# Patient Record
Sex: MALE | Race: WHITE | NOT HISPANIC OR LATINO | Employment: OTHER | URBAN - METROPOLITAN AREA
[De-identification: names, ages, dates, MRNs, and addresses within clinical notes are randomized per-mention and may not be internally consistent; named-entity substitution may affect disease eponyms.]

---

## 2020-06-16 ENCOUNTER — APPOINTMENT (OUTPATIENT)
Dept: GENERAL RADIOLOGY | Facility: CLINIC | Age: 80
End: 2020-06-16
Attending: EMERGENCY MEDICINE
Payer: COMMERCIAL

## 2020-06-16 ENCOUNTER — HOSPITAL ENCOUNTER (OUTPATIENT)
Facility: CLINIC | Age: 80
Setting detail: OBSERVATION
Discharge: HOME | End: 2020-06-17
Attending: EMERGENCY MEDICINE | Admitting: STUDENT IN AN ORGANIZED HEALTH CARE EDUCATION/TRAINING PROGRAM
Payer: COMMERCIAL

## 2020-06-16 DIAGNOSIS — R07.9 CHEST PAIN, UNSPECIFIED TYPE: ICD-10-CM

## 2020-06-16 LAB
ALBUMIN UR-MCNC: NEGATIVE MG/DL
ANION GAP SERPL CALCULATED.3IONS-SCNC: 8 MMOL/L (ref 3–14)
APPEARANCE UR: CLEAR
BASOPHILS # BLD AUTO: 0 10E9/L (ref 0–0.2)
BASOPHILS NFR BLD AUTO: 0.6 %
BILIRUB UR QL STRIP: NEGATIVE
BUN SERPL-MCNC: 30 MG/DL (ref 7–30)
CALCIUM SERPL-MCNC: 8.5 MG/DL (ref 8.5–10.1)
CHLORIDE SERPL-SCNC: 107 MMOL/L (ref 94–109)
CO2 SERPL-SCNC: 23 MMOL/L (ref 20–32)
COLOR UR AUTO: ABNORMAL
CREAT SERPL-MCNC: 1.24 MG/DL (ref 0.66–1.25)
DIFFERENTIAL METHOD BLD: ABNORMAL
EOSINOPHIL # BLD AUTO: 0.2 10E9/L (ref 0–0.7)
EOSINOPHIL NFR BLD AUTO: 2.6 %
ERYTHROCYTE [DISTWIDTH] IN BLOOD BY AUTOMATED COUNT: 13.4 % (ref 10–15)
GFR SERPL CREATININE-BSD FRML MDRD: 54 ML/MIN/{1.73_M2}
GLUCOSE SERPL-MCNC: 117 MG/DL (ref 70–99)
GLUCOSE UR STRIP-MCNC: NEGATIVE MG/DL
HCT VFR BLD AUTO: 41.4 % (ref 40–53)
HGB BLD-MCNC: 13.4 G/DL (ref 13.3–17.7)
HGB UR QL STRIP: NEGATIVE
IMM GRANULOCYTES # BLD: 0 10E9/L (ref 0–0.4)
IMM GRANULOCYTES NFR BLD: 0.1 %
INR PPP: 2.57 (ref 0.86–1.14)
KETONES UR STRIP-MCNC: NEGATIVE MG/DL
LEUKOCYTE ESTERASE UR QL STRIP: NEGATIVE
LYMPHOCYTES # BLD AUTO: 1.4 10E9/L (ref 0.8–5.3)
LYMPHOCYTES NFR BLD AUTO: 20.3 %
MCH RBC QN AUTO: 31.2 PG (ref 26.5–33)
MCHC RBC AUTO-ENTMCNC: 32.4 G/DL (ref 31.5–36.5)
MCV RBC AUTO: 96 FL (ref 78–100)
MONOCYTES # BLD AUTO: 1 10E9/L (ref 0–1.3)
MONOCYTES NFR BLD AUTO: 14 %
MUCOUS THREADS #/AREA URNS LPF: PRESENT /LPF
NEUTROPHILS # BLD AUTO: 4.3 10E9/L (ref 1.6–8.3)
NEUTROPHILS NFR BLD AUTO: 62.4 %
NITRATE UR QL: NEGATIVE
NRBC # BLD AUTO: 0 10*3/UL
NRBC BLD AUTO-RTO: 0 /100
PH UR STRIP: 5 PH (ref 5–7)
PLATELET # BLD AUTO: 146 10E9/L (ref 150–450)
POTASSIUM SERPL-SCNC: 4.2 MMOL/L (ref 3.4–5.3)
RBC # BLD AUTO: 4.3 10E12/L (ref 4.4–5.9)
RBC #/AREA URNS AUTO: 1 /HPF (ref 0–2)
SODIUM SERPL-SCNC: 138 MMOL/L (ref 133–144)
SOURCE: ABNORMAL
SP GR UR STRIP: 1.02 (ref 1–1.03)
TROPONIN I SERPL-MCNC: <0.015 UG/L (ref 0–0.04)
TROPONIN I SERPL-MCNC: <0.015 UG/L (ref 0–0.04)
UROBILINOGEN UR STRIP-MCNC: NORMAL MG/DL (ref 0–2)
WBC # BLD AUTO: 6.9 10E9/L (ref 4–11)
WBC #/AREA URNS AUTO: <1 /HPF (ref 0–5)

## 2020-06-16 PROCEDURE — 81001 URINALYSIS AUTO W/SCOPE: CPT | Performed by: EMERGENCY MEDICINE

## 2020-06-16 PROCEDURE — U0003 INFECTIOUS AGENT DETECTION BY NUCLEIC ACID (DNA OR RNA); SEVERE ACUTE RESPIRATORY SYNDROME CORONAVIRUS 2 (SARS-COV-2) (CORONAVIRUS DISEASE [COVID-19]), AMPLIFIED PROBE TECHNIQUE, MAKING USE OF HIGH THROUGHPUT TECHNOLOGIES AS DESCRIBED BY CMS-2020-01-R: HCPCS | Performed by: EMERGENCY MEDICINE

## 2020-06-16 PROCEDURE — 71046 X-RAY EXAM CHEST 2 VIEWS: CPT

## 2020-06-16 PROCEDURE — 85610 PROTHROMBIN TIME: CPT | Performed by: EMERGENCY MEDICINE

## 2020-06-16 PROCEDURE — 99285 EMERGENCY DEPT VISIT HI MDM: CPT | Mod: 25

## 2020-06-16 PROCEDURE — 84484 ASSAY OF TROPONIN QUANT: CPT | Mod: 91 | Performed by: EMERGENCY MEDICINE

## 2020-06-16 PROCEDURE — 93005 ELECTROCARDIOGRAM TRACING: CPT

## 2020-06-16 PROCEDURE — 85025 COMPLETE CBC W/AUTO DIFF WBC: CPT | Performed by: EMERGENCY MEDICINE

## 2020-06-16 PROCEDURE — 84484 ASSAY OF TROPONIN QUANT: CPT | Performed by: EMERGENCY MEDICINE

## 2020-06-16 PROCEDURE — 99220 ZZC INITIAL OBSERVATION CARE,LEVL III: CPT | Performed by: STUDENT IN AN ORGANIZED HEALTH CARE EDUCATION/TRAINING PROGRAM

## 2020-06-16 PROCEDURE — 25000132 ZZH RX MED GY IP 250 OP 250 PS 637: Performed by: EMERGENCY MEDICINE

## 2020-06-16 PROCEDURE — G0378 HOSPITAL OBSERVATION PER HR: HCPCS

## 2020-06-16 PROCEDURE — 80048 BASIC METABOLIC PNL TOTAL CA: CPT | Performed by: EMERGENCY MEDICINE

## 2020-06-16 RX ORDER — ASPIRIN 325 MG
325 TABLET ORAL ONCE
Status: COMPLETED | OUTPATIENT
Start: 2020-06-16 | End: 2020-06-16

## 2020-06-16 RX ADMIN — ASPIRIN 325 MG ORAL TABLET 325 MG: 325 PILL ORAL at 20:41

## 2020-06-16 ASSESSMENT — ENCOUNTER SYMPTOMS
NAUSEA: 0
HEMATURIA: 0
COUGH: 0
SHORTNESS OF BREATH: 0
DYSURIA: 0
FREQUENCY: 1
DIARRHEA: 0
FEVER: 0
VOMITING: 0
ABDOMINAL PAIN: 0

## 2020-06-16 ASSESSMENT — MIFFLIN-ST. JEOR: SCORE: 1741.41

## 2020-06-17 ENCOUNTER — APPOINTMENT (OUTPATIENT)
Dept: CARDIOLOGY | Facility: CLINIC | Age: 80
End: 2020-06-17
Attending: STUDENT IN AN ORGANIZED HEALTH CARE EDUCATION/TRAINING PROGRAM
Payer: COMMERCIAL

## 2020-06-17 ENCOUNTER — APPOINTMENT (OUTPATIENT)
Dept: NUCLEAR MEDICINE | Facility: CLINIC | Age: 80
End: 2020-06-17
Attending: INTERNAL MEDICINE
Payer: COMMERCIAL

## 2020-06-17 VITALS
SYSTOLIC BLOOD PRESSURE: 119 MMHG | HEIGHT: 74 IN | TEMPERATURE: 98.3 F | OXYGEN SATURATION: 99 % | WEIGHT: 213.3 LBS | RESPIRATION RATE: 18 BRPM | BODY MASS INDEX: 27.37 KG/M2 | HEART RATE: 58 BPM | DIASTOLIC BLOOD PRESSURE: 78 MMHG

## 2020-06-17 PROBLEM — R07.9 CHEST PAIN: Status: ACTIVE | Noted: 2020-06-17

## 2020-06-17 LAB
ANION GAP SERPL CALCULATED.3IONS-SCNC: 5 MMOL/L (ref 3–14)
BUN SERPL-MCNC: 27 MG/DL (ref 7–30)
CALCIUM SERPL-MCNC: 8.4 MG/DL (ref 8.5–10.1)
CHLORIDE SERPL-SCNC: 108 MMOL/L (ref 94–109)
CO2 SERPL-SCNC: 26 MMOL/L (ref 20–32)
CREAT SERPL-MCNC: 1.13 MG/DL (ref 0.66–1.25)
CV BLOOD PRESSURE: 28 %
CV STRESS MAX HR HE: 73
ERYTHROCYTE [DISTWIDTH] IN BLOOD BY AUTOMATED COUNT: 13.2 % (ref 10–15)
GFR SERPL CREATININE-BSD FRML MDRD: 61 ML/MIN/{1.73_M2}
GLUCOSE SERPL-MCNC: 85 MG/DL (ref 70–99)
HCT VFR BLD AUTO: 40.7 % (ref 40–53)
HGB BLD-MCNC: 13 G/DL (ref 13.3–17.7)
INR PPP: 2.7 (ref 0.86–1.14)
INTERPRETATION ECG - MUSE: NORMAL
MCH RBC QN AUTO: 30.9 PG (ref 26.5–33)
MCHC RBC AUTO-ENTMCNC: 31.9 G/DL (ref 31.5–36.5)
MCV RBC AUTO: 97 FL (ref 78–100)
NUC STRESS EJECTION FRACTION: 37 %
PLATELET # BLD AUTO: 143 10E9/L (ref 150–450)
POTASSIUM SERPL-SCNC: 4.4 MMOL/L (ref 3.4–5.3)
RATE PRESSURE PRODUCT: 6059
RBC # BLD AUTO: 4.21 10E12/L (ref 4.4–5.9)
SARS-COV-2 RNA SPEC QL NAA+PROBE: NOT DETECTED
SODIUM SERPL-SCNC: 139 MMOL/L (ref 133–144)
SPECIMEN SOURCE: NORMAL
STRESS ECHO BASELINE DIASTOLIC HE: 69
STRESS ECHO BASELINE HR: 60
STRESS ECHO BASELINE SYSTOLIC BP: 116
STRESS ECHO CALCULATED PERCENT HR: 52 %
STRESS ECHO LAST STRESS DIASTOLIC BP: 55
STRESS ECHO LAST STRESS SYSTOLIC BP: 83
STRESS ECHO TARGET HR: 140
TROPONIN I SERPL-MCNC: <0.015 UG/L (ref 0–0.04)
WBC # BLD AUTO: 7.1 10E9/L (ref 4–11)

## 2020-06-17 PROCEDURE — G0378 HOSPITAL OBSERVATION PER HR: HCPCS

## 2020-06-17 PROCEDURE — 80048 BASIC METABOLIC PNL TOTAL CA: CPT | Performed by: STUDENT IN AN ORGANIZED HEALTH CARE EDUCATION/TRAINING PROGRAM

## 2020-06-17 PROCEDURE — 34300033 ZZH RX 343: Performed by: STUDENT IN AN ORGANIZED HEALTH CARE EDUCATION/TRAINING PROGRAM

## 2020-06-17 PROCEDURE — 36415 COLL VENOUS BLD VENIPUNCTURE: CPT | Performed by: STUDENT IN AN ORGANIZED HEALTH CARE EDUCATION/TRAINING PROGRAM

## 2020-06-17 PROCEDURE — 85027 COMPLETE CBC AUTOMATED: CPT | Performed by: STUDENT IN AN ORGANIZED HEALTH CARE EDUCATION/TRAINING PROGRAM

## 2020-06-17 PROCEDURE — 93018 CV STRESS TEST I&R ONLY: CPT | Performed by: INTERNAL MEDICINE

## 2020-06-17 PROCEDURE — 84484 ASSAY OF TROPONIN QUANT: CPT | Performed by: STUDENT IN AN ORGANIZED HEALTH CARE EDUCATION/TRAINING PROGRAM

## 2020-06-17 PROCEDURE — 93306 TTE W/DOPPLER COMPLETE: CPT | Mod: 26 | Performed by: INTERNAL MEDICINE

## 2020-06-17 PROCEDURE — 99204 OFFICE O/P NEW MOD 45 MIN: CPT | Mod: 25 | Performed by: INTERNAL MEDICINE

## 2020-06-17 PROCEDURE — 78452 HT MUSCLE IMAGE SPECT MULT: CPT

## 2020-06-17 PROCEDURE — 85610 PROTHROMBIN TIME: CPT | Performed by: INTERNAL MEDICINE

## 2020-06-17 PROCEDURE — 99217 ZZC OBSERVATION CARE DISCHARGE: CPT | Performed by: INTERNAL MEDICINE

## 2020-06-17 PROCEDURE — 25000132 ZZH RX MED GY IP 250 OP 250 PS 637: Performed by: INTERNAL MEDICINE

## 2020-06-17 PROCEDURE — 40000264 ECHOCARDIOGRAM COMPLETE

## 2020-06-17 PROCEDURE — 25500064 ZZH RX 255 OP 636: Performed by: STUDENT IN AN ORGANIZED HEALTH CARE EDUCATION/TRAINING PROGRAM

## 2020-06-17 PROCEDURE — 25000128 H RX IP 250 OP 636

## 2020-06-17 PROCEDURE — A9502 TC99M TETROFOSMIN: HCPCS | Performed by: STUDENT IN AN ORGANIZED HEALTH CARE EDUCATION/TRAINING PROGRAM

## 2020-06-17 PROCEDURE — 36415 COLL VENOUS BLD VENIPUNCTURE: CPT | Performed by: INTERNAL MEDICINE

## 2020-06-17 PROCEDURE — 78452 HT MUSCLE IMAGE SPECT MULT: CPT | Mod: 26 | Performed by: INTERNAL MEDICINE

## 2020-06-17 RX ORDER — ISOSORBIDE MONONITRATE 30 MG/1
30 TABLET, EXTENDED RELEASE ORAL DAILY
Qty: 30 TABLET | Refills: 0 | Status: SHIPPED | OUTPATIENT
Start: 2020-06-17 | End: 2023-03-04

## 2020-06-17 RX ORDER — FEXOFENADINE HCL 180 MG/1
180 TABLET ORAL DAILY PRN
COMMUNITY

## 2020-06-17 RX ORDER — METOPROLOL SUCCINATE 50 MG/1
50 TABLET, EXTENDED RELEASE ORAL 2 TIMES DAILY WITH MEALS
Status: DISCONTINUED | OUTPATIENT
Start: 2020-06-17 | End: 2020-06-17 | Stop reason: HOSPADM

## 2020-06-17 RX ORDER — ASPIRIN 81 MG/1
162 TABLET ORAL DAILY
Status: DISCONTINUED | OUTPATIENT
Start: 2020-06-18 | End: 2020-06-17 | Stop reason: HOSPADM

## 2020-06-17 RX ORDER — ONDANSETRON 2 MG/ML
4 INJECTION INTRAMUSCULAR; INTRAVENOUS EVERY 6 HOURS PRN
Status: DISCONTINUED | OUTPATIENT
Start: 2020-06-17 | End: 2020-06-17 | Stop reason: HOSPADM

## 2020-06-17 RX ORDER — ISOSORBIDE MONONITRATE 30 MG/1
30 TABLET, EXTENDED RELEASE ORAL DAILY
Qty: 30 TABLET | Refills: 0 | Status: SHIPPED | OUTPATIENT
Start: 2020-06-17 | End: 2020-06-17

## 2020-06-17 RX ORDER — FEXOFENADINE HCL 180 MG/1
180 TABLET ORAL DAILY PRN
Status: DISCONTINUED | OUTPATIENT
Start: 2020-06-17 | End: 2020-06-17 | Stop reason: HOSPADM

## 2020-06-17 RX ORDER — REGADENOSON 0.08 MG/ML
INJECTION, SOLUTION INTRAVENOUS
Status: COMPLETED
Start: 2020-06-17 | End: 2020-06-17

## 2020-06-17 RX ORDER — ISOSORBIDE MONONITRATE 30 MG/1
30 TABLET, EXTENDED RELEASE ORAL DAILY
Status: DISCONTINUED | OUTPATIENT
Start: 2020-06-17 | End: 2020-06-17 | Stop reason: HOSPADM

## 2020-06-17 RX ORDER — NITROGLYCERIN 0.4 MG/1
0.4 TABLET SUBLINGUAL EVERY 5 MIN PRN
Status: DISCONTINUED | OUTPATIENT
Start: 2020-06-17 | End: 2020-06-17 | Stop reason: HOSPADM

## 2020-06-17 RX ORDER — LIDOCAINE 40 MG/G
CREAM TOPICAL
Status: DISCONTINUED | OUTPATIENT
Start: 2020-06-17 | End: 2020-06-17 | Stop reason: HOSPADM

## 2020-06-17 RX ORDER — METOPROLOL SUCCINATE 50 MG/1
50 TABLET, EXTENDED RELEASE ORAL EVERY MORNING
COMMUNITY

## 2020-06-17 RX ORDER — ATORVASTATIN CALCIUM 40 MG/1
80 TABLET, FILM COATED ORAL AT BEDTIME
Status: DISCONTINUED | OUTPATIENT
Start: 2020-06-17 | End: 2020-06-17 | Stop reason: HOSPADM

## 2020-06-17 RX ORDER — NITROGLYCERIN 0.4 MG/1
TABLET SUBLINGUAL
COMMUNITY
Start: 2019-09-09

## 2020-06-17 RX ORDER — ACETAMINOPHEN 325 MG/1
650 TABLET ORAL EVERY 4 HOURS PRN
Status: DISCONTINUED | OUTPATIENT
Start: 2020-06-17 | End: 2020-06-17 | Stop reason: HOSPADM

## 2020-06-17 RX ORDER — ONDANSETRON 4 MG/1
4 TABLET, ORALLY DISINTEGRATING ORAL EVERY 6 HOURS PRN
Status: DISCONTINUED | OUTPATIENT
Start: 2020-06-17 | End: 2020-06-17 | Stop reason: HOSPADM

## 2020-06-17 RX ORDER — NALOXONE HYDROCHLORIDE 0.4 MG/ML
.1-.4 INJECTION, SOLUTION INTRAMUSCULAR; INTRAVENOUS; SUBCUTANEOUS
Status: DISCONTINUED | OUTPATIENT
Start: 2020-06-17 | End: 2020-06-17 | Stop reason: HOSPADM

## 2020-06-17 RX ORDER — IRBESARTAN 75 MG/1
37.5 TABLET ORAL AT BEDTIME
COMMUNITY
Start: 2019-10-30 | End: 2023-03-04

## 2020-06-17 RX ADMIN — METOPROLOL SUCCINATE 50 MG: 50 TABLET, EXTENDED RELEASE ORAL at 14:38

## 2020-06-17 RX ADMIN — TETROFOSMIN 30 MCI.: 1.38 INJECTION, POWDER, LYOPHILIZED, FOR SOLUTION INTRAVENOUS at 11:56

## 2020-06-17 RX ADMIN — REGADENOSON 0.4 MG: 0.08 INJECTION, SOLUTION INTRAVENOUS at 11:53

## 2020-06-17 RX ADMIN — HUMAN ALBUMIN MICROSPHERES AND PERFLUTREN 3 ML: 10; .22 INJECTION, SOLUTION INTRAVENOUS at 10:40

## 2020-06-17 RX ADMIN — TETROFOSMIN 10.2 MCI.: 1.38 INJECTION, POWDER, LYOPHILIZED, FOR SOLUTION INTRAVENOUS at 09:50

## 2020-06-17 ASSESSMENT — COLUMBIA-SUICIDE SEVERITY RATING SCALE - C-SSRS
2. HAVE YOU ACTUALLY HAD ANY THOUGHTS OF KILLING YOURSELF IN THE PAST MONTH?: NO
1. IN THE PAST MONTH, HAVE YOU WISHED YOU WERE DEAD OR WISHED YOU COULD GO TO SLEEP AND NOT WAKE UP?: NO

## 2020-06-17 ASSESSMENT — MIFFLIN-ST. JEOR: SCORE: 1739.4

## 2020-06-17 NOTE — PLAN OF CARE
PRIMARY DIAGNOSIS: CHEST PAIN  OUTPATIENT/OBSERVATION GOALS TO BE MET BEFORE DISCHARGE:    1. Ruled out acute coronary syndrome (negative or stable Troponin): No  2. Resolution of pain or adequate pain control via oral regiment: Yes  3. Appropriate provocative testing performed. NM stress test ordered  Results: pending   4. Return to near baseline physical activity (including ADL and safe ambulation)  5. Cleared for discharge by consultants (if involved)  6. Safe discharge environment identified by care coordination (if unable to fully meet other goals)    Please review provider order for any additional goals.     Nurse to notify provider when observation goals have been met and patient is ready for discharge.

## 2020-06-17 NOTE — CONSULTS
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 Clifton with [facility], last ischemic evaluation Lexiscan stress test 9/29/2017  \" CONCLUSIONS:  1.  Myocardial perfusion imaging is abnormal.  There is a large area of  myocardial infarction without any evidence of ischemia.  Overall left  ventricular ejection fraction is noted to be reduced at 37% with regional  wall motion abnormalities involving the anterior, apical, and gabino-septal and apical-inferior wall.\"  - last TTE 10/25/2019  \"CONCLUSIONS  1. Severe bi-atrial enlargement.  2. Mild left ventricular dilation is present. Normal left ventricular  wall thickness. There is mid and distal anterior, anteroseptal, septal and apical hypokinesis to akinesis. Moderately reduced LV function is present. Left ventricular ejection fraction is visually estimated at 35%. Left ventricular Doppler filling pattern consistent with Grade II (moderate) diastolic dysfunction.  3. Normal right ventricle size and normal global function. A  pacemaker/AICD lead is noted in the right ventricle.  4. The aortic valve is tricuspid. There is mild aortic sclerosis  without evidence of aortic stenosis. Trace aortic regurgitation.  5. Mild dilation of the aorta is present involving the sinuses of  Valsalva (Maximal dimension 4.0 cm) and the ascending aorta (Maximal dimension 3.9 cm).  6. Pulmonary artery systolic pressure estimate is " "27 mmHg above RAP.  7. The inferior vena cava is normal suggesting normal RA pressure.  8. Compared to the previous study done on 8/9/16, there has been no significant change.\"    - Discussed options for further evaluation and management including conservative medical management with close follow up, non-invasive stress testing, or coronary angiography. Reviewed the risks and benefits of each option at length, and their questions and concerns were addressed  - My recommendation is to proceed with a Lexiscan stress test. Pt is in agreement and would like to proceed in this manner  - Discussed with Pt that they should seek medical attention if they experience any future episodes of chest pain/discomfort, worsening exertional capacity, dyspnea, or other concerning symptoms, and they voiced understanding    - continue home cardiac regimen    Thank you for allowing our team to participate in the care of Constantino Thorne.  Please do not hesitate to page me with any questions or concerns.     Ronny Ridley MD, [facility]  Cardiology  Pager:  [phone]  Text Page   June 17, 2020         Past Medical History:   I have reviewed this patient's past medical history  The ASCVD Risk score (W[place] MARILOU Jr., et al., 2013) failed to calculate for the following reasons:    The 2013 ASCVD risk score is only valid for ages 40 to 79    The patient has a prior MI or stroke diagnosis  Past Medical History:   Diagnosis Date     A-fib (H)      Hypertension                Past Surgical History:   I have reviewed this patient's past surgical history   No past surgical history on file.          Social History:   I have reviewed this patient's social history  Social History     Tobacco Use     Smoking status: Not on file   Substance Use Topics     Alcohol use: Not on file             Family History:   I have reviewed this patient's family history  Family History   Problem Relation Age of Onset     Cancer Sister              Allergies: "   I have reviewed this patient's allergy history  Allergies   Allergen Reactions     Lisinopril Unknown and Cough     Spironolactone Unknown and Diarrhea             Medications reviewed:   Prior to admission medications:  Prior to Admission medications    Medication Sig Start Date End Date Taking? Authorizing Provider   atorvastatin (LIPITOR) 80 MG tablet Take 80 mg by mouth At Bedtime    Yes Reported, Patient   fexofenadine (ALLEGRA) 180 MG tablet Take 180 mg by mouth daily as needed for allergies   Yes Unknown, Entered By History   irbesartan (AVAPRO) 75 MG tablet Take 37.5 mg by mouth At Bedtime 10/30/19 10/29/20 Yes Unknown, Entered By History   metoprolol succinate ER (TOPROL-XL) 50 MG 24 hr tablet Take 50 mg by mouth 2 times daily (with meals)   Yes Unknown, Entered By History   nitroGLYcerin (NITROSTAT) 0.4 MG sublingual tablet Place 1 Tablet under tongue as needed. If no relief after 5 min call 911;continue 1 tab every 5 min max 3 tab 9/9/19  Yes Unknown, Entered By History   warfarin ANTICOAGULANT (COUMADIN ANTICOAGULANT) 5 MG tablet Take 7.5mg (1.5 tablets) on Tuesday, Thursday, Saturday and 5mg (1 tablet) all other days of the week or as directed by INR clinic.   Yes Reported, Patient      Current medications:  Current Facility-Administered Medications Ordered in Epic   Medication Dose Route Frequency Last Rate Last Dose     acetaminophen (TYLENOL) tablet 650 mg  650 mg Oral Q4H PRN         lidocaine (LMX4) cream   Topical Q1H PRN         lidocaine 1 % 0.1-1 mL  0.1-1 mL Other Q1H PRN         melatonin tablet 1 mg  1 mg Oral At Bedtime PRN         naloxone (NARCAN) injection 0.1-0.4 mg  0.1-0.4 mg Intravenous Q2 Min PRN         nitroGLYcerin (NITROSTAT) sublingual tablet 0.4 mg  0.4 mg Sublingual Q5 Min PRN         ondansetron (ZOFRAN-ODT) ODT tab 4 mg  4 mg Oral Q6H PRN        Or     ondansetron (ZOFRAN) injection 4 mg  4 mg Intravenous Q6H PRN         regadenoson (LEXISCAN) 0.4 MG/5ML injection              sodium chloride (PF) 0.9% PF flush 3 mL  3 mL Intracatheter q1 min prn         sodium chloride (PF) 0.9% PF flush 3 mL  3 mL Intracatheter Q8H   3 mL at 20 0739     technetium Tc 99m tetrofosmin 2UD study (MYOVIEW) radioisotope injection 3-42 mCi  3-42 mCi Intravenous Q2H   10.2 mCi at 20 0950     No current [facility]-ordered outpatient medications on file.             Review of Systems:   A complete review of systems was performed and was negative except as mentioned in the HPI.          Physical Exam:   Vital signs were personally reviewed:  Temperatures:  Current - Temp: 98  F (36.7  C); Max - Temp  Av.3  F (36.8  C)  Min: 98  F (36.7  C)  Max: 98.6  F (37  C)  Respiration range: Resp  Av  Min: 14  Max: 18  Pulse range: Pulse  Av.9  Min: 57  Max: 60  Blood pressure range: Systolic (24hrs), Av , Min:116 , Max:129   ; Diastolic (24hrs), Av, Min:72, Max:94    Pulse oximetry range: SpO2  Av.4 %  Min: 95 %  Max: 100 %  No intake or output data in the 24 hours ending 20 1020  213 lbs 4.8 oz  Body mass index is 27.76 kg/m .   Body surface area is 2.24 meters squared.  Constitutional: appears stated age, in no apparent distress, appears to be well nourished  Eyes: sclera anicteric, conjunctiva normal, no lesions on eyelids or lashes  ENT: normocephalic, without obvious abnormality, atraumatic, external ears without lesions   Pulmonary: clear to auscultation bilaterally, no wheezes, no rales, no increased work of breathing  Cardiovascular: JVP normal, regular rate, regular rhythm, 1/6 OMAR at the RUSB no murmur appreciated, no lower extremity edema  Gastrointestinal: abdominal exam benign, non-tender, no rigidity, no guarding  Neurologic: awake, alert, face symmetrical, moves all extremities  Skin: no abnormal rashes or lesions on limited exam, nails normal without discoloration or clubbing, no jaundice  Psychiatric: affect is normal, answers questions appropriately, oriented  to self and place         Laboratory tests:   Laboratory tests personally reviewed:   CMP  Recent Labs   Lab 06/17/20  0703 06/16/20  1936    138   POTASSIUM 4.4 4.2   CHLORIDE 108 107   CO2 26 23   ANIONGAP 5 8   GLC 85 117*   BUN 27 30   CR 1.13 1.24   GFRESTIMATED 61 54*   GFRESTBLACK 70 63   ZACH 8.4* 8.5     CBC  Recent Labs   Lab 06/17/20  0703 06/16/20 1936   WBC 7.1 6.9   RBC 4.21* 4.30*   HGB 13.0* 13.4   HCT 40.7 41.4   MCV 97 96   MCH 30.9 31.2   MCHC 31.9 32.4   RDW 13.2 13.4   * 146*     INR  Recent Labs   Lab 06/17/20  1003 06/16/20 1936   INR 2.70* 2.57*     Lab Results   Component Value Date    TROPI <0.015 06/17/2020    TROPI <0.015 06/16/2020    TROPI <0.015 06/16/2020    TROPONIN 0.06 (H) 02/14/2010     No results for input(s): CHOL, HDL, LDL, TRIG, CHOLHDLRATIO in the last 41921 hours.  No results found for: A1C  TSH   Date Value Ref Range Status   02/16/2010 2.31 0.4 - 5.0 mU/L Final            Imaging and Additional Data:   Additional data personally reviewed:    Recent Results (from the past 24 hour(s))   XR Chest 2 Views    Narrative    EXAM: XR CHEST 2 VW  LOCATION: [facility]  DATE/TIME: 6/16/2020 8:31 PM    INDICATION: Chest pain  COMPARISON: 03/12/2010      Impression    IMPRESSION: Dual-chamber implantable defibrillator appears in good position. Heart size and. Lungs are clear.   NM MPI w Lexiscan   Result Value    Target

## 2020-06-17 NOTE — DISCHARGE SUMMARY
[facility]  Discharge Summary  Name: Constantino Thorne    MRN: [id number]  YOB: 1940    Age: 79 year old  Date of Discharge:  6/17/2020  Date of Admission: 6/16/2020  Primary Care Provider: Thom Barnes  Discharge Physician:  Preston Brumfield MD  Discharging Service:  Hospitalist      Hospital Course/Discharge Diagnoses:  Constantino Thorne is a 79 year old male with somewhat complex past cardiac history including prior out of hospital arrest, DANIEL to mid LAD, heart failure with reduced EF with pacemaker in place admitted on 6/16/2020 with episodic chest pain starting the day prior to admission.  This seemed somewhat atypical for cardiac pain as it was sharp and stabbing in nature.  This later developed into chest pressure so he came into the ER.  Here he was noted to be in an atrial paced V sensed rhythm and serial troponins were negative.  He was admitted for cardiology evaluation and he was seen by Dr. Ridley and subsequently underwent nuclear medicine stress testing.  The EKG portion of this was negative for ischemic changes but the nuclear portion showed a small area of mild ischemia in the inferior segment of the left ventricle.  EF was estimated to be 28% at rest and 37% with stress.  Echocardiogram was obtained here as well which estimated his LVEF at 30 to 35% with mid to distal anterior akinesis.    Ultimately after further discussion with cardiology the patient elected to pursue medical management with the addition of Imdur and close outpatient follow-up with his primary cardiologist.     Problem List/Assessment and Plan:   1. Episodic chest pain, negative serial troponins with somewhat abnormal nuclear medicine stress test:  Addressed by cardiology, plan is for medical management at this time with the addition of Imdur and close outpatient follow-up with the patient's primary cardiologist in clinic.  --Continue on irbesartan, metoprolol, statin and Imdur added by  cardiology.      2.  Chronic renal disease, stage III:   Assessment: Cr on admission 1.24     3.  History of persistent atrial fibrillation, chronically on Coumadin.  INR therapeutic.  Coumadin held pending cardiac work-up but now he will resume this upon discharge.    --Continue metoprolol       4.   SABINE: No acute issues.         Discharge Disposition:  Discharged to home     Allergies:  Allergies   Allergen Reactions     Lisinopril Unknown and Cough     Spironolactone Unknown and Diarrhea        Discharge Medications:        Review of your medicines      START taking      Dose / Directions   isosorbide mononitrate 30 MG 24 hr tablet  Commonly known as:  IMDUR  Used for:  Chest pain, unspecified type      Dose:  30 mg  Take 1 tablet (30 mg) by mouth daily  Quantity:  30 tablet  Refills:  0        CONTINUE these medicines which have NOT CHANGED      Dose / Directions   atorvastatin 80 MG tablet  Commonly known as:  LIPITOR      Dose:  80 mg  Take 80 mg by mouth At Bedtime  Refills:  0     COUMADIN ANTICOAGULANT 5 MG tablet  Generic drug:  warfarin ANTICOAGULANT      Take 7.5mg (1.5 tablets) on Tuesday, Thursday, Saturday and 5mg (1 tablet) all other days of the week or as directed by INR clinic.  Refills:  0     fexofenadine 180 MG tablet  Commonly known as:  ALLEGRA      Dose:  180 mg  Take 180 mg by mouth daily as needed for allergies  Refills:  0     irbesartan 75 MG tablet  Commonly known as:  AVAPRO      Dose:  37.5 mg  Take 37.5 mg by mouth At Bedtime  Refills:  0     metoprolol succinate ER 50 MG 24 hr tablet  Commonly known as:  TOPROL-XL      Dose:  50 mg  Take 50 mg by mouth 2 times daily (with meals)  Refills:  0     nitroGLYcerin 0.4 MG sublingual tablet  Commonly known as:  NITROSTAT      Place 1 Tablet under tongue as needed. If no relief after 5 min call 911;continue 1 tab every 5 min max 3 tab  Refills:  0           Where to get your medicines      These medications were sent to [facility]  "[place], [place] - [address] [address] [facility] [address] [facility]  [address] [facility] [address]    Phone:  [phone]     isosorbide mononitrate 30 MG 24 hr tablet         Condition on Discharge:  Discharge condition: Stable       Code status on discharge: Full Code     History of Illness:  See detailed admission note for full details.    Physical Exam:  Vital signs:  Temp: 98.3  F (36.8  C) Temp src: Oral BP: 119/78 Pulse: 58 Heart Rate: 65 Resp: 18 SpO2: 99 % O2 Device: None (Room air)   Height: 186.7 cm (6' 1.5\") Weight: 96.8 kg (213 lb 4.8 oz)  Estimated body mass index is 27.76 kg/m  as calculated from the following:    Height as of this encounter: 1.867 m (6' 1.5\").    Weight as of this encounter: 96.8 kg (213 lb 4.8 oz).    Wt Readings from Last 1 Encounters:   06/17/20 96.8 kg (213 lb 4.8 oz)     General: Alert, awake, no acute distress.  HEENT: NC/AT, eyes anicteric, external occular movements intact, face symmetric.  Dentition WNL, MM moist.  Cardiac: RRR, S1, S2.  No murmurs appreciated.  Pulmonary: Normal chest rise, normal work of breathing.  Lungs CTA BL  Abdomen: soft, non-tender, non-distended.  Bowel Sounds Present.  No guarding.  Extremities: no deformities.  Warm, well perfused.  Skin: no rashes or lesions noted.  Warm and Dry.  Neuro: No focal deficits noted.  Speech clear.  Coordination and strength grossly normal.  Psych: Appropriate affect.    Procedures other than Imaging:  Stress testing     Imaging:  Results for orders placed or performed during the hospital encounter of 06/16/20   XR Chest 2 Views    Narrative    EXAM: XR CHEST 2 VW  LOCATION: [facility]  DATE/TIME: 6/16/2020 8:31 PM    INDICATION: Chest pain  COMPARISON: 03/12/2010      Impression    IMPRESSION: Dual-chamber implantable defibrillator appears in good position. Heart size and. Lungs are clear.   Echocardiogram Complete    Narrative    [id number]  [id number]  [id number]  [id number]           [facility]  Echocardiography Laboratory  [address] [place] [address]        Name: DESIREE SKAGGS  MRN: [id number]  : 1940  Study Date: 2020 10:29 AM  Age: 79 yrs  Gender: Male  Patient Location: [facility]  Reason For Study: Chest Pain  Ordering Physician: SHYAM CUBA  Referring Physician: Thom Barnes MD  Performed By: Pattie Pereyra RDCS     BSA: 2.2 m2  Height: 73 in  Weight: 213 lb  HR: 65  BP: 118/74 mmHg  _____________________________________________________________________________  __        Procedure  Complete Portable Echo Adult. Optison (NDC #5714-1404) given intravenously.  _____________________________________________________________________________  __        Interpretation Summary     The visual ejection fraction is estimated at 30-35%.  Left ventricular systolic function is moderately reduced.  There are regional wall motion abnormalities as specified. The findings are  consistent with a prior LAD territory infarct.  The ascending aorta is Mildly dilated.  The study was technically difficult. Compared to prior study, there is no  significant change.  _____________________________________________________________________________  __        Left Ventricle  The left ventricle is borderline dilated. (The upper limit of normal is 5.6cm  for left ventricular size in end-diastole.). There is mild concentric left  ventricular hypertrophy. Diastolic Doppler findings (E/E' ratio and/or other  parameters) suggest left ventricular filling pressures are increased. The  visual ejection fraction is estimated at 30-35%. Left ventricular systolic  function is moderately reduced. Mid to distal anteroseptal akinesis. Mid to  distal inferoseptal akinesis. Distal anterolateral akinesis. Mid to distal  anterior akinesis. Distal inferior akinesis. There is apical akinesis. There  are regional wall motion abnormalities as specified.     Right Ventricle  There is a catheter/pacemaker lead  seen in the right ventricle. The right  ventricle is normal in size and function.     Atria  Normal left atrial size. Right atrial size is normal. There is no color  Doppler evidence of an atrial shunt.     Mitral Valve  Thickened mitral valve chordae. There is trace mitral regurgitation.        Tricuspid Valve  There is trace tricuspid regurgitation. The right ventricular systolic  pressure is approximated at 22.8 mmHg plus the right atrial pressure.     Aortic Valve  The aortic valve is trileaflet. There is trivial trileaflet aortic sclerosis.  No aortic regurgitation is present. No hemodynamically significant valvular  aortic stenosis.     Pulmonic Valve  There is no pulmonic valvular regurgitation.     Vessels  Borderline aortic root dilatation. The ascending aorta is Mildly dilated. IVC  diameter and respiratory changes fall into an intermediate range suggesting an  RA pressure of 8 mmHg.     Pericardium  There is no pericardial effusion.        Rhythm  Sinus rhythm was noted. The patient exhibited occasional PVCs.  _____________________________________________________________________________  __  MMode/2D Measurements & Calculations  IVSd: 1.0 cm     LVIDd: 5.7 cm  LVIDs: 4.6 cm  LVPWd: 1.0 cm  LVPWs: 1.4 cm  FS: 19.5 %  LV mass(C)d: 231.4 grams  LV mass(C)dI: 104.7 grams/m2  Ao root diam: 3.7 cm  LA dimension: 4.6 cm  asc Aorta Diam: 4.1 cm  LA/Ao: 1.2     EF(MOD-bp): 37.2 %  LA Volume (BP): 69.0 ml  LA Volume Index (BP): 31.2 ml/m2  RWT: 0.35           Doppler Measurements & Calculations  MV E max sebas: 66.6 cm/sec  MV A max sebas: 80.0 cm/sec  MV E/A: 0.83  MV P1/2t max sebas: 68.1 cm/sec  MV P1/2t: 96.8 msec  MVA(P1/2t): 2.3 cm2  MV dec slope: 206.0 cm/sec2  PA acc time: 0.14 sec  TR max sebas: 239.0 cm/sec  TR max P.8 mmHg  E/E' avg: 15.0  Lateral E/e': 14.8  Medial E/e': 15.2           _____________________________________________________________________________  __           Report approved by: Tay  Carlo Dyer 06/17/2020 01:08 PM      NM MPI w Lexiscan     Value    Target     Baseline Systolic     Baseline Diastolic BP 69    Last Stress Systolic BP 83    Last Stress Diastolic BP 55    Baseline HR 60    Max HR 73    Calculated Percent HR 52    Rate Pressure Product 6,059.0    Nuc Rest EF 28    Left Ventricular EF 37    Narrative       The nuclear stress test is abnormal.     There is a small area of mild ischemia in the inferior segment(s) of   the left ventricle.     There is a large area of transmural infarction in the mid to distal   anterior and apical segment(s) of the left ventricle. This appears to be   in the left anterior descending distribution.     Left ventricular function is severely reduced.     The left ventricular ejection fraction at rest is 28%.  The left   ventricular ejection fraction at stress is 37%.     Left ventricular enlargement is noted.     There is no prior study for comparison.            Consultations:  Consultation during this admission received from cardiology.       Recent Lab Results:  Recent Labs   Lab 06/17/20  0703 06/16/20  1936   WBC 7.1 6.9   HGB 13.0* 13.4   HCT 40.7 41.4   MCV 97 96   * 146*          Lab Results   Component Value Date     06/17/2020     06/16/2020     11/02/2016    Lab Results   Component Value Date    CHLORIDE 108 06/17/2020    CHLORIDE 107 06/16/2020    CHLORIDE 104 11/02/2016    Lab Results   Component Value Date    BUN 27 06/17/2020    BUN 30 06/16/2020    BUN 31 11/02/2016      Lab Results   Component Value Date    POTASSIUM 4.4 06/17/2020    POTASSIUM 4.2 06/16/2020    POTASSIUM 4.1 11/02/2016    Lab Results   Component Value Date    CO2 26 06/17/2020    CO2 23 06/16/2020    CO2 25 11/02/2016    Lab Results   Component Value Date    CR 1.13 06/17/2020    CR 1.24 06/16/2020    CR 1.24 11/02/2016        Recent Labs   Lab 06/17/20  0703 06/16/20  2220 06/16/20  1936   TROPI <0.015 <0.015 <0.015           Pending Results:    Unresulted Labs Ordered in the Past 30 Days of this Admission     Date and Time Order Name Status Description    6/16/2020 2043 Asymptomatic COVID-19 Virus (Coronavirus) by PCR In process            Discharge Instructions and Follow-Up:   Discharge Procedure Orders   Reason for your hospital stay   Order Comments: You were admitted for chest pain and had negative troponins to rule out a heart attack.  Following this, you did undergo cardiac stress testing which was mildly abnormal.  You discussed your options with the cardiologist and elected to add a new medication called Imdur and to follow-up closely with your own cardiologist to discuss further management.     Follow-up and recommended labs and tests    Order Comments: Follow-up with Dr. Davidson in your cardiology clinic as soon as you are able, ideally in the next week or so.     Activity   Order Comments: Your activity upon discharge: Light activity as tolerated     Order Specific Question Answer Comments   Is discharge order? Yes      Diet   Order Comments: Follow this diet upon discharge: Orders Placed This Encounter      Regular Diet Adult     Order Specific Question Answer Comments   Is discharge order? Yes        Total time spent in face to face contact with the patient and coordinating discharge was:  35 Minutes.

## 2020-06-17 NOTE — ED NOTES
Ambulated to bathroom with steady gait independently.  Given lunch box.  Watching obs video.  Denies any other needs this time.

## 2020-06-17 NOTE — PLAN OF CARE
Pt A&O x4, VSS, denies chest pain since coming to the hospital. RA. LS CTA. Cards consulted. SBA, NPO since midnight.      PRIMARY DIAGNOSIS: CHEST PAIN  OUTPATIENT/OBSERVATION GOALS TO BE MET BEFORE DISCHARGE:  1. Ruled out acute coronary syndrome (negative or stable Troponin):  no  2. Pain Status: Pain free.  3. Appropriate provocative testing performed: No  - Stress Test Procedure:   - Interpretation of cardiac rhythm per telemetry tech: y    4. Cleared by Consultants (if applicable):No  5. Return to near baseline physical activity: Yes  Discharge Planner Nurse   Safe discharge environment identified: Yes  Barriers to discharge: Yes       Entered by: Amy C. Severson 06/17/2020 7:03 AM     Please review provider order for any additional goals.   Nurse to notify provider when observation goals have been met and patient is ready for discharge.

## 2020-06-17 NOTE — PROGRESS NOTES
Cardiology brief follow up note:    TTE shows LVEF 30-35%, regional wall motion abnormalities unchanged from prior.    Troponin negative x3.    Asymptomatic since admission.    Reviewed findings of nuclear stress test with Pt, showing small area of mild ischemia in the inferior segment, and large anterior/apical infarction.     Discussed options, risks/benefits, for further evaluation/management including uptitration of anti-anginal medications or coronary angiography +/- PCI. Recommended trial of medical therapy, and close follow up. He is in agreement. Understands to seek medical care if he has any recurrence of his chest pain/discomfort or other concerning symptoms. Will start imdur 30mg daily. He follows with Dr. Clifton outpatient. I shared with him that I know Dr. Clifton from my time in training, and enjoyed working on his team, and to say hello for me. He understands he should follow up with his group on discharge.     Ronny Ridley MD, [facility]  Cardiology  Pager:  [phone]  Text Page   June 17, 2020

## 2020-06-17 NOTE — PLAN OF CARE
Vss. Denies cp. discharge inst reviewed with pt. Script sent to [facility] per pt request. Edu given on new med. Eating meal and waiting for spouse to .   .rhobPRIMARY DIAGNOSIS: CHEST PAIN  OUTPATIENT/OBSERVATION GOALS TO BE MET BEFORE DISCHARGE:  1. Ruled out acute coronary syndrome (negative or stable Troponin):  Yes  2. Pain Status: Pain free.  3. Appropriate provocative testing performed: Yes  - Stress Test Procedure: Nuclear  - Interpretation of cardiac rhythm per telemetry tech: apaced    4. Cleared by Consultants (if applicable):Yes  5. Return to near baseline physical activity: Yes  Discharge Planner Nurse   Safe discharge environment identified: Yes  Barriers to discharge: No       Entered by: Colin Stoner 06/17/2020 3:25 PM     Please review provider order for any additional goals.   Nurse to notify provider when observation goals have been met and patient is ready for discharge.

## 2020-06-17 NOTE — PROGRESS NOTES
[facility]  Hospitalist Progress Note  Thom Brumfield MD 06/17/20    Reason for Stay (Diagnosis): Chest pain         Assessment and Plan:      Summary of Stay: Constantino Thorne is a 79 year old male with somewhat complex past cardiac history including prior out of hospital arrest, DANIEL to mid LAD, heart failure with reduced EF with pacemaker in place admitted on 6/16/2020 with episodic chest pain starting the day prior to admission.  This seemed somewhat atypical for cardiac pain as it was sharp and stabbing in nature.  This later developed into chest pressure so he came into the ER.  Here he was noted to be in an atrial paced V sensed rhythm and serial troponins were negative.  He was admitted for cardiology evaluation and he was seen by Dr. Ridley and subsequently underwent nuclear medicine stress testing.  The EKG portion of this was negative for ischemic changes but the nuclear portion showed a small area of mild ischemia in the inferior segment of the left ventricle.  EF was estimated to be 28% at rest and 37% with stress.  Echocardiogram was obtained here as well which estimated his LVEF at 30 to 35% with mid to distal anterior akinesis.    Problem List/Assessment and Plan:   1. Episodic chest pain, negative serial troponins with somewhat abnormal nuclear medicine stress test: Ultimately defer management to cardiology.  Currently chest pain-free as he has been since presentation.  Unclear if these represent stable findings in the setting of a prior LAD infarct or unstable angina.  --Continue on irbesartan, metoprolol, statin and aspirin.     2.  Chronic renal disease, stage III:   Assessment: Cr on admission 1.24  - avoid NSAIDs/nephrotoxins as able.    3.  History of persistent atrial fibrillation, chronically on Coumadin.  INR therapeutic.  Coumadin held pending cardiac work-up,   --will hold Coumadin until cardiology weighs and regarding plan for angiogram versus discharge home with  "medical management.   --Continue metoprolol       4.   SABINE: No acute issues.      DVT Prophylaxis: Warfarin  Code Status: Full Code  Discharge Dispo/Date: Pending cardiology input.        Interval History (Subjective):      Patient admitted last night with chest pain, underwent nuclear medicine stress testing as today with some evidence of ischemia  Awaiting cardiology follow-up regarding the plan  No recurrence of chest pain following admission                  Physical Exam:      Last Vital Signs:  /78 (BP Location: Right arm)   Pulse 58   Temp 98.3  F (36.8  C) (Oral)   Resp 18   Ht 1.867 m (6' 1.5\")   Wt 96.8 kg (213 lb 4.8 oz)   SpO2 99%   BMI 27.76 kg/m      No intake/output data recorded.    General: Alert, awake, no acute distress.  HEENT: NC/AT, eyes anicteric, external occular movements intact, face symmetric.  Dentition WNL, MM moist.  Cardiac: RRR, S1, S2.  No murmurs appreciated.  Pulmonary: Normal chest rise, normal work of breathing.  Lungs CTA BL  Abdomen: soft, non-tender, non-distended.  Bowel Sounds Present.  No guarding.  Extremities: no deformities.  Warm, well perfused.  Skin: no rashes or lesions noted.  Warm and Dry.  Neuro: No focal deficits noted.  Speech clear.  Coordination and strength grossly normal.  Psych: Appropriate affect.         Medications:      All current medications were reviewed with changes reflected in problem list.         Data:      All new lab and imaging data was reviewed.   Labs:  Recent Labs   Lab 06/17/20  0703      POTASSIUM 4.4   CHLORIDE 108   CO2 26   ANIONGAP 5   GLC 85   BUN 27   CR 1.13   GFRESTIMATED 61   GFRESTBLACK 70   ZACH 8.4*     Recent Labs   Lab 06/17/20  0703   WBC 7.1   HGB 13.0*   HCT 40.7   MCV 97   *     Recent Labs   Lab 06/17/20  0703 06/16/20  2220 06/16/20  1936   TROPI <0.015 <0.015 <0.015      Imaging:   Recent Results (from the past 48 hour(s))   XR Chest 2 Views    Narrative    EXAM: XR CHEST 2 VW  LOCATION: " [facility]  DATE/TIME: 2020 8:31 PM    INDICATION: Chest pain  COMPARISON: 2010      Impression    IMPRESSION: Dual-chamber implantable defibrillator appears in good position. Heart size and. Lungs are clear.   Echocardiogram Complete    Narrative    [id number]  [id number]  [id number]  [id number]^BEREKET^SHYAM           [facility]  Echocardiography Laboratory  [address] [place] [address]        Name: DESIREE SKAGGS  MRN: [id number]  : 1940  Study Date: 2020 10:29 AM  Age: 79 yrs  Gender: Male  Patient Location: [facility]  Reason For Study: Chest Pain  Ordering Physician: SHYAM CUBA  Referring Physician: Thom Barnes MD  Performed By: Pattie Pereyra RDCS     BSA: 2.2 m2  Height: 73 in  Weight: 213 lb  HR: 65  BP: 118/74 mmHg  _____________________________________________________________________________  __        Procedure  Complete Portable Echo Adult. Optison (NDC #[id number]) given intravenously.  _____________________________________________________________________________  __        Interpretation Summary     The visual ejection fraction is estimated at 30-35%.  Left ventricular systolic function is moderately reduced.  There are regional wall motion abnormalities as specified. The findings are  consistent with a prior LAD territory infarct.  The ascending aorta is Mildly dilated.  The study was technically difficult. Compared to prior study, there is no  significant change.  _____________________________________________________________________________  __        Left Ventricle  The left ventricle is borderline dilated. (The upper limit of normal is 5.6cm  for left ventricular size in end-diastole.). There is mild concentric left  ventricular hypertrophy. Diastolic Doppler findings (E/E' ratio and/or other  parameters) suggest left ventricular filling pressures are increased. The  visual ejection fraction is estimated at 30-35%. Left ventricular  systolic  function is moderately reduced. Mid to distal anteroseptal akinesis. Mid to  distal inferoseptal akinesis. Distal anterolateral akinesis. Mid to distal  anterior akinesis. Distal inferior akinesis. There is apical akinesis. There  are regional wall motion abnormalities as specified.     Right Ventricle  There is a catheter/pacemaker lead seen in the right ventricle. The right  ventricle is normal in size and function.     Atria  Normal left atrial size. Right atrial size is normal. There is no color  Doppler evidence of an atrial shunt.     Mitral Valve  Thickened mitral valve chordae. There is trace mitral regurgitation.        Tricuspid Valve  There is trace tricuspid regurgitation. The right ventricular systolic  pressure is approximated at 22.8 mmHg plus the right atrial pressure.     Aortic Valve  The aortic valve is trileaflet. There is trivial trileaflet aortic sclerosis.  No aortic regurgitation is present. No hemodynamically significant valvular  aortic stenosis.     Pulmonic Valve  There is no pulmonic valvular regurgitation.     Vessels  Borderline aortic root dilatation. The ascending aorta is Mildly dilated. IVC  diameter and respiratory changes fall into an intermediate range suggesting an  RA pressure of 8 mmHg.     Pericardium  There is no pericardial effusion.        Rhythm  Sinus rhythm was noted. The patient exhibited occasional PVCs.  _____________________________________________________________________________  __  MMode/2D Measurements & Calculations  IVSd: 1.0 cm     LVIDd: 5.7 cm  LVIDs: 4.6 cm  LVPWd: 1.0 cm  LVPWs: 1.4 cm  FS: 19.5 %  LV mass(C)d: 231.4 grams  LV mass(C)dI: 104.7 grams/m2  Ao root diam: 3.7 cm  LA dimension: 4.6 cm  asc Aorta Diam: 4.1 cm  LA/Ao: 1.2     EF(MOD-bp): 37.2 %  LA Volume (BP): 69.0 ml  LA Volume Index (BP): 31.2 ml/m2  RWT: 0.35           Doppler Measurements & Calculations  MV E max sebas: 66.6 cm/sec  MV A max sebas: 80.0 cm/sec  MV E/A: 0.83  MV P1/2t  max sebas: 68.1 cm/sec  MV P1/2t: 96.8 msec  MVA(P1/2t): 2.3 cm2  MV dec slope: 206.0 cm/sec2  PA acc time: 0.14 sec  TR max sebas: 239.0 cm/sec  TR max P.8 mmHg  E/E' avg: 15.0  Lateral E/e': 14.8  Medial E/e': 15.2           _____________________________________________________________________________  __           Report approved by: Carlo Hallman 2020 01:08 PM      NM MPI w Lexiscan   Result Value    Target     Baseline Systolic     Baseline Diastolic BP 69    Last Stress Systolic BP 83    Last Stress Diastolic BP 55    Baseline HR 60    Max HR 73    Calculated Percent HR 52    Rate Pressure Product 6,059.0    Nuc Rest EF 28    Left Ventricular EF 37    Narrative       The nuclear stress test is abnormal.     There is a small area of mild ischemia in the inferior segment(s) of   the left ventricle.     There is a large area of transmural infarction in the mid to distal   anterior and apical segment(s) of the left ventricle. This appears to be   in the left anterior descending distribution.     Left ventricular function is severely reduced.     The left ventricular ejection fraction at rest is 28%.  The left   ventricular ejection fraction at stress is 37%.     Left ventricular enlargement is noted.     There is no prior study for comparison.             Thom Brumfield MD , MD.

## 2020-06-17 NOTE — ED PROVIDER NOTES
History     Chief Complaint:  Chest pain      HPI   Constantino Thorne is a 79 year old male with a history of MI with stenting, ICD pacemaker in situ, ischemic cardiomyopathy, heart failure, hypertension, hyperlipidemia, CAD, SABINE, atrial fibrillation on Coumadin who presents with left-sided chest pain since yesterday afternoon. He states that these started as sharp little intermittent pains, but today the discomfort became much more noticeable on and off for most of the day. It lasts approximately an hour each time and is more of a dull pressure pain. There are no particular alleviators or exacerbators. No exertional symptoms. He took one nitroglycerin with a small amount of relief last night. He denies active chest pain. He denies fever, cough, shortness of breath, abdominal pain, nausea, vomiting, and diarrhea.     Patient recalls some urinary frequency last night. He denies hematuria or dysuria.     CARDIAC RISK FACTORS:  Sex:    Male  Tobacco:   No  Hypertension:   Yes  Hyperlipidemia:  Yes  Diabetes:   Yes  Family History:  No    Allergies:  Lisinopril  Spironolactone     Medications:    ATORVASTATIN CALCIUM PO  METOPROLOL TARTRATE PO  Avapro  Warfarin Sodium (COUMADIN PO)    Past Medical History:    Coronary artery disease  HFrEF  SABINE  Stage 3 CKD  Persistent atrial fibrillation  Cardiac arrest  MI  Ischemic cardiomyopathy  ICD in situ  Hypertension  Hyperlipidemia  BPH     Past Surgical History:    ICD implant  Hernia repair  Fracture repair  Cardiac stents x2    Family History:    Cataract  Diabetes   Ovarian cancer    Social History:  Smoking status: Never  Alcohol use: No  Patient presents alone.  PCP: Thom Barnes    Marital Status:   [2]     Review of Systems   Constitutional: Negative for fever.   Respiratory: Negative for cough and shortness of breath.    Cardiovascular: Positive for chest pain.   Gastrointestinal: Negative for abdominal pain, diarrhea, nausea and vomiting.   Genitourinary:  "Positive for frequency. Negative for dysuria and hematuria.   All other systems reviewed and are negative.    Physical Exam     Patient Vitals for the past 24 hrs:   BP Temp Pulse Heart Rate Resp SpO2 Height Weight   06/16/20 2200 121/74 -- 58 60 -- 97 % -- --   06/16/20 2045 127/80 -- 60 58 -- 98 % -- --   06/16/20 1905 126/80 98.3  F (36.8  C) -- 62 14 98 % -- --   06/16/20 1903 -- -- -- -- 18 -- 1.867 m (6' 1.5\") 97 kg (213 lb 12.1 oz)     Physical Exam  VS: Reviewed per above  HENT: Mucous membranes moist  EYES: sclera anicteric  CV: Rate as noted, regular rhythm.   RESP: Effort normal. Breath sounds are normal bilaterally.  GI: no tenderness/rebound/guarding, not distended.  NEURO: Alert, moving all extremities  MSK: No deformity of the extremities  SKIN: Warm and dry        Emergency Department Course     ECG (19:10:37):  Rate 64 bpm. HI interval 218. QRS duration 100. QT/QTc 400/412. P-R-T axes * -22 71. Atrial-paced rhythm with prolonged AV conduction with occasional supraventricular complexes and with premature ventricular or aberrantly conducted complexes. Low voltage QRS. Cannot rule out anteroseptal infarct, age undetermined. No prior for comparison. Interpreted at 2005 by Antoni Peter MD.      Imaging:  Radiology findings were communicated with the patient and Admitting MD who voiced understanding of the findings.    X-ray Chest, 2 views:  Dual-chamber implantable defibrillator appears in good position. Heart size and. Lungs are clear.   Result per radiology.     Laboratory:  Laboratory findings were communicated with the patient and Admitting MD who voiced understanding of the findings.    CBC: WBC 6.9, HGB 13.4,  (L)   BMP: Glucose 117 (H), GFR 54 (L), o/w WNL (Creatinine 1.24)  1936 Troponin I: <0.015  INR: 2.57 (H)  Delta Troponin I: Pending    UA: Mucous present, o/w Negative      Asymptomatic COVID-19 Virus by PCR NP swab: Pending     Interventions:  2041 - aspirin 325 mg PO "     Emergency Department Course:  Past medical records, nursing notes, and vitals reviewed.  2005: I performed an exam of the patient and obtained history, as documented above. The patient was placed on cardiac monitoring.     EKG was taken in the ED. IV inserted and blood drawn. This was sent to laboratory for testing, findings above.  The patient was sent for a chest x-ray while in the emergency department, findings above.     2235: Findings and plan explained to the Patient who consents to admission. Discussed the patient with Dr. Torres, who will admit the patient to an observation bed for further monitoring, evaluation, and treatment.    I personally reviewed the laboratory and imaging results with the Patient and answered all related questions prior to admission.     Impression & Plan     Medical Decision Making:  Patient presents to the ER for evaluation of intermittent chest pain since last night.  On arrival vital signs within normal limits.  EKG and troponin did not reveal ACS.  He has a paced rhythm on EKG.  He is anticoagulated with therapeutic INR today.  I have a low suspicion for PE.  Chest x-ray is clear without widened mediastinum or history to suggest aortic dissection.  Given history of CAD and ischemic cardiomyopathy as well as intermittent pain, plan admit to the hospital for further ACS rule out.  He remained chest pain-free in the ER during my shift.  Patient was awaiting hospital bed at [facility].    Diagnosis:    ICD-10-CM    1. Chest pain, unspecified type  R07.9      Disposition:  Admitted to obs unit.    Scribe Disclosure:  I, Andi Sanchez, am serving as a scribe at 8:00 PM on 6/16/2020 to document services personally performed by Antoni Peter MD based on my observations and the provider's statements to me.      Andi Sanchez   6/16/2020   [facility] EMERGENCY DEPARTMENT     Antoni Peter MD  06/16/20 1114

## 2020-06-17 NOTE — H&P
[facility]    History and Physical - Hospitalist Service       Date of Admission:  6/16/2020    Assessment & Plan   Constantino Thorne is a 79 year old male admitted on 6/16/2020. He presents with CP.       Chest Pain    Ischemic cardiomyopathy    HFrEF (heart failure with reduced ejection fraction) (H)    Assessment: on admission, Trop wnl. EKG with atrial paced rhythm Last ECHO 08/2016 shows Left ventricular ejection fraction is visually estimated at 35%. Analysis of regional left ventricular function reveals: mid to distal anterior, anteroseptal, distal anterolateral hypokinesis, apical akinesis is present. Hemodynamics are stable, patient is currently chest pain free.     Plan:   - admit to observation  - Cardiology eval  - trend trop  - Continue BB  - NTG tablet as needed  - Telemetry  - ECHO       Chronic renal disease, stage III (H)    Assessment: Cr on admission 1.24    Plan:   - avoid NSAIDs/nephrotoxins      Essential hypertension    Hyperlipidemia    Assessment/Plan: continue PTA BB/Valsartan once verified      Long term current use of anticoagulant therapy    Persistent atrial fibrillation (H)    Assessment/Plan: INR on admission 2.57, continue warfarin with pharmacy to dose      SABINE (obstructive sleep apnea)    Assessment/Plan: stable       Diet: NPO for Medical/Clinical Reasons Except for: Meds    DVT Prophylaxis: Pneumatic Compression Devices  Conley Catheter: not present  Code Status: FULL CODE         Disposition Plan   Expected discharge: Tomorrow, recommended to prior living arrangement once Cardiology work up completed.  Entered: Rashid Torres MD 06/16/2020, 10:53 PM     The patient's care was discussed with the Patient and ED Provider.    Rashid Torres MD  [facility]    ______________________________________________________________________    Chief Complaint     CP    History is obtained from the patient    History of Present Illness   Constantino Thorne is a 79 year old male  who with past medical history of coronary disease, ischemic cardiomyopathy, hypertension, hyperlipidemia, obstructive sleep apnea, atrial fibrillation on warfarin who presents for evaluation of chest pain.    Patient was that he was in his usual state of health until the day prior to admission when he developed left-sided chest pain that radiated down his left arm.  Initially was described as a sharp pain, but over the past day, has evolved into more of a pressure-like sensation.  Each episode lasts about an hour and resolve spontaneously.  No particular exacerbating factors or alleviating factors.  Symptoms occurred at rest.  He did take 1 tablet of nitroglycerin the night prior to mission with improvement in symptoms.  He denies any fevers or chills.  He denies any nausea/vomiting abdominal.  He denies any recent chest wall trauma.  No new rashes.  He denies any calf pain or leg swelling.  He denies any recent travel, no exposures to coronavirus patients or suspected patients.  He denies any blood in stool, no weight loss or night sweats.  He denies any recent headaches, vision changes, localized weakness or numbness of extremities.  He has no joint pain.  He denies any hematuria or dysuria, he denies any diarrhea.  He otherwise has no other complaints this time.    Review of Systems    The 10 point Review of Systems is negative other than noted in the HPI or here.     Past Medical History    I have reviewed this patient's medical history and updated it with pertinent information if needed.   Past Medical History:   Diagnosis Date     A-fib (H)      Hypertension        Past Surgical History   I have reviewed this patient's surgical history and updated it with pertinent information if needed.  No past surgical history on file.    Social History   I have reviewed this patient's social history and updated it with pertinent information if needed.  Social History     Tobacco Use     Smoking status: None   Substance Use  Topics     Alcohol use: None     Drug use: None       Family History   I have reviewed this patient's family history and updated it with pertinent information if needed.   Family History   Problem Relation Age of Onset     Cancer Sister        Prior to Admission Medications   Prior to Admission Medications   Prescriptions Last Dose Informant Patient Reported? Taking?   ATORVASTATIN CALCIUM PO   Yes No   METOPROLOL TARTRATE PO   Yes No   NITROGLYCERIN SL   Yes No   Sig: Place 0.4 mg under the tongue   VALSARTAN PO   Yes No   Warfarin Sodium (COUMADIN PO)   Yes No      Facility-Administered Medications: None     Allergies   Allergies   Allergen Reactions     Lisinopril Unknown and Cough     Spironolactone Unknown and Diarrhea       Physical Exam   Vital Signs: Temp: 98.3  F (36.8  C)   BP: 116/75 Pulse: 59 Heart Rate: 60 Resp: 14 SpO2: 97 % O2 Device: None (Room air)    Weight: 213 lbs 12.13 oz    Constitutional: awake, alert, cooperative, no apparent distress.   Eyes: Lids and lashes normal, pupils equal, round and reactive to light   ENT: Normocephalic, without obvious abnormality, atraumatic, sinuses nontender on palpation   Hematologic / Lymphatic: no cervical lymphadenopathy   Respiratory: CTABL   Cardiovascular: RRR with no m/r/g   GI: Normal bowel sounds, soft, non-distended, non-tender.   Skin: normal skin color, texture, turgor   Musculoskeletal: There is no redness, warmth, or swelling of the joints. Full range of motion noted.   Neurologic: Awake, alert, oriented to name, place and time. Cranial nerves II-XII are grossly intact. Motor is 5 out of 5 bilaterally. Sensory is intact.   Neuropsychiatric: normal mood and affect    Data   Data reviewed today: I reviewed all medications, new labs and imaging results over the last 24 hours. I personally reviewed the EKG tracing showing see below and the chest x-ray image(s) showing see below.    EKG  Atrial-paced rhythm with prolonged AV conduction with occasional  supraventricular complexes and with premature ventricular or aberrantly conducted complexes    CXR  Dual-chamber implantable defibrillator appears in good position. Heart size and. Lungs are clear.   Result per radiology.          Most Recent 3 CBC's:  Recent Labs   Lab Test 06/16/20 1936 11/02/16 1930   WBC 6.9 10.7   HGB 13.4 13.6   MCV 96 95   * 169     Most Recent 3 BMP's:  Recent Labs   Lab Test 06/16/20 1936 11/02/16 1930    137   POTASSIUM 4.2 4.1   CHLORIDE 107 104   CO2 23 25   BUN 30 31*   CR 1.24 1.24   ANIONGAP 8 8   ZACH 8.5 8.7   * 153*     Most Recent 3 Troponin's:  Recent Labs   Lab Test 06/16/20 1936   TROPI <0.015     Recent Results (from the past 24 hour(s))   XR Chest 2 Views    Narrative    EXAM: XR CHEST 2 VW  LOCATION: [facility]  DATE/TIME: 6/16/2020 8:31 PM    INDICATION: Chest pain  COMPARISON: 03/12/2010      Impression    IMPRESSION: Dual-chamber implantable defibrillator appears in good position. Heart size and. Lungs are clear.

## 2020-06-17 NOTE — ED NOTES
"[facility]  ED Nurse Handoff Report    Valetanna Thorne is a 79 year old male   ED Chief complaint: Chest Pain  . ED Diagnosis:   Final diagnoses:   None     Allergies:   Allergies   Allergen Reactions     Lisinopril Unknown and Cough     Spironolactone Unknown and Diarrhea       Code Status: not on file  Activity level - Baseline/Home:  Independent. Activity Level - Current:   Stand by Assist. Lift room needed: No. Bariatric: No   Needed: No   Isolation: No. Infection: Not Applicable.     Vital Signs:   Vitals:    06/16/20 1903 06/16/20 1905 06/16/20 2045   BP:  126/80 127/80   Pulse:   60   Resp: 18 14    Temp:  98.3  F (36.8  C)    SpO2:  98% 98%   Weight: 97 kg (213 lb 12.1 oz)     Height: 1.867 m (6' 1.5\")         Cardiac Rhythm:  ,   Cardiac  Cardiac Rhythm: Atrial paced  Pain level:    Patient confused: No. Patient Falls Risk: No.   Elimination Status: Has voided   Patient Report - Initial Complaint: CP. Focused Assessment:  javier Thorne is a 79 year old male with a history of MI with stenting, ICD in situ, ischemic cardiomyopathy, heart failure, hypertension, hyperlipidemia, CAD, SABINE, atrial fibrillation on Coumadin who presents with left-sided chest pain since yesterday afternoon. He states that these started as sharp little intermittent pains, but today the discomfort became much more noticeable on and off for most of the day. It lasts approximately an hour each time and is more of a dull pressure pain. There are no particular alleviators or exacerbators. No exertional symptoms. He took one nitroglycerin with a small amount of relief last night. He denies active chest pain. He denies fever, cough, shortness of breath, abdominal pain, nausea, vomiting, and diarrhea.     *Pt reports MI 10 years ago with 2 stents placed  *Left anterior chest pain, denies SOB  *Atrial paced pace maker  Tests Performed:   Labs Ordered and Resulted from Time of ED Arrival Up to the Time of Departure from " the ED   CBC WITH PLATELETS DIFFERENTIAL - Abnormal; Notable for the following components:       Result Value    RBC Count 4.30 (*)     Platelet Count 146 (*)     All other components within normal limits   BASIC METABOLIC PANEL - Abnormal; Notable for the following components:    Glucose 117 (*)     GFR Estimate 54 (*)     All other components within normal limits   INR - Abnormal; Notable for the following components:    INR 2.57 (*)     All other components within normal limits   TROPONIN I   ROUTINE UA WITH MICROSCOPIC   COVID-19 VIRUS (CORONAVIRUS) BY PCR   PULSE OXIMETRY NURSING   CARDIAC CONTINUOUS MONITORING   PERIPHERAL IV CATHETER   PATIENT CARE ORDER     XR Chest 2 Views    (Results Pending)       Treatments provided: See MAR  Family Comments: none present  OBS brochure/video discussed/provided to patient:  yes  ED Medications:   Medications   aspirin (ASA) tablet 325 mg (325 mg Oral Given 6/16/20 2041)     Drips infusing:  No  For the majority of the shift, the patient's behavior Green. Interventions performed were rounding.    Sepsis treatment initiated: No       ED Nurse Name/Phone Number: Abeba Pollack RN,   9:04 PM  RECEIVING UNIT ED HANDOFF REVIEW    Above ED Nurse Handoff Report was reviewed: Yes  Reviewed by: Amy C. Severson, RN on June 17, 2020 at 12:06 AM

## 2020-06-17 NOTE — CONSULTS
Consult Date:  06/17/2020      CARDIOLOGY CONSULTATION      CONSULT INDICATION:  Chest pain.      HISTORY OF PRESENT ILLNESS:      I had the opportunity to see patient, Constantino Thorne today in hospital for a Cardiology consultation.  As you know, he is an 79-year-old male with a past medical history significant for prior out-of-hospital cardiac arrest, status post coronary angiography with DANIEL to mid-LAD total occlusion, heart failure with reduced ejection fraction secondary to ischemic cardiomyopathy (LVEF 35%, mid to distal anterior, anteroseptal, apical akinesis on TTE 10/25/2019) and permanent pacemaker, who presents for further evaluation and management of chest pain.      The patient reports that he was in his usual state of health until the day prior to admission on 06/15/2020, when he felt acute onset sharp chest pain over his left anterior chest, described as a stabbing pain that was intermittent over several hours.  It occurred at rest initially, though notably earlier in the day he was pushing a lawnmower around his yard, though during that activity he did not have any abnormal symptoms.  The following day on the day of presentation, 06/16/2020, he developed chest pressure and so presented to the Emergency Department for further evaluation and management.  He is unsure if these symptoms are similar to his prior anginal symptoms that led up to his cardiac arrest while his wife was driving to the hospital on 03/15/2010, for which he underwent a coronary angiography with DANIEL to the mid-LAD.        In the Emergency Department, he was found to have a blood pressure of 126/80 mmHg, heart rate 62, respiratory rate 14, satting high 90s on room air.  Initial ECG showed A paced, V sensed rhythm with occasional PVC, Q waves anteriorly, no acute ischemic changes.  Laboratory investigation is notable for potassium of 4.2, creatinine 1.24, which has since down trended to 1.13.  CBC was largely unremarkable.  Troponin  negative x 3.  He has not had any chest discomfort since admission.      Prior to this, he reports that he was in fair health, is fairly active and does yard work including pushing a lawnmower around his yard without any recent change in exertional capacity or abnormal chest pressure/discomfort.  Denies any dizziness, palpitations, lightheadedness, presyncope or syncope.  No symptoms of orthopnea/PND or abnormal lower extremity swelling.      He follows with Dr. Clifton with the [facility], last clinic appointment 09/23/2019.  At that time he was found to be in stable cardiovascular health and no changes were warranted to his cardiac regimen.  His last ischemic evaluation was done on 09/29/2017 with a Lexiscan stress test, which showed evidence of infarct over the anterior apical wall segments consistent with prior infarct, without any ischemia.  Last TTE 10/25/2019 from an outside hospital shows mild LV dilatation, LVEF 35% with mid to distal anterior, anteroseptal, septal and apical hypokinesis to akinesis, mild aortic sclerosis, mildly dilated ascending aorta.      ASSESSMENT, PLAN AND RECOMMENDATIONS:     1.  Chest discomfort.  Chest pain is atypical for angina, stabbing, occurred at rest, though notably he did push his lawnmower around the yard earlier that morning and did not have any chest discomfort.  Initial ECG did not show any acute ischemic changes.  Troponin negative x 3, currently asymptomatic.   2.  Heart failure with reduced ejection fraction secondary to ischemic cardiomyopathy, history of out-of-hospital VF arrest, status post coronary angiography with PCI to the mid-LAD 03/2010.  TTE 10/2019, LVEF 35%, regional wall motion abnormalities consistent with prior LAD infarct.   3.  History of pacemaker.   4.  Atrial fibrillation, on warfarin.      Discussed with the patient at length his clinical presentation and options for further evaluation and management.  We discussed the  risks, benefits and alternatives of each option.  His options at this time include conservative medical management with up-titration of antianginal therapy and close followup, noninvasive stress testing or coronary angiography.      My recommendation at this time is to proceed with a Lexiscan stress test, as his last ischemic evaluation was a Lexiscan stress test on 2017 that showed evidence of infarct without ischemia.  The patient is in agreement to proceed with this plan.      -- Continue home cardiac regimen.   -- Lexiscan stress test.   -- Discussed with Pt that they should seek medical attention if they experience any future episodes of chest pain/discomfort, worsening exertional capacity, dyspnea, or other concerning symptoms, and they voiced understanding    Cardiology will continue to follow.      Thank you for allowing our team to participate in the care of patient, Constantino Skaggs.  Please do not hesitate to page or call with any questions or concerns.      Ronny Ridley MD, [facility]  Cardiology  Pager:  [phone]  Text Page   2020        D: 2020   T: 2020   MT: AMARILIS      Name:     CONSTANTINO SKAGGS   MRN:      [id number]        Account:       [id number]   :      1940           Consult Date:  2020      Document: [id number]

## 2020-06-17 NOTE — PHARMACY-ANTICOAGULATION SERVICE
Clinical Pharmacy- Warfarin Discharge Note  This patient is currently on warfarin for the treatment of Atrial fibrillation.  INR Goal= 2-3  Expected length of therapy lifetime.    Take 7.5mg (1.5 tablets) on Tuesday, Thursday, Saturday and 5mg (1 tablet) all other days of the week or as directed by INR clinic.       Anticoagulation Dose History     Recent Dosing and Labs Latest Ref Rng & Units 2/26/2010 2/28/2010 3/4/2010 3/6/2010 11/2/2016 6/16/2020 6/17/2020    INR 0.86 - 1.14 1.05 1.12 1.26(H) 1.22(H) 2.99(H) 2.57(H) 2.70(H)          Warfarin Discharge Reconciliation Complete  Agree with plan to resume warfarin as PTA - INR check as previous.         Junior Eller, Pharm.D., BCPS

## 2023-03-03 ENCOUNTER — APPOINTMENT (OUTPATIENT)
Dept: CT IMAGING | Facility: CLINIC | Age: 83
End: 2023-03-03
Attending: EMERGENCY MEDICINE
Payer: COMMERCIAL

## 2023-03-03 ENCOUNTER — HOSPITAL ENCOUNTER (OUTPATIENT)
Facility: CLINIC | Age: 83
Setting detail: OBSERVATION
Discharge: HOME | End: 2023-03-04
Attending: EMERGENCY MEDICINE | Admitting: INTERNAL MEDICINE
Payer: COMMERCIAL

## 2023-03-03 DIAGNOSIS — N17.9 ACUTE KIDNEY INJURY (H): ICD-10-CM

## 2023-03-03 DIAGNOSIS — R10.9 FLANK PAIN: ICD-10-CM

## 2023-03-03 LAB
ALBUMIN UR-MCNC: 70 MG/DL
ANION GAP SERPL CALCULATED.3IONS-SCNC: 11 MMOL/L (ref 7–15)
APPEARANCE UR: ABNORMAL
BILIRUB UR QL STRIP: NEGATIVE
BUN SERPL-MCNC: 41.3 MG/DL (ref 8–23)
CALCIUM SERPL-MCNC: 9.1 MG/DL (ref 8.8–10.2)
CHLORIDE SERPL-SCNC: 101 MMOL/L (ref 98–107)
COLOR UR AUTO: ABNORMAL
CREAT SERPL-MCNC: 1.52 MG/DL (ref 0.67–1.17)
DEPRECATED HCO3 PLAS-SCNC: 24 MMOL/L (ref 22–29)
ERYTHROCYTE [DISTWIDTH] IN BLOOD BY AUTOMATED COUNT: 13.4 % (ref 10–15)
GFR SERPL CREATININE-BSD FRML MDRD: 45 ML/MIN/1.73M2
GLUCOSE SERPL-MCNC: 112 MG/DL (ref 70–99)
GLUCOSE UR STRIP-MCNC: NEGATIVE MG/DL
HCT VFR BLD AUTO: 39 % (ref 40–53)
HGB BLD-MCNC: 12.7 G/DL (ref 13.3–17.7)
HGB UR QL STRIP: ABNORMAL
HOLD SPECIMEN: NORMAL
HYALINE CASTS: 12 /LPF
INR PPP: 2.48 (ref 0.85–1.15)
KETONES UR STRIP-MCNC: NEGATIVE MG/DL
LEUKOCYTE ESTERASE UR QL STRIP: ABNORMAL
MCH RBC QN AUTO: 31.3 PG (ref 26.5–33)
MCHC RBC AUTO-ENTMCNC: 32.6 G/DL (ref 31.5–36.5)
MCV RBC AUTO: 96 FL (ref 78–100)
MUCOUS THREADS #/AREA URNS LPF: PRESENT /LPF
NITRATE UR QL: NEGATIVE
PH UR STRIP: 5.5 [PH] (ref 5–7)
PLATELET # BLD AUTO: 145 10E3/UL (ref 150–450)
POTASSIUM SERPL-SCNC: 5.2 MMOL/L (ref 3.4–5.3)
RBC # BLD AUTO: 4.06 10E6/UL (ref 4.4–5.9)
RBC URINE: >182 /HPF
SODIUM SERPL-SCNC: 136 MMOL/L (ref 136–145)
SP GR UR STRIP: 1.02 (ref 1–1.03)
UROBILINOGEN UR STRIP-MCNC: NORMAL MG/DL
WBC # BLD AUTO: 9.4 10E3/UL (ref 4–11)
WBC URINE: 1 /HPF

## 2023-03-03 PROCEDURE — 87086 URINE CULTURE/COLONY COUNT: CPT | Performed by: EMERGENCY MEDICINE

## 2023-03-03 PROCEDURE — 85014 HEMATOCRIT: CPT | Performed by: EMERGENCY MEDICINE

## 2023-03-03 PROCEDURE — 74176 CT ABD & PELVIS W/O CONTRAST: CPT

## 2023-03-03 PROCEDURE — 96374 THER/PROPH/DIAG INJ IV PUSH: CPT

## 2023-03-03 PROCEDURE — 81001 URINALYSIS AUTO W/SCOPE: CPT | Performed by: EMERGENCY MEDICINE

## 2023-03-03 PROCEDURE — 36415 COLL VENOUS BLD VENIPUNCTURE: CPT | Performed by: EMERGENCY MEDICINE

## 2023-03-03 PROCEDURE — 250N000013 HC RX MED GY IP 250 OP 250 PS 637: Performed by: EMERGENCY MEDICINE

## 2023-03-03 PROCEDURE — 96376 TX/PRO/DX INJ SAME DRUG ADON: CPT

## 2023-03-03 PROCEDURE — G0378 HOSPITAL OBSERVATION PER HR: HCPCS

## 2023-03-03 PROCEDURE — 96375 TX/PRO/DX INJ NEW DRUG ADDON: CPT

## 2023-03-03 PROCEDURE — 258N000003 HC RX IP 258 OP 636: Performed by: EMERGENCY MEDICINE

## 2023-03-03 PROCEDURE — 99285 EMERGENCY DEPT VISIT HI MDM: CPT | Mod: 25

## 2023-03-03 PROCEDURE — 85610 PROTHROMBIN TIME: CPT | Performed by: EMERGENCY MEDICINE

## 2023-03-03 PROCEDURE — 99223 1ST HOSP IP/OBS HIGH 75: CPT | Mod: AI | Performed by: PHYSICIAN ASSISTANT

## 2023-03-03 PROCEDURE — 96361 HYDRATE IV INFUSION ADD-ON: CPT

## 2023-03-03 PROCEDURE — 250N000011 HC RX IP 250 OP 636: Performed by: EMERGENCY MEDICINE

## 2023-03-03 PROCEDURE — 80048 BASIC METABOLIC PNL TOTAL CA: CPT | Performed by: EMERGENCY MEDICINE

## 2023-03-03 RX ORDER — HYDROMORPHONE HCL IN WATER/PF 6 MG/30 ML
0.2 PATIENT CONTROLLED ANALGESIA SYRINGE INTRAVENOUS
Status: COMPLETED | OUTPATIENT
Start: 2023-03-03 | End: 2023-03-03

## 2023-03-03 RX ORDER — MORPHINE SULFATE 4 MG/ML
4 INJECTION, SOLUTION INTRAMUSCULAR; INTRAVENOUS
Status: COMPLETED | OUTPATIENT
Start: 2023-03-03 | End: 2023-03-03

## 2023-03-03 RX ORDER — HYDROMORPHONE HCL IN WATER/PF 6 MG/30 ML
0.2 PATIENT CONTROLLED ANALGESIA SYRINGE INTRAVENOUS ONCE
Status: COMPLETED | OUTPATIENT
Start: 2023-03-03 | End: 2023-03-03

## 2023-03-03 RX ORDER — SODIUM CHLORIDE 9 MG/ML
INJECTION, SOLUTION INTRAVENOUS CONTINUOUS
Status: DISCONTINUED | OUTPATIENT
Start: 2023-03-03 | End: 2023-03-04 | Stop reason: DRUGHIGH

## 2023-03-03 RX ADMIN — HYDROMORPHONE HYDROCHLORIDE 0.2 MG: 0.2 INJECTION, SOLUTION INTRAMUSCULAR; INTRAVENOUS; SUBCUTANEOUS at 19:14

## 2023-03-03 RX ADMIN — HYDROMORPHONE HYDROCHLORIDE 0.2 MG: 0.2 INJECTION, SOLUTION INTRAMUSCULAR; INTRAVENOUS; SUBCUTANEOUS at 20:13

## 2023-03-03 RX ADMIN — SODIUM CHLORIDE: 9 INJECTION, SOLUTION INTRAVENOUS at 19:15

## 2023-03-03 RX ADMIN — MORPHINE SULFATE 4 MG: 4 INJECTION, SOLUTION INTRAMUSCULAR; INTRAVENOUS at 21:07

## 2023-03-03 RX ADMIN — SODIUM CHLORIDE 1000 ML: 9 INJECTION, SOLUTION INTRAVENOUS at 17:57

## 2023-03-03 RX ADMIN — MORPHINE SULFATE 4 MG: 4 INJECTION, SOLUTION INTRAMUSCULAR; INTRAVENOUS at 22:07

## 2023-03-03 RX ADMIN — OXYCODONE HYDROCHLORIDE 2.5 MG: 5 TABLET ORAL at 17:57

## 2023-03-03 ASSESSMENT — ACTIVITIES OF DAILY LIVING (ADL)
ADLS_ACUITY_SCORE: 35

## 2023-03-03 ASSESSMENT — ENCOUNTER SYMPTOMS
VOMITING: 0
DYSURIA: 0
CHILLS: 0
FEVER: 0
FLANK PAIN: 1
DIARRHEA: 0
COUGH: 0
FREQUENCY: 0

## 2023-03-03 NOTE — ED NOTES
Rapid Assessment Note    History:   The patient presents to the ED with right sided flank pain which radiates into the right side of his abdomen. The patient was seen at [facility] 3 days ago for hematuria, high INR, and epistaxis, and was ultimately discharged home. Since then, he has continued to have hematuria and this morning, around 0500, suddenly developed right flank pain. The pain resolved for approximately 5 hours, then returned, and has been worsening since despite the patient taking a couple tablets of Tylenol around 1400. Presently, he denies any dysuria or frequency and has had no cough, fever, chills, vomiting, diarrhea, or rash.    Exam:   General:  Sitting, comfortable appearing.   Head:  No obvious trauma to head  Ears, Nose:  External ears normal.  Nose normal.   Eyes:  Conjunctivae clear.  Pupils round and symmetry.    Neck:  Normal range of motion. Neck supple and symmetric.    Pulm/Chest:  No respiratory distress.  Breathing comfortably.    CV: Regular rate and rhythm.    ABD: non tender soft abd   MSK:  Right cva tenderness   Neuro:  Alert. Moving all extremities.    Skin:  Skin is warm and dry.      Plan of Care:   I evaluated the patient and developed an initial plan of care. I discussed this plan and explained that I, or one of my partners, would be returning to complete the evaluation.     I, Naye Henson, am serving as a scribe to document services personally performed by Heather Perez MD, based on my observations and the provider's statements to me.    11/5/2018  [employer]    Portions of this medical record were completed by a scribe. UPON MY REVIEW AND AUTHENTICATION BY ELECTRONIC SIGNATURE, this confirms (a) I performed the applicable clinical services, and (b) the record is accurate.      Heather Perez MD  03/03/23 [id number]

## 2023-03-03 NOTE — ED TRIAGE NOTES
Pt arrives to the ED due to having right sided flank pain that wraps around to his right side of abdomen. Pt states that pain is intense, rates it a 10/10. Seen on Tuesday at [facility] due to a bloody nose and blood in urine which he continues to have he states. Pt denies any pain with urination. Pt is on warfarin.

## 2023-03-04 VITALS
DIASTOLIC BLOOD PRESSURE: 63 MMHG | BODY MASS INDEX: 29.34 KG/M2 | WEIGHT: 221.34 LBS | TEMPERATURE: 98.7 F | OXYGEN SATURATION: 95 % | HEART RATE: 60 BPM | HEIGHT: 73 IN | SYSTOLIC BLOOD PRESSURE: 99 MMHG | RESPIRATION RATE: 20 BRPM

## 2023-03-04 LAB
ANION GAP SERPL CALCULATED.3IONS-SCNC: 9 MMOL/L (ref 7–15)
BUN SERPL-MCNC: 38.9 MG/DL (ref 8–23)
CALCIUM SERPL-MCNC: 8.4 MG/DL (ref 8.8–10.2)
CHLORIDE SERPL-SCNC: 104 MMOL/L (ref 98–107)
CREAT SERPL-MCNC: 1.59 MG/DL (ref 0.67–1.17)
DEPRECATED HCO3 PLAS-SCNC: 23 MMOL/L (ref 22–29)
ERYTHROCYTE [DISTWIDTH] IN BLOOD BY AUTOMATED COUNT: 13.5 % (ref 10–15)
GFR SERPL CREATININE-BSD FRML MDRD: 43 ML/MIN/1.73M2
GLUCOSE SERPL-MCNC: 109 MG/DL (ref 70–99)
HCT VFR BLD AUTO: 35.7 % (ref 40–53)
HGB BLD-MCNC: 11.7 G/DL (ref 13.3–17.7)
INR PPP: 2.38 (ref 0.85–1.15)
MCH RBC QN AUTO: 31.7 PG (ref 26.5–33)
MCHC RBC AUTO-ENTMCNC: 32.8 G/DL (ref 31.5–36.5)
MCV RBC AUTO: 97 FL (ref 78–100)
PLATELET # BLD AUTO: 136 10E3/UL (ref 150–450)
POTASSIUM SERPL-SCNC: 4.2 MMOL/L (ref 3.4–5.3)
RBC # BLD AUTO: 3.69 10E6/UL (ref 4.4–5.9)
SODIUM SERPL-SCNC: 136 MMOL/L (ref 136–145)
WBC # BLD AUTO: 11.5 10E3/UL (ref 4–11)

## 2023-03-04 PROCEDURE — 258N000003 HC RX IP 258 OP 636: Performed by: PHYSICIAN ASSISTANT

## 2023-03-04 PROCEDURE — 250N000013 HC RX MED GY IP 250 OP 250 PS 637: Performed by: PHYSICIAN ASSISTANT

## 2023-03-04 PROCEDURE — 85610 PROTHROMBIN TIME: CPT | Performed by: PHYSICIAN ASSISTANT

## 2023-03-04 PROCEDURE — G0378 HOSPITAL OBSERVATION PER HR: HCPCS

## 2023-03-04 PROCEDURE — 80048 BASIC METABOLIC PNL TOTAL CA: CPT | Performed by: PHYSICIAN ASSISTANT

## 2023-03-04 PROCEDURE — 99239 HOSP IP/OBS DSCHRG MGMT >30: CPT | Performed by: INTERNAL MEDICINE

## 2023-03-04 PROCEDURE — 96361 HYDRATE IV INFUSION ADD-ON: CPT

## 2023-03-04 PROCEDURE — 36415 COLL VENOUS BLD VENIPUNCTURE: CPT | Performed by: PHYSICIAN ASSISTANT

## 2023-03-04 PROCEDURE — 85027 COMPLETE CBC AUTOMATED: CPT | Performed by: PHYSICIAN ASSISTANT

## 2023-03-04 RX ORDER — ATORVASTATIN CALCIUM 40 MG/1
80 TABLET, FILM COATED ORAL AT BEDTIME
Status: DISCONTINUED | OUTPATIENT
Start: 2023-03-04 | End: 2023-03-04 | Stop reason: HOSPADM

## 2023-03-04 RX ORDER — AMOXICILLIN 250 MG
2 CAPSULE ORAL 2 TIMES DAILY
Status: DISCONTINUED | OUTPATIENT
Start: 2023-03-04 | End: 2023-03-04 | Stop reason: HOSPADM

## 2023-03-04 RX ORDER — AMOXICILLIN 250 MG
1 CAPSULE ORAL 2 TIMES DAILY
Status: DISCONTINUED | OUTPATIENT
Start: 2023-03-04 | End: 2023-03-04 | Stop reason: HOSPADM

## 2023-03-04 RX ORDER — WARFARIN SODIUM 5 MG/1
5 TABLET ORAL
Status: DISCONTINUED | OUTPATIENT
Start: 2023-03-04 | End: 2023-03-04 | Stop reason: HOSPADM

## 2023-03-04 RX ORDER — ONDANSETRON 2 MG/ML
4 INJECTION INTRAMUSCULAR; INTRAVENOUS EVERY 6 HOURS PRN
Status: DISCONTINUED | OUTPATIENT
Start: 2023-03-04 | End: 2023-03-04 | Stop reason: HOSPADM

## 2023-03-04 RX ORDER — METOPROLOL SUCCINATE 50 MG/1
100 TABLET, EXTENDED RELEASE ORAL EVERY EVENING
COMMUNITY

## 2023-03-04 RX ORDER — IRBESARTAN 75 MG/1
37.5 TABLET ORAL AT BEDTIME
COMMUNITY

## 2023-03-04 RX ORDER — ISOSORBIDE MONONITRATE 30 MG/1
30 TABLET, EXTENDED RELEASE ORAL DAILY
Status: DISCONTINUED | OUTPATIENT
Start: 2023-03-04 | End: 2023-03-04 | Stop reason: HOSPADM

## 2023-03-04 RX ORDER — ONDANSETRON 4 MG/1
4 TABLET, ORALLY DISINTEGRATING ORAL EVERY 6 HOURS PRN
Status: DISCONTINUED | OUTPATIENT
Start: 2023-03-04 | End: 2023-03-04 | Stop reason: HOSPADM

## 2023-03-04 RX ORDER — TAMSULOSIN HYDROCHLORIDE 0.4 MG/1
0.4 CAPSULE ORAL DAILY
COMMUNITY

## 2023-03-04 RX ORDER — FEXOFENADINE HCL 180 MG/1
180 TABLET ORAL DAILY PRN
Status: DISCONTINUED | OUTPATIENT
Start: 2023-03-04 | End: 2023-03-04 | Stop reason: HOSPADM

## 2023-03-04 RX ORDER — SODIUM CHLORIDE 9 MG/ML
INJECTION, SOLUTION INTRAVENOUS CONTINUOUS
Status: ACTIVE | OUTPATIENT
Start: 2023-03-04 | End: 2023-03-04

## 2023-03-04 RX ORDER — AMOXICILLIN 250 MG
1 CAPSULE ORAL 2 TIMES DAILY PRN
Status: DISCONTINUED | OUTPATIENT
Start: 2023-03-04 | End: 2023-03-04 | Stop reason: HOSPADM

## 2023-03-04 RX ORDER — AMOXICILLIN 250 MG
2 CAPSULE ORAL 2 TIMES DAILY PRN
Status: DISCONTINUED | OUTPATIENT
Start: 2023-03-04 | End: 2023-03-04 | Stop reason: HOSPADM

## 2023-03-04 RX ORDER — METOPROLOL SUCCINATE 25 MG/1
50 TABLET, EXTENDED RELEASE ORAL 2 TIMES DAILY WITH MEALS
Status: DISCONTINUED | OUTPATIENT
Start: 2023-03-04 | End: 2023-03-04 | Stop reason: HOSPADM

## 2023-03-04 RX ADMIN — METOPROLOL SUCCINATE 50 MG: 25 TABLET, EXTENDED RELEASE ORAL at 09:24

## 2023-03-04 RX ADMIN — ISOSORBIDE MONONITRATE 30 MG: 30 TABLET, EXTENDED RELEASE ORAL at 09:24

## 2023-03-04 RX ADMIN — SODIUM CHLORIDE: 9 INJECTION, SOLUTION INTRAVENOUS at 02:03

## 2023-03-04 ASSESSMENT — ACTIVITIES OF DAILY LIVING (ADL)
ADLS_ACUITY_SCORE: 35

## 2023-03-04 NOTE — PLAN OF CARE
Goal Outcome Evaluation:       Pt alert and oriented, vital signs stable on RA, Pt states pain is tolerable, No Pain meds administered since pt became a boarder. Uses the urinal, Tea colored urine. PIV infusing NS @100ml/hr. Nursing will continue to monitor.

## 2023-03-04 NOTE — PROGRESS NOTES
Patient's After Visit Summary was reviewed with patient.   Patient verbalized understanding of After Visit Summary, recommended follow up and was given an opportunity to ask questions.   Discharge medications sent home with patient/family: Not applicable   Discharged with spouse via private ride.

## 2023-03-04 NOTE — H&P
[facility]  Admission History and Physical Examination    NAME: Constantino Thorne   : 1940   MRN: [id number]     Date of Admission:  3/3/2023    Assessment & Plan   Constantino Thorne is a 82 year old male with past medical history significant for hyperlipidemia, hypertension, atrial fibrillation on chronic warfarin, sick sinus status post pacemaker insertion chronic kidney disease, coronary disease who presents with severe right flank pain that started this afternoon, as well as episode of hematuria.  He was actually seen at [facility] 3 days ago for hematuria secondary to supratherapeutic INR.  His hematuria did improve although this morning around 5 AM he developed severe sharp right flank pain.  Otherwise no fevers chills nausea vomiting.  Due to continued pain he come back to the emergency room for further evaluation.    Upon arrival to emergency room he is afebrile vital signs are stable.  Laboratory results shows mild renal insufficiency with a creatinine of 1.52.  CBC was normal at 9.4 with a hemoglobin of 12.7.  INR today was therapeutic at 2.48.  Urinalysis was grossly positive for red blood cell, hyaline cast but only trace leukocyte Estrace and minimal WBC.  Urine culture obtained.  CT scan abdomen pelvis shows mild right hydronephrosis and perinephritic stranding suggestive of a recently passed ureteral stone. He was given several doses of IV pain meds with still persistent pain so I have been asked to admit him overnight for pain control.     #Acute right flank pain, suspect due to passed ureteral stone   #Hematuria - multifactorial, likely due to passed stone in the setting of OAC  -  Pt denies any hx of kidney stone. Also denies any recent fever, abd pain, or n/v.   -  Currently pain has improved by the time I saw him   -  Cont IVF, PRN pain control overnight, I suspect he can leave in the morning if pain is manageable with oral pain meds   -  Hgb stable, repeat CBC in am      #Mild BETZAIDA   -  Suspect due to recent passed stone  -  Cont IVF overnight  -  Resume labs in am     #CAD, hx of ICM  #SSS s/p PM   #Afib on warfarin   -  Stable  -  Resume PTA home meds including warfarin, Toprol XL, Imdur and statin    Awaiting formal pharmacy reconciliation to resume home medications.     DVT Prophylaxis: Warfarin  Code Status: Full Code  COVID PCR TESTING STATUS: Negative       Expected Discharge Date: 03/04/2023               Leilani Campo PA-C    Primary Care Physician   Thom Barnes    Chief Complaint   Right flank pain     History is obtained from the patient    Discussed with Dr. Perez in the ED, full chart review including lab work, imaging, and vital signs were reviewed.     History of Present Illness   Constantino Thorne is a 82 year old male with past medical history significant for hyperlipidemia, hypertension, atrial fibrillation on chronic warfarin, sick sinus status post pacemaker insertion chronic kidney disease, coronary disease who presents with severe right flank pain that started this afternoon, as well as episode of hematuria.  He was actually seen at [facility] 3 days ago for hematuria secondary to supratherapeutic INR.  His hematuria did improve although this morning around 5 AM he developed severe sharp right flank pain.  Otherwise no fevers chills nausea vomiting.  Due to continued pain he come back to the emergency room for further evaluation.    Upon arrival to emergency room he is afebrile vital signs are stable.  Laboratory results shows mild renal insufficiency with a creatinine of 1.52.  CBC was normal at 9.4 with a hemoglobin of 12.7.  INR today was therapeutic at 2.48.  Urinalysis was grossly positive for red blood cell, hyaline cast but only trace leukocyte Estrace and minimal WBC.  Urine culture obtained.  CT scan abdomen pelvis shows mild right hydronephrosis and perinephritic stranding suggestive of a recently passed ureteral stone. He was  given several doses of IV pain meds with still persistent pain so I have been asked to admit him overnight for pain control.     Past Medical History    I have reviewed this patient's medical history and updated it with pertinent information if needed.   Past Medical History:   Diagnosis Date     A-fib (H)      Hypertension        Past Surgical History   I have reviewed this patient's surgical history and updated it with pertinent information if needed.    Prior to Admission Medications   Prior to Admission Medications   Prescriptions Last Dose Informant Patient Reported? Taking?   atorvastatin (LIPITOR) 80 MG tablet   Yes No   Sig: Take 80 mg by mouth At Bedtime    fexofenadine (ALLEGRA) 180 MG tablet   Yes No   Sig: Take 180 mg by mouth daily as needed for allergies   irbesartan (AVAPRO) 75 MG tablet   Yes No   Sig: Take 37.5 mg by mouth At Bedtime   isosorbide mononitrate (IMDUR) 30 MG 24 hr tablet   No No   Sig: Take 1 tablet (30 mg) by mouth daily   metoprolol succinate ER (TOPROL-XL) 50 MG 24 hr tablet   Yes No   Sig: Take 50 mg by mouth 2 times daily (with meals)   nitroGLYcerin (NITROSTAT) 0.4 MG sublingual tablet   Yes No   Sig: Place 1 Tablet under tongue as needed. If no relief after 5 min call 911;continue 1 tab every 5 min max 3 tab   warfarin ANTICOAGULANT (COUMADIN ANTICOAGULANT) 5 MG tablet   Yes No   Sig: Take 7.5mg (1.5 tablets) on Tuesday, Thursday, Saturday and 5mg (1 tablet) all other days of the week or as directed by INR clinic.      Facility-Administered Medications: None     Allergies   Allergies   Allergen Reactions     Lisinopril Unknown and Cough     Spironolactone Unknown and Diarrhea       Social History   I have reviewed this patient's social history and updated it with pertinent information if needed. Constantino Thorne      Family History   I have reviewed this patient's family history and updated it with pertinent information if needed.   Family History   Problem Relation Age of Onset      Cancer Sister        Review of Systems   The 10 point Review of Systems is negative other than noted in the HPI or here.     Physical Exam   Temp: 98.7  F (37.1  C) Temp src: Oral BP: 124/75 Pulse: 77   Resp: 22 SpO2: 96 % O2 Device: None (Room air)    Vital Signs with Ranges  Temp:  [98.7  F (37.1  C)] 98.7  F (37.1  C)  Pulse:  [75-77] 77  Resp:  [22] 22  BP: (124-133)/(75-83) 124/75  SpO2:  [95 %-98 %] 96 %  221 lbs 5.47 oz    Constitutional: Awake, alert,  no apparent distress.  Eyes: Conjunctiva and pupils examined and normal.  HEENT: Moist mucous membranes, normal dentition.  Respiratory: Clear to auscultation bilaterally, no crackles or wheezing.  Cardiovascular: Regular rate and rhythm, normal S1 and S2, and no murmur noted.  GI: Soft, non-distended, non-tender, bowel sounds present. No rebound tenderness or guarding.  Lymph/Hematologic: No anterior cervical or supraclavicular adenopathy.  Skin: No rashes, no cyanosis, no edema.  Musculoskeletal: No deformities noted.  No erythema or tenderness. Moving all extremities.  Neurologic: No focal deficits noted. Speech is clear. Coordination and strength grossly normal.   Psychiatric: Appropriate affect.    Data   Data reviewed today:      Imaging:   Recent Results (from the past 24 hour(s))   CT Abdomen Pelvis w/o Contrast    Narrative    EXAM: CT ABDOMEN PELVIS W/O CONTRAST  LOCATION: [facility]  DATE/TIME: 3/3/2023 6:16 PM    INDICATION: Right flank pain and hematuria.  COMPARISON: None.  TECHNIQUE: CT scan of the abdomen and pelvis was performed without IV contrast. Multiplanar reformats were obtained. Dose reduction techniques were used.  CONTRAST: None.    FINDINGS:      Absence of intravenous contrast limits the sensitivity of this examination for detection of infectious/inflammatory change, post traumatic abnormalities, vascular abnormalities, and visceral lesions.    LOWER CHEST: Cardiomegaly and left ventricular infarct.  Defibrillator lead tip is present at the right ventricular apex. Small hiatal hernia. Calcifications of the coronary arteries and thoracic aorta.    HEPATOBILIARY: Liver surface nodularity and widening of periportal spaces, suggestive of underlying portal projection.    Gallbladder is normal.    No intrahepatic or intrahepatic biliary ductal dilatation.    PANCREAS: Normal attenuation. No peripancreatic inflammatory fat stranding.    SPLEEN: Normal attenuation. Normal size. Small low-attenuation of the inferior spleen, measuring up to 1.2 cm, likely a benign hemangioma or hamartoma.    ADRENAL GLANDS: Normal.    KIDNEYS: Mild right hydroureteronephrosis and asymmetric right perinephric stranding, without obstructing stone or mass lesion identified. Tiny left hemorrhagic/proteinaceous cyst at the mid zone left kidney, axial image 60.    No nephroureterolithiasis.    Thick-walled urinary bladder, though incompletely distended.    PELVIC ORGANS: Moderate to severe prostate gland enlargement.    BOWEL: No evidence of acute gastrointestinal inflammation or obstruction. Normal appendix. Colonic diverticulosis.    No intraperitoneal free fluid or free air.    LYMPH NODES: No suspicious abdominal or pelvic lymphadenopathy.    VASCULATURE: No abdominal aortic aneurysm. Mild atherosclerotic calcifications.    MUSCULOSKELETAL: No suspicious abnormality.    OTHER: No additionally significant abnormalities.      Impression    IMPRESSION:   1.  Mild right hydronephrosis and perinephric stranding, suggestive of a recently passed ureteral stone and/or upper urinary tract infection.  2.  Additional nonacute findings as detailed above.           Recent Labs   Lab 03/03/23  1712   WBC 9.4   HGB 12.7*   MCV 96   *   INR 2.48*      POTASSIUM 5.2   CHLORIDE 101   CO2 24   BUN 41.3*   CR 1.52*   ANIONGAP 11   ZACH 9.1   *           Leilani Campo PA-C  [facility] Medicine  March 3, 2023  Securely message with the  Tea Web Console (learn more here)  Text page via [facility] Paging/Directory

## 2023-03-04 NOTE — PHARMACY-ADMISSION MEDICATION HISTORY
Admission medication history interview status for this patient is complete. See [facility] admission navigator for allergy information, prior to admission medications and immunization status.     Medication history interview done, indicate source(s): Patient  Medication history resources (including written lists, pill bottles, clinic record): Rc refill hx, AC note 3/2/2023.  [facility]    Changes made to PTA medication list:  Added:  Flomax  Changed:   -Toprol XL 50mg bid ---> 50mg qam & 100mg qpm  -Warfarin 7.5mg tues thurs sat & 5mg ROW--> 7.5mg on Mon & 5mg ROW per AC note on 3/2/23.  Reported as Not Taking: Imdur  Removed: Imdur    Actions taken by pharmacist (provider contacted, etc):None     Additional medication history information:None    Medication reconciliation/reorder completed by provider prior to medication history? Yes, paged .    Medication Affordability:  Not including over the counter (OTC) medications, was there a time in the past 12 months when you did not take your medications as prescribed because of cost?: No      Prior to Admission medications    Medication Sig Last Dose Taking? Auth Provider Long Term End Date   atorvastatin (LIPITOR) 80 MG tablet Take 80 mg by mouth At Bedtime  3/3/2023 at am Yes Reported, Patient Yes    fexofenadine (ALLEGRA) 180 MG tablet Take 180 mg by mouth daily as needed for allergies prn Yes Unknown, Entered By History     irbesartan (AVAPRO) 75 MG tablet Take 37.5 mg by mouth At Bedtime 3/3/2023 at pm Yes Unknown, Entered By History No    metoprolol succinate ER (TOPROL XL) 50 MG 24 hr tablet Take 100 mg by mouth every evening 3/3/2023 at pm Yes Unknown, Entered By History Yes    metoprolol succinate ER (TOPROL-XL) 50 MG 24 hr tablet Take 50 mg by mouth every morning 3/3/2023 at am Yes Unknown, Entered By History Yes    nitroGLYcerin (NITROSTAT) 0.4 MG sublingual tablet Place 1 Tablet under tongue as needed. If no relief after 5 min call  911;continue 1 tab every 5 min max 3 tab prn Yes Unknown, Entered By History No    tamsulosin (FLOMAX) 0.4 MG capsule Take 0.4 mg by mouth daily 3/3/2023 at am Yes Unknown, Entered By History     warfarin ANTICOAGULANT (COUMADIN) 5 MG tablet Take 7.5mg (1.5 tablets) on Mondays and 5mg (1 tablet) all other days of the week or as directed by INR clinic. 3/2/2023 at pm Yes Reported, Patient

## 2023-03-04 NOTE — DISCHARGE SUMMARY
[facility]  Discharge Summary  Hospitalist      Date of Admission:  3/3/2023  Date of Discharge:  3/4/2023  Provider:  Nile Walker MD. [facility]  Date of Service (when I last saw the patient): 03/04/23      Primary Provider: Thom Barnes          Discharge Diagnosis:     Discharge Diagnoses   Acute right flank pain, suspect due to passed ureteral stone   Hematuria - multifactorial, likely due to passed stone in the setting of OAC  Mild BETZAIDA    Other medical issues:  Past Medical History:   Diagnosis Date     A-fib (H)      Hypertension      #CAD, hx of ICM  #SSS s/p PM   #Afib on warfarin     Please see the admission history and physical for full details.     Hospital Course     Constantino Thorne was admitted on 3/3/2023.  The following problems were addressed during his hospitalization:  82-year-old male with a medical history of hyperlipidemia, hypertension, atrial fibrillation, sick sinus syndrome, chronic kidney disease, and coronary disease presents with severe right flank pain and hematuria. He was seen 3 days ago for hematuria related to a supratherapeutic INR. Upon arrival to the ER, he was afebrile and stable. Laboratory results showed mild renal insufficiency, normal CBC, and a therapeutic INR. CT scan showed mild right hydronephrosis and perinephritic stranding suggestive of a recently passed ureteral stone.   He was admitted overnight for pain control and observation.   Next morning he was completely asymptomatic was still waiting in the emergency room for a bed and wanted to go home he was discharged after he ate breakfast, pain-free, recommendation to follow-up with urology and his primary care with a repeat basic metabolic panel and CBC  Patient has chronic kidney disease stage*3 creatinine historically has been around 1.2 back in 2010 he had acute kidney injury with creatinine in the 2+ range I suspect he has a mild elevation in his creatinine up to 1.5 related to the  obstruction created by the stone a repeat basic metabolic panel in primary clinic is recommended if there is further concern refer to nephrology      Pending Results   Unresulted Labs Ordered in the Past 30 Days of this Admission     Date and Time Order Name Status Description    3/3/2023  7:15 PM Urine Culture In process           Discharge Orders      Reason for your hospital stay    Kidney stone     Follow-up and recommended labs and tests     Follow-up with primary care physician next week  Follow-up with urologist in 1 to 2 weeks     Activity    Your activity upon discharge: activity as tolerated     Diet    Follow this diet upon discharge: Orders Placed This Encounter      Regular Diet Adult       Code Status   Full Code       Primary Care Physician   Thom Barnes    Physical Exam   Temp: 98.7  F (37.1  C) Temp src: Oral BP: 99/63 Pulse: 60   Resp: 20 SpO2: 95 % O2 Device: None (Room air)    Vitals:    03/03/23 1702   Weight: 100.4 kg (221 lb 5.5 oz)     Vital Signs with Ranges  Temp:  [98.5  F (36.9  C)-98.7  F (37.1  C)] 98.7  F (37.1  C)  Pulse:  [60-77] 60  Resp:  [20-22] 20  BP: ()/(58-83) 99/63  SpO2:  [93 %-98 %] 95 %  I/O last 3 completed shifts:  In: -   Out: 400 [Urine:400]    Constitutional:  alert, cooperative, no apparent distress  Respiratory: No increased work of breathing, good air exchange, no crackles or wheezing.  Cardiovascular: apical impulse,normal S1 and S2  GI: bowel sounds present, soft, non-distended, non-tender      Discharge Disposition   Discharged to home    Consultations This Hospital Stay   PHARMACY TO DOSE WARFARIN    Time Spent on this Encounter   INile MD, personally saw the patient today and spent greater than 30 minutes discharging this patient.      Discharge Medications   Current Discharge Medication List      CONTINUE these medications which have NOT CHANGED    Details   atorvastatin (LIPITOR) 80 MG tablet Take 80 mg by mouth At Bedtime        fexofenadine (ALLEGRA) 180 MG tablet Take 180 mg by mouth daily as needed for allergies      irbesartan (AVAPRO) 75 MG tablet Take 37.5 mg by mouth At Bedtime      !! metoprolol succinate ER (TOPROL XL) 50 MG 24 hr tablet Take 100 mg by mouth every evening      !! metoprolol succinate ER (TOPROL-XL) 50 MG 24 hr tablet Take 50 mg by mouth every morning      nitroGLYcerin (NITROSTAT) 0.4 MG sublingual tablet Place 1 Tablet under tongue as needed. If no relief after 5 min call 911;continue 1 tab every 5 min max 3 tab      tamsulosin (FLOMAX) 0.4 MG capsule Take 0.4 mg by mouth daily      warfarin ANTICOAGULANT (COUMADIN) 5 MG tablet Take 7.5mg (1.5 tablets) on Mondays and 5mg (1 tablet) all other days of the week or as directed by INR clinic.       !! - Potential duplicate medications found. Please discuss with provider.      STOP taking these medications       isosorbide mononitrate (IMDUR) 30 MG 24 hr tablet Comments:   Reason for Stopping:             Allergies   Allergies   Allergen Reactions     Lisinopril Unknown and Cough     Spironolactone Unknown and Diarrhea     Data   Most Recent 3 CBC's:Recent Labs   Lab Test 03/04/23  0739 03/03/23  1712 06/17/20  0703   WBC 11.5* 9.4 7.1   HGB 11.7* 12.7* 13.0*   MCV 97 96 97   * 145* 143*      Most Recent 3 BMP's:  Recent Labs   Lab Test 03/04/23  0739 03/03/23  1712 06/17/20  0703    136 139   POTASSIUM 4.2 5.2 4.4   CHLORIDE 104 101 108   CO2 23 24 26   BUN 38.9* 41.3* 27   CR 1.59* 1.52* 1.13   ANIONGAP 9 11 5   ZACH 8.4* 9.1 8.4*   * 112* 85     Most Recent 2 LFT's:  Recent Labs   Lab Test 11/02/16  1930   AST 28   ALT 38   ALKPHOS 109   BILITOTAL 0.6     Most Recent INR's and Anticoagulation Dosing History:  Anticoagulation Dose History     Recent Dosing and Labs Latest Ref Rng & Units 3/4/2010 3/6/2010 11/2/2016 6/16/2020 6/17/2020 3/3/2023 3/4/2023    INR 0.85 - 1.15 1.26(H) 1.22(H) 2.99(H) 2.57(H) 2.70(H) 2.48(H) 2.38(H)        Most Recent 3  Troponin's:  Recent Labs   Lab Test 06/17/20  0703 06/16/20  2220 06/16/20  1936   TROPI <0.015 <0.015 <0.015     Most Recent Cholesterol Panel:No lab results found.  Most Recent 6 Bacteria Isolates From Any Culture (See EPIC Reports for Culture Details):No lab results found.  Most Recent TSH, T4 and A1c Labs:No lab results found.  Results for orders placed or performed during the hospital encounter of 03/03/23   CT Abdomen Pelvis w/o Contrast    Narrative    EXAM: CT ABDOMEN PELVIS W/O CONTRAST  LOCATION: [facility]  DATE/TIME: 3/3/2023 6:16 PM    INDICATION: Right flank pain and hematuria.  COMPARISON: None.  TECHNIQUE: CT scan of the abdomen and pelvis was performed without IV contrast. Multiplanar reformats were obtained. Dose reduction techniques were used.  CONTRAST: None.    FINDINGS:      Absence of intravenous contrast limits the sensitivity of this examination for detection of infectious/inflammatory change, post traumatic abnormalities, vascular abnormalities, and visceral lesions.    LOWER CHEST: Cardiomegaly and left ventricular infarct. Defibrillator lead tip is present at the right ventricular apex. Small hiatal hernia. Calcifications of the coronary arteries and thoracic aorta.    HEPATOBILIARY: Liver surface nodularity and widening of periportal spaces, suggestive of underlying portal projection.    Gallbladder is normal.    No intrahepatic or intrahepatic biliary ductal dilatation.    PANCREAS: Normal attenuation. No peripancreatic inflammatory fat stranding.    SPLEEN: Normal attenuation. Normal size. Small low-attenuation of the inferior spleen, measuring up to 1.2 cm, likely a benign hemangioma or hamartoma.    ADRENAL GLANDS: Normal.    KIDNEYS: Mild right hydroureteronephrosis and asymmetric right perinephric stranding, without obstructing stone or mass lesion identified. Tiny left hemorrhagic/proteinaceous cyst at the mid zone left kidney, axial image 60.    No  nephroureterolithiasis.    Thick-walled urinary bladder, though incompletely distended.    PELVIC ORGANS: Moderate to severe prostate gland enlargement.    BOWEL: No evidence of acute gastrointestinal inflammation or obstruction. Normal appendix. Colonic diverticulosis.    No intraperitoneal free fluid or free air.    LYMPH NODES: No suspicious abdominal or pelvic lymphadenopathy.    VASCULATURE: No abdominal aortic aneurysm. Mild atherosclerotic calcifications.    MUSCULOSKELETAL: No suspicious abnormality.    OTHER: No additionally significant abnormalities.      Impression    IMPRESSION:   1.  Mild right hydronephrosis and perinephric stranding, suggestive of a recently passed ureteral stone and/or upper urinary tract infection.  2.  Additional nonacute findings as detailed above.                 Disclaimer: This note consists of symbols derived from keyboarding, dictation and/or voice recognition software. As a result, there may be errors in the script that have gone undetected. Please consider this when interpreting information found in this chart.

## 2023-03-04 NOTE — PROGRESS NOTES
PRIMARY DIAGNOSIS: ACUTE PAIN  OUTPATIENT/OBSERVATION GOALS TO BE MET BEFORE DISCHARGE:  1. Pain Status: Pain free.    2. Return to near baseline physical activity: Yes    3. Cleared for discharge by consultants (if involved): Yes    Discharge Planner Nurse   Safe discharge environment identified: Yes  Barriers to discharge: Yes       Entered by: Sofia Vanessa RN 03/04/2023 12:06 PM     Please review provider order for any additional goals.   Nurse to notify provider when observation goals have been met and patient is ready for discharge.

## 2023-03-04 NOTE — ED NOTES
"[facility]  ED Nurse Handoff Report    Constantino Thorne is a 82 year old male   ED Chief complaint: Abdominal Pain  . ED Diagnosis:   Final diagnoses:   Acute kidney injury (H)   Flank pain     Allergies:   Allergies   Allergen Reactions    Lisinopril Unknown and Cough    Spironolactone Unknown and Diarrhea       Code Status: Full Code  Activity level - Baseline/Home:  Independent. Activity Level - Current:   Assist X 1. Lift room needed: No. Bariatric: No   Needed: No   Isolation: No. Infection: Not Applicable.     Vital Signs:   Vitals:    03/03/23 1702 03/03/23 2021 03/03/23 2137   BP: 131/81 133/83 124/75   Pulse: 75  77   Resp: 22     Temp: 98.7  F (37.1  C)     TempSrc: Oral     SpO2: 98% 95% 96%   Weight: 100.4 kg (221 lb 5.5 oz)     Height: 1.854 m (6' 1\")         Cardiac Rhythm:  ,      Pain level:    Patient confused: No. Patient Falls Risk: Yes.   Elimination Status: Has voided   Patient Report - Initial Complaint: abd pain. Focused Assessment: Pt arrives to the ED due to having right sided flank pain that wraps around to his right side of abdomen. Pt states that pain is intense, rates it a 10/10. Seen on Tuesday at [facility] due to a bloody nose and blood in urine which he continues to have he states. Pt denies any pain with urination. Pt is on warfarin.   Tests Performed: imaging, labs, meds. Abnormal Results:   CT Abdomen Pelvis w/o Contrast   Final Result   IMPRESSION:    1.  Mild right hydronephrosis and perinephric stranding, suggestive of a recently passed ureteral stone and/or upper urinary tract infection.   2.  Additional nonacute findings as detailed above.              Labs Ordered and Resulted from Time of ED Arrival to Time of ED Departure   UA MACROSCOPIC WITH REFLEX TO MICRO AND CULTURE - Abnormal       Result Value    Color Urine Orange (*)     Appearance Urine Cloudy (*)     Glucose Urine Negative      Bilirubin Urine Negative      Ketones Urine Negative      " Specific Gravity Urine 1.024      Blood Urine Large (*)     pH Urine 5.5      Protein Albumin Urine 70 (*)     Urobilinogen Urine Normal      Nitrite Urine Negative      Leukocyte Esterase Urine Trace (*)     Mucus Urine Present (*)     RBC Urine >182 (*)     WBC Urine 1      Hyaline Casts Urine 12 (*)    CBC WITH PLATELETS - Abnormal    WBC Count 9.4      RBC Count 4.06 (*)     Hemoglobin 12.7 (*)     Hematocrit 39.0 (*)     MCV 96      MCH 31.3      MCHC 32.6      RDW 13.4      Platelet Count 145 (*)    BASIC METABOLIC PANEL - Abnormal    Sodium 136      Potassium 5.2      Chloride 101      Carbon Dioxide (CO2) 24      Anion Gap 11      Urea Nitrogen 41.3 (*)     Creatinine 1.52 (*)     Calcium 9.1      Glucose 112 (*)     GFR Estimate 45 (*)    INR - Abnormal    INR 2.48 (*)    URINE CULTURE     .   Treatments provided: see MAR  Family Comments: wife at bedside  OBS brochure/video discussed/provided to patient:  Yes  ED Medications:   Medications   0.9% sodium chloride BOLUS (0 mLs Intravenous Stopped 3/3/23 1914)     Followed by   sodium chloride 0.9% infusion (0 mLs Intravenous Stopped 3/3/23 2119)   oxyCODONE IR (ROXICODONE) half-tab 2.5 mg (2.5 mg Oral $Given 3/3/23 1757)   HYDROmorphone (DILAUDID) injection 0.2 mg (0.2 mg Intravenous $Given 3/3/23 1914)   HYDROmorphone (DILAUDID) injection 0.2 mg (0.2 mg Intravenous $Given 3/3/23 2013)   morphine (PF) injection 4 mg (4 mg Intravenous $Given 3/3/23 2107)   morphine (PF) injection 4 mg (4 mg Intravenous $Given 3/3/23 2207)     Drips infusing:  No  For the majority of the shift, the patient's behavior Green. Interventions performed were n/a.    Sepsis treatment initiated: No     Patient tested for COVID 19 prior to admission: NO    ED Nurse Name/Phone Number: Vi Cullen RN,   10:23 PM

## 2023-03-04 NOTE — ED PROVIDER NOTES
History     Chief Complaint:  Abdominal Pain       HPI   Constantino Thorne is a 82 year old male with a history of hyperlipidemia, hypertension who presents with right sided flank pain which radiates into the right side of his abdomen. The patient was seen at [facility] 3 days ago for hematuria, high INR, and epistaxis, and was ultimately discharged home. Since then, he has continued to have hematuria and this morning, around 0500, suddenly developed right flank pain. The pain resolved for approximately 5 hours, then returned, and has been worsening since despite the patient taking a couple tablets of Tylenol around 1400. Presently, he denies any dysuria or frequency and has had no cough, fever, chills, vomiting, diarrhea, or rash.      Independent Historian:   None - Patient Only    Review of External Notes: reviewed ER visit from 2/28/23 from julián      ROS:  Review of Systems   Constitutional: Negative for chills and fever.   Respiratory: Negative for cough.    Gastrointestinal: Negative for diarrhea and vomiting.   Genitourinary: Positive for flank pain. Negative for dysuria and frequency.   Skin: Negative for rash.   All other systems reviewed and are negative.      Allergies:  Lisinopril  Spironolactone     Medications:    Atorvastatin   Fexofenadine  Irbesartan   Imdur  Metoprolol succinate   Nitroglycerin  Warfarin   Tamsulosin     Past Medical History:    Atrial fibrillation   Hypertension   Cataract   Symptomatic PVCs  HFrEF   SABINE   Chronic kidney disease stage 3  Cardiac arrest due to underlying cardiac condition  Myocardial infarction   Ischemic cardiomyopathy  Implantable cardioverter-defibrillator (ICD) in situ  Hyperlipidemia   Benign prostatic hyperplasia  Rheumatoid arthritis     Past Surgical History:    Cardiac defibrilator placement   Hernia repair  Fracture treatment   Cataract removal 2x   Vasectomy     Family History:    Mother: Cataract, diabetes   Sister: cancer     Social History:  The  "patient presents to the ED by means of car.  PCP: Thom Barnes     Physical Exam     Patient Vitals for the past 24 hrs:   BP Temp Temp src Pulse Resp SpO2 Height Weight   03/03/23 2137 124/75 -- -- 77 -- 96 % -- --   03/03/23 2021 133/83 -- -- -- -- 95 % -- --   03/03/23 1702 131/81 98.7  F (37.1  C) Oral 75 22 98 % 1.854 m (6' 1\") 100.4 kg (221 lb 5.5 oz)        Physical Exam  General: Resting on the bed.  Head: No obvious trauma to head.  Ears, Nose, Throat:  External ears normal.  Nose normal.    Eyes:  Conjunctivae clear.    CV: Regular rate and rhythm.  No murmurs.      Respiratory: Effort normal and breath sounds normal.  No wheezing or crackles.   Gastrointestinal: Soft.  No distension. There is no tenderness.  There is no rigidity, no rebound and no guarding.   Musculoskeletal: right cva tenderness   Neuro: Alert. Moving all extremities appropriately.  Normal speech.    Skin: Skin is warm and dry.  No rash noted.     Emergency Department Course     Imaging:  CT Abdomen Pelvis w/o Contrast   Final Result   IMPRESSION:    1.  Mild right hydronephrosis and perinephric stranding, suggestive of a recently passed ureteral stone and/or upper urinary tract infection.   2.  Additional nonacute findings as detailed above.               Report per radiology    Laboratory:  Labs Ordered and Resulted from Time of ED Arrival to Time of ED Departure   UA MACROSCOPIC WITH REFLEX TO MICRO AND CULTURE - Abnormal       Result Value    Color Urine Orange (*)     Appearance Urine Cloudy (*)     Glucose Urine Negative      Bilirubin Urine Negative      Ketones Urine Negative      Specific Gravity Urine 1.024      Blood Urine Large (*)     pH Urine 5.5      Protein Albumin Urine 70 (*)     Urobilinogen Urine Normal      Nitrite Urine Negative      Leukocyte Esterase Urine Trace (*)     Mucus Urine Present (*)     RBC Urine >182 (*)     WBC Urine 1      Hyaline Casts Urine 12 (*)    CBC WITH PLATELETS - Abnormal    WBC Count " 9.4      RBC Count 4.06 (*)     Hemoglobin 12.7 (*)     Hematocrit 39.0 (*)     MCV 96      MCH 31.3      MCHC 32.6      RDW 13.4      Platelet Count 145 (*)    BASIC METABOLIC PANEL - Abnormal    Sodium 136      Potassium 5.2      Chloride 101      Carbon Dioxide (CO2) 24      Anion Gap 11      Urea Nitrogen 41.3 (*)     Creatinine 1.52 (*)     Calcium 9.1      Glucose 112 (*)     GFR Estimate 45 (*)    INR - Abnormal    INR 2.48 (*)    URINE CULTURE          Emergency Department Course & Assessments:  Interventions:  Medications   0.9% sodium chloride BOLUS (0 mLs Intravenous Stopped 3/3/23 1914)     Followed by   sodium chloride 0.9% infusion (0 mLs Intravenous Stopped 3/3/23 2119)   oxyCODONE IR (ROXICODONE) half-tab 2.5 mg (2.5 mg Oral $Given 3/3/23 1757)   HYDROmorphone (DILAUDID) injection 0.2 mg (0.2 mg Intravenous $Given 3/3/23 1914)   HYDROmorphone (DILAUDID) injection 0.2 mg (0.2 mg Intravenous $Given 3/3/23 2013)   morphine (PF) injection 4 mg (4 mg Intravenous $Given 3/3/23 2107)   morphine (PF) injection 4 mg (4 mg Intravenous $Given 3/3/23 2207)      Assessments:   I obtained the history and examined the patient as noted above.    I rechecked the patient and explained findings.   I rechecked the patient.      Independent Interpretation (X-rays, CTs, rhythm strip):  None    Consultations/Discussion of Management or Tests:   I spoke with [facility] about pain control and plan     Social Determinants of Health affecting care:   None    Disposition:  The patient was admitted to the hospital under the care of Dr. Wong with [facility].     Impression & Plan      Medical Decision Makin-year-old presents with right flank pain.  Vitals are reassuring.  Broad differentials pursued include not limited to stone, pyelonephritis, infected stone, AAA, obstruction, diverticulitis, colitis, etc.  CBC shows no leukocytosis or anemia.  Patient is anticoagulated with therapeutic INR.   BMP does show mild acute kidney injury with elevation in creatinine.  Fluids were ordered.  UA shows large blood but no signs of infection.  CT scan was done for possible stone given patient's symptoms and blood in urine, there is mild right hydronephrosis and perinephric stranding.  Consistent with either a passed stone versus UTI.  UA does not look infectious.  Patient's pain is not resolved so I do not see any clear signs of a passed stone.  Cannot rule out stenosis or other obstruction.  Patient's pain is not controlled with oral medication nor 4 doses of IV medication.  Therefore plan for admission.  Discussed with hospitalist who graciously excepted.      Diagnosis:    ICD-10-CM    1. Acute kidney injury (H)  N17.9       2. Flank pain  R10.9            Discharge Medications:  New Prescriptions    No medications on file          Scribe Disclosure:  I, Joshua Kjer, am serving as a scribe at 7:41 PM on 3/3/2023 to document services personally performed by Heather Perez MD based on my observations and the provider's statements to me.   3/3/2023   Heather Perez MD Bennett, Jennifer L, MD  03/04/23 0000

## 2023-03-05 LAB — BACTERIA UR CULT: NORMAL

## 2024-01-01 NOTE — PHARMACY-ADMISSION MEDICATION HISTORY
Admission medication history interview status for this patient is complete. See [facility] admission navigator for allergy information, prior to admission medications and immunization status.     Medication history interview done via telephone during Covid-19 pandemic, indicate source(s): Patient  Medication history resources (including written lists, pill bottles, clinic record): Care Everywhere [[facility]], written med list from home    Changes made to PTA medication list:  Added: Fexofenadine; Irbesartan;  Deleted: Valsartan;  Changed: Doses/SIGs added to all meds    Actions taken by pharmacist (provider contacted, etc): Page/Sticky note to MD     Additional medication history information:None    Medication reconciliation/reorder completed by provider prior to medication history?  No        Prior to Admission medications    Medication Sig Last Dose Taking? Auth Provider   atorvastatin (LIPITOR) 80 MG tablet Take 80 mg by mouth At Bedtime  6/15/2020 at x1 Yes Reported, Patient   fexofenadine (ALLEGRA) 180 MG tablet Take 180 mg by mouth daily as needed for allergies 6/16/2020 at AM Yes Unknown, Entered By History   irbesartan (AVAPRO) 75 MG tablet Take 37.5 mg by mouth At Bedtime 6/15/2020 at HS Yes Unknown, Entered By History   metoprolol succinate ER (TOPROL-XL) 50 MG 24 hr tablet Take 50 mg by mouth 2 times daily (with meals) 6/16/2020 at x1 Yes Unknown, Entered By History   nitroGLYcerin (NITROSTAT) 0.4 MG sublingual tablet Place 1 Tablet under tongue as needed. If no relief after 5 min call 911;continue 1 tab every 5 min max 3 tab  Yes Unknown, Entered By History   warfarin ANTICOAGULANT (COUMADIN ANTICOAGULANT) 5 MG tablet Take 7.5mg (1.5 tablets) on Tuesday, Thursday, Saturday and 5mg (1 tablet) all other days of the week or as directed by INR clinic. 6/15/2020 at 5mg Yes Reported, Patient         
<<-----Click here for Discharge Medication Review

## 2024-10-10 ENCOUNTER — APPOINTMENT (OUTPATIENT)
Dept: CT IMAGING | Facility: CLINIC | Age: 84
End: 2024-10-10
Attending: PHYSICIAN ASSISTANT
Payer: COMMERCIAL

## 2024-10-10 ENCOUNTER — APPOINTMENT (OUTPATIENT)
Dept: GENERAL RADIOLOGY | Facility: CLINIC | Age: 84
End: 2024-10-10
Attending: PHYSICIAN ASSISTANT
Payer: COMMERCIAL

## 2024-10-10 ENCOUNTER — HOSPITAL ENCOUNTER (OUTPATIENT)
Facility: CLINIC | Age: 84
Setting detail: OBSERVATION
Discharge: HOME | End: 2024-10-11
Attending: PHYSICIAN ASSISTANT | Admitting: HOSPITALIST
Payer: COMMERCIAL

## 2024-10-10 DIAGNOSIS — I10 ESSENTIAL HYPERTENSION: Primary | ICD-10-CM

## 2024-10-10 DIAGNOSIS — R05.9 COUGH: ICD-10-CM

## 2024-10-10 DIAGNOSIS — R55 SYNCOPE: ICD-10-CM

## 2024-10-10 DIAGNOSIS — S06.6XAA TRAUMATIC SUBARACHNOID HEMORRHAGE (H): ICD-10-CM

## 2024-10-10 LAB
ANION GAP SERPL CALCULATED.3IONS-SCNC: 15 MMOL/L (ref 7–15)
APTT PPP: 35 SECONDS (ref 22–38)
ATRIAL RATE - MUSE: 60 BPM
BASOPHILS # BLD MANUAL: 0 10E3/UL (ref 0–0.2)
BASOPHILS NFR BLD MANUAL: 0 %
BUN SERPL-MCNC: 31.8 MG/DL (ref 8–23)
BURR CELLS BLD QL SMEAR: SLIGHT
C PNEUM DNA SPEC QL NAA+PROBE: NOT DETECTED
CALCIUM SERPL-MCNC: 8.8 MG/DL (ref 8.8–10.4)
CHLORIDE SERPL-SCNC: 102 MMOL/L (ref 98–107)
CREAT SERPL-MCNC: 1.21 MG/DL (ref 0.67–1.17)
DIASTOLIC BLOOD PRESSURE - MUSE: NORMAL MMHG
EGFRCR SERPLBLD CKD-EPI 2021: 59 ML/MIN/1.73M2
EOSINOPHIL # BLD MANUAL: 0.6 10E3/UL (ref 0–0.7)
EOSINOPHIL NFR BLD MANUAL: 6 %
ERYTHROCYTE [DISTWIDTH] IN BLOOD BY AUTOMATED COUNT: 13.3 % (ref 10–15)
FLUAV H1 2009 PAND RNA SPEC QL NAA+PROBE: NOT DETECTED
FLUAV H1 RNA SPEC QL NAA+PROBE: NOT DETECTED
FLUAV H3 RNA SPEC QL NAA+PROBE: NOT DETECTED
FLUAV RNA SPEC QL NAA+PROBE: NOT DETECTED
FLUBV RNA SPEC QL NAA+PROBE: NOT DETECTED
GLUCOSE SERPL-MCNC: 103 MG/DL (ref 70–99)
HADV DNA SPEC QL NAA+PROBE: NOT DETECTED
HCO3 SERPL-SCNC: 21 MMOL/L (ref 22–29)
HCOV PNL SPEC NAA+PROBE: NOT DETECTED
HCT VFR BLD AUTO: 37.9 % (ref 40–53)
HGB BLD-MCNC: 12.4 G/DL (ref 13.3–17.7)
HMPV RNA SPEC QL NAA+PROBE: NOT DETECTED
HOLD SPECIMEN: NORMAL
HPIV1 RNA SPEC QL NAA+PROBE: NOT DETECTED
HPIV2 RNA SPEC QL NAA+PROBE: NOT DETECTED
HPIV3 RNA SPEC QL NAA+PROBE: NOT DETECTED
HPIV4 RNA SPEC QL NAA+PROBE: DETECTED
INR PPP: 2.03 (ref 0.85–1.15)
INTERPRETATION ECG - MUSE: NORMAL
LYMPHOCYTES # BLD MANUAL: 1.6 10E3/UL (ref 0.8–5.3)
LYMPHOCYTES NFR BLD MANUAL: 17 %
M PNEUMO DNA SPEC QL NAA+PROBE: NOT DETECTED
MCH RBC QN AUTO: 31 PG (ref 26.5–33)
MCHC RBC AUTO-ENTMCNC: 32.7 G/DL (ref 31.5–36.5)
MCV RBC AUTO: 95 FL (ref 78–100)
MONOCYTES # BLD MANUAL: 1.1 10E3/UL (ref 0–1.3)
MONOCYTES NFR BLD MANUAL: 12 %
NEUTROPHILS # BLD MANUAL: 6 10E3/UL (ref 1.6–8.3)
NEUTROPHILS NFR BLD MANUAL: 65 %
P AXIS - MUSE: NORMAL DEGREES
PLAT MORPH BLD: ABNORMAL
PLATELET # BLD AUTO: 150 10E3/UL (ref 150–450)
POTASSIUM SERPL-SCNC: 4.4 MMOL/L (ref 3.4–5.3)
PR INTERVAL - MUSE: 226 MS
QRS DURATION - MUSE: 90 MS
QT - MUSE: 404 MS
QTC - MUSE: 404 MS
R AXIS - MUSE: -28 DEGREES
RBC # BLD AUTO: 4 10E6/UL (ref 4.4–5.9)
RBC MORPH BLD: ABNORMAL
RSV RNA SPEC QL NAA+PROBE: NOT DETECTED
RSV RNA SPEC QL NAA+PROBE: NOT DETECTED
RV+EV RNA SPEC QL NAA+PROBE: NOT DETECTED
SODIUM SERPL-SCNC: 138 MMOL/L (ref 135–145)
SYSTOLIC BLOOD PRESSURE - MUSE: NORMAL MMHG
T AXIS - MUSE: 63 DEGREES
TROPONIN T SERPL HS-MCNC: 19 NG/L
VENTRICULAR RATE- MUSE: 60 BPM
WBC # BLD AUTO: 9.3 10E3/UL (ref 4–11)

## 2024-10-10 PROCEDURE — 70450 CT HEAD/BRAIN W/O DYE: CPT

## 2024-10-10 PROCEDURE — 71046 X-RAY EXAM CHEST 2 VIEWS: CPT

## 2024-10-10 PROCEDURE — 36415 COLL VENOUS BLD VENIPUNCTURE: CPT | Performed by: PHYSICIAN ASSISTANT

## 2024-10-10 PROCEDURE — 84484 ASSAY OF TROPONIN QUANT: CPT | Performed by: PHYSICIAN ASSISTANT

## 2024-10-10 PROCEDURE — G0378 HOSPITAL OBSERVATION PER HR: HCPCS

## 2024-10-10 PROCEDURE — 99222 1ST HOSP IP/OBS MODERATE 55: CPT | Performed by: PHYSICIAN ASSISTANT

## 2024-10-10 PROCEDURE — 85730 THROMBOPLASTIN TIME PARTIAL: CPT | Performed by: PHYSICIAN ASSISTANT

## 2024-10-10 PROCEDURE — 99285 EMERGENCY DEPT VISIT HI MDM: CPT | Mod: 25

## 2024-10-10 PROCEDURE — 72125 CT NECK SPINE W/O DYE: CPT

## 2024-10-10 PROCEDURE — 250N000013 HC RX MED GY IP 250 OP 250 PS 637: Performed by: PHYSICIAN ASSISTANT

## 2024-10-10 PROCEDURE — 93005 ELECTROCARDIOGRAM TRACING: CPT

## 2024-10-10 PROCEDURE — 85610 PROTHROMBIN TIME: CPT | Performed by: PHYSICIAN ASSISTANT

## 2024-10-10 PROCEDURE — 82310 ASSAY OF CALCIUM: CPT | Performed by: PHYSICIAN ASSISTANT

## 2024-10-10 PROCEDURE — 85007 BL SMEAR W/DIFF WBC COUNT: CPT | Performed by: PHYSICIAN ASSISTANT

## 2024-10-10 PROCEDURE — 70450 CT HEAD/BRAIN W/O DYE: CPT | Mod: 77

## 2024-10-10 PROCEDURE — 80048 BASIC METABOLIC PNL TOTAL CA: CPT | Performed by: PHYSICIAN ASSISTANT

## 2024-10-10 PROCEDURE — 99204 OFFICE O/P NEW MOD 45 MIN: CPT

## 2024-10-10 PROCEDURE — 85027 COMPLETE CBC AUTOMATED: CPT | Performed by: PHYSICIAN ASSISTANT

## 2024-10-10 PROCEDURE — 96365 THER/PROPH/DIAG IV INF INIT: CPT

## 2024-10-10 PROCEDURE — 250N000011 HC RX IP 250 OP 636: Performed by: PHYSICIAN ASSISTANT

## 2024-10-10 PROCEDURE — 96366 THER/PROPH/DIAG IV INF ADDON: CPT

## 2024-10-10 PROCEDURE — 258N000003 HC RX IP 258 OP 636: Performed by: PHYSICIAN ASSISTANT

## 2024-10-10 PROCEDURE — 87486 CHLMYD PNEUM DNA AMP PROBE: CPT | Performed by: PHYSICIAN ASSISTANT

## 2024-10-10 RX ORDER — AMOXICILLIN 250 MG
1 CAPSULE ORAL 2 TIMES DAILY PRN
Status: DISCONTINUED | OUTPATIENT
Start: 2024-10-10 | End: 2024-10-11 | Stop reason: HOSPADM

## 2024-10-10 RX ORDER — METOPROLOL SUCCINATE 50 MG/1
50 TABLET, EXTENDED RELEASE ORAL EVERY MORNING
Status: DISCONTINUED | OUTPATIENT
Start: 2024-10-11 | End: 2024-10-11 | Stop reason: HOSPADM

## 2024-10-10 RX ORDER — ACETAMINOPHEN 650 MG/1
650 SUPPOSITORY RECTAL EVERY 4 HOURS PRN
Status: DISCONTINUED | OUTPATIENT
Start: 2024-10-10 | End: 2024-10-11 | Stop reason: HOSPADM

## 2024-10-10 RX ORDER — TAMSULOSIN HYDROCHLORIDE 0.4 MG/1
0.4 CAPSULE ORAL EVERY EVENING
Status: DISCONTINUED | OUTPATIENT
Start: 2024-10-10 | End: 2024-10-11 | Stop reason: HOSPADM

## 2024-10-10 RX ORDER — ALBUTEROL SULFATE 0.83 MG/ML
2.5 SOLUTION RESPIRATORY (INHALATION)
Status: DISCONTINUED | OUTPATIENT
Start: 2024-10-10 | End: 2024-10-11 | Stop reason: HOSPADM

## 2024-10-10 RX ORDER — HYDRALAZINE HYDROCHLORIDE 20 MG/ML
5 INJECTION INTRAMUSCULAR; INTRAVENOUS EVERY 4 HOURS PRN
Status: DISCONTINUED | OUTPATIENT
Start: 2024-10-10 | End: 2024-10-11 | Stop reason: HOSPADM

## 2024-10-10 RX ORDER — ACETAMINOPHEN 325 MG/1
650 TABLET ORAL EVERY 4 HOURS PRN
Status: DISCONTINUED | OUTPATIENT
Start: 2024-10-10 | End: 2024-10-11 | Stop reason: HOSPADM

## 2024-10-10 RX ORDER — TRIAMCINOLONE ACETONIDE 1 MG/G
CREAM TOPICAL 2 TIMES DAILY PRN
COMMUNITY

## 2024-10-10 RX ORDER — ONDANSETRON 4 MG/1
4 TABLET, ORALLY DISINTEGRATING ORAL EVERY 6 HOURS PRN
Status: DISCONTINUED | OUTPATIENT
Start: 2024-10-10 | End: 2024-10-11 | Stop reason: HOSPADM

## 2024-10-10 RX ORDER — WARFARIN SODIUM 5 MG/1
5 TABLET ORAL DAILY
Status: ON HOLD | COMMUNITY
End: 2024-10-11

## 2024-10-10 RX ORDER — AMOXICILLIN 250 MG
2 CAPSULE ORAL 2 TIMES DAILY PRN
Status: DISCONTINUED | OUTPATIENT
Start: 2024-10-10 | End: 2024-10-11 | Stop reason: HOSPADM

## 2024-10-10 RX ORDER — ONDANSETRON 2 MG/ML
4 INJECTION INTRAMUSCULAR; INTRAVENOUS EVERY 6 HOURS PRN
Status: DISCONTINUED | OUTPATIENT
Start: 2024-10-10 | End: 2024-10-11 | Stop reason: HOSPADM

## 2024-10-10 RX ORDER — METOPROLOL SUCCINATE 100 MG/1
100 TABLET, EXTENDED RELEASE ORAL EVERY EVENING
Status: DISCONTINUED | OUTPATIENT
Start: 2024-10-10 | End: 2024-10-11 | Stop reason: HOSPADM

## 2024-10-10 RX ORDER — GUAIFENESIN 600 MG/1
600 TABLET, EXTENDED RELEASE ORAL 2 TIMES DAILY
Status: DISCONTINUED | OUTPATIENT
Start: 2024-10-10 | End: 2024-10-11 | Stop reason: HOSPADM

## 2024-10-10 RX ADMIN — GUAIFENESIN 600 MG: 600 TABLET, EXTENDED RELEASE ORAL at 21:00

## 2024-10-10 RX ADMIN — PHYTONADIONE 10 MG: 10 INJECTION, EMULSION INTRAMUSCULAR; INTRAVENOUS; SUBCUTANEOUS at 13:27

## 2024-10-10 RX ADMIN — ACETAMINOPHEN 650 MG: 325 TABLET ORAL at 21:14

## 2024-10-10 RX ADMIN — TAMSULOSIN HYDROCHLORIDE 0.4 MG: 0.4 CAPSULE ORAL at 21:00

## 2024-10-10 ASSESSMENT — COLUMBIA-SUICIDE SEVERITY RATING SCALE - C-SSRS
1. IN THE PAST MONTH, HAVE YOU WISHED YOU WERE DEAD OR WISHED YOU COULD GO TO SLEEP AND NOT WAKE UP?: NO
6. HAVE YOU EVER DONE ANYTHING, STARTED TO DO ANYTHING, OR PREPARED TO DO ANYTHING TO END YOUR LIFE?: NO
2. HAVE YOU ACTUALLY HAD ANY THOUGHTS OF KILLING YOURSELF IN THE PAST MONTH?: NO

## 2024-10-10 ASSESSMENT — ACTIVITIES OF DAILY LIVING (ADL)
ADLS_ACUITY_SCORE: 36
ADLS_ACUITY_SCORE: 35
ADLS_ACUITY_SCORE: 36
ADLS_ACUITY_SCORE: 35
ADLS_ACUITY_SCORE: 31
ADLS_ACUITY_SCORE: 36

## 2024-10-10 NOTE — CONSULTS
[facility]    Neurosurgery Consultation     Date of Admission:  10/10/2024  Date of Consult (When I saw the patient): 10/10/24    Assessment   Constantino Thorne is a 84 year old male ANTICOAGULATED on warfarin with history of atrial fibrillation, MI, HTN, HLD, and CKD who was admitted on 10/10/2024 after a syncopal episode and fall. Neurosurgery was consulted for SAH.    Patient is seen this afternoon in bed with wife at bedside. Denies headache, nausea, vomiting, vision changes. Neuro exam intact. Last dose of warfarin last night at 1730.    EXAM: CT HEAD W/O CONTRAST  10/10/2024 11:55 AM                                                                    IMPRESSION:   1. Small amount of sulcal hyperdensity in the left frontal lobe  (series 7, image 41, series 3, image 21 and series 5, image 17) likely  representing small amount of subarachnoid hemorrhage.   2. Chronic small vessel ischemic disease and generalized cerebral  atrophy.    Labs:    INR 2.03  PTT 25  Hgb 12.4    Plan   -Vitamin K for reversal   -No immediate neurosurgical intervention indicated at this time  -Admit through hospitalist   -Hold all anticoagulation including warfarin, ASA, NSAIDs  -HOB30  -SBP<150  -Repeat head CT in 6 hours    I have discussed the following assessment and plan with Dr. Ho.     Mariely Estrada, CNP  [facility]  [address] [place] [id number]  Tel [phone]  Fax [phone]     Code Status    Prior    Reason for Consult   I was asked by Bronson Baird to evaluate this patient for:  SAH    Primary Care Physician   Thom Barnes    Chief Complaint   SAH    History is obtained from the patient and electronic health record    History of Present Illness   Constantino Thorne is a 84 year old male who presented to the ED via EMS after an episode of syncope. The patient was seen at urgent care earlier this morning for a cough that he has been suffering from for  a couple of weeks. The patient was scheduled for an x-ray for his work up. After the x-ray was completed, the patient was standing up from the table when he experienced a sudden syncopal episode. He fell and hit the left side of his head on the table on his way down. The fall was witnessed but the patient can't remember the fall.     Past Medical History   I have reviewed this patient's medical history and updated it with pertinent information if needed.   Past Medical History:   Diagnosis Date    A-fib (H)     Hypertension        Past Surgical History   I have reviewed this patient's surgical history and updated it with pertinent information if needed.  No past surgical history on file.    Prior to Admission Medications   Prior to Admission Medications   Prescriptions Last Dose Informant Patient Reported? Taking?   atorvastatin (LIPITOR) 80 MG tablet   Yes No   Sig: Take 80 mg by mouth At Bedtime    fexofenadine (ALLEGRA) 180 MG tablet   Yes No   Sig: Take 180 mg by mouth daily as needed for allergies   irbesartan (AVAPRO) 75 MG tablet   Yes No   Sig: Take 37.5 mg by mouth At Bedtime   metoprolol succinate ER (TOPROL XL) 50 MG 24 hr tablet   Yes No   Sig: Take 100 mg by mouth every evening   metoprolol succinate ER (TOPROL-XL) 50 MG 24 hr tablet   Yes No   Sig: Take 50 mg by mouth every morning   nitroGLYcerin (NITROSTAT) 0.4 MG sublingual tablet   Yes No   Sig: Place 1 Tablet under tongue as needed. If no relief after 5 min call 911;continue 1 tab every 5 min max 3 tab   tamsulosin (FLOMAX) 0.4 MG capsule   Yes No   Sig: Take 0.4 mg by mouth daily   warfarin ANTICOAGULANT (COUMADIN) 5 MG tablet   Yes No   Sig: Take 7.5mg (1.5 tablets) on Mondays and 5mg (1 tablet) all other days of the week or as directed by INR clinic.      Facility-Administered Medications: None     Allergies   Allergies   Allergen Reactions    Lisinopril Unknown and Cough    Spironolactone Unknown and Diarrhea       Social History   I have  reviewed this patient's social history and updated it with pertinent information if needed. Constantino Thorne      Family History   I have reviewed this patient's family history and updated it with pertinent information if needed.   Family History   Problem Relation Age of Onset    Cancer Sister        Review of Systems   10 point ROS negative other than symptoms noted in HPI.    Physical Exam   Vital signs with ranges:   Temp:  [97.1  F (36.2  C)] 97.1  F (36.2  C)  Pulse:  [58-67] 67  Resp:  [12-28] 28  BP: (130-136)/(80-84) 136/84  SpO2:  [92 %-100 %] 100 %  0 lbs 0 oz    HEENT:  Normocephalic, atraumatic  Heart:  No peripheral edema  Lungs:  No SOB  Skin:  Warm and dry, good capillary refill.    NEUROLOGICAL EXAMINATION:   Mental status:  Alert and Oriented x 3, speech is fluent.  Cranial nerves:  II-XII intact. PERRL  Motor:    Shoulder Abduction  Right:  5/5   Left:  5/5  Biceps                      Right:  5/5   Left:  5/5  Triceps                     Right:  5/5   Left:  5/5  Wrist Extensors        Right:  5/5   Left:  5/5  Wrist Flexors            Right:  5/5   Left:  5/5  Intrinsics                   Right:  5/5   Left:  5/5    Iliopsoas  (hip flexion)               Right: 5/5  Left:  5/5  Quadriceps  (knee extension)       Right:  5/5  Left:  5/5  Hamstrings  (knee flexion)            Right:  5/5  Left:  5/5  Gastroc Soleus  (PF)                          Right:  5/5  Left:  5/5  Tibialis Ant  (DF)                          Right:  5/5  Left:  5/5  EHL                          Right:  5/5  Left:  5/5    Coordination:  Smooth finger to nose testing.   Negative pronator drift.      Data   CBC RESULTS:   Recent Labs   Lab Test 10/10/24  1045   WBC 9.3   RBC 4.00*   HGB 12.4*   HCT 37.9*   MCV 95   MCH 31.0   MCHC 32.7   RDW 13.3        Basic Metabolic Panel:  Lab Results   Component Value Date     10/10/2024     06/17/2020      Lab Results   Component Value Date    POTASSIUM 4.4 10/10/2024     POTASSIUM 4.4 06/17/2020     Lab Results   Component Value Date    CHLORIDE 102 10/10/2024    CHLORIDE 108 06/17/2020     Lab Results   Component Value Date    ZACH 8.8 10/10/2024    ZACH 8.4 06/17/2020     Lab Results   Component Value Date    CO2 21 10/10/2024    CO2 26 06/17/2020     Lab Results   Component Value Date    BUN 31.8 10/10/2024    BUN 27 06/17/2020     Lab Results   Component Value Date    CR 1.21 10/10/2024    CR 1.13 06/17/2020     Lab Results   Component Value Date     10/10/2024    GLC 85 06/17/2020     INR:  Lab Results   Component Value Date    INR 2.03 10/10/2024    INR 2.38 03/04/2023    INR 2.48 03/03/2023    INR 2.70 06/17/2020    INR 2.57 06/16/2020    INR 2.99 11/02/2016    INR 1.22 03/06/2010    INR 1.26 03/04/2010    INR 1.12 02/28/2010    INR 1.05 02/26/2010    INR 1.06 02/25/2010    INR 1.08 02/24/2010    INR 1.18 02/23/2010    INR 1.27 02/21/2010    INR 1.22 02/19/2010

## 2024-10-10 NOTE — ED PROVIDER NOTES
Emergency Department Note      History of Present Illness     Chief Complaint   Syncope      HPI   Constantino Thorne is a 84 year old male on warfarin with history of atrial fibrillation, myocardial infarction, hypertension, hyperlipidemia, and chronic kidney disease who presents from urgent care to the ED with wife via EMS after syncope. The patient was seen at urgent care earlier this morning for a cough that he has been suffering from for a couple of weeks. The patient was scheduled for an x-ray for his work up. After the x-ray was completed, the patient was standing up from the table when he experienced a sudden syncopal episode and He fell and hit the left side of his head on the table on his way down per EMS. No seizure like activity was reported and no cpr/etc. The fall was witnessed but the patient can't remember the fall. EMS was called. The patient's wife notes that the patient has felt lightheaded after standing up from sitting in the past. During evaluation the patient denies chest pain, shortness of breath, palpitations, abdominal pain, and numbness or tingling in his extremities. The patient doesn't endorse a fever and has been eating and drinking normally.     Independent Historian   EMS as above.    Review of External Notes   Reviewed [facility] Dr. Barnes [facility] note from 7/23 where pt seen for medicare annual wellness exam note hx Coronary artery disease with atrial fibrillation, hypertension and hyperlipidemia on Coumadin.  Blood pressure and cholesterol are well controlled. Follow-up cardiology.     Past Medical History     Medical History and Problem List   Atrial fibrillation  Heart failure with reduced ejection fraction  SABINE  Chronic kidney disease  Myocardial infarction   Ischemic cardiomyopathy   Hypertension   Hyperlipidemia   Benign prostatic hyperplasia   Rheumatoid arthritis    Medications   Warfarin   Flomax   Atorvastatin   Avapro  Metoprolol    Surgical History   Hernia repair  Vasectomy  "  Cataract removal    Physical Exam     Patient Vitals for the past 24 hrs:   BP Temp Temp src Pulse Resp SpO2 Height Weight   10/10/24 1730 135/75 -- Oral 62 18 96 % 1.854 m (6' 1\") 97.8 kg (215 lb 9.6 oz)   10/10/24 1709 -- -- -- -- 19 99 % -- --   10/10/24 1708 -- -- -- 64 18 -- -- --   10/10/24 1707 -- -- -- 60 15 -- -- --   10/10/24 1706 -- -- -- 65 19 -- -- --   10/10/24 1705 -- -- -- 64 19 -- -- --   10/10/24 1704 -- -- -- 62 18 -- -- --   10/10/24 1701 -- -- -- 67 20 -- -- --   10/10/24 1659 136/74 -- -- 63 16 -- -- --   10/10/24 1515 131/69 -- -- 64 -- -- -- --   10/10/24 1501 133/83 -- -- 61 18 100 % -- --   10/10/24 1446 137/87 -- -- 63 24 95 % -- --   10/10/24 1315 136/84 -- -- 67 28 100 % -- --   10/10/24 1259 -- -- -- 63 17 -- -- --   10/10/24 1258 -- -- -- 65 12 -- -- --   10/10/24 1257 -- -- -- 61 -- 100 % -- --   10/10/24 1256 -- -- -- 60 12 -- -- --   10/10/24 1242 -- -- -- 59 16 92 % -- --   10/10/24 1241 -- -- -- 59 16 100 % -- --   10/10/24 1240 -- -- -- 67 13 -- -- --   10/10/24 1238 -- -- -- 58 19 99 % -- --   10/10/24 1237 -- -- -- 61 17 99 % -- --   10/10/24 1236 -- -- -- 64 16 100 % -- --   10/10/24 1216 -- -- -- -- -- 100 % -- --   10/10/24 1215 130/80 -- -- 60 19 -- -- --   10/10/24 1037 -- 97.1  F (36.2  C) Temporal -- -- -- -- --   10/10/24 1035 135/81 -- -- 60 16 98 % -- --     Physical Exam  General: Alert and awake. No coughing heard.  Head:  Scalpw/left frontal forehead hematoma.  Eyes:  Globes normal and atraumatic.  PERRL, EOMI   ENT:  No bruising of facial bone ttp or step-offs.    TM's normal bilaterally    Oropharynx atraumatic.  Neck:  Normal range of motion without rigidity. Able to rotate 45 degrees BL. No bruising or swelling.  CV:  Regular rate and rhythm. No M/R/G.. Pacer to chest wall.  Resp:  Breath sounds are clear bilaterally. Normal effort.  Abdomen: Abdomen is soft, no distension, no tenderness, no masses.  MS:  No midline cervical, thoracic, or lumbar " tenderness    No tenderness over sternum, scapula, ribs, clavicles.    Extremities atraumatic.  PROM of all other major joints performed and unremarkable.  Skin:  Warm and dry, No bruising.  Extremities warm and well perfused  Neuro:  Alert and oriented.  Strength and sensation grossly intact in all 4 extremities.  Cranial nerves  2-12 intact. GCS: 15. Gait normal.  Psych:  Alert.  Normal affect.  Cooperative.      Diagnostics     Lab Results   Labs Ordered and Resulted from Time of ED Arrival to Time of ED Departure   BASIC METABOLIC PANEL - Abnormal       Result Value    Sodium 138      Potassium 4.4      Chloride 102      Carbon Dioxide (CO2) 21 (*)     Anion Gap 15      Urea Nitrogen 31.8 (*)     Creatinine 1.21 (*)     GFR Estimate 59 (*)     Calcium 8.8      Glucose 103 (*)    INR - Abnormal    INR 2.03 (*)    CBC WITH PLATELETS AND DIFFERENTIAL - Abnormal    WBC Count 9.3      RBC Count 4.00 (*)     Hemoglobin 12.4 (*)     Hematocrit 37.9 (*)     MCV 95      MCH 31.0      MCHC 32.7      RDW 13.3      Platelet Count 150     MANUAL DIFFERENTIAL - Abnormal    % Neutrophils 65      % Lymphocytes 17      % Monocytes 12      % Eosinophils 6      % Basophils 0      Absolute Neutrophils 6.0      Absolute Lymphocytes 1.6      Absolute Monocytes 1.1      Absolute Eosinophils 0.6      Absolute Basophils 0.0      RBC Morphology Confirmed RBC Indices      Platelet Assessment        Value: Automated Count Confirmed. Platelet morphology is normal.    [place] Cells Slight (*)    PARTIAL THROMBOPLASTIN TIME - Normal    aPTT 35     TROPONIN T, HIGH SENSITIVITY - Normal    Troponin T, High Sensitivity 19         Imaging   Chest XR,  PA & LAT   Final Result   IMPRESSION: No significant interval change. Dual-lead cardiac device   left chest wall, with leads projected over the RA and RV. Heart size   is stable. Pulmonary vascularity is within normal limits. Lungs clear.   No pleural effusions. Advanced degenerative changes right  glenohumeral   joint.      POAL ALCARAZ MD            SYSTEM ID:  [id number]      CT Cervical Spine w/o Contrast   Final Result   IMPRESSION:   1. No acute fracture or posttraumatic subluxation.   2. No high-grade spinal canal or neural foraminal stenosis.      JASIEL RUSSO MD            SYSTEM ID:  [id number]      Head CT w/o contrast   Final Result   IMPRESSION:    1. Small amount of sulcal hyperdensity in the left frontal lobe   (series 7, image 41, series 3, image 21 and series 5, image 17) likely   representing small amount of subarachnoid hemorrhage.    2. Chronic small vessel ischemic disease and generalized cerebral   atrophy.      Findings were discussed with Dr. Baird at 12:00 hours CDT.       JASIEL RUSSO MD            SYSTEM ID:  [id number]      CT Head w/o Contrast    (Results Pending)       EKG   ECG taken at 1054, ECG read at 1102  Atrial -paced rhythm with prolonged AV conduction  Low voltage QRS  Cannot rule out anteroseptal infarct, age undetermined    PVC no longer present as compared to prior, dated 6/16/20.  Rate 60 bpm. NC interval 226 ms. QRS duration 90 ms. QT/QTc 404/404 ms. P-R-T axes * -28 63.    Independent Interpretation   Chest x-ray is which shows pacemaker, no infiltrate cardiomegaly effusions or pulmonary edema.    ED Course      Medications Administered   Medications   HOLD: warfarin (COUMADIN) therapy (has no administration in time range)   irbesartan (AVAPRO) half-tab 37.5 mg (has no administration in time range)   metoprolol succinate ER (TOPROL XL) 24 hr tablet 100 mg (has no administration in time range)   metoprolol succinate ER (TOPROL XL) 24 hr tablet 50 mg (has no administration in time range)   tamsulosin (FLOMAX) capsule 0.4 mg (has no administration in time range)   senna-docusate (SENOKOT-S/PERICOLACE) 8.6-50 MG per tablet 1 tablet (has no administration in time range)     Or   senna-docusate (SENOKOT-S/PERICOLACE) 8.6-50 MG per tablet 2 tablet (has no  administration in time range)   ondansetron (ZOFRAN ODT) ODT tab 4 mg (has no administration in time range)     Or   ondansetron (ZOFRAN) injection 4 mg (has no administration in time range)   acetaminophen (TYLENOL) tablet 650 mg (has no administration in time range)     Or   acetaminophen (TYLENOL) Suppository 650 mg (has no administration in time range)   albuterol (PROVENTIL) neb solution 2.5 mg (has no administration in time range)   guaiFENesin (MUCINEX) 12 hr tablet 600 mg (has no administration in time range)   warfarin-No DOSE today (has no administration in time range)   hydrALAZINE (APRESOLINE) injection 5 mg (has no administration in time range)   phytonadione (AQUAMEPHYTON, VITAMIN K) 10 mg in sodium chloride 0.9 % 50 mL intermittent infusion (0 mg Intravenous Stopped 10/10/24 1516)       Procedures   Procedures     Discussion of Management   Neurosurgery, I spoke w/Dr. Batista neurosurgery  I spoke to Mariely Ace NP Neurosurgery  I spoke to Tatianna Johnson hospitalist who agrees to accept the pt for Dr. Alvarez  ED Course   ED Course as of 10/10/24 1736   Thu Oct 10, 2024   1043 I obtained history and performed physical exam as noted above.    1124 The patient hasn't experienced an arrhythmia 9/14/24. He has 60 PVCs per hour. His pacemaker is atrially paced and firing appropriately. The battery is good.   1202 I spoke with radiology, the patient has a brain hemorrhage.        Additional Documentation  None    Medical Decision Making / Diagnosis     CMS Diagnoses: None    MIPS       None    MDM   Constantino Thorne is a 84 year old male with a history of atrial fibrillation and pacemaker on Coumadin who presents after syncopal episode while at the urgent care clinic today while they were trying to obtain a chest x-ray for a cough that he has been having.  Broad differential considered.  Nothing to suggest seizure per report.  This sounds most likely like it was orthostatic given that it occurred while  sitting up.  No events on pacer interrogation and EKG shows paced rhythm without other concerning findings.  Very low suspicion for cardiac syncope.  Troponin is normal no symptoms to suggest ACS PE dissection etc.  CT of the head does unfortunately show a very small traumatic subarachnoid hemorrhage.  Given that the patient is on Coumadin and theraputic w/INR >2 and is only on this for A-fib but I did reverse with vitamin K given low thrombotic risk, but per neurosurgery we will hold off on Kcentra and no indication for surgical intervention.  The patient is neurologically intact nothing to suggest stroke or seizure.  Goal BP per neurosurgery of less than 150 systolic which he has not required any medications for.  Cervical spine CT is negative head to toe trauma exam otherwise nonconcerning.  Regarding the cough I have not heard any coughing during the patient's time here chest x-ray is clear nothing to suggest pneumonia CHF etc. no bronchospasm heard.  Unclear etiology of this but will defer further to hospitalist team.    Disposition   The patient was admitted to the hospital.     Diagnosis     ICD-10-CM    1. Syncope  R55       2. Traumatic subarachnoid hemorrhage (H)  S06.6XAA       3. Cough  R05.9            Discharge Medications   Current Discharge Medication List            Scribe Disclosure:  Aron ROBERTSON, am serving as a scribe at 10:54 AM on 10/10/2024 to document services personally performed by Bronson Baird PA-C based on my observations and the provider's statements to me.        Bronson Baird PA-C  10/10/24 3804

## 2024-10-10 NOTE — ED TRIAGE NOTES
Pt arrives via EMS with c/o fall after standing up from xray table at the clinic. Pt did have LOC and hit his head on the table during the fall. Pt is on coumadin.      Triage Assessment (Adult)       Row Name 10/10/24 1034          Triage Assessment    Airway WDL WDL        Respiratory WDL    Respiratory WDL WDL        Skin Circulation/Temperature WDL    Skin Circulation/Temperature WDL WDL        Cardiac WDL    Cardiac WDL WDL        Peripheral/Neurovascular WDL    Peripheral Neurovascular WDL WDL        Cognitive/Neuro/Behavioral WDL    Cognitive/Neuro/Behavioral WDL WDL

## 2024-10-10 NOTE — PLAN OF CARE
PRIMARY DIAGNOSIS: SYNCOPE  OUTPATIENT/OBSERVATION GOALS TO BE MET BEFORE DISCHARGE:  1. Orthostatic performed: No    2. Diagnostic testing complete & at baseline neurologic testing: No    3. Cleared by consultants (if involved): No    4. Interpretation of cardiac rhythm per telemetry tech: initiated    5. Tolerating adequate PO diet and medications: Yes    6. Return to near baseline physical activity or neurologic status: Yes    Discharge Planner Nurse   Safe discharge environment identified: Yes  Barriers to discharge: Yes       Entered by: Juliana De La Cruz RN 10/10/2024 6:19 PM    Please review provider order for any additional goals.   Nurse to notify provider when observation goals have been met and patient is ready for discharge.      Goal Outcome Evaluation:      Plan of Care Reviewed With: patient, spouse      Outcome Evaluation: Initial assessment: A&Ox4, able to make needs known. Ax1 with GB for ambulation; Tele monitoring. PCR Respiratory panel collected and pending. Neuros intact; Diet: Cardiac, no caffeine. Frequent, good, loose cough. BS audible, GI - reports occasional dribbling. Occipital bruising to left eye - reports mild headache, care clustered and lighting adjusted. Plan: Repeat CT around 2100.          Problem: Adult Inpatient Plan of Care  Goal: Plan of Care Review  Description: The Plan of Care Review/Shift note should be completed every shift.  The Outcome Evaluation is a brief statement about your assessment that the patient is improving, declining, or no change.  This information will be displayed automatically on your shift  note.  Outcome: Progressing  Flowsheets (Taken 10/10/2024 1815)  Outcome Evaluation:   Initial assessment: A&Ox4, able to make needs known. Ax1 with GB for ambulation   Tele monitoring. PCR Respiratory panel collected and pending. Neuros intact   Diet: Cardiac, no caffeine. Frequent, good, loose cough. BS audible, GI - reports occasional dribbling. Occipital  "bruising to left eye - reports mild headache, care clustered and lighting adjusted. Plan: Repeat CT around 2100.  Plan of Care Reviewed With:   patient   spouse  Goal: Patient-Specific Goal (Individualized)  Description: You can add care plan individualizations to a care plan. Examples of Individualization might be:  \"Parent requests to be called daily at 9am for status\", \"I have a hard time hearing out of my right ear\", or \"Do not touch me to wake me up as it startles  me\".  Outcome: Progressing  Goal: Absence of Hospital-Acquired Illness or Injury  Outcome: Progressing  Intervention: Identify and Manage Fall Risk  Recent Flowsheet Documentation  Taken 10/10/2024 1800 by Juliana De La Cruz, RN  Safety Promotion/Fall Prevention:   activity supervised   assistive device/personal items within reach   lighting adjusted   mobility aid in reach   nonskid shoes/slippers when out of bed   room organization consistent   safety round/check completed  Intervention: Prevent Skin Injury  Recent Flowsheet Documentation  Taken 10/10/2024 1800 by Juliana De La Cruz, RN  Body Position: position changed independently  Device Skin Pressure Protection: adhesive use limited  Intervention: Prevent and Manage VTE (Venous Thromboembolism) Risk  Recent Flowsheet Documentation  Taken 10/10/2024 1800 by Juliana De La Cruz, RN  VTE Prevention/Management: (on Coumadin - currently on hold d/t recent fall with injury) SCDs off (sequential compression devices)  Intervention: Prevent Infection  Recent Flowsheet Documentation  Taken 10/10/2024 1800 by Juliana De La Cruz, RN  Infection Prevention:   equipment surfaces disinfected   hand hygiene promoted   rest/sleep promoted   personal protective equipment utilized   single patient room provided  Goal: Optimal Comfort and Wellbeing  Outcome: Progressing  Intervention: Monitor Pain and Promote Comfort  Recent Flowsheet Documentation  Taken 10/10/2024 1730 by Juliana De La Cruz, RN  Pain " Management Interventions:   care clustered   food   quiet environment facilitated  Goal: Readiness for Transition of Care  Outcome: Progressing     Problem: Skin Injury Risk Increased  Goal: Skin Health and Integrity  Outcome: Progressing  Intervention: Plan: Nurse Driven Intervention: Friction and Shear  Recent Flowsheet Documentation  Taken 10/10/2024 1800 by Juliana De La Cruz, RN  Friction/Shear Interventions: HOB 30 degrees or less  Intervention: Optimize Skin Protection  Recent Flowsheet Documentation  Taken 10/10/2024 1800 by Juliana De La Cruz, RN  Activity Management: activity adjusted per tolerance  Head of Bed (HOB) Positioning: HOB at 30-45 degrees

## 2024-10-10 NOTE — PLAN OF CARE
ROOM # 228         [facility]if not independent, order SW consult[facility]  :     Activity level at baseline: Ind.  Activity level on admit: Ax1    Who will be transporting you at discharge: Spouse    Patient registered to observation; given Patient Bill of Rights; given the opportunity to ask questions about observation status and their plan of care.  Patient has been oriented to the observation room, bathroom and call light is in place.    Discussed discharge goals and expectations with patient/family.

## 2024-10-10 NOTE — ED NOTES
[facility]  ED Nurse Handoff Report    ED Chief complaint: Syncope  . ED Diagnosis:   Final diagnoses:   None       Allergies:   Allergies   Allergen Reactions    Lisinopril Unknown and Cough    Spironolactone Unknown and Diarrhea       Code Status: Full Code    Activity level - Baseline/Home:  independent.  Activity Level - Current:   assist of 1.   Lift room needed: No.   Bariatric: No   Needed: No   Isolation: No.   Infection: Not Applicable.     Respiratory status: Room air    Vital Signs (within 30 minutes):   Vitals:    10/10/24 1238 10/10/24 1240 10/10/24 1241 10/10/24 1242   BP:       Pulse: 58 67 59 59   Resp: 19 13 16 16   Temp:       TempSrc:       SpO2: 99%  100% 92%       Cardiac Rhythm:  ,      Pain level:    Patient confused: No.   Patient Falls Risk: patient and family education and activity supervised.   Elimination Status: Has voided     Patient Report - Initial Complaint: fall.   Focused Assessment: Constantino Thorne is a 84 year old male on warfarin with history of atrial fibrillation, myocardial infarction, hypertension, hyperlipidemia, and chronic kidney disease who presents from urgent care to the ED with wife via EMS after syncope. The patient was seen at urgent care earlier this morning for a cough that he has been suffering from for a couple of weeks. The patient was scheduled for an x-ray for his work up. After the x-ray was completed, the patient was standing up from the table when he experienced a sudden syncopal episode. He fell and hit the left side of his head on the table on his way down. The fall was witnessed but the patient can't remember the fall. EMS was called. The patient's wife notes that the patient has felt lightheaded after standing up from sitting in the past. During evaluation the patient denies chest pain, shortness of breath, abdominal pain, and numbness or tingling in his extremities. The patient doesn't endorse a fever and has been  eating and drinking normally.      1104  Respiratory      RespiratoryAirway WDL: WDL  Respiratory WDLRespiratory WDL: .WDL except (cough, sob for 5 weeks)      1057  Cardiac      Cardiac (Adult)Cardiac WDL: .WDL except (LOC after standing up. Hx per spouse. Hit head; L anterior forehead. golf ball sized lump, ecchymosis, on coumadin.)          Abnormal Results:   Labs Ordered and Resulted from Time of ED Arrival to Time of ED Departure   BASIC METABOLIC PANEL - Abnormal       Result Value    Sodium 138      Potassium 4.4      Chloride 102      Carbon Dioxide (CO2) 21 (*)     Anion Gap 15      Urea Nitrogen 31.8 (*)     Creatinine 1.21 (*)     GFR Estimate 59 (*)     Calcium 8.8      Glucose 103 (*)    INR - Abnormal    INR 2.03 (*)    CBC WITH PLATELETS AND DIFFERENTIAL - Abnormal    WBC Count 9.3      RBC Count 4.00 (*)     Hemoglobin 12.4 (*)     Hematocrit 37.9 (*)     MCV 95      MCH 31.0      MCHC 32.7      RDW 13.3      Platelet Count 150     MANUAL DIFFERENTIAL - Abnormal    % Neutrophils 65      % Lymphocytes 17      % Monocytes 12      % Eosinophils 6      % Basophils 0      Absolute Neutrophils 6.0      Absolute Lymphocytes 1.6      Absolute Monocytes 1.1      Absolute Eosinophils 0.6      Absolute Basophils 0.0      RBC Morphology Confirmed RBC Indices      Platelet Assessment        Value: Automated Count Confirmed. Platelet morphology is normal.    [place] Cells Slight (*)    PARTIAL THROMBOPLASTIN TIME - Normal    aPTT 35     TROPONIN T, HIGH SENSITIVITY - Normal    Troponin T, High Sensitivity 19     TROPONIN T, HIGH SENSITIVITY        Chest XR,  PA & LAT   Preliminary Result   IMPRESSION: No significant interval change. Dual-lead cardiac device   left chest wall, with leads projected over the RA and RV. Heart size   is stable. Pulmonary vascularity is within normal limits. Lungs clear.   No pleural effusions. Advanced degenerative changes right glenohumeral   joint.      CT Cervical Spine w/o  Contrast   Final Result   IMPRESSION:   1. No acute fracture or posttraumatic subluxation.   2. No high-grade spinal canal or neural foraminal stenosis.      JASIEL RUSSO MD            SYSTEM ID:  [id number]      Head CT w/o contrast   Final Result   IMPRESSION:    1. Small amount of sulcal hyperdensity in the left frontal lobe   (series 7, image 41, series 3, image 21 and series 5, image 17) likely   representing small amount of subarachnoid hemorrhage.    2. Chronic small vessel ischemic disease and generalized cerebral   atrophy.      Findings were discussed with Dr. Baird at 12:00 hours CDT.       JASIEL RUSSO MD            SYSTEM ID:  [id number]          Treatments provided: labs, imaging  Family Comments: spouse at bedside  OBS brochure/video discussed/provided to patient:  No  ED Medications:   Medications   HOLD: warfarin (COUMADIN) therapy (has no administration in time range)   phytonadione (AQUAMEPHYTON, VITAMIN K) 10 mg in sodium chloride 0.9 % 50 mL intermittent infusion (has no administration in time range)       Drips infusing:  No  For the majority of the shift this patient was Green.   Interventions performed were n/a.    Sepsis treatment initiated: No    Cares/treatment/interventions/medications to be completed following ED care:   none      ED Nurse Name: Ena Bautista RN  12:56 PM    RECEIVING UNIT ED HANDOFF REVIEW    Above ED Nurse Handoff Report was reviewed: Yes  Reviewed by: Juliana De La Cruz RN on October 10, 2024 at 5:10 PM

## 2024-10-10 NOTE — ED NOTES
Assisted pt with urinal. Pt tolerated well. No issues. Side rails and call light within reach.

## 2024-10-10 NOTE — PHARMACY-ADMISSION MEDICATION HISTORY
Pharmacist Admission Medication History    Admission medication history is complete. The information provided in this note is only as accurate as the sources available at the time of the update.    Information Source(s): Patient via in-person    Pertinent Information:      Changes made to PTA medication list:  Added: Traimcinolone cream  Deleted: Allegra  Changed: Warfarin dosing    Allergies reviewed with patient and updates made in EHR: yes    Medication History Completed By: Madonna Schaefer PharmD 10/10/2024 2:35 PM    PTA Med List   Medication Sig Last Dose    atorvastatin (LIPITOR) 80 MG tablet Take 80 mg by mouth At Bedtime  10/9/2024 at hs    irbesartan (AVAPRO) 75 MG tablet Take 37.5 mg by mouth At Bedtime 10/9/2024 at hs    metoprolol succinate ER (TOPROL XL) 50 MG 24 hr tablet Take 100 mg by mouth every evening 10/9/2024 at pm    metoprolol succinate ER (TOPROL-XL) 50 MG 24 hr tablet Take 50 mg by mouth every morning 10/10/2024 at am    nitroGLYcerin (NITROSTAT) 0.4 MG sublingual tablet Place 1 Tablet under tongue as needed. If no relief after 5 min call 911;continue 1 tab every 5 min max 3 tab prn    tamsulosin (FLOMAX) 0.4 MG capsule Take 0.4 mg by mouth daily 10/9/2024 at pm    triamcinolone (KENALOG) 0.1 % external cream Apply topically 2 times daily as needed for irritation. Didn't start yet    warfarin ANTICOAGULANT (COUMADIN) 5 MG tablet Take 5 mg by mouth daily. 10/9/2024 at pm

## 2024-10-11 VITALS
WEIGHT: 215.6 LBS | RESPIRATION RATE: 16 BRPM | HEART RATE: 83 BPM | SYSTOLIC BLOOD PRESSURE: 138 MMHG | HEIGHT: 73 IN | DIASTOLIC BLOOD PRESSURE: 74 MMHG | TEMPERATURE: 98 F | BODY MASS INDEX: 28.57 KG/M2 | OXYGEN SATURATION: 98 %

## 2024-10-11 DIAGNOSIS — I60.9 SAH (SUBARACHNOID HEMORRHAGE) (H): Primary | ICD-10-CM

## 2024-10-11 LAB
ANION GAP SERPL CALCULATED.3IONS-SCNC: 12 MMOL/L (ref 7–15)
BUN SERPL-MCNC: 28.1 MG/DL (ref 8–23)
CALCIUM SERPL-MCNC: 8.7 MG/DL (ref 8.8–10.4)
CHLORIDE SERPL-SCNC: 103 MMOL/L (ref 98–107)
CREAT SERPL-MCNC: 1.12 MG/DL (ref 0.67–1.17)
EGFRCR SERPLBLD CKD-EPI 2021: 64 ML/MIN/1.73M2
GLUCOSE SERPL-MCNC: 90 MG/DL (ref 70–99)
HCO3 SERPL-SCNC: 20 MMOL/L (ref 22–29)
INR PPP: 1.43 (ref 0.85–1.15)
MAGNESIUM SERPL-MCNC: 1.6 MG/DL (ref 1.7–2.3)
POTASSIUM SERPL-SCNC: 4.1 MMOL/L (ref 3.4–5.3)
SODIUM SERPL-SCNC: 135 MMOL/L (ref 135–145)

## 2024-10-11 PROCEDURE — G0378 HOSPITAL OBSERVATION PER HR: HCPCS

## 2024-10-11 PROCEDURE — 99239 HOSP IP/OBS DSCHRG MGMT >30: CPT | Performed by: PHYSICIAN ASSISTANT

## 2024-10-11 PROCEDURE — 83735 ASSAY OF MAGNESIUM: CPT | Performed by: INTERNAL MEDICINE

## 2024-10-11 PROCEDURE — 85610 PROTHROMBIN TIME: CPT | Performed by: PHYSICIAN ASSISTANT

## 2024-10-11 PROCEDURE — 258N000003 HC RX IP 258 OP 636: Performed by: PHYSICIAN ASSISTANT

## 2024-10-11 PROCEDURE — 36415 COLL VENOUS BLD VENIPUNCTURE: CPT | Performed by: PHYSICIAN ASSISTANT

## 2024-10-11 PROCEDURE — 250N000013 HC RX MED GY IP 250 OP 250 PS 637: Performed by: PHYSICIAN ASSISTANT

## 2024-10-11 PROCEDURE — 80048 BASIC METABOLIC PNL TOTAL CA: CPT | Performed by: PHYSICIAN ASSISTANT

## 2024-10-11 RX ORDER — IRBESARTAN 75 MG/1
37.5 TABLET ORAL AT BEDTIME
Status: ON HOLD
Start: 2024-10-14 | End: 2024-11-03

## 2024-10-11 RX ADMIN — SODIUM CHLORIDE 500 ML: 9 INJECTION, SOLUTION INTRAVENOUS at 13:37

## 2024-10-11 RX ADMIN — GUAIFENESIN 600 MG: 600 TABLET, EXTENDED RELEASE ORAL at 08:04

## 2024-10-11 RX ADMIN — SODIUM CHLORIDE 500 ML: 9 INJECTION, SOLUTION INTRAVENOUS at 10:00

## 2024-10-11 RX ADMIN — ACETAMINOPHEN 650 MG: 325 TABLET ORAL at 05:34

## 2024-10-11 ASSESSMENT — ACTIVITIES OF DAILY LIVING (ADL)
ADLS_ACUITY_SCORE: 35
ADLS_ACUITY_SCORE: 35
ADLS_ACUITY_SCORE: 37
ADLS_ACUITY_SCORE: 35
ADLS_ACUITY_SCORE: 37
ADLS_ACUITY_SCORE: 37
ADLS_ACUITY_SCORE: 35
ADLS_ACUITY_SCORE: 37
ADLS_ACUITY_SCORE: 35
ADLS_ACUITY_SCORE: 35
ADLS_ACUITY_SCORE: 37
ADLS_ACUITY_SCORE: 35

## 2024-10-11 NOTE — PLAN OF CARE
"PRIMARY DIAGNOSIS: VERTIGO    OUTPATIENT/OBSERVATION GOALS TO BE MET BEFORE DISCHARGE  1. Orthostatic performed: Yes:          Lying Orthostatic BP: 102/57         Sitting Orthostatic BP: 99/56         Standing Orthostatic BP: 90/56     2. Completion of appropriate imaging: N/A    3. Tolerating PO medications: Yes    4. Return to near baseline physical activity: Yes    5. Cleared for discharge by consultants (if involved): No    Discharge Planner Nurse   Safe discharge environment identified: Yes  Barriers to discharge: Yes       Entered by: Albertina Cheek RN 10/11/2024  12:00PM   Pt A&O, SBA for mobility, walked in whole-way, ate 100% of his meal, voided good, spouse present a bed side, will give another bag of IV fluids 500 ml  BP 97/54 (BP Location: Left arm)   Pulse 83   Temp 98.7  F (37.1  C) (Oral)   Resp 18   Ht 1.854 m (6' 1\")   Wt 97.8 kg (215 lb 9.6 oz)   SpO2 96%   BMI 28.44 kg/m     Problem: Adult Inpatient Plan of Care  Goal: Plan of Care Review  Description: The Plan of Care Review/Shift note should be completed every shift.  The Outcome Evaluation is a brief statement about your assessment that the patient is improving, declining, or no change.  This information will be displayed automatically on your shift  note.  10/11/2024 1324 by Albertina Cheek RN  Outcome: Not Progressing  Flowsheets (Taken 10/11/2024 1324)  Outcome Evaluation: B.P. steel low  Plan of Care Reviewed With: patient  10/11/2024 1013 by Albertina Cheek RN  Outcome: Not Progressing  Flowsheets (Taken 10/11/2024 1013)  Outcome Evaluation: Pt A&O, has low B.P., 500 ml IV fluids was given  Plan of Care Reviewed With: patient  Goal: Patient-Specific Goal (Individualized)  Description: You can add care plan individualizations to a care plan. Examples of Individualization might be:  \"Parent requests to be called daily at 9am for status\", \"I have a hard time hearing out of my right ear\", or \"Do not touch me to wake me up " "as it startles  me\".  10/11/2024 1324 by Albertina Cheek RN  Outcome: Not Progressing  10/11/2024 1013 by Albertina Cheek RN  Outcome: Not Progressing  Goal: Absence of Hospital-Acquired Illness or Injury  10/11/2024 1324 by Albertina Cheek RN  Outcome: Not Progressing  10/11/2024 1013 by Albertina Cheek RN  Outcome: Not Progressing  Intervention: Identify and Manage Fall Risk  Recent Flowsheet Documentation  Taken 10/11/2024 1007 by Albertina Cheek RN  Safety Promotion/Fall Prevention:   assistive device/personal items within reach   clutter free environment maintained   nonskid shoes/slippers when out of bed   safety round/check completed  Intervention: Prevent Skin Injury  Recent Flowsheet Documentation  Taken 10/11/2024 1007 by Albertina Cheek RN  Body Position: position changed independently  Device Skin Pressure Protection: adhesive use limited  Intervention: Prevent and Manage VTE (Venous Thromboembolism) Risk  Recent Flowsheet Documentation  Taken 10/11/2024 1007 by Albertina Cheek RN  VTE Prevention/Management: SCDs off (sequential compression devices)  Intervention: Prevent Infection  Recent Flowsheet Documentation  Taken 10/11/2024 1007 by Albertina Cheek RN  Infection Prevention:   equipment surfaces disinfected   hand hygiene promoted   rest/sleep promoted   personal protective equipment utilized   single patient room provided  Goal: Optimal Comfort and Wellbeing  10/11/2024 1324 by Albertina Cheek RN  Outcome: Not Progressing  10/11/2024 1013 by Albertina Cheek RN  Outcome: Not Progressing  Intervention: Monitor Pain and Promote Comfort  Recent Flowsheet Documentation  Taken 10/11/2024 0756 by Albertina Cheek RN  Pain Management Interventions: medication offered but refused  Goal: Readiness for Transition of Care  10/11/2024 1324 by Albertina Cheek RN  Outcome: Not Progressing  Flowsheets (Taken 10/11/2024 1324)  Anticipated Changes Related to Illness: " inability to care for self  Transportation Anticipated: family or friend will provide  Concerns to be Addressed:   adjustment to diagnosis/illness   decision making   home safety  Barriers to Discharge: low B.P.  10/11/2024 1013 by Albertina Cheek RN  Outcome: Not Progressing  Flowsheets (Taken 10/11/2024 1013)  Anticipated Changes Related to Illness: inability to care for self  Transportation Anticipated: family or friend will provide  Concerns to be Addressed:   all concerns addressed in this encounter   cognitive/perceptual   compliance issue   coping/stress   decision making  Barriers to Discharge: low B.P.  Intervention: Mutually Develop Transition Plan  Recent Flowsheet Documentation  Taken 10/11/2024 1324 by Albertina Cheek RN  Anticipated Changes Related to Illness: inability to care for self  Transportation Anticipated: family or friend will provide  Concerns to be Addressed:   adjustment to diagnosis/illness   decision making   home safety  Taken 10/11/2024 1013 by Albertina Cheek RN  Anticipated Changes Related to Illness: inability to care for self  Transportation Anticipated: family or friend will provide  Concerns to be Addressed:   all concerns addressed in this encounter   cognitive/perceptual   compliance issue   coping/stress   decision making     Problem: Skin Injury Risk Increased  Goal: Skin Health and Integrity  10/11/2024 1324 by Albertina Cheek RN  Outcome: Not Progressing  10/11/2024 1013 by Albertina Cheek RN  Outcome: Not Progressing  Intervention: Plan: Nurse Driven Intervention: Moisture Management  Recent Flowsheet Documentation  Taken 10/11/2024 1007 by Albertina Cheek RN  Bathing/Skin Care:   incontinence care   foot care  Taken 10/11/2024 0800 by Albertina Cheek RN  Moisture Interventions: Perineal cleanser  Bathing/Skin Care: dressed/undressed  Intervention: Plan: Nurse Driven Intervention: Friction and Shear  Recent Flowsheet Documentation  Taken  10/11/2024 1007 by Albertina Cheek RN  Friction/Shear Interventions: HOB 30 degrees or less  Intervention: Optimize Skin Protection  Recent Flowsheet Documentation  Taken 10/11/2024 1007 by Albertina Cheek RN  Activity Management:   ambulated to bathroom   back to bed  Head of Bed (HOB) Positioning: HOB at 30-45 degrees     Problem: Fall Injury Risk  Goal: Absence of Fall and Fall-Related Injury  10/11/2024 1324 by Albertina Cheek RN  Outcome: Not Progressing  10/11/2024 1013 by Albertina Cheek RN  Outcome: Not Progressing  Intervention: Identify and Manage Contributors  Recent Flowsheet Documentation  Taken 10/11/2024 1007 by Albertina Cheek RN  Medication Review/Management: medications reviewed  Intervention: Promote Injury-Free Environment  Recent Flowsheet Documentation  Taken 10/11/2024 1007 by Albertina Cheek RN  Safety Promotion/Fall Prevention:   assistive device/personal items within reach   clutter free environment maintained   nonskid shoes/slippers when out of bed   safety round/check completed     Please review provider order for any additional goals.   Nurse to notify provider when observation goals have been met and patient is ready for discharge.Goal Outcome Evaluation:      Plan of Care Reviewed With: patient          Outcome Evaluation: B.P. steel low

## 2024-10-11 NOTE — PLAN OF CARE
"PRIMARY DIAGNOSIS: Syncope    OUTPATIENT/OBSERVATION GOALS TO BE MET BEFORE DISCHARGE  1. Orthostatic performed: No    2. Tolerating PO medications: Yes    3. Return to near baseline physical activity: Yes    4. Cleared for discharge by consultants (if involved): No    Discharge Planner Nurse   Safe discharge environment identified: No  Barriers to discharge: Yes       Entered by: Albaro Mccarty RN 10/11/2024 12:10 AM     Please review provider order for any additional goals.   Nurse to notify provider when observation goals have been met and patient is ready for discharge.        Pt is Aox4 and calm. Pt ambulates SBA in the room. Pt is on telemetry showing A-paced in the 60's. Pt has a pacemaker. Coumadin is being held. Pt tolerated oral medications. Pt states head pain 5/10 and was given PRN tylenol with scheduled meds. IV is saline locked. Monitoring SBP to stay <150. Pt has bruising on L side of face around eye. Respiratory panel came back positive for parainfluenza virus 4, pt will be kept on droplet and contact precautions. Neurosurg following. Continue to follow plan of care.     /56 (BP Location: Left arm)   Pulse 62   Temp 100.1  F (37.8  C) (Oral)   Resp 18   Ht 1.854 m (6' 1\")   Wt 97.8 kg (215 lb 9.6 oz)   SpO2 94%   BMI 28.44 kg/m     "

## 2024-10-11 NOTE — PROGRESS NOTES
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

## 2024-10-11 NOTE — PLAN OF CARE
Patient's After Visit Summary was reviewed with patient and/or spouse.   Patient verbalized understanding of After Visit Summary, recommended follow up and was given an opportunity to ask questions.   Discharge medications sent home with patient/family: No, no new medications prescribed.   Discharged with spouse.    OBSERVATION patient END time: 5:05pm    Discharge paperwork gone through multiple times for understanding. Highlighted main points.

## 2024-10-11 NOTE — PROGRESS NOTES
"[facility], [place]   Neurosurgery Progress Note:    Date of service: 10/11/2024    Assessment: Constantino Thorne is a 84 year old male ANTICOAGULATED on warfarin with history of atrial fibrillation, MI, HTN, HLD, and CKD who was admitted on 10/10/2024 after a syncopal episode and fall. Neurosurgery was consulted for SAH.     Clinically Significant Risk Factors Present on Admission            # Hypomagnesemia: Lowest Mg = 1.6 mg/dL in last 2 days, will replace as needed     # Drug Induced Coagulation Defect: home medication list includes an anticoagulant medication    # Overweight: Estimated body mass index is 28.44 kg/m  as calculated from the following:    Height as of this encounter: 1.854 m (6' 1\").    Weight as of this encounter: 97.8 kg (215 lb 9.6 oz).             Plan:  - Q4 hour neuro checks  -No immediate neurosurgical intervention indicated at this time  -Hold all anticoagulation including warfarin, ASA, NSAIDs  -HOB30  -SBP<150  - Remainder of cares per primary team  - Please contact neurosurgery with any neurologic changes  - follow-up in 2 weeks in Neurosurgery clinic with repeat imaging.  Please continue to hold Warfarin until repeat imaging has been completed and reviewed         STEPHEN Oreilly, CNP  10/11/2024  Department of Neurosurgery  Pager: [phone]    I have spent a total of 25 minutes total time counseling, coordination, and chart review, over 50% of the time was spent in direct patient care.       Interval History:  Repeat head CT obtained last evening and remains stable.  Patient denies headache, nausea, vomiting, weakness.  Has pain in the periorbital area on the left.  Had run of VT overnight, ICD in place.           Objective:   Temp:  [97.1  F (36.2  C)-100.1  F (37.8  C)] 99.2  F (37.3  C)  Pulse:  [58-83] 83  Resp:  [12-28] 18  BP: ()/(47-87) 94/47  SpO2:  [92 %-100 %] 93 %  No intake/output data recorded.    Gen: Appears comfortable, " NAD  Neurologic:  - Alert & Oriented to person, place, time, and situation  - Follows commands briskly  - Speech fluent, spontaneous. No aphasia or dysarthria.  - No gaze preference. No apparent hemineglect.  - PERRL, EOMI  - Strong eye closure, jaw clench, and cheek puff  - Face symmetric with sensation intact to light touch  - Palate elevates symmetrically, uvula midline, tongue protrudes midline  - Trapezii and sternocleidomastoid muscles 5/5 bilaterally  - No pronator drift     Del Tr Bi WE WF Gr   R 5 5 5 5 5 5   L 5 5 5 5 5 5    HF KE KF DF PF EHL   R 5 5 5 5 5 5   L 5 5 5 5 5 5     Reflexes 2+ throughout    Sensation intact and symmetric to light touch throughout    LABS  Recent Labs   Lab Test 10/10/24  1045 03/04/23  0739 03/03/23  1712   WBC 9.3 11.5* 9.4   HGB 12.4* 11.7* 12.7*   MCV 95 97 96    136* 145*       Recent Labs   Lab Test 10/11/24  0528 10/10/24  1045 03/04/23  0739    138 136   POTASSIUM 4.1 4.4 4.2   CHLORIDE 103 102 104   CO2 20* 21* 23   BUN 28.1* 31.8* 38.9*   CR 1.12 1.21* 1.59*   ANIONGAP 12 15 9   ZACH 8.7* 8.8 8.4*   GLC 90 103* 109*       IMAGING    Recent Results (from the past 24 hour(s))   Head CT w/o contrast    Narrative    EXAM: CT HEAD W/O CONTRAST  10/10/2024 11:55 AM     HISTORY:  Trauma       COMPARISON:  Head CT 7/2/2016    TECHNIQUE: Using multidetector thin collimation helical acquisition  technique, axial, coronal and sagittal CT images from the skull base  to the vertex were obtained without intravenous contrast.   (topogram) image(s) also obtained and reviewed. Dose reduction  techniques were performed.    FINDINGS:  Small amount of sulcal hyperdensity in the left frontal lobe (series  7, image 41, series 3, image 21 and series 5, image 17). No mass  effect, or midline shift. No acute loss of gray-white matter  differentiation in the cerebral hemispheres. Periventricular and  subcortical hypoattenuation, consistent with chronic cholecystitis  can  disease. Mild generalized cerebral atrophy. Ventricles are  proportionate to the cerebral sulci. Clear basal cisterns.    The bony calvaria and the bones of the skull base are normal.  Scattered paranasal sinus mucosal thickening. Right mastoid effusion.  The left mastoid air cells are clear. Grossly normal orbits.       Impression    IMPRESSION:   1. Small amount of sulcal hyperdensity in the left frontal lobe  (series 7, image 41, series 3, image 21 and series 5, image 17) likely  representing small amount of subarachnoid hemorrhage.   2. Chronic small vessel ischemic disease and generalized cerebral  atrophy.    Findings were discussed with Dr. Baird at 12:00 hours CDT.     JASIEL RUSSO MD         SYSTEM ID:  [id number]   CT Cervical Spine w/o Contrast    Narrative    EXAM: CT CERVICAL SPINE W/O CONTRAST  10/10/2024 11:58 AM     HISTORY: Trauma       COMPARISON: Cervical spine CT 11/2/2016    TECHNIQUE: Using multidetector thin collimation helical acquisition  technique, axial, coronal and sagittal CT images through the cervical  spine were obtained without intravenous contrast. Dose reduction  techniques were used.    FINDINGS:  No acute fracture or traumatic subluxation. No prevertebral edema.     Multilevel mild disc height loss and endplate osteophyte formation. No  high-grade spinal canal or neuroforaminal stenosis.    Normal paraspinous soft tissues.      Impression    IMPRESSION:  1. No acute fracture or posttraumatic subluxation.  2. No high-grade spinal canal or neural foraminal stenosis.    JASIEL RUSSO MD         SYSTEM ID:  [id number]   Chest XR,  PA & LAT    Narrative    CHEST TWO VIEWS October 10, 2024 11:59 AM     HISTORY: Cough.    COMPARISON: 6/16/2020.      Impression    IMPRESSION: No significant interval change. Dual-lead cardiac device  left chest wall, with leads projected over the RA and RV. Heart size  is stable. Pulmonary vascularity is within normal limits. Lungs clear.  No  pleural effusions. Advanced degenerative changes right glenohumeral  joint.    OPAL ALCARAZ MD         SYSTEM ID:  [id number]   CT Head w/o Contrast    Narrative    EXAM: CT HEAD W/O CONTRAST  LOCATION: [facility]  DATE: 10/10/2024    INDICATION: hx SAH  COMPARISON: CT head 10/10/2024  TECHNIQUE: Routine CT Head without IV contrast. Multiplanar reformats. Dose reduction techniques were used.    FINDINGS:  INTRACRANIAL CONTENTS:   Left frontal lobe small volume subarachnoid hemorrhage redemonstrated from prior exam although decreased in attenuation.     No additional acute intracranial hemorrhage. No acute extra-axial hematomas. No significant mass effect or midline shift.    No CT evidence of acute infarct. Mild to moderate presumed chronic small vessel ischemic changes. Mild to moderate generalized volume loss. No hydrocephalus.     VISUALIZED ORBITS/SINUSES/MASTOIDS: No intraorbital abnormality. Mild mucosal thickening scattered about the paranasal sinuses. Right mastoid air cells moderate patchy opacification. Left mastoid air cells essentially clear.    BONES/SOFT TISSUES: Left facial soft tissue contusion.      Impression    IMPRESSION:  1.  Left frontal lobe small volume subarachnoid hemorrhage redemonstrated from prior exam although decreased in attenuation.     2.  No new or progressive acute renal hemorrhages.

## 2024-10-11 NOTE — PLAN OF CARE
"Goal Outcome Evaluation:      Plan of Care Reviewed With: patient  PRIMARY DIAGNOSIS: SYNCOPE/TIA  OUTPATIENT/OBSERVATION GOALS TO BE MET BEFORE DISCHARGE:  1. Orthostatic performed: Yes:          Lying Orthostatic BP: 98/56         Sitting Orthostatic BP: 91/56         Standing Orthostatic BP: 72/43     2. Diagnostic testing complete & at baseline neurologic testing: Yes    3. Cleared by consultants (if involved): No    4. Interpretation of cardiac rhythm per telemetry tech: A-PAST SR 60    5. Tolerating adequate PO diet and medications: Yes    6. Return to near baseline physical activity or neurologic status: Yes    Discharge Planner Nurse   Safe discharge environment identified: Yes  Barriers to discharge: Yes       Entered by: Albertina Cheek RN 10/11/2024  08:15am     Pt A&O, SBA for mobility, voided adequately, denied pain, on droplet precaution, on tele monitor, ate 100% of his meal, CMS intact.  BP 98/56 (BP Location: Left arm)   Pulse 62   Temp 98.9  F (37.2  C) (Oral)   Resp 18   Ht 1.854 m (6' 1\")   Wt 97.8 kg (215 lb 9.6 oz)   SpO2 92%   BMI 28.44 kg/m     Problem: Adult Inpatient Plan of Care  Goal: Plan of Care Review  Description: The Plan of Care Review/Shift note should be completed every shift.  The Outcome Evaluation is a brief statement about your assessment that the patient is improving, declining, or no change.  This information will be displayed automatically on your shift  note.  Outcome: Not Progressing  Flowsheets (Taken 10/11/2024 1013)  Outcome Evaluation: Pt A&O, has low B.P., 500 ml IV fluids was given  Plan of Care Reviewed With: patient  Goal: Patient-Specific Goal (Individualized)  Description: You can add care plan individualizations to a care plan. Examples of Individualization might be:  \"Parent requests to be called daily at 9am for status\", \"I have a hard time hearing out of my right ear\", or \"Do not touch me to wake me up as it startles  me\".  Outcome: Not " Progressing  Goal: Absence of Hospital-Acquired Illness or Injury  Outcome: Not Progressing  Intervention: Identify and Manage Fall Risk  Recent Flowsheet Documentation  Taken 10/11/2024 1007 by Albertina Cheek RN  Safety Promotion/Fall Prevention:   assistive device/personal items within reach   clutter free environment maintained   nonskid shoes/slippers when out of bed   safety round/check completed  Intervention: Prevent Skin Injury  Recent Flowsheet Documentation  Taken 10/11/2024 1007 by Albertina Cheek RN  Body Position: position changed independently  Device Skin Pressure Protection: adhesive use limited  Intervention: Prevent and Manage VTE (Venous Thromboembolism) Risk  Recent Flowsheet Documentation  Taken 10/11/2024 1007 by Albertina Cheek RN  VTE Prevention/Management: SCDs off (sequential compression devices)  Intervention: Prevent Infection  Recent Flowsheet Documentation  Taken 10/11/2024 1007 by Albertina Cheek RN  Infection Prevention:   equipment surfaces disinfected   hand hygiene promoted   rest/sleep promoted   personal protective equipment utilized   single patient room provided  Goal: Optimal Comfort and Wellbeing  Outcome: Not Progressing  Intervention: Monitor Pain and Promote Comfort  Recent Flowsheet Documentation  Taken 10/11/2024 0756 by Albertina Cheek RN  Pain Management Interventions: medication offered but refused  Goal: Readiness for Transition of Care  Outcome: Not Progressing  Flowsheets (Taken 10/11/2024 1013)  Anticipated Changes Related to Illness: inability to care for self  Transportation Anticipated: family or friend will provide  Concerns to be Addressed:   all concerns addressed in this encounter   cognitive/perceptual   compliance issue   coping/stress   decision making  Barriers to Discharge: low B.P.  Intervention: Mutually Develop Transition Plan  Recent Flowsheet Documentation  Taken 10/11/2024 1013 by Albertina Cheek RN  Anticipated Changes  Related to Illness: inability to care for self  Transportation Anticipated: family or friend will provide  Concerns to be Addressed:   all concerns addressed in this encounter   cognitive/perceptual   compliance issue   coping/stress   decision making     Problem: Skin Injury Risk Increased  Goal: Skin Health and Integrity  Outcome: Not Progressing  Intervention: Plan: Nurse Driven Intervention: Moisture Management  Recent Flowsheet Documentation  Taken 10/11/2024 1007 by Albertina Cheek RN  Bathing/Skin Care:   incontinence care   foot care  Intervention: Plan: Nurse Driven Intervention: Friction and Shear  Recent Flowsheet Documentation  Taken 10/11/2024 1007 by Albertina Cheek RN  Friction/Shear Interventions: HOB 30 degrees or less  Intervention: Optimize Skin Protection  Recent Flowsheet Documentation  Taken 10/11/2024 1007 by Albertina Cheek RN  Activity Management:   ambulated to bathroom   back to bed  Head of Bed (HOB) Positioning: HOB at 30-45 degrees     Problem: Fall Injury Risk  Goal: Absence of Fall and Fall-Related Injury  Outcome: Not Progressing  Intervention: Identify and Manage Contributors  Recent Flowsheet Documentation  Taken 10/11/2024 1007 by Albertina Cheek RN  Medication Review/Management: medications reviewed  Intervention: Promote Injury-Free Environment  Recent Flowsheet Documentation  Taken 10/11/2024 1007 by Albertina Cheek RN  Safety Promotion/Fall Prevention:   assistive device/personal items within reach   clutter free environment maintained   nonskid shoes/slippers when out of bed   safety round/check completed     Please review provider order for any additional goals.   Nurse to notify provider when observation goals have been met and patient is ready for discharge.        Outcome Evaluation: Pt A&O, has low B.P., 500 ml IV fluids was given

## 2024-10-11 NOTE — DISCHARGE SUMMARY
"[facility]  Hospitalist Discharge Summary      Date of Admission:  10/10/2024  Date of Discharge:  10/11/2024  Discharging Provider: Sofia Bautista PA-C  Discharge Service: Hospitalist Service    Discharge Diagnoses   Fall, possibly syncope due to orthostasis  SDH    Clinically Significant Risk Factors     # Overweight: Estimated body mass index is 28.44 kg/m  as calculated from the following:    Height as of this encounter: 1.854 m (6' 1\").    Weight as of this encounter: 97.8 kg (215 lb 9.6 oz).       Follow-ups Needed After Discharge   Follow-up Appointments     Follow-up and recommended labs and tests       Follow up with primary care provider, Thom Barnes, within 7 days for   hospital follow- up.  The following labs/tests are recommended: review   blood pressure medicines. Recheck BMP and blood pressure.    Follow-up in 2 weeks in Neurosurgery clinic with repeat imaging. They   should call you to schedule but if not please call [phone]. Please   continue to hold Warfarin until repeat imaging has been completed and   reviewed            Unresulted Labs Ordered in the Past 30 Days of this Admission       No orders found for last 31 day(s).            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     Patient initially presented to urgent care earlier this morning for evaluation of a cough that he has been suffering from for the last 3 weeks. His cough has been productive with yellow sputum with associated nasal congestion and post nasal drip. No associated fevers or chills. The patient went to have a chest " x-ray as part of his workup and upon completion patient was standing from the table and syncopized.  Patient recalls having the x-ray done but does not recall falling or loss of consciousness.  Per the individuals that witnessed the episode the patient fell backwards and hit his left temple on the table.  No seizure activity noted.  EMS was called to evaluate the patient.  The patient has a history of lightheadedness with standing at home and usually takes a few movements upon standing before moving due to this.  Patient denies any associated chest pain, shortness of breath, nausea, vomiting, palpitations, unilateral weakness, abdominal pain, or vision changes associated with episode.  Notes a headache over his left temple where is his head.  Denies any history of asthma and COPD.  No recent sick contacts.  The patient did have his flu and COVID-vaccine on 10/8. No recent medication changes.     ED workup reveals: VSS, Hgb of 12.4, CO2 of 21, BUN of 31.8, creatinine of 1.21, glucose of 103, INR of 2.03, PTT WNL, troponin WNL, EKG showing rate of 60 bpm in atrial paced rhythm with prolonged AV conduction, low voltage QRS, cannot rule out anteroseptal infarct, age undetermined, CT of head shows small amount of sulcal hyperdensity in the left frontal lobe likely representing small amount of subarachnoid hemorrhage, chronic small vessel ischemic disease, CT cervical spine shows no acute fracture or posttraumatic subluxation, and chest x-ray shows pulmonary vascularity is within normal limits, lungs clear, no pleural effusions.     Patient was admitted under observation and watched overnight with repeat head imaging that was stable. We held Warfarin on admission. He was seen by neurosurgery who recommends holding Warfarin, Aspirin and NSAIDS until they see him in clinic for 2 weeks. Noted on admission to be orthostatic likely leading him to to fall at urgent care. Given a litre of IV fluids with improvement. Recommend he  not take Irbesartan until 10/14 and can resume Metoprolol at discharge as long as he is not feeling dizzy. On admission tested positive for parainfluenza which is likely the cause of cough and nasal symptoms. Recommend over the counter supportive care medicines at this time. Patient also had left black eye and conjunctival hemorrhage from fall. No vision problems or pain with this. Will most likely resolve over time.         Consultations This Hospital Stay   NEUROSURGERY IP CONSULT    Code Status   Full Code    Time Spent on this Encounter   I, Sofia Bautista PA-C, personally saw the patient today and spent greater than 30 minutes discharging this patient.       Sofia Bautista PA-C  [facility] OBSERVATION DEPT  [address] [facility] [address]  Phone: [phone]  ______________________________________________________________________    Physical Exam   Vital Signs: Temp: 98  F (36.7  C) Temp src: Oral BP: 138/74 Pulse: 83   Resp: 16 SpO2: 98 % O2 Device: None (Room air)    Weight: 215 lbs 9.6 oz  General Appearance: Alert and orientated x 3  Respiratory: CTAB  Cardiovascular: RRR without murmur  GI: Bowel sounds are present without tenderness  Skin: Ecchymosis around left eye. Conjunctival redness noted  Other: No lower extremity swelling        Primary Care Physician   Thom Barnes    Discharge Orders      Reason for your hospital stay    You were admitted for concerns of a fall with head trauma causing a small bleed under your skull.  You were monitored overnight without any increased bleeding.  We noted that your blood pressure was low with standing which is likely due to poor oral intake or too much blood pressure medication.  This might be because you have a cold right now and are not feeling the greatest.  For tonight we would like you to take half your dose of metoprolol so you will only take 50 mg.  As long as you are not feeling dizzy tomorrow morning you  can take your metoprolol at regular dosing.  If you feel dizzy we recommend holding it for that morning.    We have stopped your irbesartan until Monday and recommend you see a primary care provider sometime next week to reassess everything.  We have also stopped your warfarin as recommended by neurosurgery for the head bleed. Do not take any Aspirin or NSAIDS either.  They would like to see you in 2 weeks and at that time they will discuss resuming.    For your cold you can get over-the-counter cough suppressants, nasal decongestants or saline nasal spray.  For your eye it will just take time for the swelling, redness and bruising to go away.     Follow-up and recommended labs and tests     Follow up with primary care provider, Thom Barnes, within 7 days for hospital follow- up.  The following labs/tests are recommended: review blood pressure medicines. Recheck BMP and blood pressure.    Follow-up in 2 weeks in Neurosurgery clinic with repeat imaging. They should call you to schedule but if not please call [phone]. Please continue to hold Warfarin until repeat imaging has been completed and reviewed     Activity    Your activity upon discharge: activity as tolerated     Diet    Follow this diet upon discharge: Regular       Significant Results and Procedures   Most Recent 3 CBC's:  Recent Labs   Lab Test 10/10/24  1045 03/04/23  0739 03/03/23  1712   WBC 9.3 11.5* 9.4   HGB 12.4* 11.7* 12.7*   MCV 95 97 96    136* 145*     Most Recent 3 BMP's:  Recent Labs   Lab Test 10/11/24  0528 10/10/24  1045 03/04/23  0739    138 136   POTASSIUM 4.1 4.4 4.2   CHLORIDE 103 102 104   CO2 20* 21* 23   BUN 28.1* 31.8* 38.9*   CR 1.12 1.21* 1.59*   ANIONGAP 12 15 9   ZACH 8.7* 8.8 8.4*   GLC 90 103* 109*   ,   Results for orders placed or performed during the hospital encounter of 10/10/24   Head CT w/o contrast    Narrative    EXAM: CT HEAD W/O CONTRAST  10/10/2024 11:55 AM     HISTORY:  Trauma        COMPARISON:  Head CT 7/2/2016    TECHNIQUE: Using multidetector thin collimation helical acquisition  technique, axial, coronal and sagittal CT images from the skull base  to the vertex were obtained without intravenous contrast.   (topogram) image(s) also obtained and reviewed. Dose reduction  techniques were performed.    FINDINGS:  Small amount of sulcal hyperdensity in the left frontal lobe (series  7, image 41, series 3, image 21 and series 5, image 17). No mass  effect, or midline shift. No acute loss of gray-white matter  differentiation in the cerebral hemispheres. Periventricular and  subcortical hypoattenuation, consistent with chronic cholecystitis can  disease. Mild generalized cerebral atrophy. Ventricles are  proportionate to the cerebral sulci. Clear basal cisterns.    The bony calvaria and the bones of the skull base are normal.  Scattered paranasal sinus mucosal thickening. Right mastoid effusion.  The left mastoid air cells are clear. Grossly normal orbits.       Impression    IMPRESSION:   1. Small amount of sulcal hyperdensity in the left frontal lobe  (series 7, image 41, series 3, image 21 and series 5, image 17) likely  representing small amount of subarachnoid hemorrhage.   2. Chronic small vessel ischemic disease and generalized cerebral  atrophy.    Findings were discussed with Dr. Baird at 12:00 hours CDT.     JASIEL RUSSO MD         SYSTEM ID:  [id number]   CT Cervical Spine w/o Contrast    Narrative    EXAM: CT CERVICAL SPINE W/O CONTRAST  10/10/2024 11:58 AM     HISTORY: Trauma       COMPARISON: Cervical spine CT 11/2/2016    TECHNIQUE: Using multidetector thin collimation helical acquisition  technique, axial, coronal and sagittal CT images through the cervical  spine were obtained without intravenous contrast. Dose reduction  techniques were used.    FINDINGS:  No acute fracture or traumatic subluxation. No prevertebral edema.     Multilevel mild disc height loss and  endplate osteophyte formation. No  high-grade spinal canal or neuroforaminal stenosis.    Normal paraspinous soft tissues.      Impression    IMPRESSION:  1. No acute fracture or posttraumatic subluxation.  2. No high-grade spinal canal or neural foraminal stenosis.    JASIEL RUSSO MD         SYSTEM ID:  [id number]   Chest XR,  PA & LAT    Narrative    CHEST TWO VIEWS October 10, 2024 11:59 AM     HISTORY: Cough.    COMPARISON: 6/16/2020.      Impression    IMPRESSION: No significant interval change. Dual-lead cardiac device  left chest wall, with leads projected over the RA and RV. Heart size  is stable. Pulmonary vascularity is within normal limits. Lungs clear.  No pleural effusions. Advanced degenerative changes right glenohumeral  joint.    OPAL ALCARAZ MD         SYSTEM ID:  [id number]   CT Head w/o Contrast    Narrative    EXAM: CT HEAD W/O CONTRAST  LOCATION: [facility]  DATE: 10/10/2024    INDICATION: hx SAH  COMPARISON: CT head 10/10/2024  TECHNIQUE: Routine CT Head without IV contrast. Multiplanar reformats. Dose reduction techniques were used.    FINDINGS:  INTRACRANIAL CONTENTS:   Left frontal lobe small volume subarachnoid hemorrhage redemonstrated from prior exam although decreased in attenuation.     No additional acute intracranial hemorrhage. No acute extra-axial hematomas. No significant mass effect or midline shift.    No CT evidence of acute infarct. Mild to moderate presumed chronic small vessel ischemic changes. Mild to moderate generalized volume loss. No hydrocephalus.     VISUALIZED ORBITS/SINUSES/MASTOIDS: No intraorbital abnormality. Mild mucosal thickening scattered about the paranasal sinuses. Right mastoid air cells moderate patchy opacification. Left mastoid air cells essentially clear.    BONES/SOFT TISSUES: Left facial soft tissue contusion.      Impression    IMPRESSION:  1.  Left frontal lobe small volume subarachnoid hemorrhage redemonstrated from  prior exam although decreased in attenuation.     2.  No new or progressive acute renal hemorrhages.         Discharge Medications   Current Discharge Medication List        CONTINUE these medications which have CHANGED    Details   irbesartan (AVAPRO) 75 MG tablet Take 0.5 tablets (37.5 mg) by mouth at bedtime.    Associated Diagnoses: Essential hypertension           CONTINUE these medications which have NOT CHANGED    Details   atorvastatin (LIPITOR) 80 MG tablet Take 80 mg by mouth At Bedtime       !! metoprolol succinate ER (TOPROL XL) 50 MG 24 hr tablet Take 100 mg by mouth every evening      !! metoprolol succinate ER (TOPROL-XL) 50 MG 24 hr tablet Take 50 mg by mouth every morning      nitroGLYcerin (NITROSTAT) 0.4 MG sublingual tablet Place 1 Tablet under tongue as needed. If no relief after 5 min call 911;continue 1 tab every 5 min max 3 tab      tamsulosin (FLOMAX) 0.4 MG capsule Take 0.4 mg by mouth daily      triamcinolone (KENALOG) 0.1 % external cream Apply topically 2 times daily as needed for irritation.       !! - Potential duplicate medications found. Please discuss with provider.        STOP taking these medications       warfarin ANTICOAGULANT (COUMADIN) 5 MG tablet Comments:   Reason for Stopping:             Allergies   Allergies   Allergen Reactions    Lisinopril Unknown and Cough    Spironolactone Unknown and Diarrhea

## 2024-10-11 NOTE — PROVIDER NOTIFICATION
"Notified cross cover that pt had 11 beats of V-tach. Writer went and assessed pt who reported no CP and VSS.    BP 94/47 (BP Location: Left arm)   Pulse 83   Temp 100.1  F (37.8  C) (Oral)   Resp 18   Ht 1.854 m (6' 1\")   Wt 97.8 kg (215 lb 9.6 oz)   SpO2 93%   BMI 28.44 kg/m         Cross cover added Mag to pt's AM labs.   "

## 2024-10-11 NOTE — PLAN OF CARE
PRIMARY DIAGNOSIS: Syncope    OUTPATIENT/OBSERVATION GOALS TO BE MET BEFORE DISCHARGE  1. Orthostatic performed: No    2. Tolerating PO medications: Yes    3. Return to near baseline physical activity: No    4. Cleared for discharge by consultants (if involved): No    Discharge Planner Nurse   Safe discharge environment identified: No  Barriers to discharge: Yes       Entered by: Albaro Mccarty RN 10/10/2024 11:42 PM     Please review provider order for any additional goals.   Nurse to notify provider when observation goals have been met and patient is ready for discharge.      Pt is Aox4 and calm. Pt ambulates SBA in the room. Pt is on telemetry. Coumadin is being held. Pt tolerated oral medications. Pt states head pain 5/10  and was given PRN tylenol with scheduled meds. IV is saline locked. Monitoring SBP to stay <150. Respiratory panel came back positive for parainfluenza virus 4, pt will be kept on droplet and contact precautions. Neurosurg following. Continue to follow plan of care.

## 2024-10-24 ENCOUNTER — HOSPITAL ENCOUNTER (OUTPATIENT)
Dept: CT IMAGING | Facility: CLINIC | Age: 84
Discharge: HOME | End: 2024-10-24
Attending: NURSE PRACTITIONER | Admitting: NURSE PRACTITIONER
Payer: COMMERCIAL

## 2024-10-24 DIAGNOSIS — I60.9 SAH (SUBARACHNOID HEMORRHAGE) (H): ICD-10-CM

## 2024-10-24 PROCEDURE — 70450 CT HEAD/BRAIN W/O DYE: CPT

## 2024-11-02 ENCOUNTER — HOSPITAL ENCOUNTER (EMERGENCY)
Facility: CLINIC | Age: 84
Discharge: HOME | DRG: 521 | End: 2024-11-02
Attending: EMERGENCY MEDICINE | Admitting: EMERGENCY MEDICINE
Payer: COMMERCIAL

## 2024-11-02 ENCOUNTER — APPOINTMENT (OUTPATIENT)
Dept: CT IMAGING | Facility: CLINIC | Age: 84
DRG: 521 | End: 2024-11-02
Attending: EMERGENCY MEDICINE
Payer: COMMERCIAL

## 2024-11-02 VITALS
SYSTOLIC BLOOD PRESSURE: 130 MMHG | OXYGEN SATURATION: 97 % | HEART RATE: 63 BPM | BODY MASS INDEX: 28.72 KG/M2 | WEIGHT: 216.71 LBS | RESPIRATION RATE: 13 BRPM | HEIGHT: 73 IN | DIASTOLIC BLOOD PRESSURE: 89 MMHG | TEMPERATURE: 96.9 F

## 2024-11-02 DIAGNOSIS — R07.9 CHEST PAIN, UNSPECIFIED TYPE: ICD-10-CM

## 2024-11-02 LAB
ALBUMIN SERPL BCG-MCNC: 3.7 G/DL (ref 3.5–5.2)
ALP SERPL-CCNC: 103 U/L (ref 40–150)
ALT SERPL W P-5'-P-CCNC: 25 U/L (ref 0–70)
ANION GAP SERPL CALCULATED.3IONS-SCNC: 14 MMOL/L (ref 7–15)
AST SERPL W P-5'-P-CCNC: 27 U/L (ref 0–45)
ATRIAL RATE - MUSE: 60 BPM
BASOPHILS # BLD AUTO: 0.1 10E3/UL (ref 0–0.2)
BASOPHILS NFR BLD AUTO: 1 %
BILIRUB SERPL-MCNC: 0.6 MG/DL
BUN SERPL-MCNC: 25 MG/DL (ref 8–23)
CALCIUM SERPL-MCNC: 8.9 MG/DL (ref 8.8–10.4)
CHLORIDE SERPL-SCNC: 103 MMOL/L (ref 98–107)
CREAT SERPL-MCNC: 1.15 MG/DL (ref 0.67–1.17)
D DIMER PPP FEU-MCNC: 1 UG/ML FEU (ref 0–0.5)
DIASTOLIC BLOOD PRESSURE - MUSE: NORMAL MMHG
EGFRCR SERPLBLD CKD-EPI 2021: 62 ML/MIN/1.73M2
EOSINOPHIL # BLD AUTO: 0.3 10E3/UL (ref 0–0.7)
EOSINOPHIL NFR BLD AUTO: 3 %
ERYTHROCYTE [DISTWIDTH] IN BLOOD BY AUTOMATED COUNT: 13.4 % (ref 10–15)
FLUAV RNA SPEC QL NAA+PROBE: NEGATIVE
FLUBV RNA RESP QL NAA+PROBE: NEGATIVE
GLUCOSE SERPL-MCNC: 98 MG/DL (ref 70–99)
HCO3 SERPL-SCNC: 21 MMOL/L (ref 22–29)
HCT VFR BLD AUTO: 37.9 % (ref 40–53)
HGB BLD-MCNC: 12.6 G/DL (ref 13.3–17.7)
HOLD SPECIMEN: NORMAL
IMM GRANULOCYTES # BLD: 0 10E3/UL
IMM GRANULOCYTES NFR BLD: 1 %
INR PPP: 1.05 (ref 0.85–1.15)
INTERPRETATION ECG - MUSE: NORMAL
LYMPHOCYTES # BLD AUTO: 1.2 10E3/UL (ref 0.8–5.3)
LYMPHOCYTES NFR BLD AUTO: 14 %
MCH RBC QN AUTO: 31.1 PG (ref 26.5–33)
MCHC RBC AUTO-ENTMCNC: 33.2 G/DL (ref 31.5–36.5)
MCV RBC AUTO: 94 FL (ref 78–100)
MONOCYTES # BLD AUTO: 0.9 10E3/UL (ref 0–1.3)
MONOCYTES NFR BLD AUTO: 11 %
NEUTROPHILS # BLD AUTO: 5.8 10E3/UL (ref 1.6–8.3)
NEUTROPHILS NFR BLD AUTO: 71 %
NRBC # BLD AUTO: 0 10E3/UL
NRBC BLD AUTO-RTO: 0 /100
NT-PROBNP SERPL-MCNC: 835 PG/ML (ref 0–1800)
P AXIS - MUSE: NORMAL DEGREES
PLATELET # BLD AUTO: 180 10E3/UL (ref 150–450)
POTASSIUM SERPL-SCNC: 4.6 MMOL/L (ref 3.4–5.3)
PR INTERVAL - MUSE: 224 MS
PROT SERPL-MCNC: 6.8 G/DL (ref 6.4–8.3)
QRS DURATION - MUSE: 86 MS
QT - MUSE: 398 MS
QTC - MUSE: 398 MS
R AXIS - MUSE: -25 DEGREES
RBC # BLD AUTO: 4.05 10E6/UL (ref 4.4–5.9)
RSV RNA SPEC NAA+PROBE: NEGATIVE
SARS-COV-2 RNA RESP QL NAA+PROBE: NEGATIVE
SODIUM SERPL-SCNC: 138 MMOL/L (ref 135–145)
SYSTOLIC BLOOD PRESSURE - MUSE: NORMAL MMHG
T AXIS - MUSE: 47 DEGREES
TROPONIN T SERPL HS-MCNC: 15 NG/L
TROPONIN T SERPL HS-MCNC: 18 NG/L
VENTRICULAR RATE- MUSE: 60 BPM
WBC # BLD AUTO: 8.2 10E3/UL (ref 4–11)

## 2024-11-02 PROCEDURE — 85610 PROTHROMBIN TIME: CPT | Performed by: EMERGENCY MEDICINE

## 2024-11-02 PROCEDURE — 250N000011 HC RX IP 250 OP 636: Performed by: EMERGENCY MEDICINE

## 2024-11-02 PROCEDURE — 85379 FIBRIN DEGRADATION QUANT: CPT | Performed by: EMERGENCY MEDICINE

## 2024-11-02 PROCEDURE — 85004 AUTOMATED DIFF WBC COUNT: CPT | Performed by: EMERGENCY MEDICINE

## 2024-11-02 PROCEDURE — 93005 ELECTROCARDIOGRAM TRACING: CPT

## 2024-11-02 PROCEDURE — 84484 ASSAY OF TROPONIN QUANT: CPT | Performed by: EMERGENCY MEDICINE

## 2024-11-02 PROCEDURE — 82247 BILIRUBIN TOTAL: CPT | Performed by: EMERGENCY MEDICINE

## 2024-11-02 PROCEDURE — 87637 SARSCOV2&INF A&B&RSV AMP PRB: CPT | Performed by: EMERGENCY MEDICINE

## 2024-11-02 PROCEDURE — 83880 ASSAY OF NATRIURETIC PEPTIDE: CPT | Performed by: EMERGENCY MEDICINE

## 2024-11-02 PROCEDURE — 99285 EMERGENCY DEPT VISIT HI MDM: CPT | Mod: 25

## 2024-11-02 PROCEDURE — 36415 COLL VENOUS BLD VENIPUNCTURE: CPT | Performed by: EMERGENCY MEDICINE

## 2024-11-02 PROCEDURE — 71275 CT ANGIOGRAPHY CHEST: CPT

## 2024-11-02 RX ORDER — IOPAMIDOL 755 MG/ML
500 INJECTION, SOLUTION INTRAVASCULAR ONCE
Status: COMPLETED | OUTPATIENT
Start: 2024-11-02 | End: 2024-11-02

## 2024-11-02 RX ADMIN — IOPAMIDOL 71 ML: 755 INJECTION, SOLUTION INTRAVENOUS at 04:26

## 2024-11-02 ASSESSMENT — ACTIVITIES OF DAILY LIVING (ADL)
ADLS_ACUITY_SCORE: 0

## 2024-11-02 NOTE — ED TRIAGE NOTES
Pt arrives complaining of dull chest pain. Was mild before bed but woke up and is worse. Took 2 nitroglycerin around 1am. History of Mis with stents.     Has been off blood thinner since his fall and head bleed.      Triage Assessment (Adult)       Row Name 11/02/24 0209          Triage Assessment    Airway WDL WDL        Respiratory WDL    Respiratory WDL WDL        Skin Circulation/Temperature WDL    Skin Circulation/Temperature WDL WDL        Cardiac WDL    Cardiac WDL X;all;chest pain        Chest Pain Assessment    Chest Pain Location anterior chest, left     Duration dull chest pain

## 2024-11-02 NOTE — ED PROVIDER NOTES
"  Emergency Department Note      History of Present Illness     Chief Complaint   Chest Pain      HPI   Constantino Thorne is a 84 year old male with a known history of hypertension, dyslipidemia, atrial fibrillation, ischemic cardiomyopathy, CAD with prior MI and cardiac arrest with ICD in place, CKD presenting to the ED for chest pain.  Patient was recently admitted to the hospital for evaluation of syncope resulting in a head injury.  He was found to have a small subarachnoid hemorrhage on 10/10.  His Coumadin was discontinued at that point in time.  He presents today stating around 10 PM he developed a left-sided, nonradiating chest pain described as dull in nature.  He took 2 sublingual nitroglycerin with improvement in his pain.  He reports for the past few weeks having productive cough with yellow sputum.  He reports occasional dyspnea.  He denies any fever, chills, current dyspnea or current chest pain at bedside.  No nausea, vomiting, abdominal pain or other symptoms.  No known sick contacts.    Independent Historian   None    Review of External Notes   Hospital discharge summary volatile        Past Medical History     Medical History and Problem List   Past Medical History:   Diagnosis Date    A-fib (H)     Hypertension        Medications   atorvastatin (LIPITOR) 80 MG tablet  irbesartan (AVAPRO) 75 MG tablet  metoprolol succinate ER (TOPROL XL) 50 MG 24 hr tablet  metoprolol succinate ER (TOPROL-XL) 50 MG 24 hr tablet  nitroGLYcerin (NITROSTAT) 0.4 MG sublingual tablet  tamsulosin (FLOMAX) 0.4 MG capsule  triamcinolone (KENALOG) 0.1 % external cream        Surgical History   No past surgical history on file.    Physical Exam     Patient Vitals for the past 24 hrs:   BP Temp Temp src Pulse Resp SpO2 Height Weight   11/02/24 0245 99/59 -- -- 61 -- 95 % -- --   11/02/24 0222 (!) 89/57 -- -- 62 -- -- -- --   11/02/24 0211 95/59 96.9  F (36.1  C) Temporal 66 20 94 % 1.854 m (6' 1\") 98.3 kg (216 lb 11.4 oz) "     Physical Exam  Nursing note and vitals reviewed.  Constitutional: Well nourished.   Eyes: Conjunctiva normal.  Pupils are equal, round, and reactive to light.   ENT: Nose normal. Mucous membranes pink and moist.    Neck: Normal range of motion.  CVS: Normal rate, regular rhythm.  Normal heart sounds.    Pulmonary: Lungs clear to auscultation bilaterally. No wheezes/rales/rhonchi.  GI: Abdomen soft. Nontender, nondistended. No rigidity or guarding.    MSK: No calf tenderness or swelling.  Neuro: Alert. Follows simple commands.  Skin: Skin is warm and dry. No rash noted.   Psychiatric: Normal affect.       Diagnostics     Lab Results   Labs Ordered and Resulted from Time of ED Arrival to Time of ED Departure   COMPREHENSIVE METABOLIC PANEL - Abnormal       Result Value    Sodium 138      Potassium 4.6      Carbon Dioxide (CO2) 21 (*)     Anion Gap 14      Urea Nitrogen 25.0 (*)     Creatinine 1.15      GFR Estimate 62      Calcium 8.9      Chloride 103      Glucose 98      Alkaline Phosphatase 103      AST 27      ALT 25      Protein Total 6.8      Albumin 3.7      Bilirubin Total 0.6     CBC WITH PLATELETS AND DIFFERENTIAL - Abnormal    WBC Count 8.2      RBC Count 4.05 (*)     Hemoglobin 12.6 (*)     Hematocrit 37.9 (*)     MCV 94      MCH 31.1      MCHC 33.2      RDW 13.4      Platelet Count 180      % Neutrophils 71      % Lymphocytes 14      % Monocytes 11      % Eosinophils 3      % Basophils 1      % Immature Granulocytes 1      NRBCs per 100 WBC 0      Absolute Neutrophils 5.8      Absolute Lymphocytes 1.2      Absolute Monocytes 0.9      Absolute Eosinophils 0.3      Absolute Basophils 0.1      Absolute Immature Granulocytes 0.0      Absolute NRBCs 0.0     D DIMER QUANTITATIVE - Abnormal    D-Dimer Quantitative 1.00 (*)    TROPONIN T, HIGH SENSITIVITY - Normal    Troponin T, High Sensitivity 18     INR - Normal    INR 1.05     INFLUENZA A/B, RSV, & SARS-COV2 PCR - Normal    Influenza A PCR Negative       Influenza B PCR Negative      RSV PCR Negative      SARS CoV2 PCR Negative     NT PROBNP INPATIENT - Normal    N terminal Pro BNP Inpatient 835     TROPONIN T, HIGH SENSITIVITY - Normal    Troponin T, High Sensitivity 15         Imaging   CT Chest Pulmonary Embolism w Contrast   Final Result   IMPRESSION:   1.  Cardiac defibrillator and previous coronary artery stents. Bibasilar atelectasis. No pulmonary embolism or other acute abnormality identified.          EKG   ECG results from 11/02/24   EKG 12-lead, tracing only     Value    Systolic Blood Pressure     Diastolic Blood Pressure     Ventricular Rate 60    Atrial Rate 60    IA Interval 224    QRS Duration 86        QTc 398    P Axis     R AXIS -25    T Axis 47    Interpretation ECG      Atrial-paced rhythm with prolonged AV conduction  Low voltage QRS  Cannot rule out Anteroseptal infarct (cited on or before 14-Feb-2010)  Abnormal ECG  When compared with ECG of 10-Oct-2024 10:54,  Inverted T waves have replaced nonspecific T wave abnormality in Anterior leads       Independent Interpretation   none    ED Course      Medications Administered   Medications - No data to display    Procedures   Procedures     Discussion of Management   None    ED Course    [employer] pacemaker interrogation observations:  Device appears to be occasionally undersensing on atrial lead during AT/AF event(s). Atrial understanding during AT/AF detected event(s) does not appear to impact device function or performance.    Additional Documentation  None    Medical Decision Making / Diagnosis     CMS Diagnoses: None    MIPS       CT for PE was ordered because the patient had an abnormal d-dimer.    KAREN Thorne is a 84 year old male presenting with chest pain.  He is chest pain-free after nitroglycerin at home.  He has labile BP on arrival though this downtrended during his time in the ED.  EKG with atrial paced rhythm though no ischemic changes.  His pacemaker was  interrogated during his time in the ED and appeared to be occasionally under sensing though this did not appear to impact device function overall.  High-sensitivity screening troponin negative x 2.  He remained chest pain-free during his time in the ED.  D-dimer was elevated so he did undergo formal CT which fortunately is without evidence of PE, pneumonia, pneumothorax or widened mediastinum.  He has no abdominal tenderness and I doubt intra-abdominal source to explain his presentation.  I did offer observation in light of patient's symptoms though he is wishing to be discharged home at this point in time.  I did discuss concern for presentation being secondary to anginal equivalent though he feels comfortable with discharge home with PCP follow-up as well as cardiology follow-up.  Given his recent intracranial hemorrhage, he is fortunately unfortunately not a good candidate to resume his anticoagulation at this point in time.  I encouraged him to monitor for increasing chest pain, dyspnea or should symptoms worsen or change promptly represent.  Wife and patient comfortable with plan of care.    Disposition   The patient was discharged.     Diagnosis     ICD-10-CM    1. Chest pain, unspecified type  R07.9            Discharge Medications   New Prescriptions    No medications on file         DO Lanette Camargo Lindsey E, DO  11/02/24 0706

## 2024-11-03 ENCOUNTER — APPOINTMENT (OUTPATIENT)
Dept: CT IMAGING | Facility: CLINIC | Age: 84
DRG: 521 | End: 2024-11-03
Attending: EMERGENCY MEDICINE
Payer: COMMERCIAL

## 2024-11-03 ENCOUNTER — HOSPITAL ENCOUNTER (INPATIENT)
Facility: CLINIC | Age: 84
LOS: 6 days | Discharge: HOME HEALTH | DRG: 521 | End: 2024-11-09
Attending: EMERGENCY MEDICINE | Admitting: INTERNAL MEDICINE
Payer: COMMERCIAL

## 2024-11-03 ENCOUNTER — APPOINTMENT (OUTPATIENT)
Dept: GENERAL RADIOLOGY | Facility: CLINIC | Age: 84
DRG: 521 | End: 2024-11-03
Attending: EMERGENCY MEDICINE
Payer: COMMERCIAL

## 2024-11-03 DIAGNOSIS — S06.9XAA UNSPECIFIED INTRACRANIAL INJURY WITH LOSS OF CONSCIOUSNESS STATUS UNKNOWN, INITIAL ENCOUNTER (H): ICD-10-CM

## 2024-11-03 DIAGNOSIS — Z96.649 S/P HIP HEMIARTHROPLASTY: Primary | ICD-10-CM

## 2024-11-03 DIAGNOSIS — S72.001A CLOSED DISPLACED FRACTURE OF RIGHT FEMORAL NECK (H): ICD-10-CM
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
## 2024-11-03 DIAGNOSIS — I50.20 HFREF (HEART FAILURE WITH REDUCED EJECTION FRACTION) (H): ICD-10-CM

## 2024-11-03 DIAGNOSIS — J18.9 PNEUMONIA OF LEFT LUNG DUE TO INFECTIOUS ORGANISM, UNSPECIFIED PART OF LUNG: ICD-10-CM

## 2024-11-03 LAB
ALBUMIN UR-MCNC: 10 MG/DL
ANION GAP SERPL CALCULATED.3IONS-SCNC: 14 MMOL/L (ref 7–15)
APPEARANCE UR: CLEAR
BASOPHILS # BLD AUTO: 0 10E3/UL (ref 0–0.2)
BASOPHILS NFR BLD AUTO: 0 %
BILIRUB UR QL STRIP: NEGATIVE
BUN SERPL-MCNC: 25.6 MG/DL (ref 8–23)
CALCIUM SERPL-MCNC: 8.6 MG/DL (ref 8.8–10.4)
CHLORIDE SERPL-SCNC: 102 MMOL/L (ref 98–107)
COLOR UR AUTO: YELLOW
CREAT SERPL-MCNC: 1.24 MG/DL (ref 0.67–1.17)
EGFRCR SERPLBLD CKD-EPI 2021: 57 ML/MIN/1.73M2
EOSINOPHIL # BLD AUTO: 0.3 10E3/UL (ref 0–0.7)
EOSINOPHIL NFR BLD AUTO: 3 %
ERYTHROCYTE [DISTWIDTH] IN BLOOD BY AUTOMATED COUNT: 13.6 % (ref 10–15)
GLUCOSE SERPL-MCNC: 117 MG/DL (ref 70–99)
GLUCOSE UR STRIP-MCNC: NEGATIVE MG/DL
HCO3 SERPL-SCNC: 21 MMOL/L (ref 22–29)
HCT VFR BLD AUTO: 37 % (ref 40–53)
HGB BLD-MCNC: 12.2 G/DL (ref 13.3–17.7)
HGB UR QL STRIP: NEGATIVE
HOLD SPECIMEN: NORMAL
IMM GRANULOCYTES # BLD: 0.1 10E3/UL
IMM GRANULOCYTES NFR BLD: 1 %
KETONES UR STRIP-MCNC: NEGATIVE MG/DL
LEUKOCYTE ESTERASE UR QL STRIP: NEGATIVE
LYMPHOCYTES # BLD AUTO: 1.7 10E3/UL (ref 0.8–5.3)
LYMPHOCYTES NFR BLD AUTO: 18 %
MCH RBC QN AUTO: 30.8 PG (ref 26.5–33)
MCHC RBC AUTO-ENTMCNC: 33 G/DL (ref 31.5–36.5)
MCV RBC AUTO: 93 FL (ref 78–100)
MONOCYTES # BLD AUTO: 1.2 10E3/UL (ref 0–1.3)
MONOCYTES NFR BLD AUTO: 13 %
MUCOUS THREADS #/AREA URNS LPF: PRESENT /LPF
NEUTROPHILS # BLD AUTO: 5.8 10E3/UL (ref 1.6–8.3)
NEUTROPHILS NFR BLD AUTO: 64 %
NITRATE UR QL: NEGATIVE
NRBC # BLD AUTO: 0 10E3/UL
NRBC BLD AUTO-RTO: 0 /100
PH UR STRIP: 5.5 [PH] (ref 5–7)
PLATELET # BLD AUTO: 159 10E3/UL (ref 150–450)
POTASSIUM SERPL-SCNC: 4.3 MMOL/L (ref 3.4–5.3)
RBC # BLD AUTO: 3.96 10E6/UL (ref 4.4–5.9)
RBC URINE: 1 /HPF
SODIUM SERPL-SCNC: 137 MMOL/L (ref 135–145)
SP GR UR STRIP: 1.02 (ref 1–1.03)
SQUAMOUS EPITHELIAL: <1 /HPF
TROPONIN T SERPL HS-MCNC: 14 NG/L
TROPONIN T SERPL HS-MCNC: 17 NG/L
UROBILINOGEN UR STRIP-MCNC: NORMAL MG/DL
WBC # BLD AUTO: 9 10E3/UL (ref 4–11)
WBC URINE: 1 /HPF

## 2024-11-03 PROCEDURE — 99222 1ST HOSP IP/OBS MODERATE 55: CPT | Performed by: INTERNAL MEDICINE

## 2024-11-03 PROCEDURE — 70450 CT HEAD/BRAIN W/O DYE: CPT

## 2024-11-03 PROCEDURE — 36415 COLL VENOUS BLD VENIPUNCTURE: CPT | Performed by: EMERGENCY MEDICINE

## 2024-11-03 PROCEDURE — 81001 URINALYSIS AUTO W/SCOPE: CPT | Performed by: EMERGENCY MEDICINE

## 2024-11-03 PROCEDURE — 73502 X-RAY EXAM HIP UNI 2-3 VIEWS: CPT

## 2024-11-03 PROCEDURE — 84484 ASSAY OF TROPONIN QUANT: CPT | Performed by: EMERGENCY MEDICINE

## 2024-11-03 PROCEDURE — 85004 AUTOMATED DIFF WBC COUNT: CPT | Performed by: EMERGENCY MEDICINE

## 2024-11-03 PROCEDURE — 250N000013 HC RX MED GY IP 250 OP 250 PS 637: Performed by: INTERNAL MEDICINE

## 2024-11-03 PROCEDURE — 250N000011 HC RX IP 250 OP 636: Performed by: EMERGENCY MEDICINE

## 2024-11-03 PROCEDURE — 85014 HEMATOCRIT: CPT | Performed by: EMERGENCY MEDICINE

## 2024-11-03 PROCEDURE — 93005 ELECTROCARDIOGRAM TRACING: CPT

## 2024-11-03 PROCEDURE — 99285 EMERGENCY DEPT VISIT HI MDM: CPT | Mod: 25

## 2024-11-03 PROCEDURE — 120N000001 HC R&B MED SURG/OB

## 2024-11-03 PROCEDURE — 258N000003 HC RX IP 258 OP 636: Performed by: INTERNAL MEDICINE

## 2024-11-03 PROCEDURE — 250N000011 HC RX IP 250 OP 636: Performed by: INTERNAL MEDICINE

## 2024-11-03 PROCEDURE — 80048 BASIC METABOLIC PNL TOTAL CA: CPT | Performed by: EMERGENCY MEDICINE

## 2024-11-03 PROCEDURE — 96374 THER/PROPH/DIAG INJ IV PUSH: CPT | Mod: 59

## 2024-11-03 RX ORDER — NALOXONE HYDROCHLORIDE 0.4 MG/ML
0.2 INJECTION, SOLUTION INTRAMUSCULAR; INTRAVENOUS; SUBCUTANEOUS
Status: DISCONTINUED | OUTPATIENT
Start: 2024-11-03 | End: 2024-11-09 | Stop reason: HOSPADM

## 2024-11-03 RX ORDER — NALOXONE HYDROCHLORIDE 0.4 MG/ML
0.4 INJECTION, SOLUTION INTRAMUSCULAR; INTRAVENOUS; SUBCUTANEOUS
Status: DISCONTINUED | OUTPATIENT
Start: 2024-11-03 | End: 2024-11-09 | Stop reason: HOSPADM

## 2024-11-03 RX ORDER — ATORVASTATIN CALCIUM 40 MG/1
80 TABLET, FILM COATED ORAL AT BEDTIME
Status: DISCONTINUED | OUTPATIENT
Start: 2024-11-03 | End: 2024-11-09 | Stop reason: HOSPADM

## 2024-11-03 RX ORDER — AMOXICILLIN 250 MG
1 CAPSULE ORAL 2 TIMES DAILY PRN
Status: DISCONTINUED | OUTPATIENT
Start: 2024-11-03 | End: 2024-11-04

## 2024-11-03 RX ORDER — FENTANYL CITRATE 50 UG/ML
50 INJECTION, SOLUTION INTRAMUSCULAR; INTRAVENOUS
Status: DISCONTINUED | OUTPATIENT
Start: 2024-11-03 | End: 2024-11-03

## 2024-11-03 RX ORDER — ACETAMINOPHEN 325 MG/1
650 TABLET ORAL EVERY 4 HOURS PRN
Status: DISCONTINUED | OUTPATIENT
Start: 2024-11-03 | End: 2024-11-04

## 2024-11-03 RX ORDER — AMOXICILLIN 250 MG
1 CAPSULE ORAL 2 TIMES DAILY
Status: DISCONTINUED | OUTPATIENT
Start: 2024-11-03 | End: 2024-11-09 | Stop reason: HOSPADM

## 2024-11-03 RX ORDER — ONDANSETRON 2 MG/ML
4 INJECTION INTRAMUSCULAR; INTRAVENOUS EVERY 6 HOURS PRN
Status: DISCONTINUED | OUTPATIENT
Start: 2024-11-03 | End: 2024-11-09 | Stop reason: HOSPADM

## 2024-11-03 RX ORDER — ONDANSETRON 4 MG/1
4 TABLET, ORALLY DISINTEGRATING ORAL EVERY 6 HOURS PRN
Status: DISCONTINUED | OUTPATIENT
Start: 2024-11-03 | End: 2024-11-09 | Stop reason: HOSPADM

## 2024-11-03 RX ORDER — METOPROLOL SUCCINATE 100 MG/1
100 TABLET, EXTENDED RELEASE ORAL EVERY EVENING
Status: DISCONTINUED | OUTPATIENT
Start: 2024-11-03 | End: 2024-11-03

## 2024-11-03 RX ORDER — HYDROMORPHONE HCL IN WATER/PF 6 MG/30 ML
0.4 PATIENT CONTROLLED ANALGESIA SYRINGE INTRAVENOUS
Status: DISCONTINUED | OUTPATIENT
Start: 2024-11-03 | End: 2024-11-09 | Stop reason: HOSPADM

## 2024-11-03 RX ORDER — HYDROMORPHONE HYDROCHLORIDE 2 MG/1
2 TABLET ORAL EVERY 4 HOURS PRN
Status: DISCONTINUED | OUTPATIENT
Start: 2024-11-03 | End: 2024-11-04

## 2024-11-03 RX ORDER — CALCIUM CARBONATE 500 MG/1
1000 TABLET, CHEWABLE ORAL 4 TIMES DAILY PRN
Status: DISCONTINUED | OUTPATIENT
Start: 2024-11-03 | End: 2024-11-09 | Stop reason: HOSPADM

## 2024-11-03 RX ORDER — METOPROLOL SUCCINATE 50 MG/1
50 TABLET, EXTENDED RELEASE ORAL 2 TIMES DAILY
Status: DISCONTINUED | OUTPATIENT
Start: 2024-11-03 | End: 2024-11-04

## 2024-11-03 RX ORDER — NITROGLYCERIN 0.4 MG/1
0.4 TABLET SUBLINGUAL EVERY 5 MIN PRN
Status: DISCONTINUED | OUTPATIENT
Start: 2024-11-03 | End: 2024-11-09 | Stop reason: HOSPADM

## 2024-11-03 RX ORDER — AMOXICILLIN 250 MG
2 CAPSULE ORAL 2 TIMES DAILY PRN
Status: DISCONTINUED | OUTPATIENT
Start: 2024-11-03 | End: 2024-11-04

## 2024-11-03 RX ORDER — LIDOCAINE 40 MG/G
CREAM TOPICAL
Status: DISCONTINUED | OUTPATIENT
Start: 2024-11-03 | End: 2024-11-09 | Stop reason: HOSPADM

## 2024-11-03 RX ORDER — AMOXICILLIN 250 MG
2 CAPSULE ORAL 2 TIMES DAILY
Status: DISCONTINUED | OUTPATIENT
Start: 2024-11-03 | End: 2024-11-09 | Stop reason: HOSPADM

## 2024-11-03 RX ORDER — ACETAMINOPHEN 650 MG/1
650 SUPPOSITORY RECTAL EVERY 4 HOURS PRN
Status: DISCONTINUED | OUTPATIENT
Start: 2024-11-03 | End: 2024-11-04

## 2024-11-03 RX ORDER — HYDROMORPHONE HCL IN WATER/PF 6 MG/30 ML
0.2 PATIENT CONTROLLED ANALGESIA SYRINGE INTRAVENOUS
Status: DISCONTINUED | OUTPATIENT
Start: 2024-11-03 | End: 2024-11-09 | Stop reason: HOSPADM

## 2024-11-03 RX ORDER — TAMSULOSIN HYDROCHLORIDE 0.4 MG/1
0.4 CAPSULE ORAL DAILY
Status: DISCONTINUED | OUTPATIENT
Start: 2024-11-04 | End: 2024-11-09 | Stop reason: HOSPADM

## 2024-11-03 RX ORDER — SODIUM CHLORIDE 9 MG/ML
INJECTION, SOLUTION INTRAVENOUS CONTINUOUS
Status: DISCONTINUED | OUTPATIENT
Start: 2024-11-03 | End: 2024-11-04

## 2024-11-03 RX ADMIN — METOPROLOL SUCCINATE 50 MG: 50 TABLET, EXTENDED RELEASE ORAL at 21:35

## 2024-11-03 RX ADMIN — SODIUM CHLORIDE: 9 INJECTION, SOLUTION INTRAVENOUS at 21:38

## 2024-11-03 RX ADMIN — HYDROMORPHONE HYDROCHLORIDE 0.2 MG: 0.2 INJECTION, SOLUTION INTRAMUSCULAR; INTRAVENOUS; SUBCUTANEOUS at 22:39

## 2024-11-03 RX ADMIN — ATORVASTATIN CALCIUM 80 MG: 40 TABLET, FILM COATED ORAL at 21:34

## 2024-11-03 RX ADMIN — FENTANYL CITRATE 50 MCG: 50 INJECTION, SOLUTION INTRAMUSCULAR; INTRAVENOUS at 17:44

## 2024-11-03 RX ADMIN — HYDROMORPHONE HYDROCHLORIDE 1 MG: 2 TABLET ORAL at 21:35

## 2024-11-03 RX ADMIN — SENNOSIDES AND DOCUSATE SODIUM 1 TABLET: 8.6; 5 TABLET ORAL at 21:35

## 2024-11-03 ASSESSMENT — ACTIVITIES OF DAILY LIVING (ADL)
ADLS_ACUITY_SCORE: 0

## 2024-11-03 ASSESSMENT — COLUMBIA-SUICIDE SEVERITY RATING SCALE - C-SSRS
2. HAVE YOU ACTUALLY HAD ANY THOUGHTS OF KILLING YOURSELF IN THE PAST MONTH?: NO
1. IN THE PAST MONTH, HAVE YOU WISHED YOU WERE DEAD OR WISHED YOU COULD GO TO SLEEP AND NOT WAKE UP?: NO
6. HAVE YOU EVER DONE ANYTHING, STARTED TO DO ANYTHING, OR PREPARED TO DO ANYTHING TO END YOUR LIFE?: NO

## 2024-11-03 NOTE — ED TRIAGE NOTES
"Pt arrives by EMS from assisted living, pt tripped while walking to the elevator, landed on his right hip. Felt \"a little\" dizzy prior to falling. Did not hit his head, denies LOC. Pt immediately felt severe right hip pain after the fall. Right hip externally rotated and shortened. EMS gave 50mcg in route. Pacemaker present. Denies chest pain or SOB. Has been off Warfarin since a fall 3 weeks ago associated with a SDH. BS: 143     Triage Assessment (Adult)       Row Name 11/03/24 1701          Triage Assessment    Airway WDL WDL        Respiratory WDL    Respiratory WDL WDL        Skin Circulation/Temperature WDL    Skin Circulation/Temperature WDL WDL        Cardiac WDL    Cardiac WDL WDL        Peripheral/Neurovascular WDL    Peripheral Neurovascular WDL WDL        Cognitive/Neuro/Behavioral WDL    Cognitive/Neuro/Behavioral WDL WDL                     "

## 2024-11-03 NOTE — ED PROVIDER NOTES
Emergency Department Note      History of Present Illness     Chief Complaint   Fall and Hip Pain      HPI   Constantino Thorne is a 84 year old male presents the emergency department after mechanical fall.  Was in an assisted living and fell getting into the elevator felt immediate right hip pain when he fell.  Did report feeling a little dizzy prior to falling, no loss of consciousness no vomiting currently denies headache neck pain chest pain abdominal pain, only complains of right hip pain.    Independent Historian   None    Review of External Notes       Past Medical History     Medical History and Problem List   Past Medical History:   Diagnosis Date    A-fib (H)     Hypertension        Medications   atorvastatin (LIPITOR) 80 MG tablet  irbesartan (AVAPRO) 75 MG tablet  metoprolol succinate ER (TOPROL XL) 50 MG 24 hr tablet  metoprolol succinate ER (TOPROL-XL) 50 MG 24 hr tablet  nitroGLYcerin (NITROSTAT) 0.4 MG sublingual tablet  tamsulosin (FLOMAX) 0.4 MG capsule  triamcinolone (KENALOG) 0.1 % external cream        Surgical History   History reviewed. No pertinent surgical history.    Physical Exam     Patient Vitals for the past 24 hrs:   BP Temp Pulse Resp SpO2 Height Weight   11/03/24 2030 133/79 -- 64 17 96 % -- --   11/03/24 2023 -- -- 66 -- 95 % -- --   11/03/24 2018 -- -- 65 -- 96 % -- --   11/03/24 2013 134/85 -- 61 -- 97 % -- --   11/03/24 2008 -- -- 64 -- 96 % -- --   11/03/24 2003 -- -- 65 19 96 % -- --   11/03/24 1958 135/86 -- 65 17 96 % -- --   11/03/24 1945 134/71 -- 65 12 96 % -- --   11/03/24 1930 139/86 -- 61 -- 96 % -- --   11/03/24 1915 137/88 -- 63 17 94 % -- --   11/03/24 1900 -- -- 63 12 95 % -- --   11/03/24 1858 (!) 134/94 -- 63 -- 95 % -- --   11/03/24 1843 (!) 136/91 -- 64 -- 95 % -- --   11/03/24 1834 133/82 -- 65 -- 95 % -- --   11/03/24 1828 (!) 134/90 -- 62 -- 95 % -- --   11/03/24 1736 125/74 -- 63 15 96 % -- --   11/03/24 1721 (!) 144/85 -- 61 16 96 % -- --   11/03/24 1706 (!)  "143/82 -- -- -- 96 % -- --   11/03/24 1704 137/87 98.1  F (36.7  C) 70 16 95 % 1.854 m (6' 1\") 95.3 kg (210 lb)     Physical Exam  Gen: well appearing, in no acute distress  Oral : Mucous membranes moist,   Nose: No rhinorhea  Ears: External near normal, without drainage  Eyes: periorbital tissues and sclera normal   Neck: supple, no abnormal swelling  Lungs: Clear bilaterally, no tachypnea or distress, speaks full sentences  CV: Regular rate, regular rhythm  Abd: soft, nontender, nondistended, no rebound/guarding  Ext: Right leg externally rotated, tender about the right hip  Skin: warm, dry, well perfused, no rashes/bruising/lesions on exposed skin  Neuro: alert, no gross motor or sensory deficits,   Psych: pleasant mood, normal affect      Diagnostics     Lab Results   Labs Ordered and Resulted from Time of ED Arrival to Time of ED Departure   ROUTINE UA WITH MICROSCOPIC REFLEX TO CULTURE - Abnormal       Result Value    Color Urine Yellow      Appearance Urine Clear      Glucose Urine Negative      Bilirubin Urine Negative      Ketones Urine Negative      Specific Gravity Urine 1.024      Blood Urine Negative      pH Urine 5.5      Protein Albumin Urine 10 (*)     Urobilinogen Urine Normal      Nitrite Urine Negative      Leukocyte Esterase Urine Negative      Mucus Urine Present (*)     RBC Urine 1      WBC Urine 1      Squamous Epithelials Urine <1     BASIC METABOLIC PANEL - Abnormal    Sodium 137      Potassium 4.3      Chloride 102      Carbon Dioxide (CO2) 21 (*)     Anion Gap 14      Urea Nitrogen 25.6 (*)     Creatinine 1.24 (*)     GFR Estimate 57 (*)     Calcium 8.6 (*)     Glucose 117 (*)    CBC WITH PLATELETS AND DIFFERENTIAL - Abnormal    WBC Count 9.0      RBC Count 3.96 (*)     Hemoglobin 12.2 (*)     Hematocrit 37.0 (*)     MCV 93      MCH 30.8      MCHC 33.0      RDW 13.6      Platelet Count 159      % Neutrophils 64      % Lymphocytes 18      % Monocytes 13      % Eosinophils 3      % Basophils " 0      % Immature Granulocytes 1      NRBCs per 100 WBC 0      Absolute Neutrophils 5.8      Absolute Lymphocytes 1.7      Absolute Monocytes 1.2      Absolute Eosinophils 0.3      Absolute Basophils 0.0      Absolute Immature Granulocytes 0.1      Absolute NRBCs 0.0     TROPONIN T, HIGH SENSITIVITY - Normal    Troponin T, High Sensitivity 14     TROPONIN T, HIGH SENSITIVITY       Imaging   Head CT w/o contrast   Final Result   IMPRESSION: Negative for acute intracranial hemorrhage, hydrocephalus or transcortical infarct. No skull fracture.      XR Pelvis w Hip Right 1 View   Final Result   IMPRESSION: Acute right femoral neck fracture with superolateral displacement. There is normal joint alignment. Mild degenerative changes throughout the pelvis.          EKG       Independent Interpretation   X-ray right femur shows femoral neck fracture    ED Course      Medications Administered   Medications   fentaNYL (PF) (SUBLIMAZE) injection 50 mcg (50 mcg Intravenous $Given 11/3/24 4501)       Procedures   Procedures     Discussion of Management   Admitting Hospitalist,      ED Course        Additional Documentation  None    Medical Decision Making / Diagnosis     CMS Diagnoses: None    MIPS       None    MDM   Constantino REBECCA Thorne is a 84 year old male presents after mechanical fall, new right hip fracture.  No loss of consciousness but I did scan his brain given recent traumatic subarachnoid from a trauma.  Fortunately no nutrient no new intracranial hemorrhage.  Contacted hospitalist team regarding admission.    Disposition   The patient was admitted to the hospital.     Diagnosis     ICD-10-CM    1. Closed displaced fracture of right femoral neck (H)  S72.001A       2. Fall from standing, initial encounter  W19.XXXA            Discharge Medications   New Prescriptions    No medications on file         MD Allen Bahena Paul Anthony, MD  11/03/24 4490

## 2024-11-04 ENCOUNTER — ANESTHESIA (OUTPATIENT)
Dept: SURGERY | Facility: CLINIC | Age: 84
DRG: 521 | End: 2024-11-04
Payer: COMMERCIAL

## 2024-11-04 ENCOUNTER — ANESTHESIA EVENT (OUTPATIENT)
Dept: SURGERY | Facility: CLINIC | Age: 84
DRG: 521 | End: 2024-11-04
Payer: COMMERCIAL

## 2024-11-04 ENCOUNTER — APPOINTMENT (OUTPATIENT)
Dept: GENERAL RADIOLOGY | Facility: CLINIC | Age: 84
DRG: 521 | End: 2024-11-04
Attending: SPECIALIST/TECHNOLOGIST
Payer: COMMERCIAL

## 2024-11-04 LAB
ABO/RH(D): NORMAL
ANION GAP SERPL CALCULATED.3IONS-SCNC: 13 MMOL/L (ref 7–15)
ANTIBODY SCREEN: NEGATIVE
ATRIAL RATE - MUSE: 65 BPM
BUN SERPL-MCNC: 21.9 MG/DL (ref 8–23)
CALCIUM SERPL-MCNC: 8.4 MG/DL (ref 8.8–10.4)
CHLORIDE SERPL-SCNC: 103 MMOL/L (ref 98–107)
CREAT SERPL-MCNC: 1.01 MG/DL (ref 0.67–1.17)
CREAT SERPL-MCNC: 1.03 MG/DL (ref 0.67–1.17)
DIASTOLIC BLOOD PRESSURE - MUSE: NORMAL MMHG
EGFRCR SERPLBLD CKD-EPI 2021: 71 ML/MIN/1.73M2
EGFRCR SERPLBLD CKD-EPI 2021: 73 ML/MIN/1.73M2
ERYTHROCYTE [DISTWIDTH] IN BLOOD BY AUTOMATED COUNT: 13.6 % (ref 10–15)
GLUCOSE BLDC GLUCOMTR-MCNC: 209 MG/DL (ref 70–99)
GLUCOSE SERPL-MCNC: 108 MG/DL (ref 70–99)
HCO3 SERPL-SCNC: 20 MMOL/L (ref 22–29)
HCT VFR BLD AUTO: 35.9 % (ref 40–53)
HGB BLD-MCNC: 12 G/DL (ref 13.3–17.7)
INTERPRETATION ECG - MUSE: NORMAL
MAGNESIUM SERPL-MCNC: 1.8 MG/DL (ref 1.7–2.3)
MCH RBC QN AUTO: 31.2 PG (ref 26.5–33)
MCHC RBC AUTO-ENTMCNC: 33.4 G/DL (ref 31.5–36.5)
MCV RBC AUTO: 93 FL (ref 78–100)
P AXIS - MUSE: 12 DEGREES
PATH REPORT.COMMENTS IMP SPEC: NORMAL
PATH REPORT.COMMENTS IMP SPEC: NORMAL
PATH REPORT.FINAL DX SPEC: NORMAL
PATH REPORT.GROSS SPEC: NORMAL
PATH REPORT.MICROSCOPIC SPEC OTHER STN: NORMAL
PATH REPORT.RELEVANT HX SPEC: NORMAL
PHOTO IMAGE: NORMAL
PLATELET # BLD AUTO: 156 10E3/UL (ref 150–450)
POTASSIUM SERPL-SCNC: 4 MMOL/L (ref 3.4–5.3)
PR INTERVAL - MUSE: 172 MS
QRS DURATION - MUSE: 78 MS
QT - MUSE: 390 MS
QTC - MUSE: 405 MS
R AXIS - MUSE: -36 DEGREES
RBC # BLD AUTO: 3.85 10E6/UL (ref 4.4–5.9)
SODIUM SERPL-SCNC: 136 MMOL/L (ref 135–145)
SPECIMEN EXPIRATION DATE: NORMAL
SYSTOLIC BLOOD PRESSURE - MUSE: NORMAL MMHG
T AXIS - MUSE: 34 DEGREES
VENTRICULAR RATE- MUSE: 65 BPM
WBC # BLD AUTO: 13.2 10E3/UL (ref 4–11)

## 2024-11-04 PROCEDURE — 88300 SURGICAL PATH GROSS: CPT | Mod: TC | Performed by: ORTHOPAEDIC SURGERY

## 2024-11-04 PROCEDURE — 258N000003 HC RX IP 258 OP 636: Performed by: ANESTHESIOLOGY

## 2024-11-04 PROCEDURE — 250N000011 HC RX IP 250 OP 636: Performed by: ORTHOPAEDIC SURGERY

## 2024-11-04 PROCEDURE — 88300 SURGICAL PATH GROSS: CPT | Mod: 26

## 2024-11-04 PROCEDURE — 250N000013 HC RX MED GY IP 250 OP 250 PS 637: Performed by: INTERNAL MEDICINE

## 2024-11-04 PROCEDURE — 99232 SBSQ HOSP IP/OBS MODERATE 35: CPT | Performed by: HOSPITALIST

## 2024-11-04 PROCEDURE — 80048 BASIC METABOLIC PNL TOTAL CA: CPT | Performed by: INTERNAL MEDICINE

## 2024-11-04 PROCEDURE — 258N000001 HC RX 258: Performed by: ORTHOPAEDIC SURGERY

## 2024-11-04 PROCEDURE — 36415 COLL VENOUS BLD VENIPUNCTURE: CPT | Performed by: INTERNAL MEDICINE

## 2024-11-04 PROCEDURE — C1776 JOINT DEVICE (IMPLANTABLE): HCPCS | Performed by: ORTHOPAEDIC SURGERY

## 2024-11-04 PROCEDURE — 250N000009 HC RX 250: Performed by: ORTHOPAEDIC SURGERY

## 2024-11-04 PROCEDURE — 0SRR019 REPLACEMENT OF RIGHT HIP JOINT, FEMORAL SURFACE WITH METAL SYNTHETIC SUBSTITUTE, CEMENTED, OPEN APPROACH: ICD-10-PCS | Performed by: ORTHOPAEDIC SURGERY

## 2024-11-04 PROCEDURE — 370N000017 HC ANESTHESIA TECHNICAL FEE, PER MIN: Performed by: ORTHOPAEDIC SURGERY

## 2024-11-04 PROCEDURE — C1713 ANCHOR/SCREW BN/BN,TIS/BN: HCPCS | Performed by: ORTHOPAEDIC SURGERY

## 2024-11-04 PROCEDURE — 272N000001 HC OR GENERAL SUPPLY STERILE: Performed by: ORTHOPAEDIC SURGERY

## 2024-11-04 PROCEDURE — 250N000009 HC RX 250: Performed by: NURSE ANESTHETIST, CERTIFIED REGISTERED

## 2024-11-04 PROCEDURE — 86900 BLOOD TYPING SEROLOGIC ABO: CPT | Performed by: ORTHOPAEDIC SURGERY

## 2024-11-04 PROCEDURE — 82374 ASSAY BLOOD CARBON DIOXIDE: CPT | Performed by: INTERNAL MEDICINE

## 2024-11-04 PROCEDURE — 258N000003 HC RX IP 258 OP 636: Performed by: SPECIALIST/TECHNOLOGIST

## 2024-11-04 PROCEDURE — 258N000003 HC RX IP 258 OP 636: Performed by: NURSE ANESTHETIST, CERTIFIED REGISTERED

## 2024-11-04 PROCEDURE — 82565 ASSAY OF CREATININE: CPT | Performed by: SPECIALIST/TECHNOLOGIST

## 2024-11-04 PROCEDURE — 83735 ASSAY OF MAGNESIUM: CPT | Performed by: STUDENT IN AN ORGANIZED HEALTH CARE EDUCATION/TRAINING PROGRAM

## 2024-11-04 PROCEDURE — 999N000065 XR PELVIS AND HIP PORTABLE RIGHT 1 VIEW

## 2024-11-04 PROCEDURE — 710N000009 HC RECOVERY PHASE 1, LEVEL 1, PER MIN: Performed by: ORTHOPAEDIC SURGERY

## 2024-11-04 PROCEDURE — 99221 1ST HOSP IP/OBS SF/LOW 40: CPT

## 2024-11-04 PROCEDURE — 250N000013 HC RX MED GY IP 250 OP 250 PS 637: Performed by: SPECIALIST/TECHNOLOGIST

## 2024-11-04 PROCEDURE — 999N000141 HC STATISTIC PRE-PROCEDURE NURSING ASSESSMENT: Performed by: ORTHOPAEDIC SURGERY

## 2024-11-04 PROCEDURE — 82310 ASSAY OF CALCIUM: CPT | Performed by: INTERNAL MEDICINE

## 2024-11-04 PROCEDURE — 85014 HEMATOCRIT: CPT | Performed by: INTERNAL MEDICINE

## 2024-11-04 PROCEDURE — 250N000011 HC RX IP 250 OP 636: Performed by: NURSE ANESTHETIST, CERTIFIED REGISTERED

## 2024-11-04 PROCEDURE — 250N000025 HC SEVOFLURANE, PER MIN: Performed by: ORTHOPAEDIC SURGERY

## 2024-11-04 PROCEDURE — 120N000001 HC R&B MED SURG/OB

## 2024-11-04 PROCEDURE — 250N000011 HC RX IP 250 OP 636: Performed by: HOSPITALIST

## 2024-11-04 PROCEDURE — 36415 COLL VENOUS BLD VENIPUNCTURE: CPT | Performed by: SPECIALIST/TECHNOLOGIST

## 2024-11-04 PROCEDURE — 360N000077 HC SURGERY LEVEL 4, PER MIN: Performed by: ORTHOPAEDIC SURGERY

## 2024-11-04 DEVICE — FEMORAL HEAD
Type: IMPLANTABLE DEVICE | Site: HIP | Status: FUNCTIONAL
Brand: C-TAPER HEAD

## 2024-11-04 DEVICE — BONE CEMENT SIMPLEX FULL DOSE 6191-1-001: Type: IMPLANTABLE DEVICE | Site: HIP | Status: FUNCTIONAL

## 2024-11-04 DEVICE — BIPOLAR COMPONENT
Type: IMPLANTABLE DEVICE | Site: HIP | Status: FUNCTIONAL
Brand: UHR

## 2024-11-04 DEVICE — 127 DEGREE CEMENTED HIP STEM
Type: IMPLANTABLE DEVICE | Site: HIP | Status: FUNCTIONAL
Brand: OMNIFIT

## 2024-11-04 DEVICE — BONE CEMENT RESTRICTOR BUCK FEMORAL 25MM 129419: Type: IMPLANTABLE DEVICE | Site: HIP | Status: FUNCTIONAL

## 2024-11-04 DEVICE — UNIVERSAL DISTAL CEMENT SPACER
Type: IMPLANTABLE DEVICE | Site: HIP | Status: FUNCTIONAL
Brand: OMNIFIT

## 2024-11-04 RX ORDER — ACETAMINOPHEN 325 MG/1
975 TABLET ORAL EVERY 8 HOURS
Status: COMPLETED | OUTPATIENT
Start: 2024-11-04 | End: 2024-11-07

## 2024-11-04 RX ORDER — METOPROLOL SUCCINATE 25 MG/1
25 TABLET, EXTENDED RELEASE ORAL 2 TIMES DAILY
Status: DISCONTINUED | OUTPATIENT
Start: 2024-11-04 | End: 2024-11-07

## 2024-11-04 RX ORDER — FENTANYL CITRATE 50 UG/ML
50 INJECTION, SOLUTION INTRAMUSCULAR; INTRAVENOUS EVERY 5 MIN PRN
Status: DISCONTINUED | OUTPATIENT
Start: 2024-11-04 | End: 2024-11-04 | Stop reason: HOSPADM

## 2024-11-04 RX ORDER — TOBRAMYCIN 1.2 G/30ML
INJECTION, POWDER, LYOPHILIZED, FOR SOLUTION INTRAVENOUS PRN
Status: DISCONTINUED | OUTPATIENT
Start: 2024-11-04 | End: 2024-11-04

## 2024-11-04 RX ORDER — ONDANSETRON 4 MG/1
4 TABLET, ORALLY DISINTEGRATING ORAL EVERY 30 MIN PRN
Status: DISCONTINUED | OUTPATIENT
Start: 2024-11-04 | End: 2024-11-04 | Stop reason: HOSPADM

## 2024-11-04 RX ORDER — ONDANSETRON 2 MG/ML
INJECTION INTRAMUSCULAR; INTRAVENOUS PRN
Status: DISCONTINUED | OUTPATIENT
Start: 2024-11-04 | End: 2024-11-04

## 2024-11-04 RX ORDER — OXYCODONE HYDROCHLORIDE 10 MG/1
10 TABLET ORAL EVERY 4 HOURS PRN
Status: DISCONTINUED | OUTPATIENT
Start: 2024-11-04 | End: 2024-11-09 | Stop reason: HOSPADM

## 2024-11-04 RX ORDER — FENTANYL CITRATE 50 UG/ML
25 INJECTION, SOLUTION INTRAMUSCULAR; INTRAVENOUS EVERY 5 MIN PRN
Status: DISCONTINUED | OUTPATIENT
Start: 2024-11-04 | End: 2024-11-04 | Stop reason: HOSPADM

## 2024-11-04 RX ORDER — CEFAZOLIN SODIUM/WATER 2 G/20 ML
2 SYRINGE (ML) INTRAVENOUS SEE ADMIN INSTRUCTIONS
Status: DISCONTINUED | OUTPATIENT
Start: 2024-11-04 | End: 2024-11-04

## 2024-11-04 RX ORDER — LIDOCAINE HYDROCHLORIDE 20 MG/ML
INJECTION, SOLUTION INFILTRATION; PERINEURAL PRN
Status: DISCONTINUED | OUTPATIENT
Start: 2024-11-04 | End: 2024-11-04

## 2024-11-04 RX ORDER — ALBUTEROL SULFATE 0.83 MG/ML
2.5 SOLUTION RESPIRATORY (INHALATION) EVERY 4 HOURS PRN
Status: DISCONTINUED | OUTPATIENT
Start: 2024-11-04 | End: 2024-11-04 | Stop reason: HOSPADM

## 2024-11-04 RX ORDER — ONDANSETRON 2 MG/ML
4 INJECTION INTRAMUSCULAR; INTRAVENOUS EVERY 30 MIN PRN
Status: DISCONTINUED | OUTPATIENT
Start: 2024-11-04 | End: 2024-11-04 | Stop reason: HOSPADM

## 2024-11-04 RX ORDER — CEFAZOLIN SODIUM/WATER 2 G/20 ML
2 SYRINGE (ML) INTRAVENOUS EVERY 8 HOURS
Status: DISCONTINUED | OUTPATIENT
Start: 2024-11-04 | End: 2024-11-04

## 2024-11-04 RX ORDER — PROPOFOL 10 MG/ML
INJECTION, EMULSION INTRAVENOUS PRN
Status: DISCONTINUED | OUTPATIENT
Start: 2024-11-04 | End: 2024-11-04

## 2024-11-04 RX ORDER — DEXAMETHASONE SODIUM PHOSPHATE 4 MG/ML
4 INJECTION, SOLUTION INTRA-ARTICULAR; INTRALESIONAL; INTRAMUSCULAR; INTRAVENOUS; SOFT TISSUE
Status: DISCONTINUED | OUTPATIENT
Start: 2024-11-04 | End: 2024-11-04 | Stop reason: HOSPADM

## 2024-11-04 RX ORDER — SODIUM CHLORIDE, SODIUM LACTATE, POTASSIUM CHLORIDE, CALCIUM CHLORIDE 600; 310; 30; 20 MG/100ML; MG/100ML; MG/100ML; MG/100ML
INJECTION, SOLUTION INTRAVENOUS CONTINUOUS
Status: DISCONTINUED | OUTPATIENT
Start: 2024-11-04 | End: 2024-11-04

## 2024-11-04 RX ORDER — CEFAZOLIN SODIUM/WATER 2 G/20 ML
2 SYRINGE (ML) INTRAVENOUS
Status: COMPLETED | OUTPATIENT
Start: 2024-11-04 | End: 2024-11-04

## 2024-11-04 RX ORDER — SODIUM CHLORIDE, SODIUM LACTATE, POTASSIUM CHLORIDE, CALCIUM CHLORIDE 600; 310; 30; 20 MG/100ML; MG/100ML; MG/100ML; MG/100ML
INJECTION, SOLUTION INTRAVENOUS CONTINUOUS
Status: DISCONTINUED | OUTPATIENT
Start: 2024-11-04 | End: 2024-11-04 | Stop reason: HOSPADM

## 2024-11-04 RX ORDER — TRANEXAMIC ACID 10 MG/ML
1 INJECTION, SOLUTION INTRAVENOUS ONCE
Status: COMPLETED | OUTPATIENT
Start: 2024-11-04 | End: 2024-11-04

## 2024-11-04 RX ORDER — LIDOCAINE 40 MG/G
CREAM TOPICAL
Status: DISCONTINUED | OUTPATIENT
Start: 2024-11-04 | End: 2024-11-04

## 2024-11-04 RX ORDER — DEXAMETHASONE SODIUM PHOSPHATE 4 MG/ML
INJECTION, SOLUTION INTRA-ARTICULAR; INTRALESIONAL; INTRAMUSCULAR; INTRAVENOUS; SOFT TISSUE PRN
Status: DISCONTINUED | OUTPATIENT
Start: 2024-11-04 | End: 2024-11-04

## 2024-11-04 RX ORDER — OXYCODONE HYDROCHLORIDE 5 MG/1
5 TABLET ORAL EVERY 4 HOURS PRN
Status: DISCONTINUED | OUTPATIENT
Start: 2024-11-04 | End: 2024-11-09 | Stop reason: HOSPADM

## 2024-11-04 RX ORDER — MEPERIDINE HYDROCHLORIDE 25 MG/ML
12.5 INJECTION INTRAMUSCULAR; INTRAVENOUS; SUBCUTANEOUS EVERY 5 MIN PRN
Status: DISCONTINUED | OUTPATIENT
Start: 2024-11-04 | End: 2024-11-04 | Stop reason: HOSPADM

## 2024-11-04 RX ORDER — POLYETHYLENE GLYCOL 3350 17 G/17G
17 POWDER, FOR SOLUTION ORAL DAILY
Status: DISCONTINUED | OUTPATIENT
Start: 2024-11-05 | End: 2024-11-09 | Stop reason: HOSPADM

## 2024-11-04 RX ORDER — NALOXONE HYDROCHLORIDE 0.4 MG/ML
0.1 INJECTION, SOLUTION INTRAMUSCULAR; INTRAVENOUS; SUBCUTANEOUS
Status: DISCONTINUED | OUTPATIENT
Start: 2024-11-04 | End: 2024-11-04 | Stop reason: HOSPADM

## 2024-11-04 RX ORDER — DIAZEPAM 10 MG/2ML
2.5 INJECTION, SOLUTION INTRAMUSCULAR; INTRAVENOUS
Status: DISCONTINUED | OUTPATIENT
Start: 2024-11-04 | End: 2024-11-04 | Stop reason: HOSPADM

## 2024-11-04 RX ORDER — BISACODYL 10 MG
10 SUPPOSITORY, RECTAL RECTAL DAILY PRN
Status: DISCONTINUED | OUTPATIENT
Start: 2024-11-07 | End: 2024-11-09 | Stop reason: HOSPADM

## 2024-11-04 RX ORDER — CEFAZOLIN SODIUM 2 G/100ML
2 INJECTION, SOLUTION INTRAVENOUS EVERY 8 HOURS
Status: COMPLETED | OUTPATIENT
Start: 2024-11-04 | End: 2024-11-05

## 2024-11-04 RX ORDER — HYDROXYZINE HYDROCHLORIDE 10 MG/1
10 TABLET, FILM COATED ORAL EVERY 6 HOURS PRN
Status: DISCONTINUED | OUTPATIENT
Start: 2024-11-04 | End: 2024-11-09 | Stop reason: HOSPADM

## 2024-11-04 RX ORDER — LABETALOL HYDROCHLORIDE 5 MG/ML
10 INJECTION, SOLUTION INTRAVENOUS EVERY 10 MIN PRN
Status: DISCONTINUED | OUTPATIENT
Start: 2024-11-04 | End: 2024-11-04 | Stop reason: HOSPADM

## 2024-11-04 RX ORDER — ENOXAPARIN SODIUM 100 MG/ML
40 INJECTION SUBCUTANEOUS EVERY 24 HOURS
Status: DISCONTINUED | OUTPATIENT
Start: 2024-11-05 | End: 2024-11-09 | Stop reason: HOSPADM

## 2024-11-04 RX ORDER — ACETAMINOPHEN 325 MG/1
650 TABLET ORAL EVERY 4 HOURS PRN
Status: DISCONTINUED | OUTPATIENT
Start: 2024-11-07 | End: 2024-11-09 | Stop reason: HOSPADM

## 2024-11-04 RX ORDER — FENTANYL CITRATE 50 UG/ML
INJECTION, SOLUTION INTRAMUSCULAR; INTRAVENOUS PRN
Status: DISCONTINUED | OUTPATIENT
Start: 2024-11-04 | End: 2024-11-04

## 2024-11-04 RX ORDER — VANCOMYCIN HYDROCHLORIDE 1 G/20ML
INJECTION, POWDER, LYOPHILIZED, FOR SOLUTION INTRAVENOUS PRN
Status: DISCONTINUED | OUTPATIENT
Start: 2024-11-04 | End: 2024-11-04

## 2024-11-04 RX ADMIN — HYDROXYZINE HYDROCHLORIDE 10 MG: 10 TABLET, FILM COATED ORAL at 21:22

## 2024-11-04 RX ADMIN — FENTANYL CITRATE 100 MCG: 50 INJECTION INTRAMUSCULAR; INTRAVENOUS at 09:47

## 2024-11-04 RX ADMIN — ATORVASTATIN CALCIUM 80 MG: 40 TABLET, FILM COATED ORAL at 21:21

## 2024-11-04 RX ADMIN — SENNOSIDES AND DOCUSATE SODIUM 1 TABLET: 8.6; 5 TABLET ORAL at 19:17

## 2024-11-04 RX ADMIN — PHENYLEPHRINE HYDROCHLORIDE 200 MCG: 10 INJECTION INTRAVENOUS at 11:04

## 2024-11-04 RX ADMIN — HYDROMORPHONE HYDROCHLORIDE 1 MG: 2 TABLET ORAL at 02:10

## 2024-11-04 RX ADMIN — SODIUM CHLORIDE, POTASSIUM CHLORIDE, SODIUM LACTATE AND CALCIUM CHLORIDE: 600; 310; 30; 20 INJECTION, SOLUTION INTRAVENOUS at 12:51

## 2024-11-04 RX ADMIN — TRANEXAMIC ACID 1 G: 10 INJECTION, SOLUTION INTRAVENOUS at 09:52

## 2024-11-04 RX ADMIN — SODIUM CHLORIDE, POTASSIUM CHLORIDE, SODIUM LACTATE AND CALCIUM CHLORIDE: 600; 310; 30; 20 INJECTION, SOLUTION INTRAVENOUS at 14:29

## 2024-11-04 RX ADMIN — PHENYLEPHRINE HYDROCHLORIDE 200 MCG: 10 INJECTION INTRAVENOUS at 10:51

## 2024-11-04 RX ADMIN — OXYCODONE HYDROCHLORIDE 5 MG: 5 TABLET ORAL at 19:16

## 2024-11-04 RX ADMIN — Medication 2 G: at 09:40

## 2024-11-04 RX ADMIN — ACETAMINOPHEN 975 MG: 325 TABLET, FILM COATED ORAL at 13:58

## 2024-11-04 RX ADMIN — ONDANSETRON 4 MG: 2 INJECTION INTRAMUSCULAR; INTRAVENOUS at 10:52

## 2024-11-04 RX ADMIN — METOPROLOL SUCCINATE 50 MG: 50 TABLET, EXTENDED RELEASE ORAL at 08:24

## 2024-11-04 RX ADMIN — PHENYLEPHRINE HYDROCHLORIDE 200 MCG: 10 INJECTION INTRAVENOUS at 11:12

## 2024-11-04 RX ADMIN — ACETAMINOPHEN 975 MG: 325 TABLET, FILM COATED ORAL at 21:01

## 2024-11-04 RX ADMIN — SUGAMMADEX 200 MG: 100 INJECTION, SOLUTION INTRAVENOUS at 11:16

## 2024-11-04 RX ADMIN — LIDOCAINE HYDROCHLORIDE 50 MG: 20 INJECTION, SOLUTION INFILTRATION; PERINEURAL at 09:47

## 2024-11-04 RX ADMIN — CEFAZOLIN SODIUM 2 G: 2 INJECTION, SOLUTION INTRAVENOUS at 18:19

## 2024-11-04 RX ADMIN — ROCURONIUM BROMIDE 50 MG: 50 INJECTION, SOLUTION INTRAVENOUS at 09:48

## 2024-11-04 RX ADMIN — PHENYLEPHRINE HYDROCHLORIDE 100 MCG: 10 INJECTION INTRAVENOUS at 09:58

## 2024-11-04 RX ADMIN — PHENYLEPHRINE HYDROCHLORIDE 100 MCG: 10 INJECTION INTRAVENOUS at 10:07

## 2024-11-04 RX ADMIN — TAMSULOSIN HYDROCHLORIDE 0.4 MG: 0.4 CAPSULE ORAL at 08:24

## 2024-11-04 RX ADMIN — DEXAMETHASONE SODIUM PHOSPHATE 8 MG: 4 INJECTION, SOLUTION INTRA-ARTICULAR; INTRALESIONAL; INTRAMUSCULAR; INTRAVENOUS; SOFT TISSUE at 09:48

## 2024-11-04 RX ADMIN — PHENYLEPHRINE HYDROCHLORIDE 200 MCG: 10 INJECTION INTRAVENOUS at 10:56

## 2024-11-04 RX ADMIN — TRANEXAMIC ACID 1 G: 10 INJECTION, SOLUTION INTRAVENOUS at 11:12

## 2024-11-04 RX ADMIN — PHENYLEPHRINE HYDROCHLORIDE 100 MCG: 10 INJECTION INTRAVENOUS at 09:51

## 2024-11-04 RX ADMIN — PROPOFOL 150 MG: 10 INJECTION, EMULSION INTRAVENOUS at 09:47

## 2024-11-04 RX ADMIN — SODIUM CHLORIDE, POTASSIUM CHLORIDE, SODIUM LACTATE AND CALCIUM CHLORIDE: 600; 310; 30; 20 INJECTION, SOLUTION INTRAVENOUS at 09:40

## 2024-11-04 NOTE — ANESTHESIA PROCEDURE NOTES
Airway       Patient location during procedure: OR       Procedure Start/Stop Times: 11/4/2024 9:50 AM  Staff -        CRNA: Anjum Pierce APRN CRNA       Performed By: CRNA  Consent for Airway        Urgency: elective  Indications and Patient Condition       Indications for airway management: natalie-procedural       Induction type:intravenous       Mask difficulty assessment: 1 - vent by mask    Final Airway Details       Final airway type: endotracheal airway       Successful airway: ETT - single and Oral  Endotracheal Airway Details        ETT size (mm): 8.0       Cuffed: yes       Successful intubation technique: direct laryngoscopy       DL Blade Type: Ortiz 2       Grade View of Cords: 1       Adjucts: stylet       Position: Right       Measured from: lips       Secured at (cm): 22       Bite block used: None    Post intubation assessment        Placement verified by: capnometry, equal breath sounds and chest rise        Number of attempts at approach: 1       Secured with: tape       Ease of procedure: easy       Dentition: Intact and Unchanged    Medication(s) Administered   Medication Administration Time: 11/4/2024 9:50 AM

## 2024-11-04 NOTE — PHARMACY-ADMISSION MEDICATION HISTORY
Pharmacist Admission Medication History    Admission medication history is complete. The information provided in this note is only as accurate as the sources available at the time of the update.    Information Source(s): Patient, Clinic records, and CareEverywhere/SureScripts via in-person    Pertinent Information: Warfarin still being held from last hospitalization, pt to follow up with neurosurgery this month to determine restart.     Changes made to PTA medication list:  Added: None  Deleted:   Irbesartan  Changed:   Metoprolol 50 mg QAM + 100 mg QPM --> 50 mg BID    Allergies reviewed with patient and updates made in EHR: no    Medication History Completed By: Margy Casillas RPH 11/3/2024 9:21 PM    PTA Med List   Medication Sig Last Dose/Taking    atorvastatin (LIPITOR) 80 MG tablet Take 80 mg by mouth At Bedtime  11/2/2024 Bedtime    metoprolol succinate ER (TOPROL-XL) 50 MG 24 hr tablet Take 50 mg by mouth 2 times daily. 11/3/2024 Morning    tamsulosin (FLOMAX) 0.4 MG capsule Take 0.4 mg by mouth daily 11/3/2024

## 2024-11-04 NOTE — PLAN OF CARE
Brief Ortho Plan of Care Note    Consult request received for 85 y/o M s/p mechanical fall with R displaced femoral neck fracture.  Appears no barriers to proceeding to OR tomorrow.  Appreciate hospitalist co-management.  Will plan for R hip hemiarthroplasty tomorrow with Dr. Chacko.  NPO p MN.  Pre-op orders placed.  Formal consult will occur tomorrow morning, possibly in pre-op holding area.

## 2024-11-04 NOTE — PLAN OF CARE
"Goal Outcome Evaluation:      Plan of Care Reviewed With: patient    Overall Patient Progress: improvingOverall Patient Progress: improving    Outcome Evaluation: Plan of Care Reviewed With: patient    Overall Patient Progress: improvingOverall Patient Progress: improving    Outcome Evaluation: A&O x4. On RA. NS running 50 ml/hr. NPO since midnight. Requested PRN dilaudid at around midnight. Surgery scheduled at 0940. CHG bath and scrub done. Bladder scanned at 0650 for 159. Spouse at bedside for surgery.      Problem: Adult Inpatient Plan of Care  Goal: Plan of Care Review  Description: The Plan of Care Review/Shift note should be completed every shift.  The Outcome Evaluation is a brief statement about your assessment that the patient is improving, declining, or no change.  This information will be displayed automatically on your shift  note.  11/4/2024 0802 by Heather Dominique RN  Outcome: Progressing  Flowsheets (Taken 11/4/2024 0802)  Outcome Evaluation: Plan of Care Reviewed With: patient    Overall Patient Progress: improvingOverall Patient Progress: improving    Outcome Evaluation: A&O x4. On RA. NS running 50 ml/hr. NPO since midnight. Requested PRN dilaudid at around midnight. Surgery scheduled at 0940. CHG bath and scrub done. Bladder scanned at 0650 for 159. Spouse at bedside for surgery.  Plan of Care Reviewed With: patient  Overall Patient Progress: improving  11/4/2024 0711 by Heather Dominique RN  Outcome: Progressing  Flowsheets (Taken 11/4/2024 0706)  Outcome Evaluation: A&O x4. On RA.  Plan of Care Reviewed With: patient  Overall Patient Progress: improving  Goal: Patient-Specific Goal (Individualized)  Description: You can add care plan individualizations to a care plan. Examples of Individualization might be:  \"Parent requests to be called daily at 9am for status\", \"I have a hard time hearing out of my right ear\", or \"Do not touch me to wake me up as it startles  me\".  11/4/2024 0802 by " Heather Dominique RN  Outcome: Progressing  11/4/2024 0711 by Heather Dominique RN  Outcome: Progressing  Goal: Absence of Hospital-Acquired Illness or Injury  11/4/2024 0802 by Heather Dominique RN  Outcome: Progressing  11/4/2024 0711 by Heather Dominique RN  Outcome: Progressing  Intervention: Identify and Manage Fall Risk  Recent Flowsheet Documentation  Taken 11/4/2024 0200 by Heather Dominique RN  Safety Promotion/Fall Prevention:   activity supervised   assistive device/personal items within reach  Intervention: Prevent Skin Injury  Recent Flowsheet Documentation  Taken 11/4/2024 0200 by Heather Dominique RN  Body Position: supine, head elevated  Intervention: Prevent Infection  Recent Flowsheet Documentation  Taken 11/4/2024 0200 by Heather Dominique RN  Infection Prevention:   hand hygiene promoted   rest/sleep promoted  Goal: Optimal Comfort and Wellbeing  11/4/2024 0802 by Heather Dominique RN  Outcome: Progressing  11/4/2024 0711 by Heather Dominique RN  Outcome: Progressing  Intervention: Monitor Pain and Promote Comfort  Recent Flowsheet Documentation  Taken 11/4/2024 0157 by Heather Dominique RN  Pain Management Interventions: medication (see MAR)  Goal: Readiness for Transition of Care  11/4/2024 0802 by Heather Dominique RN  Outcome: Progressing  11/4/2024 0711 by Heather Dominique RN  Outcome: Progressing     Problem: Orthopaedic Fracture  Goal: Absence of Bleeding  11/4/2024 0802 by Heather Dominique RN  Outcome: Progressing  11/4/2024 0711 by Heather Dominique RN  Outcome: Progressing  Goal: Bowel Elimination  11/4/2024 0802 by Heather Dominique RN  Outcome: Progressing  11/4/2024 0711 by Heather Dominique RN  Outcome: Progressing  Goal: Absence of Embolism Signs and Symptoms  11/4/2024 0802 by Heather Dominique RN  Outcome: Progressing  11/4/2024 0711 by Heather Dominique RN  Outcome: Progressing  Goal: Fracture Stability  11/4/2024 0802 by Heather Dominique RN  Outcome:  Progressing  11/4/2024 0711 by Heather Dominique RN  Outcome: Progressing  Goal: Optimal Functional Ability  11/4/2024 0802 by Heather Dominique RN  Outcome: Progressing  11/4/2024 0711 by Heather Dominique RN  Outcome: Progressing  Intervention: Optimize Functional Ability  Recent Flowsheet Documentation  Taken 11/4/2024 0200 by Heather Dominique RN  Positioning/Transfer Devices:   pillows   in use  Goal: Absence of Infection Signs and Symptoms  11/4/2024 0802 by Heather Dominique RN  Outcome: Progressing  11/4/2024 0711 by Heather Dominique RN  Outcome: Progressing  Goal: Effective Tissue Perfusion  11/4/2024 0802 by Heather Dominique RN  Outcome: Progressing  11/4/2024 0711 by Heather Dominique RN  Outcome: Progressing  Goal: Optimal Pain Control and Function  11/4/2024 0802 by Heather Dominique RN  Outcome: Progressing  11/4/2024 0711 by Heather Dominique RN  Outcome: Progressing  Intervention: Manage Acute Orthopaedic-Related Pain  Recent Flowsheet Documentation  Taken 11/4/2024 0157 by Heather Dominique RN  Pain Management Interventions: medication (see MAR)  Goal: Effective Oxygenation and Ventilation  11/4/2024 0802 by Heather Dominique RN  Outcome: Progressing  11/4/2024 0711 by Heather Dominique RN  Outcome: Progressing  Intervention: Promote Airway Secretion Clearance  Recent Flowsheet Documentation  Taken 11/4/2024 0200 by Heather Dominique RN  Cough And Deep Breathing: done independently per patient  Intervention: Optimize Oxygenation and Ventilation  Recent Flowsheet Documentation  Taken 11/4/2024 0200 by Heather Dominique RN  Head of Bed (HOB) Positioning: HOB at 20-30 degrees

## 2024-11-04 NOTE — OP NOTE
ORTHOPEDIC OPERATIVE REPORT     Patient Name:  Constantino Thorne 84 year old male  :  1940  MR Number:   [id number]  CSN Number:   [id number]    Date of Service: 2024  9:40 AM    Surgeon:  Darinel Chacko DO    Assistant(s):  Marina Bain PA-C    OR Staff:  Circulator: Jennifer Gracia RN  Relief Circulator: Heather Fermin RN  Relief Scrub: Imelda Valencia  Scrub Person: Rafaela Bautista    Preoperative Diagnosis:  right displaced subcapital femoral neck fracture    Postoperative Diagnosis:  Same as above    Procedure(s):  right hip hemiarthroplasty    Anesthesia Type:  General    Estimated Blood Loss:  100cc    Specimens: none    Drain(s), Tube(s), Packing: none    Complications:  none    Implants/Grafts:   Implant Name Type Inv. Item Serial No.  Lot No. LRB No. Used Action   BONE CEMENT SIMPLEX FULL DOSE 6191-1-001 - [id number] Cement, Bone BONE CEMENT SIMPLEX FULL DOSE 6191-1-001  [employer] [id number] Right 2 Implanted   IMP SPACER OSTEONICS DISTAL CEMENT 11MM 2694-8147 - [id number] Metallic Hardware/[place] IMP SPACER OSTEONICS DISTAL CEMENT 11MM 8005-2387  [employer] [id number] Right 1 Implanted   IMP STEM FEMORAL ANILA LUIS CARLOS OMNIFIT SZ 6 127DEG 0212-6634 - [id number] Total Joint Component/Insert IMP STEM FEMORAL ANILA LUIS CARLOS OMNIFIT SZ 6 127DEG 8981-0417  [employer] [id number] Right 1 Implanted   25mm flange khan femoral cement restrictor    [employer] [id number] Right 1 Implanted   IMP HEAD STRK FEMORAL UHR BIPOLAR [id number] UH1-54-28 - [id number] Total Joint Component/Insert IMP HEAD STRK FEMORAL UHR BIPOLAR [id number] UH1-54-28  [employer] [id number] Right 1 Implanted   IMP HEAD STRK FEMORAL C-TAPER 28MM +2.5MM S-1400-HH82 - [id number] Total Joint Component/Insert IMP HEAD STRK FEMORAL C-TAPER 28MM +2.5MM S-1400-HH82  [employer] [id number] Right 1 Implanted              Indications:  The patient is a 84 year old male with a right femoral neck fracture resulting from  a ground level fall yesterday at his independent living facility after becoming light-headed.  Present with his wife as well.  The patient denies antecedent pain, and was found to have a normal neurovascular exam and skin envelope.  Normally ambulates independently, though seems unstable on his feet, and with history of syncopal episodes.  Radiographs demonstrated the displaced femoral neck fracture.  The patient is an appropriately indicated candidate for surgical treatment of the femoral neck fracture with hemiarthroplasty.  I discussed the risks and benefits of the procedure, including the risks of infection, wound healing complication, neurovascular injury, instability, limb length discrepancy, need for revision surgery, and the medical risks of anesthesia including MI, stroke, and death.  Benefits include early mobilization, and reduction in the risks associated with nonoperative hip fracture management.  Alternatives to surgery were also discussed, including non-operative management, which I did not recommend.  The patient was appropriately seen and cleared for surgery per the hospitalist team, I also spoke with neurosurgery to evaluate ability to anticoagulate Constantino postoperatively.  The patient was in agreement with the plan to proceed, and the informed consent was signed and documented.  I met with the patient pre-operatively and marked the operative extremity with their agreement.  We proceeded to the operating room.     Procedure in Detail:  The patient underwent general anesthesia, and was positioned in the lateral decubitus position with the operative hip up, all bony prominences well padded, and an axillary roll placed.  The operative extremity was prepped and draped in usual sterile fashion.  A procedural pause was conducted to verify correct patient, correct extremity, presence of the surgeons initials on the operative extremity, administration of IV antibiotics. After generalized agreement, a  posterior Southern approach to the hip was performed.  The fascia was split in line with the incision to the tip of the greater trochanter, then curved posteriorly to parallel the gluteal fibers.  The short external rotators were taken down subperiosteally using cautery, and a posterior capsulotomy was performed, preserving the labrum.  A neck cut was then made approximately 1.5cm above the lesser trochanter, and the femoral head was then removed, sized, and trialed within the acetabulum.  The acetabulum was inspected and cleared of foreign material.  We then placed the femoral neck retractor, and sequentially reamed and broached the femur to an appropriate size.  We selected a size 6 stem.  We then cemented the stem after placing a sponge in the acetabulum to prevent cement extrusion, preparing the femur, and placing a cement restrictor distally at the appropriate depth.  Trials were assembled to the final implanted stem, assessing for limb lengths, soft tissue tension, and stability.  The implant was stable in all positions, dislocating only with maximal flexion, adduction, and internal rotation.  We then thoroughly cleaned the trunion, and impacted the final bipolar head.  The hip was reduced, and rechecked for stability.  The wound was copiously irrigated with saline.  A side to side capsule repair was performed with #1 vicryl, and the short external rotators were reapproximated to their insertion with #2 Ticron.  1g vancomycin and 1.2g tobramycin then placed deep within the wound. The incision was then closed in layers, with #1 Vicryl in the fascia and IT band, 2-0 vicryl in the subcutaneous tissue, and staples in the skin.  Sterile dressings were then applied.  The patient was then turned over to anesthesia for transfer to PACU and extubation.    Marina Bain PA-C was needed for and participated in all the aspects of the case, including preoperative preparation and positioning of the patient, intraoperative  manipulation of the limb, retraction of soft tissues, protection of vital structures, suctioning of bodily fluids and wound closure as well as dressing application.    Postoperative Plan:  Activity / weight bearing: WBAT, PT/OT, gait aids, fall precautions, hip dislocation precautions x6 weeks  Anticoagulation VTE ppx: mechanical SCDs, pharmacologic lovenox 40 subq daily - ok to bridge back to coumadin, will reach out to hospitalist postop to determine plan  Antibiotics: perioperative ancef for 24 hours  Analgesia: multimodal regimen, limit sedating meds, narcotics for severe pain  Discharge planning: pending progress with PT, appreciate social work assistance  Follow up: 2-3 weeks in office for staple removal, hip radiographs      Darinel Chacko DO, MSc  11/4/2024  11:32 AM

## 2024-11-04 NOTE — H&P
"[facility]    History and Physical - Hospitalist Service       Date of Admission:  11/3/2024    Assessment & Plan      Constantino Thorne is a 84 year old male admitted on 11/3/2024.       Acute right femoral neck fracture   Following a mechanical fall after a dizzy episode with no loss of consciousness or his ICD firing  Pain medications will be given  Orthopedic surgery    Atrial fibrillation  Continue metoprolol succinate 50 mg twice daily    Hypertension  Continue metoprolol succinate 50 mg twice daily,  Hold irbesartan 37.5 mg daily at bedtime in this perioperative period    Ischemic cardiomyopathy  Coronary artery disease with prior myocardial infarction  History of cardiac arrest status post ICD  Continue metoprolol succinate 50 mg twice daily,  Hold irbesartan 37.5 mg daily at bedtime in this perioperative period    Benign prostatic hypertrophy BPH  Continue tamsulosin 0.4 mg daily    SDH   And recent history of subdural hematoma        Diet:  NPO for surgery from midnight  DVT Prophylaxis: As per orthopedic surgery postop until then mechanical prophylaxis  Conley Catheter: Not present  Lines: None     Cardiac Monitoring: None  Code Status:  Full code    Clinically Significant Risk Factors Present on Admission           # Hypocalcemia: Lowest Ca = 8.6 mg/dL in last 2 days, will monitor and replace as appropriate         # Hypertension: Noted on problem list         # Overweight: Estimated body mass index is 27.71 kg/m  as calculated from the following:    Height as of this encounter: 1.854 m (6' 1\").    Weight as of this encounter: 95.3 kg (210 lb).              Disposition Plan     Medically Ready for Discharge: Anticipated in 2-4 Days           eVnkat Matias MD  Hospitalist Service  [facility]  Securely message with Page[employer] (more info)  Text page via [facility] Paging/Directory     ______________________________________________________________________    Chief Complaint "   Right hip pain    History is obtained from the patient, medical records and my discussion with emergency department physician      History of Present Illness   Constantino Thorne is a 84 year old gentleman with hyperlipidemia, hypertension, atrial fibrillation on chronic warfarin, ischemic cardiomyopathy, CAD with prior MI and cardiac arrest with ICD in place, CKD, and SABINE who was recently admitted to this hospital between 10/10/2024 and 10/11/2024 with fall probably syncope due to orthostasis who presents to the [facility] 11/3/2024 after dizzy episode and mechanical fall while trying to catch an elevator at his senior living apartment after which he was unable to get up and stand on his feet and had by 8/10 pain in the right hip because of which she came to [facility].  He denies any chest pain, shortness of breath or loss of consciousness.  Subsequently x-ray pelvis and hip of the right side showed acute right femoral neck fracture with superolateral displacement seen..  He denies any other injury.      Past Medical History    Past Medical History:   Diagnosis Date    A-fib (H)     Hypertension    Ischemic cardiomyopathy with LVEF between 30 and 35% in the echo that was done 6/17/2020  Obstructive sleep apnea  Patient has history of CKD though his creatinine was 1.12 10/11/2024  History of cardiac arrest status post ICD    Past Surgical History   History reviewed. No pertinent surgical history.    Prior to Admission Medications   Prior to Admission Medications   Prescriptions Last Dose Informant Patient Reported? Taking?   atorvastatin (LIPITOR) 80 MG tablet   Yes No   Sig: Take 80 mg by mouth At Bedtime    irbesartan (AVAPRO) 75 MG tablet   No No   Sig: Take 0.5 tablets (37.5 mg) by mouth at bedtime.   metoprolol succinate ER (TOPROL XL) 50 MG 24 hr tablet   Yes No   Sig: Take 100 mg by mouth every evening   metoprolol succinate ER (TOPROL-XL) 50 MG 24 hr tablet   Yes No   Sig: Take 50  mg by mouth every morning   nitroGLYcerin (NITROSTAT) 0.4 MG sublingual tablet   Yes No   Sig: Place 1 Tablet under tongue as needed. If no relief after 5 min call 911;continue 1 tab every 5 min max 3 tab   tamsulosin (FLOMAX) 0.4 MG capsule   Yes No   Sig: Take 0.4 mg by mouth daily   triamcinolone (KENALOG) 0.1 % external cream   Yes No   Sig: Apply topically 2 times daily as needed for irritation.      Facility-Administered Medications: None        Review of Systems    Denies any headache, blurring of vision or double vision, neck pain jaw pain or shoulder pain, chest pain, shortness of breath, cough or increased sputum production, nausea or vomiting, abdominal pain, diarrhea or constipation.  Denies any urinary symptoms of burning or increased frequency. Denies any weakness of upper or lower extremities or any seizure activity. Fever or chills. Bleeding from anywhere else.  No rashes or cellulitis.  Patient said that he could walk more than 4 blocks before his falling down without any history of chest pain or shortness of breath.     Social History   Patient was a  earlier.  He denies any smoking or alcohol use.  He says that he quit drinking alcohol after his myocardial infarction.  He lives in a senior apartment with his wife     Family History   I have reviewed this patient's family history and updated it with pertinent information if needed.  Family History   Problem Relation Age of Onset    Cancer Sister          Allergies   Allergies   Allergen Reactions    Lisinopril Unknown and Cough    Spironolactone Unknown and Diarrhea        Physical Exam   Vital Signs: Temp: 98.1  F (36.7  C)   BP: (!) 134/94 Pulse: 63   Resp: 15 SpO2: 95 %      Weight: 210 lbs 0 oz      GENERAL: Patient does not look in any acute distress  HEENT: EOM+, Conjunctiva is clear   NECK:  no Jugular Venous distention  HEART: S1 S2 irregularly irregular with systolic murmur,    LUNGS: Respirations are not laboured, Lungs  are clear to auscultation without Crepitations or Wheezing   ABDOMEN: Soft, there is no tenderness,  Bowel Sounds are Positive   LOWER LIMBS: Right leg is shorter than the left leg and it is externally rotated  CNS:  Alert,  Oriented x 3, Moving all the Four Limbs        Data   Imaging results reviewed over the past 24 hrs:   Recent Results (from the past 24 hours)   XR Pelvis w Hip Right 1 View    Narrative    EXAM: XR PELVIS AND HIP RIGHT 1 VIEW  LOCATION: [facility]  DATE: 11/3/2024    INDICATION: fall, pain  COMPARISON: None.      Impression    IMPRESSION: Acute right femoral neck fracture with superolateral displacement. There is normal joint alignment. Mild degenerative changes throughout the pelvis.   Head CT w/o contrast    Narrative    EXAM: CT HEAD W/O CONTRAST  LOCATION: [facility]  DATE: 11/3/2024    INDICATION: eval for trauma  COMPARISON: 10/10/2024, 10/24/2024.  TECHNIQUE: Routine CT Head without IV contrast. Multiplanar reformats. Dose reduction techniques were used.    FINDINGS:  INTRACRANIAL CONTENTS: No intracranial hemorrhage, extraaxial collection, or mass effect.  No CT evidence of acute infarct. Mild to moderate presumed chronic small vessel ischemic changes. Mild to moderate generalized volume loss. No hydrocephalus.     VISUALIZED ORBITS/SINUSES/MASTOIDS: No intraorbital abnormality. No paranasal sinus mucosal disease. No middle ear or mastoid effusion.    BONES/SOFT TISSUES: No acute abnormality.      Impression    IMPRESSION: Negative for acute intracranial hemorrhage, hydrocephalus or transcortical infarct. No skull fracture.     Recent Labs   Lab 11/03/24  1733 11/02/24  0241   WBC 9.0 8.2   HGB 12.2* 12.6*   MCV 93 94    180   INR  --  1.05    138   POTASSIUM 4.3 4.6   CHLORIDE 102 103   CO2 21* 21*   BUN 25.6* 25.0*   CR 1.24* 1.15   ANIONGAP 14 14   ZACH 8.6* 8.9   * 98   ALBUMIN  --  3.7   PROTTOTAL  --  6.8   BILITOTAL   --  0.6   ALKPHOS  --  103   ALT  --  25   AST  --  27

## 2024-11-04 NOTE — PLAN OF CARE
" Goal Outcome Evaluation:      Plan of Care Reviewed With: patient, spouse    Overall Patient Progress: no changeOverall Patient Progress: no change    Outcome Evaluation: A&O x4, VSS on RA, pain mngd w/ dilaudid, NPO @ midnight, surgery scheduled 0940, CHG bath done, NS 50 mL/hr infusing    Problem: Adult Inpatient Plan of Care  Goal: Plan of Care Review  Description: The Plan of Care Review/Shift note should be completed every shift.  The Outcome Evaluation is a brief statement about your assessment that the patient is improving, declining, or no change.  This information will be displayed automatically on your shift  note.  Outcome: Progressing  Flowsheets (Taken 11/3/2024 2328)  Outcome Evaluation: A&O x4, VSS on RA, pain mngd w/ dilaudid, NPO @ midnight, surgery scheduled 0940, CHG bath done, NS 50 mL/hr infusing  Plan of Care Reviewed With:   patient   spouse  Overall Patient Progress: no change     Problem: Adult Inpatient Plan of Care  Goal: Plan of Care Review  Description: The Plan of Care Review/Shift note should be completed every shift.  The Outcome Evaluation is a brief statement about your assessment that the patient is improving, declining, or no change.  This information will be displayed automatically on your shift  note.  Outcome: Progressing  Flowsheets (Taken 11/3/2024 2328)  Outcome Evaluation: A&O x4, VSS on RA, pain mngd w/ dilaudid, NPO @ midnight, surgery scheduled 0940, CHG bath done, NS 50 mL/hr infusing  Plan of Care Reviewed With:   patient   spouse  Overall Patient Progress: no change  Goal: Patient-Specific Goal (Individualized)  Description: You can add care plan individualizations to a care plan. Examples of Individualization might be:  \"Parent requests to be called daily at 9am for status\", \"I have a hard time hearing out of my right ear\", or \"Do not touch me to wake me up as it startles  me\".  Outcome: Progressing  Goal: Absence of Hospital-Acquired Illness or Injury  Outcome: " Progressing  Intervention: Identify and Manage Fall Risk  Recent Flowsheet Documentation  Taken 11/3/2024 2109 by Summer Farfan RN  Safety Promotion/Fall Prevention: safety round/check completed  Intervention: Prevent Skin Injury  Recent Flowsheet Documentation  Taken 11/3/2024 2109 by Summer Farfan RN  Body Position: supine, head elevated  Intervention: Prevent Infection  Recent Flowsheet Documentation  Taken 11/3/2024 2109 by Summer Farfan RN  Infection Prevention: hand hygiene promoted  Goal: Optimal Comfort and Wellbeing  Outcome: Progressing  Intervention: Monitor Pain and Promote Comfort  Recent Flowsheet Documentation  Taken 11/3/2024 2217 by Summer Farfan RN  Pain Management Interventions: medication (see MAR)  Goal: Readiness for Transition of Care  Outcome: Progressing  Flowsheets (Taken 11/3/2024 2100)  Transportation Anticipated: family or friend will provide  Intervention: Mutually Develop Transition Plan  Recent Flowsheet Documentation  Taken 11/3/2024 2100 by Summer Farfan RN  Transportation Anticipated: family or friend will provide  Patient/Family Anticipated Services at Transition: none  Patient/Family Anticipates Transition to: home with family  Equipment Currently Used at Home: none     Problem: Orthopaedic Fracture  Goal: Absence of Bleeding  Outcome: Progressing  Goal: Bowel Elimination  Outcome: Progressing  Goal: Absence of Embolism Signs and Symptoms  Outcome: Progressing  Goal: Fracture Stability  Outcome: Progressing  Goal: Optimal Functional Ability  Outcome: Progressing  Intervention: Optimize Functional Ability  Recent Flowsheet Documentation  Taken 11/3/2024 2109 by Summer Farfan RN  Positioning/Transfer Devices:   pillows   in use  Goal: Absence of Infection Signs and Symptoms  Outcome: Progressing  Intervention: Prevent or Manage Infection  Recent Flowsheet Documentation  Taken 11/3/2024 2109 by Summer Farfan RN  Infection Management: aseptic  technique maintained  Goal: Effective Tissue Perfusion  Outcome: Progressing  Goal: Optimal Pain Control and Function  Outcome: Progressing  Intervention: Manage Acute Orthopaedic-Related Pain  Recent Flowsheet Documentation  Taken 11/3/2024 2217 by Summer Farfan RN  Pain Management Interventions: medication (see MAR)  Goal: Effective Oxygenation and Ventilation  Outcome: Progressing  Intervention: Promote Airway Secretion Clearance  Recent Flowsheet Documentation  Taken 11/3/2024 2109 by Summer Farfan RN  Cough And Deep Breathing: done independently per patient  Intervention: Optimize Oxygenation and Ventilation  Recent Flowsheet Documentation  Taken 11/3/2024 2109 by Summer Farfan, RN  Airway/Ventilation Management: pulmonary hygiene promoted  Fluid/Electrolyte Management: fluids provided  Head of Bed (HOB) Positioning: HOB at 20-30 degrees

## 2024-11-04 NOTE — PROGRESS NOTES
Patient Arrived to room 644 from PACU via bed. alert and oriented x 4, oriented to room and call system, dressing CDI, CMS intact, IV patent and infusing. rates pain 4/10, SCD's on BLE. Frequent VS started.

## 2024-11-04 NOTE — ANESTHESIA PREPROCEDURE EVALUATION
Anesthesia Pre-Procedure Evaluation    Patient: Constantino Thorne   MRN: [id number] : 1940        Procedure : Procedure(s):  HEMIARTHROPLASTY, right HIP, BIPOLAR          Past Medical History:   Diagnosis Date    A-fib (H)     CAD (coronary artery disease)     Hyperlipidemia LDL goal <100     Hypertension     ICD (implantable cardioverter-defibrillator) in place     Old myocardial infarction     SABINE (obstructive sleep apnea)     Subdural hematoma (H) 10/10/2024    Syncope 10/10/2024      History reviewed. No pertinent surgical history.   Allergies   Allergen Reactions    Lisinopril Unknown and Cough    Spironolactone Unknown and Diarrhea      Social History     Tobacco Use    Smoking status: Never    Smokeless tobacco: Not on file   Substance Use Topics    Alcohol use: Not on file      Wt Readings from Last 1 Encounters:   24 95.3 kg (210 lb)        Anesthesia Evaluation   Pt has had prior anesthetic. Type: General.        ROS/MED HX  ENT/Pulmonary:  - neg pulmonary ROS   (+) sleep apnea,                                       Neurologic: Comment: SDH 6 weeks ago-resolved      Cardiovascular:     (+)  hypertension- -  CAD -  - -             fainting (syncope).      ICD     dysrhythmias, a-fib,             METS/Exercise Tolerance:     Hematologic:  - neg hematologic  ROS     Musculoskeletal:  - neg musculoskeletal ROS     GI/Hepatic:  - neg GI/hepatic ROS     Renal/Genitourinary:  - neg Renal ROS     Endo:  - neg endo ROS     Psychiatric/Substance Use:  - neg psychiatric ROS     Infectious Disease:  - neg infectious disease ROS     Malignancy:  - neg malignancy ROS     Other:  - neg other ROS          Physical Exam    Airway         TM distance: > 3 FB   Neck ROM: full   Mouth opening: > 3 cm    Respiratory Devices and Support         Dental  no notable dental history         Cardiovascular   cardiovascular exam normal          Pulmonary   pulmonary exam normal                OUTSIDE LABS:  CBC:   Lab  Results   Component Value Date    WBC 13.2 (H) 11/04/2024    WBC 9.0 11/03/2024    HGB 12.0 (L) 11/04/2024    HGB 12.2 (L) 11/03/2024    HCT 35.9 (L) 11/04/2024    HCT 37.0 (L) 11/03/2024     11/04/2024     11/03/2024     BMP:   Lab Results   Component Value Date     11/04/2024     11/03/2024    POTASSIUM 4.0 11/04/2024    POTASSIUM 4.3 11/03/2024    CHLORIDE 103 11/04/2024    CHLORIDE 102 11/03/2024    CO2 20 (L) 11/04/2024    CO2 21 (L) 11/03/2024    BUN 21.9 11/04/2024    BUN 25.6 (H) 11/03/2024    CR 1.03 11/04/2024    CR 1.24 (H) 11/03/2024     (H) 11/04/2024     (H) 11/03/2024     COAGS:   Lab Results   Component Value Date    PTT 35 10/10/2024    INR 1.05 11/02/2024     POC:   Lab Results   Component Value Date     (H) 03/13/2010     HEPATIC:   Lab Results   Component Value Date    ALBUMIN 3.7 11/02/2024    PROTTOTAL 6.8 11/02/2024    ALT 25 11/02/2024    AST 27 11/02/2024    ALKPHOS 103 11/02/2024    BILITOTAL 0.6 11/02/2024     OTHER:   Lab Results   Component Value Date    PH 7.49 (H) 03/05/2010    LACT 0.9 02/25/2010    ZACH 8.4 (L) 11/04/2024    PHOS 5.1 (H) 03/08/2010    MAG 1.8 11/04/2024    LIPASE 324 (H) 03/01/2010    AMYLASE 130 (H) 03/12/2010    TSH 2.31 02/16/2010       Anesthesia Plan    ASA Status:  3    NPO Status:  NPO Appropriate    Anesthesia Type: General.     - Airway: ETT   Induction: Intravenous, Propofol.   Maintenance: Balanced.        Consents    Anesthesia Plan(s) and associated risks, benefits, and realistic alternatives discussed. Questions answered and patient/representative(s) expressed understanding.     - Discussed:     - Discussed with:  Patient            Postoperative Care    Pain management: IV analgesics, Oral pain medications, Multi-modal analgesia.   PONV prophylaxis: Ondansetron (or other 5HT-3), Dexamethasone or Solumedrol     Comments:               Alex Griggs, DO    I have reviewed the pertinent notes and labs in  "the chart from the past 30 days and (re)examined the patient.  Any updates or changes from those notes are reflected in this note.               # Hypertension: Noted on problem list           # Overweight: Estimated body mass index is 27.71 kg/m  as calculated from the following:    Height as of this encounter: 1.854 m (6' 1\").    Weight as of this encounter: 95.3 kg (210 lb).             "

## 2024-11-04 NOTE — CONSULTS
[facility]    Neurosurgery Consultation     Date of Admission:  11/3/2024  Date of Consult (When I saw the patient): 11/04/24    Assessment   Constantino Thorne is a 84 year old male with history of recent traumatic SDH, HTN, dyslipidemia, atrial fibrillation, CAD with prior MI and cardiac arrest with ICD in place who was admitted on 11/3/2024 with chest pain. Neurosurgery was consulted for recent traumatic SDH; now with hip fracture. OK for prophylactic ASA or lovenox post-op?    He is admitted currently s/p mechanical fall with R displaced femoral neck fracture and is having a R hip hemiarthroplasty with Dr. Chacko today 11/4.     Saw patient this AM in pre-op before surgery with Dr. Chacko. Denies headache, nausea, vomiting, vision changes. Neuro exam intact.     EXAM: CT HEAD W/O CONTRAST  LOCATION: [facility]  DATE: 11/3/2024                                                                IMPRESSION: Negative for acute intracranial hemorrhage, hydrocephalus or transcortical infarct. No skull fracture.    Plan   -11/3/24 and 10/24/24 Head CTs show no evidence of acute intracranial hemorrhage   -OK for ppx ASA or lovenox post operatively  -Will defer to orthopedics for timing of Warfarin resumption  -Will cancel patient's outpatient NSGY clinic appointment on 11/14 and NSGY will sign off at this time     I have discussed the following assessment and plan with Dr. Wayne.     Mariely Estrada CNP  [facility] Neurosurgery  [address] [place] [id number]  Tel [phone]  Fax [phone]     Code Status    Full Code    Reason for Consult   I was asked by Stefan Kapadia MD to evaluate this patient for:  recent traumatic SDH; now with hip fracture. OK for prophylactic ASA or lovenox post-op?    Primary Care Physician   Thom Barnes    Chief Complaint   Chest pain    History is obtained from the patient and electronic health record    History  "of Present Illness   Constantino Thorne is a 84 year old male who presents with chest pain. He was recently hospitalized 10/10-10/11 for evaluation of syncope resulting in head injury. At that time, head CT showed a small SDH, which was stable on repeat imaging. Recommendations were to hold his Warfarin and any anticoagulation including ASA or NSAIDs until his follow up in 2 weeks. He has not been back to the NSGY clinic for a follow up, but has had a head CT on 10/24 which showed \"Resolution of left frontal subarachnoid hemorrhage,\" as well as a head CT during his current hospitalization which was \"Negative for acute intracranial hemorrhage, hydrocephalus or transcortical infarct. No skull fracture.\"  He does have an appointment scheduled with the NSGY clinic on 11/14 for follow up of his SAH.     Past Medical History   I have reviewed this patient's medical history and updated it with pertinent information if needed.   Past Medical History:   Diagnosis Date    A-fib (H)     Hypertension        Past Surgical History   I have reviewed this patient's surgical history and updated it with pertinent information if needed.  History reviewed. No pertinent surgical history.    Prior to Admission Medications   Prior to Admission Medications   Prescriptions Last Dose Informant Patient Reported? Taking?   atorvastatin (LIPITOR) 80 MG tablet 11/2/2024 Bedtime  Yes Yes   Sig: Take 80 mg by mouth At Bedtime    metoprolol succinate ER (TOPROL-XL) 50 MG 24 hr tablet 11/3/2024 Morning  Yes Yes   Sig: Take 50 mg by mouth 2 times daily.   nitroGLYcerin (NITROSTAT) 0.4 MG sublingual tablet Unknown  Yes No   Sig: Place 1 Tablet under tongue as needed. If no relief after 5 min call 911;continue 1 tab every 5 min max 3 tab   tamsulosin (FLOMAX) 0.4 MG capsule 11/3/2024  Yes Yes   Sig: Take 0.4 mg by mouth daily   triamcinolone (KENALOG) 0.1 % external cream Unknown  Yes No   Sig: Apply topically 2 times daily as needed for irritation.    "   Facility-Administered Medications: None     Allergies   Allergies   Allergen Reactions    Lisinopril Unknown and Cough    Spironolactone Unknown and Diarrhea       Social History   I have reviewed this patient's social history and updated it with pertinent information if needed. Constantino G Yinadom      Family History   I have reviewed this patient's family history and updated it with pertinent information if needed.   Family History   Problem Relation Age of Onset    Cancer Sister        Review of Systems   10 point ROS negative other than symptoms noted in HPI.    Physical Exam   Vital signs with ranges:   Temp:  [97.6  F (36.4  C)-98.7  F (37.1  C)] 97.7  F (36.5  C)  Pulse:  [61-76] 76  Resp:  [12-19] 18  BP: (125-144)/(71-94) 126/81  SpO2:  [92 %-97 %] 96 %  210 lbs 0 oz    HEENT:  Normocephalic, atraumatic  Heart:  No peripheral edema  Lungs:  No SOB  Skin:  Warm and dry, good capillary refill.    NEUROLOGICAL EXAMINATION:   Mental status:  Alert and Oriented x 3, speech is fluent.  Cranial nerves:  II-XII intact.   Motor:    Shoulder Abduction  Right:  5/5   Left:  5/5  Biceps                      Right:  5/5   Left:  5/5  Triceps                     Right:  5/5   Left:  5/5  Wrist Extensors        Right:  5/5   Left:  5/5  Wrist Flexors            Right:  5/5   Left:  5/5  Intrinsics                   Right:  5/5   Left:  5/5    RLE exam pain limited by existing injury. MATA.     Gastroc Soleus  (PF)                          Right:  5/5  Left:  5/5  Tibialis Ant  (DF)                          Right:  5/5  Left:  5/5  Coordination:  Smooth finger to nose testing.   Negative pronator drift.      Data   CBC RESULTS:   Recent Labs   Lab Test 11/04/24  0612   WBC 13.2*   RBC 3.85*   HGB 12.0*   HCT 35.9*   MCV 93   MCH 31.2   MCHC 33.4   RDW 13.6        Basic Metabolic Panel:  Lab Results   Component Value Date     11/04/2024     06/17/2020      Lab Results   Component Value Date    POTASSIUM 4.0  11/04/2024    POTASSIUM 4.4 06/17/2020     Lab Results   Component Value Date    CHLORIDE 103 11/04/2024    CHLORIDE 108 06/17/2020     Lab Results   Component Value Date    ZACH 8.4 11/04/2024    ZACH 8.4 06/17/2020     Lab Results   Component Value Date    CO2 20 11/04/2024    CO2 26 06/17/2020     Lab Results   Component Value Date    BUN 21.9 11/04/2024    BUN 27 06/17/2020     Lab Results   Component Value Date    CR 1.03 11/04/2024    CR 1.13 06/17/2020     Lab Results   Component Value Date     11/04/2024    GLC 85 06/17/2020     INR:  Lab Results   Component Value Date    INR 1.05 11/02/2024    INR 1.43 10/11/2024    INR 2.03 10/10/2024    INR 2.38 03/04/2023    INR 2.48 03/03/2023    INR 2.70 06/17/2020    INR 2.57 06/16/2020    INR 2.99 11/02/2016    INR 1.22 03/06/2010    INR 1.26 03/04/2010    INR 1.12 02/28/2010    INR 1.05 02/26/2010    INR 1.06 02/25/2010    INR 1.08 02/24/2010    INR 1.18 02/23/2010    INR 1.27 02/21/2010    INR 1.22 02/19/2010

## 2024-11-04 NOTE — ED NOTES
RECEIVING UNIT ED HANDOFF REVIEW    Above ED Nurse Handoff Report was reviewed: Yes  Reviewed by: Summer Farfan RN on November 3, 2024 at 8:30 PM   MARCELO Metcalf called the ED to inform them the note was read: Yes      [facility]  ED Nurse Handoff Report    ED Chief complaint: Fall and Hip Pain  . ED Diagnosis:   Final diagnoses:   Closed displaced fracture of right femoral neck (H)   Fall from standing, initial encounter       Allergies:   Allergies   Allergen Reactions    Lisinopril Unknown and Cough    Spironolactone Unknown and Diarrhea       Code Status: Full Code    Activity level - Baseline/Home:  independent.  Activity Level - Current:   in bed.   Lift room needed: No.   Bariatric: No   Needed: No   Isolation: No.   Infection: Not Applicable.     Respiratory status: Room air    Vital Signs (within 30 minutes):   Vitals:    11/03/24 1900 11/03/24 1915 11/03/24 1930 11/03/24 1945   BP:  137/88 139/86 134/71   Pulse: 63 63 61 65   Resp: 12 17  12   Temp:       SpO2: 95% 94% 96% 96%   Weight:       Height:           Cardiac Rhythm:  ,      Pain level:    Patient confused: No.   Patient Falls Risk: nonskid shoes/slippers when out of bed, patient and family education, assistive device/personal items within reach, and activity supervised.   Elimination Status: Has voided     Patient Report - Initial Complaint: Hip Pain.   Focused Assessment:     Physical Exam  Vital Signs: Temp: 98.1  F (36.7  C)   BP: (!) 134/94 Pulse: 63   Resp: 15 SpO2: 95 %      Weight: 210 lbs 0 oz        GENERAL: Patient does not look in any acute distress  HEENT: EOM+, Conjunctiva is clear   NECK:  no Jugular Venous distention  HEART: S1 S2 irregularly irregular with systolic murmur,    LUNGS: Respirations are not laboured, Lungs are clear to auscultation without Crepitations or Wheezing   ABDOMEN: Soft, there is no tenderness,  Bowel Sounds are Positive   LOWER LIMBS: Right leg is shorter than the left leg  and it is externally rotated  CNS:  Alert,  Oriented x 3, Moving all the Four Limbs      Abnormal Results:   Labs Ordered and Resulted from Time of ED Arrival to Time of ED Departure   ROUTINE UA WITH MICROSCOPIC REFLEX TO CULTURE - Abnormal       Result Value    Color Urine Yellow      Appearance Urine Clear      Glucose Urine Negative      Bilirubin Urine Negative      Ketones Urine Negative      Specific Gravity Urine 1.024      Blood Urine Negative      pH Urine 5.5      Protein Albumin Urine 10 (*)     Urobilinogen Urine Normal      Nitrite Urine Negative      Leukocyte Esterase Urine Negative      Mucus Urine Present (*)     RBC Urine 1      WBC Urine 1      Squamous Epithelials Urine <1     BASIC METABOLIC PANEL - Abnormal    Sodium 137      Potassium 4.3      Chloride 102      Carbon Dioxide (CO2) 21 (*)     Anion Gap 14      Urea Nitrogen 25.6 (*)     Creatinine 1.24 (*)     GFR Estimate 57 (*)     Calcium 8.6 (*)     Glucose 117 (*)    CBC WITH PLATELETS AND DIFFERENTIAL - Abnormal    WBC Count 9.0      RBC Count 3.96 (*)     Hemoglobin 12.2 (*)     Hematocrit 37.0 (*)     MCV 93      MCH 30.8      MCHC 33.0      RDW 13.6      Platelet Count 159      % Neutrophils 64      % Lymphocytes 18      % Monocytes 13      % Eosinophils 3      % Basophils 0      % Immature Granulocytes 1      NRBCs per 100 WBC 0      Absolute Neutrophils 5.8      Absolute Lymphocytes 1.7      Absolute Monocytes 1.2      Absolute Eosinophils 0.3      Absolute Basophils 0.0      Absolute Immature Granulocytes 0.1      Absolute NRBCs 0.0     TROPONIN T, HIGH SENSITIVITY - Normal    Troponin T, High Sensitivity 14     TROPONIN T, HIGH SENSITIVITY        Head CT w/o contrast   Final Result   IMPRESSION: Negative for acute intracranial hemorrhage, hydrocephalus or transcortical infarct. No skull fracture.      XR Pelvis w Hip Right 1 View   Final Result   IMPRESSION: Acute right femoral neck fracture with superolateral displacement. There  is normal joint alignment. Mild degenerative changes throughout the pelvis.          Treatments provided: See MAR  Family Comments: None   OBS brochure/video discussed/provided to patient:  N/A  ED Medications:   Medications   fentaNYL (PF) (SUBLIMAZE) injection 50 mcg (50 mcg Intravenous $Given 11/3/24 4093)       Drips infusing:  No  For the majority of the shift this patient was Green.   Interventions performed were See MAR.    Sepsis treatment initiated: No    Cares/treatment/interventions/medications to be completed following ED care: N/A    ED Nurse Name: Nhan Parrish RN  8:12 PM

## 2024-11-04 NOTE — CONSULTS
[facility]    ORTHOPEDIC CONSULTATION NOTE     Patient: Constantino Thorne 84 year oldmale   Admit Date: 11/3/2024          Today's Date: 11/4/2024  Attending: Venkat Matias MD HPI  The patient is a 84 year old male who initially presented after a fall yesterday at his independent living facility while walking to the elevator. He has been residing there since about September of last year. He is present with his wife bedside. He was found to have a right femoral neck fracture and admitted. He does have a rather extensive past medical history = hyperlipidemia, hypertension, atrial fibrillation on chronic warfarin (though stopped recently on 10/10/24 after a fall and small head bleed), ischemic cardiomyopathy, CAD with prior MI and cardiac arrest with ICD in place, CKD, and SABINE. He currently denies any chest pain or shortness of breath. His right hip pain seems relatively controlled at rest without movement. He has been seen by the hospitalist for admission.     Medical History  Hyperlipidemia  Hypertension  atrial fibrillation on chronic warfarin (though stopped recently on 10/10/24 after a fall and small head bleed)  Ischemic cardiomyopathy  CAD with prior MI and cardiac arrest with ICD in place  CKD  SABINE    Surgical History   CARDIAC DEFIBRILLATOR PLACEMENT         LW Problem: Implantable Cardiac Defibrillator S/p LW Modifier: [employer] DC    FRACTURE TREATMENT        HERNIA REPAIR         Medications  Current Facility-Administered Medications   Medication Dose Route Frequency Provider Last Rate Last Admin    [Auto Hold] acetaminophen (TYLENOL) tablet 650 mg  650 mg Oral Q4H PRN Venkat Matias MD        Or    [Auto Hold] acetaminophen (TYLENOL) Suppository 650 mg  650 mg Rectal Q4H PRN Venkat Matias MD        [Auto Hold] atorvastatin (LIPITOR) tablet 80 mg  80 mg Oral At Bedtime Venkat Matias MD   80 mg at 11/03/24 2134    [Auto Hold] calcium carbonate (TUMS) chewable tablet 1,000 mg  1,000 mg  Oral 4x Daily PRN Venkat Matias MD        ceFAZolin Sodium (ANCEF) injection 2 g  2 g Intravenous Pre-Op/Pre-procedure x 1 dose Ap Mcclendon MD        ceFAZolin Sodium (ANCEF) injection 2 g  2 g Intravenous See Admin Instructions Ap Mcclendon MD        [Auto Hold] HYDROmorphone (DILAUDID) half-tab 1 mg  1 mg Oral Q4H PRN Venkat Matias MD   1 mg at 11/04/24 0210    [Auto Hold] HYDROmorphone (DILAUDID) injection 0.2 mg  0.2 mg Intravenous Q2H PRN Venkat Matias MD   0.2 mg at 11/03/24 2239    [Auto Hold] HYDROmorphone (DILAUDID) injection 0.4 mg  0.4 mg Intravenous Q2H PRN Venkat Matias MD        [Auto Hold] HYDROmorphone (DILAUDID) tablet 2 mg  2 mg Oral Q4H PRN Venkat Matias MD        lactated ringers infusion   Intravenous Continuous Alex Griggs, DO        lidocaine (LMX4) cream   Topical Q1H PRN Alex Griggs DO        [Auto Hold] lidocaine (LMX4) cream   Topical Q1H PRN Venkat Matias MD        lidocaine 1 % 0.1-1 mL  0.1-1 mL Other Q1H PRN Alex Griggs, DO        [Auto Hold] lidocaine 1 % 0.1-1 mL  0.1-1 mL Other Q1H PRN Venkat Matias MD        [Auto Hold] metoprolol succinate ER (TOPROL XL) 24 hr tablet 50 mg  50 mg Oral BID Venkat Matias MD   50 mg at 11/04/24 0824    [Auto Hold] naloxone (NARCAN) injection 0.2 mg  0.2 mg Intravenous Q2 Min PRN Venkat Matias MD        Or    [Auto Hold] naloxone (NARCAN) injection 0.4 mg  0.4 mg Intravenous Q2 Min PRN Venkat Matias MD        Or    [Auto Hold] naloxone (NARCAN) injection 0.2 mg  0.2 mg Intramuscular Q2 Min PRN Venkat Matias MD        Or    [Auto Hold] naloxone (NARCAN) injection 0.4 mg  0.4 mg Intramuscular Q2 Min PRN Venkat Matias MD        [Auto Hold] nitroGLYcerin (NITROSTAT) sublingual tablet 0.4 mg  0.4 mg Sublingual Q5 Min PRN Venkat Matias MD        [Auto Hold] ondansetron (ZOFRAN ODT) ODT tab 4 mg  4 mg Oral Q6H PRN Venkat Matias MD        Or    [Auto Hold] ondansetron (ZOFRAN) injection 4 mg  4 mg Intravenous Q6H PRN  Venkat Matias MD        [Auto Hold] senna-docusate (SENOKOT-S/PERICOLACE) 8.6-50 MG per tablet 1 tablet  1 tablet Oral BID PRN Venkat Matias MD        Or    [Auto Hold] senna-docusate (SENOKOT-S/PERICOLACE) 8.6-50 MG per tablet 2 tablet  2 tablet Oral BID PRN Venkat Matias MD        [Auto Hold] senna-docusate (SENOKOT-S/PERICOLACE) 8.6-50 MG per tablet 1 tablet  1 tablet Oral BID Venkat Matias MD   1 tablet at 11/03/24 2135    Or    [Auto Hold] senna-docusate (SENOKOT-S/PERICOLACE) 8.6-50 MG per tablet 2 tablet  2 tablet Oral BID Vnekat Matias MD        sodium chloride (PF) 0.9% PF flush 3 mL  3 mL Intracatheter q1 min prn Alex Griggs DO        [Auto Hold] sodium chloride (PF) 0.9% PF flush 3 mL  3 mL Intracatheter Q8H Venkat Matias MD   3 mL at 11/03/24 2138    [Auto Hold] sodium chloride (PF) 0.9% PF flush 3 mL  3 mL Intracatheter q1 min prn Venkat Matias MD        sodium chloride 0.9 % infusion   Intravenous Continuous Venkat Matias MD 50 mL/hr at 11/03/24 2138 New Bag at 11/03/24 2138    [Auto Hold] tamsulosin (FLOMAX) capsule 0.4 mg  0.4 mg Oral Daily Venkat Matias MD   0.4 mg at 11/04/24 0824    tranexamic acid 1 g in 100 mL NS IV bag (premix)  1 g Intravenous Once Ap Mcclendon MD        tranexamic acid 1 g in 100 mL NS IV bag (premix)  1 g Intravenous Once Ap Mcclendon MD           Allergies  Allergies   Allergen Reactions    Lisinopril Unknown and Cough    Spironolactone Unknown and Diarrhea        Family History  Family History   Problem Relation Age of Onset    Cancer Sister        Social History  Social History     Socioeconomic History    Marital status:      Spouse name: None    Number of children: None    Years of education: None    Highest education level: None     Social Drivers of Health     Financial Resource Strain: Low Risk  (11/3/2024)    Financial Resource Strain     Within the past 12 months, have you or your family members you live with been unable to get  "utilities (heat, electricity) when it was really needed?: No   Food Insecurity: Low Risk  (11/3/2024)    Food Insecurity     Within the past 12 months, did you worry that your food would run out before you got money to buy more?: No     Within the past 12 months, did the food you bought just not last and you didn t have money to get more?: No   Transportation Needs: Low Risk  (11/3/2024)    Transportation Needs     Within the past 12 months, has lack of transportation kept you from medical appointments, getting your medicines, non-medical meetings or appointments, work, or from getting things that you need?: No   Interpersonal Safety: Low Risk  (11/3/2024)    Interpersonal Safety     Do you feel physically and emotionally safe where you currently live?: Yes     Within the past 12 months, have you been hit, slapped, kicked or otherwise physically hurt by someone?: No     Within the past 12 months, have you been humiliated or emotionally abused in other ways by your partner or ex-partner?: No   Housing Stability: Low Risk  (11/3/2024)    Housing Stability     Do you have housing? : Yes     Are you worried about losing your housing?: No       ROS  Right hip pain. All other systems were reviewed and found to be negative    Physical Exam  /81 (BP Location: Left arm)   Pulse 76   Temp 97.7  F (36.5  C) (Core)   Resp 18   Ht 1.854 m (6' 1\")   Wt 95.3 kg (210 lb)   SpO2 96%   BMI 27.71 kg/m    General: appears to be in no acute distress  Psychiatric: alert & oriented x3, appropriate responses to questions, pleasant mood and affect  HEENT: normocephalic, atraumatic, EOMI  Neck: supple  Chest: breathing unlabored, conversational without dyspnea   Heart: palpable peripheral pulses, denies any chest pain during our encounter  RLE: tender hip, motion deferred, skin intact, no significant swelling, no obvious pain at the knee or ankle, demonstrates motor distally DF/PF, sensation intact, denies any calf pain, skin " [facility]    Imaging  Radiographs reviewed, RIGHT displaced subcapital femoral neck fracture     Laboratory Values  Lab Results   Component Value Date    WBC 13.2 (H) 11/04/2024    HGB 12.0 (L) 11/04/2024    HCT 35.9 (L) 11/04/2024    MCV 93 11/04/2024     11/04/2024    PTT 35 10/10/2024    INR 1.05 11/02/2024     Lab Results   Component Value Date     11/04/2024    CO2 20 (L) 11/04/2024    BUN 21.9 11/04/2024         PROBLEMS:     Active Problems:    Fall from standing, initial encounter    Closed displaced fracture of right femoral neck (H)      ASSESSMENT AND PLAN:      84 year old male fall at [facility] yesterday  -right subcapital femoral neck fracture  -extensive past medical history, see above, more recently suffering a brain bleed after fall in October and taken off coumadin, this was addressed in preop with neurosurg present - repeat head CT's ok, they are ok with restarting anticoagulants which is good given his cardiac history as well as new hip fracture and risk for VTE  -preop clearance appreciated, labs reviewed, I did add a T&S to the labs already drawn, verified with phlebotomist in preop to run sample  -I recommended operative treatment of his right hip fracture with hemiarthroplasty. Risks, benefits, alternatives, and anticipated postoperative course were discussed. Risks of surgery include, but are not limited to, bleeding, infection, wound healing complications, neurovascular injury, pain, stiffness, instability, limb length discrepancy, symptomatic hardware, thromboembolic phenomena, heart attack, stroke, anesthetic and medical complications, and death. Benefits of surgery were discussed including aid with pain control, mobilization, quality of life, and avoidance of risk factors associated with nonoperative hip fracture management. We also discussed therapeutic alternatives to surgery, including non-operative management, which I did not recommend. They understand these  risks and benefits and wish to proceed. Please see operative note for detailed post-operative plan, including post-op weightbearing status.     I have explained in a language understandable to the patient or substitute decision maker:    - The procedure the patient is having and why they need it  - The possible risks of the procedure  - Reasonable alternatives to this procedure, the relevant risks, benefits, and uncertainties  - The significant risks and problems specific to this patient     I have given the patient/substitute decision-maker an opportunity to:  - Ask questions about any of the above information  - Raise any other concerns     I believe the patient/substitute decision-maker understood the above information    Darinel Chacko DO, MSc  11/4/2024 9:00 AM

## 2024-11-04 NOTE — PROGRESS NOTES
[facility]    Hospitalist Progress Note      Assessment & Plan   Constantino Thorne is a 84 year old gentleman with hyperlipidemia, hypertension, atrial fibrillation on chronic warfarin (held since 10/10/2024), ischemic cardiomyopathy, CAD with prior MI and cardiac arrest with ICD in place, CKD, and SABINE who presents after a fall.    Pt was admitted here at [facility] under observation from 10/10-10/11 after a fall resulting in a traumatic SAH.  His warfarin was held and he was seen by NSG.  Repeat CT head during that stay was stable.  He was discharged with holding of his blood thinners.  He had subsequent CT head on 10/24 with resolution of hemorrhage.  He has been off his blood thinners since that admission.     #Fall c/b right femoral neck fx s/p right hip hemiarthroplasty: Pt was at his long-term and fell getting into the elevator.  He did have dizziness prior to the fall.  No LOC.  No CP or palpitations.  Had immediate pain to right hip.  Brought into the ER for evaluation.   -In ER, pt afebrile and HDS.  Saturating OK on RA.  XR of pelvis showed acute right femoral neck fx.  CT head showed no acute hemorrhage or fracture.  CBC with mild leukocytosis.  BMP with creatinine 1.24 which is close to baseline.  UA without infection.   -Ortho consulted. Underwent surgical repair with right hip hemiarthroplasty on 11/4.    -Prn pain meds, activity post operatively.  Monitor Hgb.  EBL noted to be 100 ml.   -Consulted NSG given recent SAH.  They are OK with lovenox or ASA post-op for ppx.      #Afib: Previosuly on warfarin.  Now with 2 falls in space of about 3 weeks with complications.  Holding warfarin and ppx OK per NSG.  Defer resumption of warfarin to primary care given his recurrent falls.  Reduced dose of metoprolol succinate to 25 mg BID from 50 mg BID.  Hold parameters on metoprolol.      #CAD with h/o MI in 2010. History of V fib arrest s/p ICD. Chronic HFrEF: metoprolol with hold parameters. Continue  statin.   #BPH: Continue flomax.    #CKD2: Baseline creatinine seems to be around 1.2. At baseline  #SABINE: CPAP    DVT Prophylaxis: Enoxaparin (Lovenox) SQ  Code Status: Full Code  Medically Ready for Discharge: Anticipated in 2-4 Days    Wife, Halie, updated by phone on 11/4.     Stefan Kapadia MD    Interval History   Seen post-op.  Sleepy but answers appropriately. No CP, SOB. Surgical pain seems under control.      -Data reviewed today: I reviewed all new labs and imaging results over the last 24 hours. I personally reviewed     Physical Exam   Temp: 97.5  F (36.4  C) Temp src: Core BP: 112/72 Pulse: 64   Resp: 11 SpO2: 98 % O2 Device: Simple face mask Oxygen Delivery: 6 LPM  Vitals:    11/03/24 1704   Weight: 95.3 kg (210 lb)     Vital Signs with Ranges  Temp:  [97.5  F (36.4  C)-98.7  F (37.1  C)] 97.5  F (36.4  C)  Pulse:  [61-76] 64  Resp:  [11-19] 11  BP: (112-144)/(68-94) 112/72  SpO2:  [92 %-98 %] 98 %  I/O last 3 completed shifts:  In: 538 [P.O.:120; I.V.:418]  Out: 445 [Urine:445]    Constitutional: Nontoxic, NAD. NC in place.   HEENT: Normocephalic. MMM, No elevation of JVD noted.   Respiratory: Nl WOB, Clear bilaterally, No wheezes or crackles  Cardiovascular: Regular, no murmur  GI: BS+, NT, ND  Skin/Integumen: WWP, surgical dressings clean.   Neuro: Awake and alert. No tremor. Pleasant.     Medications   Current Facility-Administered Medications   Medication Dose Route Frequency Provider Last Rate Last Admin    lactated ringers infusion   Intravenous Continuous Alex Griggs, DO   New Bag at 11/04/24 0940    lactated ringers infusion   Intravenous Continuous Damion Dunaway MD        sodium chloride 0.9 % infusion   Intravenous Continuous Venkat Matias MD 50 mL/hr at 11/03/24 2138 New Bag at 11/03/24 2138     Current Facility-Administered Medications   Medication Dose Route Frequency Provider Last Rate Last Admin    [Auto Hold] atorvastatin (LIPITOR) tablet 80 mg  80 mg Oral At Bedtime  Venkat Matias MD   80 mg at 11/03/24 2134    ceFAZolin Sodium (ANCEF) injection 2 g  2 g Intravenous See Admin Instructions Ap Mcclendon MD        [Auto Hold] metoprolol succinate ER (TOPROL XL) 24 hr tablet 50 mg  50 mg Oral BID Venkat Matias MD   50 mg at 11/04/24 0824    [Auto Hold] senna-docusate (SENOKOT-S/PERICOLACE) 8.6-50 MG per tablet 1 tablet  1 tablet Oral BID Venkat Matias MD   1 tablet at 11/03/24 2135    Or    [Auto Hold] senna-docusate (SENOKOT-S/PERICOLACE) 8.6-50 MG per tablet 2 tablet  2 tablet Oral BID Venkat Matias MD        [Auto Hold] sodium chloride (PF) 0.9% PF flush 3 mL  3 mL Intracatheter Q8H Venkat Matias MD   3 mL at 11/03/24 2138    [Auto Hold] tamsulosin (FLOMAX) capsule 0.4 mg  0.4 mg Oral Daily Venkat Matias MD   0.4 mg at 11/04/24 0824       Data   Recent Labs   Lab 11/04/24  0612 11/03/24  1733 11/02/24  0241   WBC 13.2* 9.0 8.2   HGB 12.0* 12.2* 12.6*   MCV 93 93 94    159 180   INR  --   --  1.05    137 138   POTASSIUM 4.0 4.3 4.6   CHLORIDE 103 102 103   CO2 20* 21* 21*   BUN 21.9 25.6* 25.0*   CR 1.03 1.24* 1.15   ANIONGAP 13 14 14   ZACH 8.4* 8.6* 8.9   * 117* 98   ALBUMIN  --   --  3.7   PROTTOTAL  --   --  6.8   BILITOTAL  --   --  0.6   ALKPHOS  --   --  103   ALT  --   --  25   AST  --   --  27       Recent Results (from the past 24 hours)   XR Pelvis w Hip Right 1 View    Narrative    EXAM: XR PELVIS AND HIP RIGHT 1 VIEW  LOCATION: [facility]  DATE: 11/3/2024    INDICATION: fall, pain  COMPARISON: None.      Impression    IMPRESSION: Acute right femoral neck fracture with superolateral displacement. There is normal joint alignment. Mild degenerative changes throughout the pelvis.   Head CT w/o contrast    Narrative    EXAM: CT HEAD W/O CONTRAST  LOCATION: [facility]  DATE: 11/3/2024    INDICATION: eval for trauma  COMPARISON: 10/10/2024, 10/24/2024.  TECHNIQUE: Routine CT Head without IV  contrast. Multiplanar reformats. Dose reduction techniques were used.    FINDINGS:  INTRACRANIAL CONTENTS: No intracranial hemorrhage, extraaxial collection, or mass effect.  No CT evidence of acute infarct. Mild to moderate presumed chronic small vessel ischemic changes. Mild to moderate generalized volume loss. No hydrocephalus.     VISUALIZED ORBITS/SINUSES/MASTOIDS: No intraorbital abnormality. No paranasal sinus mucosal disease. No middle ear or mastoid effusion.    BONES/SOFT TISSUES: No acute abnormality.      Impression    IMPRESSION: Negative for acute intracranial hemorrhage, hydrocephalus or transcortical infarct. No skull fracture.

## 2024-11-04 NOTE — PLAN OF CARE
"Goal Outcome Evaluation:      Plan of Care Reviewed With: patient, spouse    Overall Patient Progress: improvingOverall Patient Progress: improving    Outcome Evaluation: pt is A/O X4. PAIN managed with scheduled Tylenol.    Patient is A&O x4. On 2 L oxygen. Pain managed with Tylenol. Dressing CDI, CMS intact. Voiding OK. On regular diet. Discharge plan TBD.    Problem: Adult Inpatient Plan of Care  Goal: Plan of Care Review  Description: The Plan of Care Review/Shift note should be completed every shift.  The Outcome Evaluation is a brief statement about your assessment that the patient is improving, declining, or no change.  This information will be displayed automatically on your shift  note.  Outcome: Progressing  Flowsheets (Taken 11/4/2024 1441)  Outcome Evaluation: pt is A/O X4. PAIN managed with scheduled Tylenol.  Plan of Care Reviewed With:   patient   spouse  Overall Patient Progress: improving  Goal: Patient-Specific Goal (Individualized)  Description: You can add care plan individualizations to a care plan. Examples of Individualization might be:  \"Parent requests to be called daily at 9am for status\", \"I have a hard time hearing out of my right ear\", or \"Do not touch me to wake me up as it startles  me\".  Outcome: Progressing  Goal: Absence of Hospital-Acquired Illness or Injury  Outcome: Progressing  Intervention: Identify and Manage Fall Risk  Recent Flowsheet Documentation  Taken 11/4/2024 1300 by Jessica Cheung, RN  Safety Promotion/Fall Prevention:   assistive device/personal items within reach   activity supervised   clutter free environment maintained   nonskid shoes/slippers when out of bed   safety round/check completed   room near nurse's station   mobility aid in reach   lighting adjusted   increase visualization of patient  Intervention: Prevent Skin Injury  Recent Flowsheet Documentation  Taken 11/4/2024 1300 by Jessica Cheung, RN  Body Position: supine, head elevated  Skin Protection: " adhesive use limited  Intervention: Prevent and Manage VTE (Venous Thromboembolism) Risk  Recent Flowsheet Documentation  Taken 11/4/2024 1300 by Jessica Cheung RN  VTE Prevention/Management: SCDs on (sequential compression devices)  Intervention: Prevent Infection  Recent Flowsheet Documentation  Taken 11/4/2024 1300 by Jessica Cheung RN  Infection Prevention:   hand hygiene promoted   rest/sleep promoted   single patient room provided  Goal: Optimal Comfort and Wellbeing  Outcome: Progressing  Intervention: Monitor Pain and Promote Comfort  Recent Flowsheet Documentation  Taken 11/4/2024 1246 by Jessica Cheung RN  Pain Management Interventions: rest  Goal: Readiness for Transition of Care  Outcome: Progressing     Problem: Orthopaedic Fracture  Goal: Absence of Bleeding  Outcome: Progressing  Goal: Bowel Elimination  Outcome: Progressing  Goal: Absence of Embolism Signs and Symptoms  Outcome: Progressing  Intervention: Prevent or Manage Embolism Risk  Recent Flowsheet Documentation  Taken 11/4/2024 1300 by Jessica Cheung RN  VTE Prevention/Management: SCDs on (sequential compression devices)  Goal: Fracture Stability  Outcome: Progressing  Goal: Optimal Functional Ability  Outcome: Progressing  Intervention: Optimize Functional Ability  Recent Flowsheet Documentation  Taken 11/4/2024 1300 by Jessica Cheung RN  Activity Management: activity adjusted per tolerance  Positioning/Transfer Devices:   pillows   in use  Goal: Absence of Infection Signs and Symptoms  Outcome: Progressing  Intervention: Prevent or Manage Infection  Recent Flowsheet Documentation  Taken 11/4/2024 1300 by Jessica Cheung RN  Infection Management: aseptic technique maintained  Goal: Effective Tissue Perfusion  Outcome: Progressing  Goal: Optimal Pain Control and Function  Outcome: Progressing  Intervention: Manage Acute Orthopaedic-Related Pain  Recent Flowsheet Documentation  Taken 11/4/2024 1246 by Jessica Cheung RN  Pain Management  Interventions: rest  Goal: Effective Oxygenation and Ventilation  Outcome: Progressing  Intervention: Promote Airway Secretion Clearance  Recent Flowsheet Documentation  Taken 11/4/2024 1300 by Jessica Cheung RN  Cough And Deep Breathing: done with encouragement  Activity Management: activity adjusted per tolerance  Intervention: Optimize Oxygenation and Ventilation  Recent Flowsheet Documentation  Taken 11/4/2024 1300 by Jessica Chenug RN  Fluid/Electrolyte Management: fluids provided  Head of Bed (HOB) Positioning: HOB at 20-30 degrees     Problem: Hip Arthroplasty  Goal: Optimal Coping  Outcome: Progressing  Goal: Absence of Bleeding  Outcome: Progressing  Goal: Effective Bowel Elimination  Outcome: Progressing  Goal: Fluid and Electrolyte Balance  Outcome: Progressing  Intervention: Monitor and Manage Fluid and Electrolyte Balance  Recent Flowsheet Documentation  Taken 11/4/2024 1300 by Jessica Cheung RN  Fluid/Electrolyte Management: fluids provided  Goal: Optimal Functional Ability  Outcome: Progressing  Intervention: Promote Optimal Functional Status  Recent Flowsheet Documentation  Taken 11/4/2024 1300 by Jessica Cheung RN  Activity Management: activity adjusted per tolerance  Goal: Absence of Infection Signs and Symptoms  Outcome: Progressing  Intervention: Prevent or Manage Infection  Recent Flowsheet Documentation  Taken 11/4/2024 1300 by Jessica Cheung RN  Infection Management: aseptic technique maintained  Goal: Intact Neurovascular Status  Outcome: Progressing  Goal: Anesthesia/Sedation Recovery  Outcome: Progressing  Intervention: Optimize Anesthesia Recovery  Recent Flowsheet Documentation  Taken 11/4/2024 1300 by Jessica Cheung RN  Safety Promotion/Fall Prevention:   assistive device/personal items within reach   activity supervised   clutter free environment maintained   nonskid shoes/slippers when out of bed   safety round/check completed   room near nurse's station   mobility aid in reach    lighting adjusted   increase visualization of patient  Goal: Optimal Pain Control and Function  Outcome: Progressing  Intervention: Prevent or Manage Pain  Recent Flowsheet Documentation  Taken 11/4/2024 1246 by Jessica Cheung, RN  Pain Management Interventions: rest  Goal: Nausea and Vomiting Relief  Outcome: Progressing  Goal: Effective Urinary Elimination  Outcome: Progressing  Goal: Effective Oxygenation and Ventilation  Outcome: Progressing  Intervention: Optimize Oxygenation and Ventilation  Recent Flowsheet Documentation  Taken 11/4/2024 1300 by Jessica Cheung, RN  Cough And Deep Breathing: done with encouragement  Activity Management: activity adjusted per tolerance  Head of Bed (HOB) Positioning: HOB at 20-30 degrees

## 2024-11-04 NOTE — ANESTHESIA POSTPROCEDURE EVALUATION
Patient: Constantino Thorne    Procedure: Procedure(s):  HEMIARTHROPLASTY, right HIP, BIPOLAR       Anesthesia Type:  No value filed.    Note:     Postop Pain Control: Uneventful            Sign Out: Well controlled pain   PONV: No   Neuro/Psych: Uneventful            Sign Out: Acceptable/Baseline neuro status   Airway/Respiratory:             Sign Out: Acceptable/Baseline resp. status   CV/Hemodynamics:             Sign Out: Acceptable CV status   Other NRE:    DID A NON-ROUTINE EVENT OCCUR?            Last vitals:  Vitals Value Taken Time   /80 11/04/24 1230   Temp 98.42  F (36.9  C) 11/04/24 1235   Pulse 64 11/04/24 1232   Resp 24 11/04/24 1234   SpO2 95 % 11/04/24 1235   Vitals shown include unfiled device data.    Electronically Signed By: Alex Griggs DO  November 4, 2024  1:36 PM

## 2024-11-04 NOTE — ANESTHESIA CARE TRANSFER NOTE
Patient: Constantino Thorne    Procedure: Procedure(s):  HEMIARTHROPLASTY, right HIP, BIPOLAR       Diagnosis: Closed displaced fracture of right femoral neck (H) [S72.001A]  Diagnosis Additional Information: No value filed.    Anesthesia Type:   No value filed.     Note:    Oropharynx: oropharynx clear of all foreign objects  Level of Consciousness: drowsy  Oxygen Supplementation: face mask  Level of Supplemental Oxygen (L/min / FiO2): 6  Independent Airway: airway patency satisfactory and stable  Dentition: dentition unchanged  Vital Signs Stable: post-procedure vital signs reviewed and stable  Report to RN Given: handoff report given  Patient transferred to: PACU    Handoff Report: Identifed the Patient, Identified the Reponsible Provider, Reviewed the pertinent medical history, Discussed the surgical course, Reviewed Intra-OP anesthesia mangement and issues during anesthesia, Set expectations for post-procedure period and Allowed opportunity for questions and acknowledgement of understanding      Vitals:  Vitals Value Taken Time   /68 11/04/24 1125   Temp 97.7  F (36.5  C) 11/04/24 1128   Pulse 66 11/04/24 1128   Resp 0 11/04/24 1128   SpO2 97 % 11/04/24 1128   Vitals shown include unfiled device data.    Electronically Signed By: STEPHEN Zaidi CRNA  November 4, 2024  11:28 AM

## 2024-11-05 ENCOUNTER — APPOINTMENT (OUTPATIENT)
Dept: PHYSICAL THERAPY | Facility: CLINIC | Age: 84
DRG: 521 | End: 2024-11-05
Payer: COMMERCIAL

## 2024-11-05 LAB
ANION GAP SERPL CALCULATED.3IONS-SCNC: 11 MMOL/L (ref 7–15)
BUN SERPL-MCNC: 24.7 MG/DL (ref 8–23)
CALCIUM SERPL-MCNC: 8.4 MG/DL (ref 8.8–10.4)
CHLORIDE SERPL-SCNC: 101 MMOL/L (ref 98–107)
CREAT SERPL-MCNC: 1.07 MG/DL (ref 0.67–1.17)
EGFRCR SERPLBLD CKD-EPI 2021: 68 ML/MIN/1.73M2
ERYTHROCYTE [DISTWIDTH] IN BLOOD BY AUTOMATED COUNT: 13.7 % (ref 10–15)
GLUCOSE SERPL-MCNC: 130 MG/DL (ref 70–99)
HCO3 SERPL-SCNC: 21 MMOL/L (ref 22–29)
HCT VFR BLD AUTO: 32.8 % (ref 40–53)
HGB BLD-MCNC: 11 G/DL (ref 13.3–17.7)
MCH RBC QN AUTO: 31 PG (ref 26.5–33)
MCHC RBC AUTO-ENTMCNC: 33.5 G/DL (ref 31.5–36.5)
MCV RBC AUTO: 92 FL (ref 78–100)
PLATELET # BLD AUTO: 140 10E3/UL (ref 150–450)
POTASSIUM SERPL-SCNC: 4.2 MMOL/L (ref 3.4–5.3)
RBC # BLD AUTO: 3.55 10E6/UL (ref 4.4–5.9)
SODIUM SERPL-SCNC: 133 MMOL/L (ref 135–145)
WBC # BLD AUTO: 21.3 10E3/UL (ref 4–11)

## 2024-11-05 PROCEDURE — 85027 COMPLETE CBC AUTOMATED: CPT | Performed by: HOSPITALIST

## 2024-11-05 PROCEDURE — 97161 PT EVAL LOW COMPLEX 20 MIN: CPT | Mod: GP | Performed by: PHYSICAL THERAPIST

## 2024-11-05 PROCEDURE — 250N000011 HC RX IP 250 OP 636: Performed by: SPECIALIST/TECHNOLOGIST

## 2024-11-05 PROCEDURE — 120N000001 HC R&B MED SURG/OB

## 2024-11-05 PROCEDURE — 36415 COLL VENOUS BLD VENIPUNCTURE: CPT | Performed by: HOSPITALIST

## 2024-11-05 PROCEDURE — 99232 SBSQ HOSP IP/OBS MODERATE 35: CPT | Performed by: HOSPITALIST

## 2024-11-05 PROCEDURE — 97116 GAIT TRAINING THERAPY: CPT | Mod: GP | Performed by: PHYSICAL THERAPIST

## 2024-11-05 PROCEDURE — 258N000003 HC RX IP 258 OP 636: Performed by: HOSPITALIST

## 2024-11-05 PROCEDURE — 97530 THERAPEUTIC ACTIVITIES: CPT | Mod: GP | Performed by: PHYSICAL THERAPIST

## 2024-11-05 PROCEDURE — 250N000011 HC RX IP 250 OP 636: Mod: JZ | Performed by: HOSPITALIST

## 2024-11-05 PROCEDURE — 250N000013 HC RX MED GY IP 250 OP 250 PS 637: Performed by: INTERNAL MEDICINE

## 2024-11-05 PROCEDURE — 80048 BASIC METABOLIC PNL TOTAL CA: CPT | Performed by: HOSPITALIST

## 2024-11-05 PROCEDURE — 97110 THERAPEUTIC EXERCISES: CPT | Mod: GP | Performed by: PHYSICAL THERAPIST

## 2024-11-05 PROCEDURE — 250N000013 HC RX MED GY IP 250 OP 250 PS 637: Performed by: SPECIALIST/TECHNOLOGIST

## 2024-11-05 RX ADMIN — ACETAMINOPHEN 975 MG: 325 TABLET, FILM COATED ORAL at 12:03

## 2024-11-05 RX ADMIN — SENNOSIDES AND DOCUSATE SODIUM 1 TABLET: 8.6; 5 TABLET ORAL at 11:01

## 2024-11-05 RX ADMIN — ACETAMINOPHEN 975 MG: 325 TABLET, FILM COATED ORAL at 05:36

## 2024-11-05 RX ADMIN — SODIUM CHLORIDE 1000 ML: 9 INJECTION, SOLUTION INTRAVENOUS at 11:53

## 2024-11-05 RX ADMIN — ENOXAPARIN SODIUM 40 MG: 40 INJECTION SUBCUTANEOUS at 05:36

## 2024-11-05 RX ADMIN — OXYCODONE HYDROCHLORIDE 5 MG: 5 TABLET ORAL at 16:09

## 2024-11-05 RX ADMIN — SENNOSIDES AND DOCUSATE SODIUM 1 TABLET: 8.6; 5 TABLET ORAL at 21:18

## 2024-11-05 RX ADMIN — ACETAMINOPHEN 975 MG: 325 TABLET, FILM COATED ORAL at 21:18

## 2024-11-05 RX ADMIN — HYDROXYZINE HYDROCHLORIDE 10 MG: 10 TABLET, FILM COATED ORAL at 11:56

## 2024-11-05 RX ADMIN — ATORVASTATIN CALCIUM 80 MG: 40 TABLET, FILM COATED ORAL at 21:18

## 2024-11-05 RX ADMIN — TAMSULOSIN HYDROCHLORIDE 0.4 MG: 0.4 CAPSULE ORAL at 11:01

## 2024-11-05 RX ADMIN — CEFAZOLIN SODIUM 2 G: 2 INJECTION, SOLUTION INTRAVENOUS at 02:03

## 2024-11-05 RX ADMIN — HYDROXYZINE HYDROCHLORIDE 10 MG: 10 TABLET, FILM COATED ORAL at 21:32

## 2024-11-05 ASSESSMENT — ACTIVITIES OF DAILY LIVING (ADL)
DEPENDENT_IADLS:: INDEPENDENT
ADLS_ACUITY_SCORE: 0

## 2024-11-05 ASSESSMENT — ENCOUNTER SYMPTOMS: DYSRHYTHMIAS: 1

## 2024-11-05 NOTE — PROGRESS NOTES
[facility]    Hospitalist Progress Note      Assessment & Plan   Constantino Thorne is a 84 year old gentleman with hyperlipidemia, hypertension, atrial fibrillation on chronic warfarin (held since 10/10/2024), ischemic cardiomyopathy, CAD with prior MI and cardiac arrest with ICD in place, CKD, and SABINE who presents after a fall.     Pt was admitted here at [facility] under observation from 10/10-10/11 after a fall resulting in a traumatic SAH.  His warfarin was held and he was seen by NSG.  Repeat CT head during that stay was stable.  He was discharged with holding of his blood thinners.  He had subsequent CT head on 10/24 with resolution of hemorrhage.  He has been off his blood thinners since that admission.      #Fall c/b right femoral neck fx s/p right hip hemiarthroplasty: Pt was at his ARNA[employer]O and fell getting into the elevator.  He did have dizziness prior to the fall.  No LOC.  No CP or palpitations.  Had immediate pain to right hip.  Brought into the ER for evaluation.   -In ER, pt afebrile and HDS.  Saturating OK on RA.  XR of pelvis showed acute right femoral neck fx.  CT head showed no acute hemorrhage or fracture.  CBC with mild leukocytosis.  BMP with creatinine 1.24 which is close to baseline.  UA without infection.   -Ortho consulted. Underwent surgical repair with right hip hemiarthroplasty on 11/4.    -Prn pain meds, activity post operatively.  Monitor Hgb.  EBL noted to be 100 ml.  Hgb OK on 11/5.    -Consulted NSG given recent SAH.  They are OK with lovenox or ASA post-op for ppx.       #Afib: Previosuly on warfarin.  Now with 2 falls in space of about 3 weeks with complications.  Holding warfarin and ppx OK per NSG.  Defer resumption of warfarin to primary care given his recurrent falls but would likely hold it until functional status improved and he is not falling.    -Reduced dose of metoprolol succinate to 25 mg BID from 50 mg BID.  Hold parameters on metoprolol.    -Will likely  need reduction in BP meds on discharge given recurrent falls; likely contributing.     #Leukocytosis: No infectious symptoms.  UA without infection.  Suspect stress from fracture and surgery.  Monitor.  Repeat CBC ordered for 11/6.       #CAD with h/o MI in 2010. History of V fib arrest s/p ICD. Chronic HFrEF: metoprolol with hold parameters. Continue statin.   #BPH: Continue flomax.    #CKD2: Baseline creatinine seems to be around 1.2. At baseline  #SABINE: CPAP     DVT Prophylaxis: Enoxaparin (Lovenox) SQ  Code Status: Full Code  Medically Ready for Discharge: Anticipated Tomorrow. Pending how he does with therapies today. Possibly 2 nights.     Stefan Kapadia MD    Interval History   No events. Feels OK. Pain 3/10.  No CP, SOB.  No lightheadedness. No fevers/chills No cough. No diarrhea.      -Data reviewed today: I reviewed all new labs and imaging results over the last 24 hours. I personally reviewed     Physical Exam   Temp: 97.5  F (36.4  C) Temp src: Temporal BP: 98/55 Pulse: 62   Resp: 20 SpO2: 95 % O2 Device: Nasal cannula Oxygen Delivery: 2 LPM  Vitals:    11/03/24 1704   Weight: 95.3 kg (210 lb)     Vital Signs with Ranges  Temp:  [97.5  F (36.4  C)-98.4  F (36.9  C)] 97.5  F (36.4  C)  Pulse:  [62-78] 62  Resp:  [11-20] 20  BP: ()/(54-80) 98/55  SpO2:  [94 %-99 %] 95 %  I/O last 3 completed shifts:  In: 1894 [I.V.:1894]  Out: 1850 [Urine:1750; Blood:100]    Constitutional: Nontoxic, NAD. NC in place.   HEENT: Normocephalic. MMM, No elevation of JVD noted.   Respiratory: Nl WOB, Clear bilaterally, No wheezes or crackles  Cardiovascular: Regular, no murmur  GI: BS+, NT, ND  Skin/Integumen: WWP, surgical dressings clean.   Neuro: Awake and alert. No tremor. Pleasant.     Medications   Current Facility-Administered Medications   Medication Dose Route Frequency Provider Last Rate Last Admin     Current Facility-Administered Medications   Medication Dose Route Frequency Provider Last Rate Last Admin     acetaminophen (TYLENOL) tablet 975 mg  975 mg Oral Q8H BainMarina PA-C   975 mg at 11/05/24 0536    atorvastatin (LIPITOR) tablet 80 mg  80 mg Oral At Bedtime Marina Bain PA-C   80 mg at 11/04/24 2121    enoxaparin ANTICOAGULANT (LOVENOX) injection 40 mg  40 mg Subcutaneous Q24H BainMarina PA-C   40 mg at 11/05/24 0536    metoprolol succinate ER (TOPROL XL) 24 hr tablet 25 mg  25 mg Oral BID Stefan Kapadia MD        polyethylene glycol (MIRALAX) Packet 17 g  17 g Oral Daily Marina Bain PA-C        senna-docusate (SENOKOT-S/PERICOLACE) 8.6-50 MG per tablet 1 tablet  1 tablet Oral BID Marina Bain PA-C   1 tablet at 11/04/24 1917    Or    senna-docusate (SENOKOT-S/PERICOLACE) 8.6-50 MG per tablet 2 tablet  2 tablet Oral BID Marina Bain PA-C        sodium chloride (PF) 0.9% PF flush 3 mL  3 mL Intracatheter Q8H BainMarina PA-C   3 mL at 11/05/24 0203    tamsulosin (FLOMAX) capsule 0.4 mg  0.4 mg Oral Daily BainMarina PA-C   0.4 mg at 11/04/24 0824       Data   Recent Labs   Lab 11/05/24  0619 11/04/24  1654 11/04/24  1327 11/04/24  0612 11/03/24  1733 11/02/24  0241   WBC 21.3*  --   --  13.2* 9.0 8.2   HGB 11.0*  --   --  12.0* 12.2* 12.6*   MCV 92  --   --  93 93 94   *  --   --  156 159 180   INR  --   --   --   --   --  1.05   *  --   --  136 137 138   POTASSIUM 4.2  --   --  4.0 4.3 4.6   CHLORIDE 101  --   --  103 102 103   CO2 21*  --   --  20* 21* 21*   BUN 24.7*  --   --  21.9 25.6* 25.0*   CR 1.07  --  1.01 1.03 1.24* 1.15   ANIONGAP 11  --   --  13 14 14   ZACH 8.4*  --   --  8.4* 8.6* 8.9   * 209*  --  108* 117* 98   ALBUMIN  --   --   --   --   --  3.7   PROTTOTAL  --   --   --   --   --  6.8   BILITOTAL  --   --   --   --   --  0.6   ALKPHOS  --   --   --   --   --  103   ALT  --   --   --   --   --  25   AST  --   --   --   --   --  27       Recent Results (from the past 24 hours)   XR Pelvis w Hip Port Right 1 View    Narrative    PELVIS AND RIGHT HIP PORTABLE ONE VIEW  11/4/2024 12:00 PM     HISTORY: Status post hip surgery.    COMPARISON: 11/3/2024.       Impression    IMPRESSION:   Postoperative changes right hip implant for treatment of  a femoral neck fracture. Hardware is well-seated without fracture or  dislocation. Bones are demineralized.     RONAK POPE MD         SYSTEM ID:  [id number]

## 2024-11-05 NOTE — PROGRESS NOTES
11/05/24 1021   Appointment Info   Signing Clinician's Name / Credentials (PT) Michael Aldana DPT   Rehab Comments (PT) no IR, ADD, hyperext, pivot/twisting; WBAT R LE   Living Environment   Living Environment Comments Pt lives in ILF with spouse; ind with mobility at baseline.   Self-Care   Usual Activity Tolerance moderate   Current Activity Tolerance fair   Equipment Currently Used at Home grab bar, toilet;grab bar, tub/shower  (walking sticks)   Fall history within last six months yes   Number of times patient has fallen within last six months 4   General Information   Onset of Illness/Injury or Date of Surgery 11/03/24   Referring Physician Marina Bain PA-C   Patient/Family Therapy Goals Statement (PT) To improve mobility, balance   Pertinent History of Current Problem (include personal factors and/or comorbidities that impact the POC) Per H&P, a 84 year old gentleman with hyperlipidemia, hypertension, atrial fibrillation on chronic warfarin (held since 10/10/2024), ischemic cardiomyopathy, CAD with prior MI and cardiac arrest with ICD in place, CKD, and SABINE who presents after a fall. Pt with R femoral neck fracture, is POD1 s/p R hemiarthroplasty.   Existing Precautions/Restrictions fall;no hip IR;no hip ADD past midline;no hip hyperextension;no pivoting or twisting;weight bearing   Weight-Bearing Status - RLE weight-bearing as tolerated   Cognition   Affect/Mental Status (Cognition) flat/blunted affect;WFL   Orientation Status (Cognition) oriented x 4   Follows Commands (Cognition) WFL   Pain Assessment   Patient Currently in Pain Yes, see Vital Sign flowsheet  (R hip)   Range of Motion (ROM)   ROM Comment able to achieve 90 degrees flexion R hip; L LE WFL   Strength (Manual Muscle Testing)   Strength (Manual Muscle Testing) Deficits observed during functional mobility   Strength Comments unable to complete R SLR; able to complete  L SLR   Bed Mobility   Comment, (Bed Mobility) [place] supine to sit    Transfers   Comment, (Transfers) CGA sit <> Stand with FWW   Gait/Stairs (Locomotion)   Distance in Feet (Gait) 10   Pattern (Gait) step-to   Deviations/Abnormal Patterns (Gait) antalgic;base of support, wide;gait speed decreased;stride length decreased;weight shifting decreased   Comment, (Gait/Stairs) CGA with FWW   Balance   Balance Comments good sitting; fair+ standing with FWW   Clinical Impression   Criteria for Skilled Therapeutic Intervention Yes, treatment indicated   PT Diagnosis (PT) impaired functional mobility   Influenced by the following impairments impaired balance, decreased R LE strength, impaired activity tolerance   Functional limitations due to impairments impaired bed mobility, transfers, ambulation   Clinical Presentation (PT Evaluation Complexity) stable   Clinical Presentation Rationale Pt medically stable   Clinical Decision Making (Complexity) low complexity   Planned Therapy Interventions (PT) balance training;bed mobility training;cryotherapy;gait training;home exercise program;neuromuscular re-education;patient/family education;ROM (range of motion);stretching;strengthening;transfer training;progressive activity/exercise   Risk & Benefits of therapy have been explained evaluation/treatment results reviewed;risks/benefits reviewed;care plan/treatment goals reviewed;current/potential barriers reviewed;participants voiced agreement with care plan;participants included;patient;spouse/significant other   PT Total Evaluation Time   PT Eval, Low Complexity Minutes (52472) 9   Physical Therapy Goals   PT Frequency Daily   PT Predicted Duration/Target Date for Goal Attainment 11/07/24   PT Goals Bed Mobility;Transfers;Gait   PT: Bed Mobility Independent;Supine to/from sit;Within precautions   PT: Transfers Modified independent;Sit to/from stand;Assistive device;Within precautions   PT: Gait Modified independent;Assistive device;150 feet;Within precautions   PT Discharge Planning   PT Plan  progress ind with transfers/bed mobility, inc amb distance   PT Discharge Recommendation (DC Rec) Transitional Care Facility   PT Rationale for DC Rec Pt is below baseline mobility, currently requiring Ax1 for short distance activity. Would benefit from continued PT in TCU setting at this time, pt hopeful to improve to return home.   PT Brief overview of current status Ax1 with FWW

## 2024-11-05 NOTE — PROVIDER NOTIFICATION
Tea to Dr. Kapadia - BP currently 77/43 - up in chair following PT session. Asymptomatic. Holding metoprolol.

## 2024-11-05 NOTE — PROGRESS NOTES
Orthopedic Surgery  Constantino Thorne  11/05/2024     Admit Date:  11/3/2024  POD: 1 Day Post-Op   Procedure(s):  Right hip hemiarthroplasty     Patient resting comfortably in bed.    Pain controlled at rest.   Tolerating oral intake.    Denies nausea or vomiting  Denies chest pain or shortness of breath    Temp:  [97.5  F (36.4  C)-98.4  F (36.9  C)] 97.5  F (36.4  C)  Pulse:  [62-78] 62  Resp:  [11-20] 20  BP: ()/(54-80) 98/55  SpO2:  [94 %-99 %] 95 %    Alert and oriented  Dressing is clean, dry, and intact.   Minimal erythema of the surrounding skin.   Bilateral calves are soft, non-tender.  Right lower extremity is NVI.  Sensation intact bilateral lower extremities  Patient able to resist dorsi and plantar flexion bilaterally  +Dp pulse    Labs:  Recent Labs   Lab Test 11/05/24  0619 11/04/24  0612 11/03/24  1733   WBC 21.3* 13.2* 9.0   HGB 11.0* 12.0* 12.2*   * 156 159     Recent Labs   Lab Test 11/02/24  0241 10/11/24  0528 10/10/24  1045   INR 1.05 1.43* 2.03*       1. PLAN:   Continue lovenox for DVT prophylaxis.  Okay to resume coumadin from ortho standpoint.    Mobilize with PT/OT    WBAT Right LE with walker, no hyperext, no IR, no pivot/twist .     Continue current pain regiment.   Dressings: Keep intact.  Change if >60% saturated or peeling off.    Follow-up: 2 weeks post-op with Dr Chacko team    2. Disposition   Anticipate d/c to TCU vs home 1-2 days pending PT progress and recommendations.     Margy Kinsey PA-C

## 2024-11-05 NOTE — PLAN OF CARE
"Goal Outcome Evaluation:      Plan of Care Reviewed With: patient    Overall Patient Progress: improvingOverall Patient Progress: improving    Outcome Evaluation: pt is A&OX4, on 2 L Oxygen and on CAPNO monitoring.WBAT. dressing CDI. good dosi/plantar flextion. no T/N. pain 4/10.mostly c/o spasming. A-1 with walker and gate belt.voiding frequently via bedsie urinal. tolerating diet. No c/o N/V.dischrge TBD.      Problem: Adult Inpatient Plan of Care  Goal: Plan of Care Review  Description: The Plan of Care Review/Shift note should be completed every shift.  The Outcome Evaluation is a brief statement about your assessment that the patient is improving, declining, or no change.  This information will be displayed automatically on your shift  note.  Outcome: Progressing  Flowsheets (Taken 11/4/2024 2209)  Outcome Evaluation: pt is A&OX4, on 2 L Oxygen and on CAPNO monitoring.WBAT. dressing CDI. good dosi/plantar flextion. no T/N. pain 4/10.mostly c/o spasming. A-1 with walker and gate belt.voiding frequently via bedsie urinal. tolerating diet. No c/o N/V.dischrge TBD.  Plan of Care Reviewed With: patient  Overall Patient Progress: improving  Goal: Patient-Specific Goal (Individualized)  Description: You can add care plan individualizations to a care plan. Examples of Individualization might be:  \"Parent requests to be called daily at 9am for status\", \"I have a hard time hearing out of my right ear\", or \"Do not touch me to wake me up as it startles  me\".  Outcome: Progressing  Goal: Absence of Hospital-Acquired Illness or Injury  Outcome: Progressing  Intervention: Prevent and Manage VTE (Venous Thromboembolism) Risk  Recent Flowsheet Documentation  Taken 11/4/2024 1700 by Rufina Ferguson RN  VTE Prevention/Management: SCDs on (sequential compression devices)  Intervention: Prevent Infection  Recent Flowsheet Documentation  Taken 11/4/2024 1700 by Rufina Ferguson RN  Infection Prevention:   hand hygiene promoted   " rest/sleep promoted   single patient room provided  Goal: Optimal Comfort and Wellbeing  Outcome: Progressing  Intervention: Monitor Pain and Promote Comfort  Recent Flowsheet Documentation  Taken 11/4/2024 2147 by Rufina Ferguson, RN  Pain Management Interventions: rest  Taken 11/4/2024 1958 by Rufina Ferguson, RN  Pain Management Interventions: rest  Goal: Readiness for Transition of Care  Outcome: Progressing

## 2024-11-05 NOTE — PLAN OF CARE
"Goal Outcome Evaluation:      Plan of Care Reviewed With: patient    Overall Patient Progress: improvingOverall Patient Progress: improving    Outcome Evaluation: Discharge plan TBD.    Diet: Reg  Mental Status:  A+Ox 4  O2: 2L  Pain: controlled with scheduled tylenol.  Mobility:  Ax1 gbw  LDA's: L PIV SL, Dressing cdi.   Consults: ortho  GI/: Voiding frequently to bedside urinal. No BM yet.  Discharge Disposition: Discharge plan TBD  Discharge Time:       Problem: Adult Inpatient Plan of Care  Goal: Plan of Care Review  Description: The Plan of Care Review/Shift note should be completed every shift.  The Outcome Evaluation is a brief statement about your assessment that the patient is improving, declining, or no change.  This information will be displayed automatically on your shift  note.  Outcome: Progressing  Flowsheets (Taken 11/5/2024 0229)  Outcome Evaluation: Discharge plan TBD.  Plan of Care Reviewed With: patient  Overall Patient Progress: improving  Goal: Patient-Specific Goal (Individualized)  Description: You can add care plan individualizations to a care plan. Examples of Individualization might be:  \"Parent requests to be called daily at 9am for status\", \"I have a hard time hearing out of my right ear\", or \"Do not touch me to wake me up as it startles  me\".  Outcome: Progressing  Goal: Absence of Hospital-Acquired Illness or Injury  Outcome: Progressing  Intervention: Identify and Manage Fall Risk  Recent Flowsheet Documentation  Taken 11/5/2024 0207 by Naye Tatum, RN  Safety Promotion/Fall Prevention:   assistive device/personal items within reach   activity supervised   clutter free environment maintained   nonskid shoes/slippers when out of bed   safety round/check completed   room near nurse's station   mobility aid in reach   lighting adjusted   increase visualization of patient  Intervention: Prevent Skin Injury  Recent Flowsheet Documentation  Taken 11/5/2024 0207 by Naye Tatum, " RN  Body Position: supine, head elevated  Intervention: Prevent and Manage VTE (Venous Thromboembolism) Risk  Recent Flowsheet Documentation  Taken 11/5/2024 0207 by Naye Tatum RN  VTE Prevention/Management: SCDs on (sequential compression devices)  Intervention: Prevent Infection  Recent Flowsheet Documentation  Taken 11/5/2024 0207 by Naye Tatum RN  Infection Prevention:   hand hygiene promoted   rest/sleep promoted   single patient room provided  Goal: Optimal Comfort and Wellbeing  Outcome: Progressing  Intervention: Monitor Pain and Promote Comfort  Recent Flowsheet Documentation  Taken 11/5/2024 0201 by Naye Tatum RN  Pain Management Interventions: rest  Goal: Readiness for Transition of Care  Outcome: Progressing     Problem: Orthopaedic Fracture  Goal: Absence of Bleeding  Outcome: Progressing  Intervention: Monitor and Manage Fracture Bleeding  Recent Flowsheet Documentation  Taken 11/5/2024 0207 by Naye Tatum RN  Bleeding Management: dressing monitored  Goal: Bowel Elimination  Outcome: Progressing  Goal: Absence of Embolism Signs and Symptoms  Outcome: Progressing  Intervention: Prevent or Manage Embolism Risk  Recent Flowsheet Documentation  Taken 11/5/2024 0207 by Naye Tatum RN  VTE Prevention/Management: SCDs on (sequential compression devices)  Goal: Fracture Stability  Outcome: Progressing  Goal: Optimal Functional Ability  Outcome: Progressing  Intervention: Optimize Functional Ability  Recent Flowsheet Documentation  Taken 11/5/2024 0207 by Naye Tatum RN  Activity Management: activity adjusted per tolerance  Positioning/Transfer Devices:   pillows   in use  Goal: Absence of Infection Signs and Symptoms  Outcome: Progressing  Intervention: Prevent or Manage Infection  Recent Flowsheet Documentation  Taken 11/5/2024 0207 by Naye Tatum RN  Infection Management: aseptic technique maintained  Goal: Effective Tissue Perfusion  Outcome: Progressing  Goal: Optimal Pain  Control and Function  Outcome: Progressing  Intervention: Manage Acute Orthopaedic-Related Pain  Recent Flowsheet Documentation  Taken 11/5/2024 0201 by Naye Tatum RN  Pain Management Interventions: rest  Goal: Effective Oxygenation and Ventilation  Outcome: Progressing  Intervention: Promote Airway Secretion Clearance  Recent Flowsheet Documentation  Taken 11/5/2024 0207 by Naye Tatum RN  Breathing Techniques/Airway Clearance: deep/controlled cough encouraged  Cough And Deep Breathing: done with encouragement  Activity Management: activity adjusted per tolerance  Intervention: Optimize Oxygenation and Ventilation  Recent Flowsheet Documentation  Taken 11/5/2024 0207 by Naye Tatum RN  Airway/Ventilation Management: pulmonary hygiene promoted  Head of Bed (HOB) Positioning: HOB at 20-30 degrees     Problem: Hip Arthroplasty  Goal: Optimal Coping  Outcome: Progressing  Goal: Absence of Bleeding  Outcome: Progressing  Intervention: Monitor and Manage Bleeding  Recent Flowsheet Documentation  Taken 11/5/2024 0207 by Naye Tatum RN  Bleeding Management: dressing monitored  Goal: Effective Bowel Elimination  Outcome: Progressing  Goal: Fluid and Electrolyte Balance  Outcome: Progressing  Goal: Optimal Functional Ability  Outcome: Progressing  Intervention: Promote Optimal Functional Status  Recent Flowsheet Documentation  Taken 11/5/2024 0207 by Naye Tatum RN  Assistive Device Utilized:   gait belt   walker  Activity Management: activity adjusted per tolerance  Goal: Absence of Infection Signs and Symptoms  Outcome: Progressing  Intervention: Prevent or Manage Infection  Recent Flowsheet Documentation  Taken 11/5/2024 0207 by Naye Tatum RN  Infection Management: aseptic technique maintained  Goal: Intact Neurovascular Status  Outcome: Progressing  Goal: Anesthesia/Sedation Recovery  Outcome: Progressing  Intervention: Optimize Anesthesia Recovery  Recent Flowsheet Documentation  Taken  11/5/2024 0207 by Naye Tatum, RN  Safety Promotion/Fall Prevention:   assistive device/personal items within reach   activity supervised   clutter free environment maintained   nonskid shoes/slippers when out of bed   safety round/check completed   room near nurse's station   mobility aid in reach   lighting adjusted   increase visualization of patient  Goal: Optimal Pain Control and Function  Outcome: Progressing  Intervention: Prevent or Manage Pain  Recent Flowsheet Documentation  Taken 11/5/2024 0201 by Naye Tatum RN  Pain Management Interventions: rest  Goal: Nausea and Vomiting Relief  Outcome: Progressing  Goal: Effective Urinary Elimination  Outcome: Progressing  Intervention: Monitor and Manage Urinary Retention  Recent Flowsheet Documentation  Taken 11/5/2024 0207 by Naye Tatum RN  Urinary Elimination Promotion: toileting device within reach  Goal: Effective Oxygenation and Ventilation  Outcome: Progressing  Intervention: Optimize Oxygenation and Ventilation  Recent Flowsheet Documentation  Taken 11/5/2024 0207 by Naye Tatum RN  Cough And Deep Breathing: done with encouragement  Airway/Ventilation Management: pulmonary hygiene promoted  Activity Management: activity adjusted per tolerance  Head of Bed (HOB) Positioning: HOB at 20-30 degrees

## 2024-11-05 NOTE — CONSULTS
Care Management Initial Consult    General Information  Assessment completed with: Patient, Spouse or significant other, Sara  Type of CM/SW Visit: Initial Assessment    Primary Care Provider verified and updated as needed: Yes   Readmission within the last 30 days: current reason for admission unrelated to previous admission   Return Category: New Diagnosis  Reason for Consult: discharge planning  Advance Care Planning: Advance Care Planning Reviewed: no concerns identified          Communication Assessment  Patient's communication style: spoken language (English or Bilingual)    Hearing Difficulty or Deaf: yes   Wear Glasses or Blind: no    Cognitive  Cognitive/Neuro/Behavioral: WDL  Level of Consciousness: alert  Arousal Level: opens eyes spontaneously  Orientation: oriented x 4  Mood/Behavior: calm, cooperative  Best Language: 0 - No aphasia  Speech: clear, spontaneous, logical    Living Environment:   People in home: spouse     Current living Arrangements: independent living facility      Able to return to prior arrangements: yes       Family/Social Support:  Care provided by: self, spouse/significant other  Provides care for: no one  Marital Status:   Support system: Wife  Halie       Description of Support System: Supportive, Involved    Support Assessment: Adequate family and caregiver support, Adequate social supports    Current Resources:   Patient receiving home care services: Yes  Skilled Home Care Services: Skilled Nursing  Community Resources: Home Care  Equipment currently used at home: grab bar, toilet, grab bar, tub/shower  Supplies currently used at home: None      Lifestyle & Psychosocial Needs:  Social Drivers of Health     Food Insecurity: Low Risk  (11/3/2024)    Food Insecurity     Within the past 12 months, did you worry that your food would run out before you got money to buy more?: No     Within the past 12 months, did the food you bought just not last and you didn t  have money to get more?: No   Depression: Not at risk (6/13/2024)    [facility]    PHQ-2     PHQ-2 Score: 0   Housing Stability: Low Risk  (11/3/2024)    Housing Stability     Do you have housing? : Yes     Are you worried about losing your housing?: No   Tobacco Use: Unknown (11/4/2024)    Patient History     Smoking Tobacco Use: Never     Smokeless Tobacco Use: Unknown     Passive Exposure: Not on file   Financial Resource Strain: Low Risk  (11/3/2024)    Financial Resource Strain     Within the past 12 months, have you or your family members you live with been unable to get utilities (heat, electricity) when it was really needed?: No   Alcohol Use: Not on file   Transportation Needs: Low Risk  (11/3/2024)    Transportation Needs     Within the past 12 months, has lack of transportation kept you from medical appointments, getting your medicines, non-medical meetings or appointments, work, or from getting things that you need?: No   Physical Activity: Not on file   Interpersonal Safety: Low Risk  (11/3/2024)    Interpersonal Safety     Do you feel physically and emotionally safe where you currently live?: Yes     Within the past 12 months, have you been hit, slapped, kicked or otherwise physically hurt by someone?: No     Within the past 12 months, have you been humiliated or emotionally abused in other ways by your partner or ex-partner?: No   Stress: Not on file   Social Connections: Not on file   Health Literacy: Not on file       Functional Status:  Prior to admission patient needed assistance:   Dependent ADLs:: Ambulation-no assistive device  Dependent IADLs:: Independent           Discussed  Partnership in Safe Discharge Planning  document with patient/family: Yes    Additional Information:  Patient was admitted on 11/3/24 after a fall at home with right displaced subcapital femoral neck fracture. S/P right hip hemiarthroplasty on 11/4/24. Care management consulted to assist with discharge  planning.     Met with patient and spouse Halie at bedside to discuss discharge plan. Address, contact info, emergency contacts, and PCP verified. [facility] in independent living. He is currently open to [facility] for RN services. Patient and spouse are in agreement with PT recs for TCU. SNF packet provided to patient/family and education provided on how to navigate Medicare.gov to research facilities and their star ratings. Patient/spouse would like referral sent to [facility]. They would prefer a private room and are aware and agree to the $48/day daily out of pocket private room fees.     Referral was sent to [facility] who has accepted referral. Patient/spouse updated. Discussed transport, they would like to wait until time of discharge to decide [facility] w/c ride vs spouse to drive. They are aware of potential cost of the medical transport.,       Next Steps: Coordinate discharge to [facility] when patient is medically ready.         Bettie Grady RN  Care Coordinator  [facility]  [phone]

## 2024-11-05 NOTE — PLAN OF CARE
"Goal Outcome Evaluation:      Plan of Care Reviewed With: patient, spouse    Overall Patient Progress: improvingOverall Patient Progress: improving    Outcome Evaluation: [facility] when discharged.    Soft BP today, metoprolol held - 1.0L bolus given with improvement to BP. Pt ambulating Ax1 with W/GB. Up in chair and ambulating in room this shift. Tylenol, atarax and ice for pain. Dressing CDI. CMS intact. Plan for discharge to [facility]. Wife at bedside.    Problem: Adult Inpatient Plan of Care  Goal: Plan of Care Review  Description: The Plan of Care Review/Shift note should be completed every shift.  The Outcome Evaluation is a brief statement about your assessment that the patient is improving, declining, or no change.  This information will be displayed automatically on your shift  note.  Outcome: Progressing  Flowsheets (Taken 11/5/2024 1624)  Outcome Evaluation: [facility] when discharged.  Plan of Care Reviewed With:   patient   spouse  Overall Patient Progress: improving  Goal: Patient-Specific Goal (Individualized)  Description: You can add care plan individualizations to a care plan. Examples of Individualization might be:  \"Parent requests to be called daily at 9am for status\", \"I have a hard time hearing out of my right ear\", or \"Do not touch me to wake me up as it startles  me\".  Outcome: Progressing  Goal: Absence of Hospital-Acquired Illness or Injury  Outcome: Progressing  Intervention: Identify and Manage Fall Risk  Recent Flowsheet Documentation  Taken 11/5/2024 1000 by Laurel Rodriguez, RN  Safety Promotion/Fall Prevention:   assistive device/personal items within reach   activity supervised   clutter free environment maintained   nonskid shoes/slippers when out of bed   safety round/check completed   room near nurse's station   mobility aid in reach   lighting adjusted   increase visualization of patient  Intervention: Prevent Skin Injury  Recent Flowsheet " Documentation  Taken 11/5/2024 1500 by Laurel Rodriguez RN  Body Position: supine, head elevated  Taken 11/5/2024 1203 by Laurel Rodriguez RN  Body Position: supine, head elevated  Taken 11/5/2024 1000 by Laurel Rodriguez RN  Body Position: supine, head elevated  Intervention: Prevent and Manage VTE (Venous Thromboembolism) Risk  Recent Flowsheet Documentation  Taken 11/5/2024 1000 by Laurel Rodriguez RN  VTE Prevention/Management: SCDs on (sequential compression devices)  Intervention: Prevent Infection  Recent Flowsheet Documentation  Taken 11/5/2024 1000 by Laurel Rodriguez RN  Infection Prevention:   hand hygiene promoted   rest/sleep promoted   single patient room provided  Goal: Optimal Comfort and Wellbeing  Outcome: Progressing  Intervention: Monitor Pain and Promote Comfort  Recent Flowsheet Documentation  Taken 11/5/2024 1156 by Laurel Rodriguez RN  Pain Management Interventions:   rest   pillow support provided   relaxation techniques promoted   cold applied  Goal: Readiness for Transition of Care  Outcome: Progressing     Problem: Orthopaedic Fracture  Goal: Absence of Bleeding  Outcome: Progressing  Intervention: Monitor and Manage Fracture Bleeding  Recent Flowsheet Documentation  Taken 11/5/2024 1000 by Laurel Rodriguez RN  Bleeding Management: dressing monitored  Goal: Bowel Elimination  Outcome: Progressing  Intervention: Promote Effective Bowel Elimination  Recent Flowsheet Documentation  Taken 11/5/2024 1000 by Laurel Rodriguez RN  Bowel Elimination Management:   toileting offered   relaxation techniques promoted   hygiene measures promoted  Bowel Elimination Promotion:   adequate fluid intake promoted   ambulation promoted  Goal: Absence of Embolism Signs and Symptoms  Outcome: Progressing  Intervention: Prevent or Manage Embolism Risk  Recent Flowsheet Documentation  Taken 11/5/2024 1000 by Laurel Rodriguez RN  VTE Prevention/Management: SCDs on (sequential compression devices)  Goal: Fracture  Stability  Outcome: Progressing  Goal: Optimal Functional Ability  Outcome: Progressing  Intervention: Optimize Functional Ability  Recent Flowsheet Documentation  Taken 11/5/2024 1500 by Laurel Rodriguez RN  Activity Management:   ambulated to bathroom   back to bed   ambulated in room  Positioning/Transfer Devices:   pillows   in use  Taken 11/5/2024 1203 by Laurel Rodriguez RN  Activity Management:   ambulated to bathroom   back to bed  Taken 11/5/2024 1000 by Laurel Rodriguez RN  Activity Management:   activity encouraged   ambulated in room  Positioning/Transfer Devices:   pillows   in use  Goal: Absence of Infection Signs and Symptoms  Outcome: Progressing  Intervention: Prevent or Manage Infection  Recent Flowsheet Documentation  Taken 11/5/2024 1000 by Laurel Rodriguez RN  Infection Management: aseptic technique maintained  Goal: Effective Tissue Perfusion  Outcome: Progressing  Intervention: Prevent or Manage Neurovascular Compromise  Recent Flowsheet Documentation  Taken 11/5/2024 1000 by Laurel Rodriguez RN  Compartment Syndrome Management: active flexion/extension encouraged  Goal: Optimal Pain Control and Function  Outcome: Progressing  Intervention: Manage Acute Orthopaedic-Related Pain  Recent Flowsheet Documentation  Taken 11/5/2024 1156 by Laurel Rodriguez RN  Pain Management Interventions:   rest   pillow support provided   relaxation techniques promoted   cold applied  Goal: Effective Oxygenation and Ventilation  Outcome: Progressing  Intervention: Promote Airway Secretion Clearance  Recent Flowsheet Documentation  Taken 11/5/2024 1500 by Laurel Rodriguez RN  Activity Management:   ambulated to bathroom   back to bed   ambulated in room  Taken 11/5/2024 1203 by Laurel Rodriguez RN  Activity Management:   ambulated to bathroom   back to bed  Taken 11/5/2024 1000 by Laurel Rodriguez RN  Breathing Techniques/Airway Clearance: deep/controlled cough encouraged  Cough And Deep Breathing: done with  encouragement  Activity Management:   activity encouraged   ambulated in room  Intervention: Optimize Oxygenation and Ventilation  Recent Flowsheet Documentation  Taken 11/5/2024 1000 by Laurel Rodriguez RN  Airway/Ventilation Management: pulmonary hygiene promoted  Head of Bed (HOB) Positioning: HOB at 20-30 degrees     Problem: Hip Arthroplasty  Goal: Optimal Coping  Outcome: Progressing  Goal: Absence of Bleeding  Outcome: Progressing  Intervention: Monitor and Manage Bleeding  Recent Flowsheet Documentation  Taken 11/5/2024 1000 by Laurel Rodriguez RN  Bleeding Management: dressing monitored  Goal: Effective Bowel Elimination  Outcome: Progressing  Intervention: Enhance Bowel Motility and Elimination  Recent Flowsheet Documentation  Taken 11/5/2024 1000 by Laurel Rodriguez RN  Bowel Motility Enhancement:   ambulation promoted   fluid intake encouraged  Bowel Elimination Management:   toileting offered   relaxation techniques promoted   hygiene measures promoted  Goal: Fluid and Electrolyte Balance  Outcome: Progressing  Goal: Optimal Functional Ability  Outcome: Progressing  Intervention: Promote Optimal Functional Status  Recent Flowsheet Documentation  Taken 11/5/2024 1500 by Laurel Rodriguez RN  Assistive Device Utilized:   gait belt   walker  Activity Management:   ambulated to bathroom   back to bed   ambulated in room  Taken 11/5/2024 1203 by Laurel Rodriguez RN  Assistive Device Utilized:   gait belt   walker  Activity Management:   ambulated to bathroom   back to bed  Taken 11/5/2024 1000 by Laurel Rodriguez RN  Assistive Device Utilized:   gait belt   walker  Activity Management:   activity encouraged   ambulated in room  Goal: Absence of Infection Signs and Symptoms  Outcome: Progressing  Intervention: Prevent or Manage Infection  Recent Flowsheet Documentation  Taken 11/5/2024 1000 by Laurel Rodriguez RN  Infection Management: aseptic technique maintained  Goal: Intact Neurovascular Status  Outcome:  Progressing  Intervention: Prevent or Manage Neurovascular Compromise  Recent Flowsheet Documentation  Taken 11/5/2024 1000 by Laurel Rodriguez RN  Compartment Syndrome Management: active flexion/extension encouraged  Goal: Anesthesia/Sedation Recovery  Outcome: Progressing  Intervention: Optimize Anesthesia Recovery  Recent Flowsheet Documentation  Taken 11/5/2024 1000 by Laurel Rodriguez RN  Safety Promotion/Fall Prevention:   assistive device/personal items within reach   activity supervised   clutter free environment maintained   nonskid shoes/slippers when out of bed   safety round/check completed   room near nurse's station   mobility aid in reach   lighting adjusted   increase visualization of patient  Goal: Optimal Pain Control and Function  Outcome: Progressing  Intervention: Prevent or Manage Pain  Recent Flowsheet Documentation  Taken 11/5/2024 1156 by Laurel Rodriguez RN  Pain Management Interventions:   rest   pillow support provided   relaxation techniques promoted   cold applied  Goal: Nausea and Vomiting Relief  Outcome: Progressing  Intervention: Prevent or Manage Nausea and Vomiting  Recent Flowsheet Documentation  Taken 11/5/2024 1000 by Laurel Rodriguez RN  Nausea/Vomiting Interventions: (denies) other (see comments)  Goal: Effective Urinary Elimination  Outcome: Progressing  Intervention: Monitor and Manage Urinary Retention  Recent Flowsheet Documentation  Taken 11/5/2024 1000 by Laurel Rodriguez RN  Urinary Elimination Promotion: toileting device within reach  Goal: Effective Oxygenation and Ventilation  Outcome: Progressing  Intervention: Optimize Oxygenation and Ventilation  Recent Flowsheet Documentation  Taken 11/5/2024 1500 by Laurel Rodriguez RN  Activity Management:   ambulated to bathroom   back to bed   ambulated in room  Taken 11/5/2024 1203 by Laurel Rodriguez RN  Activity Management:   ambulated to bathroom   back to bed  Taken 11/5/2024 1000 by Laurel Rodriguez RN  Cough And Deep  Breathing: done with encouragement  Airway/Ventilation Management: pulmonary hygiene promoted  Activity Management:   activity encouraged   ambulated in room  Head of Bed (HOB) Positioning: HOB at 20-30 degrees

## 2024-11-06 ENCOUNTER — APPOINTMENT (OUTPATIENT)
Dept: PHYSICAL THERAPY | Facility: CLINIC | Age: 84
DRG: 521 | End: 2024-11-06
Payer: COMMERCIAL

## 2024-11-06 ENCOUNTER — APPOINTMENT (OUTPATIENT)
Dept: GENERAL RADIOLOGY | Facility: CLINIC | Age: 84
DRG: 521 | End: 2024-11-06
Attending: HOSPITALIST
Payer: COMMERCIAL

## 2024-11-06 LAB
ANION GAP SERPL CALCULATED.3IONS-SCNC: 12 MMOL/L (ref 7–15)
BUN SERPL-MCNC: 29.7 MG/DL (ref 8–23)
CALCIUM SERPL-MCNC: 8.7 MG/DL (ref 8.8–10.4)
CHLORIDE SERPL-SCNC: 101 MMOL/L (ref 98–107)
CREAT SERPL-MCNC: 1.1 MG/DL (ref 0.67–1.17)
EGFRCR SERPLBLD CKD-EPI 2021: 66 ML/MIN/1.73M2
ERYTHROCYTE [DISTWIDTH] IN BLOOD BY AUTOMATED COUNT: 14 % (ref 10–15)
GLUCOSE SERPL-MCNC: 100 MG/DL (ref 70–99)
GLUCOSE SERPL-MCNC: 126 MG/DL (ref 70–99)
HCO3 SERPL-SCNC: 21 MMOL/L (ref 22–29)
HCT VFR BLD AUTO: 31.3 % (ref 40–53)
HGB BLD-MCNC: 10.6 G/DL (ref 13.3–17.7)
MCH RBC QN AUTO: 31.1 PG (ref 26.5–33)
MCHC RBC AUTO-ENTMCNC: 33.9 G/DL (ref 31.5–36.5)
MCV RBC AUTO: 92 FL (ref 78–100)
PLATELET # BLD AUTO: 135 10E3/UL (ref 150–450)
POTASSIUM SERPL-SCNC: 3.7 MMOL/L (ref 3.4–5.3)
RBC # BLD AUTO: 3.41 10E6/UL (ref 4.4–5.9)
SODIUM SERPL-SCNC: 134 MMOL/L (ref 135–145)
WBC # BLD AUTO: 14.9 10E3/UL (ref 4–11)

## 2024-11-06 PROCEDURE — 93010 ELECTROCARDIOGRAM REPORT: CPT | Performed by: INTERNAL MEDICINE

## 2024-11-06 PROCEDURE — 999N000111 HC STATISTIC OT IP EVAL DEFER

## 2024-11-06 PROCEDURE — 80048 BASIC METABOLIC PNL TOTAL CA: CPT | Performed by: HOSPITALIST

## 2024-11-06 PROCEDURE — 97116 GAIT TRAINING THERAPY: CPT | Mod: GP

## 2024-11-06 PROCEDURE — 82947 ASSAY GLUCOSE BLOOD QUANT: CPT | Performed by: HOSPITALIST

## 2024-11-06 PROCEDURE — 93005 ELECTROCARDIOGRAM TRACING: CPT

## 2024-11-06 PROCEDURE — 99232 SBSQ HOSP IP/OBS MODERATE 35: CPT | Performed by: HOSPITALIST

## 2024-11-06 PROCEDURE — 36415 COLL VENOUS BLD VENIPUNCTURE: CPT | Performed by: HOSPITALIST

## 2024-11-06 PROCEDURE — 120N000001 HC R&B MED SURG/OB

## 2024-11-06 PROCEDURE — 97530 THERAPEUTIC ACTIVITIES: CPT | Mod: GP

## 2024-11-06 PROCEDURE — 250N000013 HC RX MED GY IP 250 OP 250 PS 637: Performed by: SPECIALIST/TECHNOLOGIST

## 2024-11-06 PROCEDURE — 71045 X-RAY EXAM CHEST 1 VIEW: CPT

## 2024-11-06 PROCEDURE — 250N000013 HC RX MED GY IP 250 OP 250 PS 637: Performed by: HOSPITALIST

## 2024-11-06 PROCEDURE — 250N000011 HC RX IP 250 OP 636: Performed by: SPECIALIST/TECHNOLOGIST

## 2024-11-06 PROCEDURE — 85027 COMPLETE CBC AUTOMATED: CPT | Performed by: HOSPITALIST

## 2024-11-06 RX ORDER — OXYCODONE HYDROCHLORIDE 5 MG/1
5 TABLET ORAL EVERY 6 HOURS PRN
Qty: 20 TABLET | Refills: 0 | Status: SHIPPED | OUTPATIENT
Start: 2024-11-06 | End: 2024-11-15

## 2024-11-06 RX ORDER — AMOXICILLIN 250 MG
1 CAPSULE ORAL 2 TIMES DAILY
Qty: 30 TABLET | Refills: 0 | DISCHARGE
Start: 2024-11-06 | End: 2024-11-15

## 2024-11-06 RX ORDER — ENOXAPARIN SODIUM 100 MG/ML
40 INJECTION SUBCUTANEOUS EVERY 24 HOURS
DISCHARGE
Start: 2024-11-07 | End: 2024-11-15

## 2024-11-06 RX ORDER — ACETAMINOPHEN 325 MG/1
975 TABLET ORAL EVERY 8 HOURS
Qty: 100 TABLET | Refills: 0 | DISCHARGE
Start: 2024-11-06 | End: 2024-11-15

## 2024-11-06 RX ADMIN — ACETAMINOPHEN 975 MG: 325 TABLET, FILM COATED ORAL at 12:29

## 2024-11-06 RX ADMIN — ENOXAPARIN SODIUM 40 MG: 40 INJECTION SUBCUTANEOUS at 05:14

## 2024-11-06 RX ADMIN — ACETAMINOPHEN 975 MG: 325 TABLET, FILM COATED ORAL at 20:02

## 2024-11-06 RX ADMIN — TAMSULOSIN HYDROCHLORIDE 0.4 MG: 0.4 CAPSULE ORAL at 08:05

## 2024-11-06 RX ADMIN — ATORVASTATIN CALCIUM 80 MG: 40 TABLET, FILM COATED ORAL at 22:51

## 2024-11-06 RX ADMIN — OXYCODONE HYDROCHLORIDE 5 MG: 5 TABLET ORAL at 09:32

## 2024-11-06 RX ADMIN — AMOXICILLIN AND CLAVULANATE POTASSIUM 1 TABLET: 875; 125 TABLET, FILM COATED ORAL at 13:36

## 2024-11-06 RX ADMIN — OXYCODONE HYDROCHLORIDE 5 MG: 5 TABLET ORAL at 17:36

## 2024-11-06 RX ADMIN — SENNOSIDES AND DOCUSATE SODIUM 1 TABLET: 8.6; 5 TABLET ORAL at 20:02

## 2024-11-06 RX ADMIN — ACETAMINOPHEN 975 MG: 325 TABLET, FILM COATED ORAL at 05:14

## 2024-11-06 RX ADMIN — SENNOSIDES AND DOCUSATE SODIUM 1 TABLET: 8.6; 5 TABLET ORAL at 08:05

## 2024-11-06 RX ADMIN — AMOXICILLIN AND CLAVULANATE POTASSIUM 1 TABLET: 875; 125 TABLET, FILM COATED ORAL at 20:02

## 2024-11-06 ASSESSMENT — ACTIVITIES OF DAILY LIVING (ADL)
ADLS_ACUITY_SCORE: 15.25
ADLS_ACUITY_SCORE: 0
ADLS_ACUITY_SCORE: 15.25
ADLS_ACUITY_SCORE: 0
ADLS_ACUITY_SCORE: 15.25
ADLS_ACUITY_SCORE: 0
ADLS_ACUITY_SCORE: 0
ADLS_ACUITY_SCORE: 15.25
ADLS_ACUITY_SCORE: 0
ADLS_ACUITY_SCORE: 15.25
ADLS_ACUITY_SCORE: 0
ADLS_ACUITY_SCORE: 0
ADLS_ACUITY_SCORE: 15.25

## 2024-11-06 NOTE — PLAN OF CARE
"[id number] RN  Patient vital signs are at baseline: Yes. Room air  Patient able to ambulate as they were prior to admission or with assist devices provided by therapies during their stay:  No,  Reason:  1 assist, GB, walker. Declined out of bed now.   Patient MUST void prior to discharge:  Yes  Patient able to tolerate oral intake:  Yes  Pain has adequate pain control using Oral analgesics:  Yes. Oxycodone.  Does patient have an identified :  Yes. wife  Has goal D/C date and time been discussed with patient:  Yes. When medically ready plan to discharge to TCU.   SL. Had 1000 ml fluid bolus. BP stable. Resting in bed. No acute variances.    Problem: Adult Inpatient Plan of Care  Goal: Plan of Care Review  Description: The Plan of Care Review/Shift note should be completed every shift.  The Outcome Evaluation is a brief statement about your assessment that the patient is improving, declining, or no change.  This information will be displayed automatically on your shift  note.  Outcome: Progressing  Flowsheets (Taken 11/5/2024 1840)  Plan of Care Reviewed With: patient  Overall Patient Progress: improving  Goal: Patient-Specific Goal (Individualized)  Description: You can add care plan individualizations to a care plan. Examples of Individualization might be:  \"Parent requests to be called daily at 9am for status\", \"I have a hard time hearing out of my right ear\", or \"Do not touch me to wake me up as it startles  me\".  Outcome: Progressing  Goal: Absence of Hospital-Acquired Illness or Injury  Outcome: Progressing  Intervention: Identify and Manage Fall Risk  Recent Flowsheet Documentation  Taken 11/5/2024 6092 by Lynn Alejandra, RN  Safety Promotion/Fall Prevention:   assistive device/personal items within reach   activity supervised   clutter free environment maintained   nonskid shoes/slippers when out of bed   safety round/check completed   room near nurse's station   mobility aid in reach   lighting " adjusted   increase visualization of patient  Intervention: Prevent Skin Injury  Recent Flowsheet Documentation  Taken 11/5/2024 1746 by Lynn Alejandra, RN  Body Position:   supine, head elevated   turned   position changed independently  Intervention: Prevent and Manage VTE (Venous Thromboembolism) Risk  Recent Flowsheet Documentation  Taken 11/5/2024 1746 by Lynn Alejandra RN  VTE Prevention/Management: SCDs on (sequential compression devices)  Intervention: Prevent Infection  Recent Flowsheet Documentation  Taken 11/5/2024 1746 by Lynn Alejandra, RN  Infection Prevention:   hand hygiene promoted   rest/sleep promoted   single patient room provided  Goal: Optimal Comfort and Wellbeing  Outcome: Progressing  Intervention: Monitor Pain and Promote Comfort  Recent Flowsheet Documentation  Taken 11/5/2024 1607 by Lynn Alejandra RN  Pain Management Interventions: medication (see MAR)  Goal: Readiness for Transition of Care  Outcome: Progressing     Problem: Orthopaedic Fracture  Goal: Absence of Bleeding  Outcome: Progressing  Intervention: Monitor and Manage Fracture Bleeding  Recent Flowsheet Documentation  Taken 11/5/2024 1746 by Lynn Alejandra RN  Bleeding Management: dressing monitored  Goal: Bowel Elimination  Outcome: Progressing  Intervention: Promote Effective Bowel Elimination  Recent Flowsheet Documentation  Taken 11/5/2024 1746 by Lynn Alejandra, RN  Bowel Elimination Management:   toileting offered   relaxation techniques promoted   hygiene measures promoted  Bowel Elimination Promotion:   adequate fluid intake promoted   ambulation promoted  Goal: Absence of Embolism Signs and Symptoms  Outcome: Progressing  Intervention: Prevent or Manage Embolism Risk  Recent Flowsheet Documentation  Taken 11/5/2024 1746 by Lynn Alejandra, RN  VTE Prevention/Management: SCDs on (sequential compression devices)  Goal: Fracture Stability  Outcome:  Progressing  Goal: Optimal Functional Ability  Outcome: Progressing  Intervention: Optimize Functional Ability  Recent Flowsheet Documentation  Taken 11/5/2024 1746 by Lynn Alejandra, RN  Positioning/Transfer Devices:   pillows   in use  Goal: Absence of Infection Signs and Symptoms  Outcome: Progressing  Intervention: Prevent or Manage Infection  Recent Flowsheet Documentation  Taken 11/5/2024 1746 by Lynn Alejandra, RN  Infection Management: aseptic technique maintained  Goal: Effective Tissue Perfusion  Outcome: Progressing  Intervention: Prevent or Manage Neurovascular Compromise  Recent Flowsheet Documentation  Taken 11/5/2024 1746 by Lynn Alejandra, RN  Compartment Syndrome Management: active flexion/extension encouraged  Goal: Optimal Pain Control and Function  Outcome: Progressing  Intervention: Manage Acute Orthopaedic-Related Pain  Recent Flowsheet Documentation  Taken 11/5/2024 1607 by Lynn Alejandra, RN  Pain Management Interventions: medication (see MAR)  Goal: Effective Oxygenation and Ventilation  Outcome: Progressing  Intervention: Promote Airway Secretion Clearance  Recent Flowsheet Documentation  Taken 11/5/2024 1746 by Lynn Alejandra RN  Breathing Techniques/Airway Clearance: deep/controlled cough encouraged  Cough And Deep Breathing: done with encouragement  Intervention: Optimize Oxygenation and Ventilation  Recent Flowsheet Documentation  Taken 11/5/2024 1746 by Lynn Alejandra, RN  Airway/Ventilation Management: pulmonary hygiene promoted  Head of Bed (HOB) Positioning: HOB at 60 degrees     Problem: Hip Arthroplasty  Goal: Optimal Coping  Outcome: Progressing  Goal: Absence of Bleeding  Outcome: Progressing  Intervention: Monitor and Manage Bleeding  Recent Flowsheet Documentation  Taken 11/5/2024 1746 by Lynn Alejandra, RN  Bleeding Management: dressing monitored  Goal: Effective Bowel Elimination  Outcome: Progressing  Intervention:  Enhance Bowel Motility and Elimination  Recent Flowsheet Documentation  Taken 11/5/2024 1746 by Lynn Alejandra, RN  Bowel Motility Enhancement:   ambulation promoted   fluid intake encouraged  Bowel Elimination Management:   toileting offered   relaxation techniques promoted   hygiene measures promoted  Goal: Fluid and Electrolyte Balance  Outcome: Progressing  Goal: Optimal Functional Ability  Outcome: Progressing  Goal: Absence of Infection Signs and Symptoms  Outcome: Progressing  Intervention: Prevent or Manage Infection  Recent Flowsheet Documentation  Taken 11/5/2024 1746 by Lynn Alejandra, RN  Infection Management: aseptic technique maintained  Goal: Intact Neurovascular Status  Outcome: Progressing  Intervention: Prevent or Manage Neurovascular Compromise  Recent Flowsheet Documentation  Taken 11/5/2024 1746 by Lynn Alejandra, RN  Compartment Syndrome Management: active flexion/extension encouraged  Goal: Anesthesia/Sedation Recovery  Outcome: Progressing  Intervention: Optimize Anesthesia Recovery  Recent Flowsheet Documentation  Taken 11/5/2024 1746 by Lynn Alejandra, RN  Safety Promotion/Fall Prevention:   assistive device/personal items within reach   activity supervised   clutter free environment maintained   nonskid shoes/slippers when out of bed   safety round/check completed   room near nurse's station   mobility aid in reach   lighting adjusted   increase visualization of patient  Administration (IS): instruction provided, initial  Level Incentive Spirometer (mL): 2000  Number of Repetitions (IS): 5  Goal: Optimal Pain Control and Function  Outcome: Progressing  Intervention: Prevent or Manage Pain  Recent Flowsheet Documentation  Taken 11/5/2024 1607 by Lynn Alejandra, RN  Pain Management Interventions: medication (see MAR)  Goal: Nausea and Vomiting Relief  Outcome: Progressing  Intervention: Prevent or Manage Nausea and Vomiting  Recent Flowsheet  Documentation  Taken 11/5/2024 1746 by Lynn Alejandra, RN  Nausea/Vomiting Interventions: (denies) other (see comments)  Goal: Effective Urinary Elimination  Outcome: Progressing  Intervention: Monitor and Manage Urinary Retention  Recent Flowsheet Documentation  Taken 11/5/2024 1746 by Lynn Alejandra, RN  Urinary Elimination Promotion: toileting device within reach  Goal: Effective Oxygenation and Ventilation  Outcome: Progressing  Intervention: Optimize Oxygenation and Ventilation  Recent Flowsheet Documentation  Taken 11/5/2024 1746 by Lynn Alejandra, RN  Cough And Deep Breathing: done with encouragement  Airway/Ventilation Management: pulmonary hygiene promoted  Head of Bed (HOB) Positioning: HOB at 60 degrees   Goal Outcome Evaluation:      Plan of Care Reviewed With: patient    Overall Patient Progress: improvingOverall Patient Progress: improving

## 2024-11-06 NOTE — PROGRESS NOTES
Care Management Follow Up    Length of Stay (days): 3    Expected Discharge Date: 11/07/2024     Concerns to be Addressed:     Discharge   Patient plan of care discussed at interdisciplinary rounds: Yes    Anticipated Discharge Disposition: Skilled Nursing Facility, Transitional Care     Anticipated Discharge Services: None  Anticipated Discharge DME: None    Patient/family educated on Medicare website which has current facility and service quality ratings: yes  Education Provided on the Discharge Plan: Yes  Patient/Family in Agreement with the Plan: yes    Referrals Placed by CM/SW: Post Acute Facilities  Private pay costs discussed: private room/amenity fees and transportation costs    Additional Information:  Per hospitalist, patient will stay one more day and likely be ready for discharge tomorrow 11/7/24. [facility] admissions dept updated on discharge date. PAS completed ([id number]).    Next Steps: Coordinate discharge to [facility] once patient is medically ready. Depending on patient status on day of discharge, spouse may want to drive him to TCU herself with help getting patient in and out of vehicle. Otherwise, will have to set up [facility] w/c ride.         Bettie Grady RN  Care Coordinator  [facility]  [phone]

## 2024-11-06 NOTE — PLAN OF CARE
"Started Augmentin today. Lungs left LL fine crackles. CLARK. RA. Infrequent dry cough.   Pleasant. Had a water enema and LG BM.   Patient vital signs are at baseline: Yes. Metoprolol not give per parameters.   Patient able to ambulate as they were prior to admission or with assist devices provided by therapies during their stay:  No,  Reason:  1 assist. GB, walker. Ambulated in the room. Up in chair.   Patient MUST void prior to discharge:  Yes  Patient able to tolerate oral intake:  Yes  Pain has adequate pain control using Oral analgesics:  Yes  Does patient have an identified :  Yes. Gale wife.   Has goal D/C date and time been discussed with patient:  Yes. Plan is [facility] when medically ready.   Dressing clean and dry. Unable to lift right leg off bed per self. Pleasant.    Problem: Adult Inpatient Plan of Care  Goal: Plan of Care Review  Description: The Plan of Care Review/Shift note should be completed every shift.  The Outcome Evaluation is a brief statement about your assessment that the patient is improving, declining, or no change.  This information will be displayed automatically on your shift  note.  Outcome: Progressing  Flowsheets (Taken 11/6/2024 3046)  Plan of Care Reviewed With: patient  Overall Patient Progress: improving  Goal: Patient-Specific Goal (Individualized)  Description: You can add care plan individualizations to a care plan. Examples of Individualization might be:  \"Parent requests to be called daily at 9am for status\", \"I have a hard time hearing out of my right ear\", or \"Do not touch me to wake me up as it startles  me\".  Outcome: Progressing  Goal: Absence of Hospital-Acquired Illness or Injury  Outcome: Progressing  Intervention: Identify and Manage Fall Risk  Recent Flowsheet Documentation  Taken 11/6/2024 5410 by Lynn Alejandra, RN  Safety Promotion/Fall Prevention:   activity supervised   assistive device/personal items within reach   clutter free environment " maintained   nonskid shoes/slippers when out of bed   room near nurse's station   room organization consistent   safety round/check completed  Intervention: Prevent and Manage VTE (Venous Thromboembolism) Risk  Recent Flowsheet Documentation  Taken 11/6/2024 0810 by Lynn Alejandra, RN  VTE Prevention/Management: SCDs on (sequential compression devices)  Intervention: Prevent Infection  Recent Flowsheet Documentation  Taken 11/6/2024 0810 by Lynn Alejandra, RN  Infection Prevention:   environmental surveillance performed   rest/sleep promoted   single patient room provided  Goal: Optimal Comfort and Wellbeing  Outcome: Progressing  Intervention: Monitor Pain and Promote Comfort  Recent Flowsheet Documentation  Taken 11/6/2024 1737 by Lynn Alejandra, RN  Pain Management Interventions: medication (see MAR)  Taken 11/6/2024 1230 by Lynn Alejandra, RN  Pain Management Interventions: medication (see MAR)  Taken 11/6/2024 0932 by Lynn Alejandra, RN  Pain Management Interventions: medication (see MAR)  Taken 11/6/2024 0801 by Lynn Alejandra, RN  Pain Management Interventions: medication offered but refused  Goal: Readiness for Transition of Care  Outcome: Progressing     Problem: Orthopaedic Fracture  Goal: Absence of Bleeding  Outcome: Progressing  Intervention: Monitor and Manage Fracture Bleeding  Recent Flowsheet Documentation  Taken 11/6/2024 0810 by Lynn Alejandra, RN  Bleeding Management: dressing monitored  Goal: Bowel Elimination  Outcome: Progressing  Intervention: Promote Effective Bowel Elimination  Recent Flowsheet Documentation  Taken 11/6/2024 0810 by Lynn Alejandra, RN  Bowel Elimination Promotion:   adequate fluid intake promoted   ambulation promoted  Goal: Absence of Embolism Signs and Symptoms  Outcome: Progressing  Intervention: Prevent or Manage Embolism Risk  Recent Flowsheet Documentation  Taken 11/6/2024 0810 by Lynn Alejandra  RN  VTE Prevention/Management: SCDs on (sequential compression devices)  Goal: Fracture Stability  Outcome: Progressing  Goal: Optimal Functional Ability  Outcome: Progressing  Intervention: Optimize Functional Ability  Recent Flowsheet Documentation  Taken 11/6/2024 1507 by Lynn Alejandra RN  Activity Management: back to bed  Taken 11/6/2024 1232 by Lynn Alejandra, RN  Activity Management:   ambulated to bathroom   up in chair  Taken 11/6/2024 0934 by Lynn Alejandra, RN  Activity Management:   ambulated in room   back to bed  Taken 11/6/2024 0810 by Lynn Alejandra, RN  Range of Motion: active ROM (range of motion) encouraged  Activity Management:   ambulated in room   up in chair  Goal: Absence of Infection Signs and Symptoms  Outcome: Progressing  Goal: Effective Tissue Perfusion  Outcome: Progressing  Goal: Optimal Pain Control and Function  Outcome: Progressing  Intervention: Manage Acute Orthopaedic-Related Pain  Recent Flowsheet Documentation  Taken 11/6/2024 1737 by Lynn Alejandra RN  Pain Management Interventions: medication (see MAR)  Taken 11/6/2024 1230 by Lynn Alejandra, RN  Pain Management Interventions: medication (see MAR)  Taken 11/6/2024 0932 by Lynn Alejandra, RN  Pain Management Interventions: medication (see MAR)  Taken 11/6/2024 0801 by Lynn Alejandra, RN  Pain Management Interventions: medication offered but refused  Goal: Effective Oxygenation and Ventilation  Outcome: Progressing  Intervention: Promote Airway Secretion Clearance  Recent Flowsheet Documentation  Taken 11/6/2024 1507 by Lynn Alejandra, RN  Activity Management: back to bed  Taken 11/6/2024 1232 by Lynn Alejandra, RN  Activity Management:   ambulated to bathroom   up in chair  Taken 11/6/2024 0934 by Lynn Alejandra, RN  Activity Management:   ambulated in room   back to bed  Taken 11/6/2024 0810 by Lynn Alejandra, RN  Cough And Deep  Breathing: done with encouragement  Activity Management:   ambulated in room   up in chair     Problem: Hip Arthroplasty  Goal: Optimal Coping  Outcome: Progressing  Goal: Absence of Bleeding  Outcome: Progressing  Intervention: Monitor and Manage Bleeding  Recent Flowsheet Documentation  Taken 11/6/2024 0810 by Lynn Alejandra RN  Bleeding Management: dressing monitored  Goal: Effective Bowel Elimination  Outcome: Progressing  Goal: Fluid and Electrolyte Balance  Outcome: Progressing  Goal: Optimal Functional Ability  Outcome: Progressing  Intervention: Promote Optimal Functional Status  Recent Flowsheet Documentation  Taken 11/6/2024 1507 by Lynn Alejandra, RN  Assistive Device Utilized:   gait belt   walker  Activity Management: back to bed  Taken 11/6/2024 1232 by Lynn Alejandra, RN  Assistive Device Utilized:   gait belt   walker  Activity Management:   ambulated to bathroom   up in chair  Taken 11/6/2024 0934 by Lynn Alejandra, RN  Assistive Device Utilized:   gait belt   walker  Activity Management:   ambulated in room   back to bed  Taken 11/6/2024 0810 by Lynn Alejandra, RN  Assistive Device Utilized:   gait belt   walker  Activity Management:   ambulated in room   up in chair  Goal: Absence of Infection Signs and Symptoms  Outcome: Progressing  Goal: Intact Neurovascular Status  Outcome: Progressing  Goal: Anesthesia/Sedation Recovery  Outcome: Progressing  Intervention: Optimize Anesthesia Recovery  Recent Flowsheet Documentation  Taken 11/6/2024 0810 by Lynn Alejandra, RN  Safety Promotion/Fall Prevention:   activity supervised   assistive device/personal items within reach   clutter free environment maintained   nonskid shoes/slippers when out of bed   room near nurse's station   room organization consistent   safety round/check completed  Administration (IS): instruction provided, follow-up  Level Incentive Spirometer (mL): 2000  Goal: Optimal Pain  Control and Function  Outcome: Progressing  Intervention: Prevent or Manage Pain  Recent Flowsheet Documentation  Taken 11/6/2024 1737 by Lynn Alejandra RN  Pain Management Interventions: medication (see MAR)  Taken 11/6/2024 1230 by Lynn Alejandra RN  Pain Management Interventions: medication (see MAR)  Taken 11/6/2024 0932 by Lynn Alejandra, RN  Pain Management Interventions: medication (see MAR)  Taken 11/6/2024 0801 by Lynn Alejandra, RN  Pain Management Interventions: medication offered but refused  Goal: Nausea and Vomiting Relief  Outcome: Progressing  Intervention: Prevent or Manage Nausea and Vomiting  Recent Flowsheet Documentation  Taken 11/6/2024 0810 by Lynn Alejandra RN  Nausea/Vomiting Interventions: (denies) other (see comments)  Goal: Effective Urinary Elimination  Outcome: Progressing  Goal: Effective Oxygenation and Ventilation  Outcome: Progressing  Intervention: Optimize Oxygenation and Ventilation  Recent Flowsheet Documentation  Taken 11/6/2024 1507 by Lynn Alejandra RN  Activity Management: back to bed  Taken 11/6/2024 1232 by Lynn Alejandra, RN  Activity Management:   ambulated to bathroom   up in chair  Taken 11/6/2024 0934 by Lynn Alejandra RN  Activity Management:   ambulated in room   back to bed  Taken 11/6/2024 0810 by Lynn Alejandra, RN  Cough And Deep Breathing: done with encouragement  Activity Management:   ambulated in room   up in chair     Problem: Comorbidity Management  Goal: Blood Pressure in Desired Range  Outcome: Progressing  Intervention: Maintain Blood Pressure Management  Recent Flowsheet Documentation  Taken 11/6/2024 0810 by Lynn Alejandra, RN  Medication Review/Management: medications reviewed     Problem: Pneumonia  Goal: Fluid Balance  Outcome: Progressing  Goal: Resolution of Infection Signs and Symptoms  Outcome: Progressing  Goal: Effective Oxygenation and Ventilation  Outcome:  Progressing  Intervention: Promote Airway Secretion Clearance  Recent Flowsheet Documentation  Taken 11/6/2024 1507 by Lynn Alejandra, RN  Activity Management: back to bed  Taken 11/6/2024 1232 by Lynn Alejandra, RN  Activity Management:   ambulated to bathroom   up in chair  Taken 11/6/2024 0934 by Lynn Alejandra, RN  Activity Management:   ambulated in room   back to bed  Taken 11/6/2024 0810 by Lynn Alejandra, RN  Cough And Deep Breathing: done with encouragement  Activity Management:   ambulated in room   up in chair   Goal Outcome Evaluation:      Plan of Care Reviewed With: patient    Overall Patient Progress: improvingOverall Patient Progress: improving

## 2024-11-06 NOTE — PLAN OF CARE
"Goal Outcome Evaluation:      Plan of Care Reviewed With: patient    Overall Patient Progress: no changeOverall Patient Progress: no change    Outcome Evaluation: No acute changes overnight, plan for PT today, TCU at d/c    [id number]  A/Ox4, VSS, on RA, PIV SL, Ax1 W/GB, permanent pacemaker, pain managed with scheduled APAP and PRN oxy and atarax, aquacell dressing to RLE CDI, ice applied, plan for TCU at discharge     Problem: Adult Inpatient Plan of Care  Goal: Plan of Care Review  Description: The Plan of Care Review/Shift note should be completed every shift.  The Outcome Evaluation is a brief statement about your assessment that the patient is improving, declining, or no change.  This information will be displayed automatically on your shift  note.  Outcome: Progressing  Flowsheets (Taken 11/6/2024 0326)  Outcome Evaluation: No acute changes overnight, plan for PT today, TCU at d/c  Plan of Care Reviewed With: patient  Overall Patient Progress: no change  Goal: Patient-Specific Goal (Individualized)  Description: You can add care plan individualizations to a care plan. Examples of Individualization might be:  \"Parent requests to be called daily at 9am for status\", \"I have a hard time hearing out of my right ear\", or \"Do not touch me to wake me up as it startles  me\".  Outcome: Progressing  Goal: Absence of Hospital-Acquired Illness or Injury  Outcome: Progressing  Intervention: Identify and Manage Fall Risk  Recent Flowsheet Documentation  Taken 11/5/2024 2128 by Indy Dominique, RN  Safety Promotion/Fall Prevention:   activity supervised   assistive device/personal items within reach   clutter free environment maintained   nonskid shoes/slippers when out of bed   room near nurse's station   room organization consistent   safety round/check completed  Intervention: Prevent and Manage VTE (Venous Thromboembolism) Risk  Recent Flowsheet Documentation  Taken 11/5/2024 2128 by Indy Dominique RN  VTE " Prevention/Management: SCDs on (sequential compression devices)  Intervention: Prevent Infection  Recent Flowsheet Documentation  Taken 11/5/2024 2128 by Indy Dominique RN  Infection Prevention:   environmental surveillance performed   rest/sleep promoted   single patient room provided  Goal: Optimal Comfort and Wellbeing  Outcome: Progressing  Intervention: Monitor Pain and Promote Comfort  Recent Flowsheet Documentation  Taken 11/5/2024 2132 by Indy Dominique RN  Pain Management Interventions:   medication (see MAR)   care clustered   cold applied   rest  Goal: Readiness for Transition of Care  Outcome: Progressing     Problem: Orthopaedic Fracture  Goal: Absence of Bleeding  Outcome: Progressing  Goal: Bowel Elimination  Outcome: Progressing  Intervention: Promote Effective Bowel Elimination  Recent Flowsheet Documentation  Taken 11/5/2024 2128 by Indy Dominique RN  Bowel Elimination Promotion:   adequate fluid intake promoted   ambulation promoted  Goal: Absence of Embolism Signs and Symptoms  Outcome: Progressing  Intervention: Prevent or Manage Embolism Risk  Recent Flowsheet Documentation  Taken 11/5/2024 2128 by Indy Dominique RN  VTE Prevention/Management: SCDs on (sequential compression devices)  Goal: Fracture Stability  Outcome: Progressing  Goal: Optimal Functional Ability  Outcome: Progressing  Goal: Absence of Infection Signs and Symptoms  Outcome: Progressing  Goal: Effective Tissue Perfusion  Outcome: Progressing  Goal: Optimal Pain Control and Function  Outcome: Progressing  Intervention: Manage Acute Orthopaedic-Related Pain  Recent Flowsheet Documentation  Taken 11/5/2024 2132 by Indy Dominique RN  Pain Management Interventions:   medication (see MAR)   care clustered   cold applied   rest  Goal: Effective Oxygenation and Ventilation  Outcome: Progressing  Intervention: Promote Airway Secretion Clearance  Recent Flowsheet Documentation  Taken 11/5/2024 2128 by Indy Dominique RN  Cough And  Deep Breathing: done with encouragement     Problem: Hip Arthroplasty  Goal: Optimal Coping  Outcome: Progressing  Goal: Absence of Bleeding  Outcome: Progressing  Goal: Effective Bowel Elimination  Outcome: Progressing  Goal: Fluid and Electrolyte Balance  Outcome: Progressing  Goal: Optimal Functional Ability  Outcome: Progressing  Goal: Absence of Infection Signs and Symptoms  Outcome: Progressing  Goal: Intact Neurovascular Status  Outcome: Progressing  Goal: Anesthesia/Sedation Recovery  Outcome: Progressing  Intervention: Optimize Anesthesia Recovery  Recent Flowsheet Documentation  Taken 11/5/2024 2128 by Indy Dominique, RN  Safety Promotion/Fall Prevention:   activity supervised   assistive device/personal items within reach   clutter free environment maintained   nonskid shoes/slippers when out of bed   room near nurse's station   room organization consistent   safety round/check completed  Goal: Optimal Pain Control and Function  Outcome: Progressing  Intervention: Prevent or Manage Pain  Recent Flowsheet Documentation  Taken 11/5/2024 2132 by Indy Dominique, RN  Pain Management Interventions:   medication (see MAR)   care clustered   cold applied   rest  Goal: Nausea and Vomiting Relief  Outcome: Progressing  Intervention: Prevent or Manage Nausea and Vomiting  Recent Flowsheet Documentation  Taken 11/5/2024 2128 by Indy Dominique, RN  Nausea/Vomiting Interventions: (denies) other (see comments)  Goal: Effective Urinary Elimination  Outcome: Progressing  Goal: Effective Oxygenation and Ventilation  Outcome: Progressing  Intervention: Optimize Oxygenation and Ventilation  Recent Flowsheet Documentation  Taken 11/5/2024 2128 by Indy Dominique, RN  Cough And Deep Breathing: done with encouragement

## 2024-11-06 NOTE — PROGRESS NOTES
Orthopedic Surgery  Constantino Thorne  11/06/2024     Admit Date:  11/3/2024  POD: 2 Days Post-Op   Procedure(s):  Right hip hemiarthroplasty     Patient resting comfortably in chair.  Pain controlled at rest.   Tolerating oral intake.    Denies nausea or vomiting  Denies chest pain or shortness of breath    Temp:  [97.8  F (36.6  C)-98.6  F (37  C)] 97.8  F (36.6  C)  Pulse:  [72-82] 72  Resp:  [18] 18  BP: ()/(51-72) 111/69  SpO2:  [97 %-98 %] 98 %    Alert and oriented  Dressing is clean, dry, and intact.   Minimal erythema of the surrounding skin.   Bilateral calves are soft, non-tender.  Right lower extremity is NVI.  Sensation intact bilateral lower extremities  Patient able to resist dorsi and plantar flexion bilaterally  3+Dp pulse    Labs:  Recent Labs   Lab Test 11/06/24  0637 11/05/24  0619 11/04/24  0612   WBC 14.9* 21.3* 13.2*   HGB 10.6* 11.0* 12.0*   * 140* 156     Recent Labs   Lab Test 11/02/24  0241 10/11/24  0528 10/10/24  1045   INR 1.05 1.43* 2.03*       1. PLAN:   Continue lovenox 40 mg for DVT prophylaxis x 30 days. PTA coumadin will be held until follow-up with PCP.    Mobilize with PT/OT    WBAT Right LE with walker, no hyperext, no IR, no pivot/twist .     Continue current pain regimen   Dressings: Keep intact.  Change if >60% saturated or peeling off.    Follow-up: 2 weeks post-op with Dr Chacko team    2. Disposition   Anticipate d/c to TCU. Ortho stable       Paty Molina PA-C

## 2024-11-06 NOTE — PROGRESS NOTES
Occupational Therapy: Orders received. Chart reviewed and discussed with care team.? Occupational Therapy not indicated due to pt discharging to TCU, working with PT, requires significant assistance. . Will need OT but can defer eval/tx to next level of care as to avoid duplication of services.? Defer discharge recommendations to care team.? Will complete orders.

## 2024-11-06 NOTE — PROGRESS NOTES
[facility]    Hospitalist Progress Note             Date of Admission:  11/3/2024                   Day of hospitalization: 3    Assessment and Plan:   Constantino Thorne is a 84 year old gentleman with hyperlipidemia, hypertension, atrial fibrillation on chronic warfarin (held since 10/10/2024), ischemic cardiomyopathy, CAD with prior MI and cardiac arrest with ICD in place, CKD, and SABINE who presents after a fall.     Pt was admitted here at [facility] under observation from 10/10-10/11 after a fall resulting in a traumatic SAH.  His warfarin was held and he was seen by NSG.  Repeat CT head during that stay was stable.  He was discharged with holding of his blood thinners.  He had subsequent CT head on 10/24 with resolution of hemorrhage.  He has been off his blood thinners since that admission.      #Fall c/b right femoral neck fx s/p right hip hemiarthroplasty: Pt was at his [employer] and fell getting into the elevator.  He did have dizziness prior to the fall.  No LOC.  No CP or palpitations.  Had immediate pain to right hip.  Brought into the ER for evaluation.   -In ER, pt afebrile and HDS.  Saturating OK on RA.  XR of pelvis showed acute right femoral neck fx.  CT head showed no acute hemorrhage or fracture.  CBC with mild leukocytosis.  BMP with creatinine 1.24 which is close to baseline.  UA without infection.   -Ortho consulted. Underwent surgical repair with right hip hemiarthroplasty on 11/4.    -Prn pain meds, activity post operatively.  Monitor Hgb.  EBL noted to be 100 ml.  Hgb at 10.6 on 10/6  -Consulted NSG given recent SAH.  They are OK with lovenox or ASA post-op for ppx.       CLARK  Cough  Possible left lower lobe infiltrate  -Patient complains of dyspnea on exertion with walking, also has a productive cough.  Given chest x-ray findings of left lower lobe infiltrates we will start him on Augmentin.  COVID and influenza on admission was negative  -He does not have significant lower  "extremity edema and lungs sound clear, Chest x-ray no evidence of volume overload.  Hold off Lasix    Right sided reproducible chest pain  - CT angio chest no evidence of Pulmonary embolism on Nov 2, CXR today no evidence of rib fracture  - likely related to fall, will order Ecg    Mild BETZAIDA  -His creatinine seems to be trending upward slowly, will encourage oral intake avoid further IV fluids given his history of heart failure    Hyponatremia  -Somewhat improved from yesterday after 1 L of IV fluid    #Chronic Afib: Previosuly on warfarin.  Now with 2 falls in space of about 3 weeks with complications.  Holding warfarin and ppx OK per NSG.  Defer resumption of warfarin to primary care given his recurrent falls but would likely hold it until functional status improved and he is not falling.    -Reduced dose of metoprolol succinate to 25 mg BID from 50 mg BID.  Hold parameters on metoprolol.    -Will likely need reduction in BP meds on discharge given recurrent falls; likely contributing.      #Leukocytosis: No infectious symptoms.  UA without infection.  Suspect stress from fracture and surgery.  Monitor.  Repeat CBC improved     #CAD with h/o MI in 2010. History of V fib arrest s/p ICD. Chronic HFrEF: metoprolol with hold parameters. Continue statin.   #BPH: Continue flomax.    #SABINE: CPAP        # Code status: Full  # DVT: Lovenox  # IVF: None          Medically Ready for Discharge: Anticipated Tomorrow                    Ronit King MD    Subjective   Chief Complaint:  Fall  Subjective: Tired during the day as inability to sleep in the evening states he does have dyspnea on exertion when he is walking with physical therapy along with cough which is more productive right now and mucus with cough improving.        Objective   /69   Pulse 72   Temp 97.8  F (36.6  C) (Temporal)   Resp 18   Ht 1.854 m (6' 1\")   Wt 95.3 kg (210 lb)   SpO2 98%   BMI 27.71 kg/m       Physical Exam  General: Pt in NAD, normal " "appearance  HEENT: OP clear MMM, no JVD  Lungs: Clear to Auscultation Bilateral, normal breathing  without accessory muscle usage, no wheezing, rhonchi or crackles  Cardiac: +S1, S2, RRR, no MRG, no edema  Abdominal: normal bowel sounds, NT/ND  Skin: warm, dry, normal turgor, no rash  Psyche: A& O x3, appropriate affect             Intake/Output Summary (Last 24 hours) at 11/6/2024 1356  Last data filed at 11/6/2024 1300  Gross per 24 hour   Intake 1240 ml   Output 1650 ml   Net -410 ml           Labs and Imaging Results:      Recent Labs   Lab 11/06/24  0637 11/05/24  0619   WBC 14.9* 21.3*   HGB 10.6* 11.0*   * 140*        Recent Labs   Lab 11/06/24  1155 11/05/24  0619   * 133*   CO2 21* 21*   BUN 29.7* 24.7*        Recent Labs   Lab 11/02/24  0241   INR 1.05      No results for input(s): \"CKMB\" in the last 168 hours.    Invalid input(s): \"TROPONINT\"     Recent Labs   Lab 11/02/24  0241   ALBUMIN 3.7   AST 27   ALT 25   ALKPHOS 103        Micro:     Radio:  XR Chest Port 1 View   Preliminary Result   IMPRESSION: Increased mild streaky opacities at the left lung base may   be developing airspace disease. Normal cardiac silhouette. Left chest   pacer is stable.      XR Pelvis w Hip Port Right 1 View   Final Result   IMPRESSION:   Postoperative changes right hip implant for treatment of   a femoral neck fracture. Hardware is well-seated without fracture or   dislocation. Bones are demineralized.       RONAK POPE MD            SYSTEM ID:  [id number]      Head CT w/o contrast   Final Result   IMPRESSION: Negative for acute intracranial hemorrhage, hydrocephalus or transcortical infarct. No skull fracture.      XR Pelvis w Hip Right 1 View   Final Result   IMPRESSION: Acute right femoral neck fracture with superolateral displacement. There is normal joint alignment. Mild degenerative changes throughout the pelvis.              Medications:      Scheduled Meds:    Current Facility-Administered " Medications   Medication Dose Route Frequency Provider Last Rate Last Admin    acetaminophen (TYLENOL) tablet 975 mg  975 mg Oral Q8H Marina Bain PA-C   975 mg at 11/06/24 1229    amoxicillin-clavulanate (AUGMENTIN) 875-125 MG per tablet 1 tablet  1 tablet Oral Q12H [facility] (08/20) Ronit King MD   1 tablet at 11/06/24 1336    atorvastatin (LIPITOR) tablet 80 mg  80 mg Oral At Bedtime Marina Bain PA-C   80 mg at 11/05/24 2118    enoxaparin ANTICOAGULANT (LOVENOX) injection 40 mg  40 mg Subcutaneous Q24H Marina Bain PA-C   40 mg at 11/06/24 0514    metoprolol succinate ER (TOPROL XL) 24 hr tablet 25 mg  25 mg Oral BID Stefan Kapadia MD        polyethylene glycol (MIRALAX) Packet 17 g  17 g Oral Daily Marina Bain PA-C        senna-docusate (SENOKOT-S/PERICOLACE) 8.6-50 MG per tablet 1 tablet  1 tablet Oral BID Marina Bain PA-C   1 tablet at 11/06/24 0805    Or    senna-docusate (SENOKOT-S/PERICOLACE) 8.6-50 MG per tablet 2 tablet  2 tablet Oral BID Marina Bain PA-C        sodium chloride (PF) 0.9% PF flush 3 mL  3 mL Intracatheter Q8H Marina Bain PA-C   3 mL at 11/06/24 0516    tamsulosin (FLOMAX) capsule 0.4 mg  0.4 mg Oral Daily Marina Bain PA-C   0.4 mg at 11/06/24 0805     Continuous Infusions:    Current Facility-Administered Medications   Medication Dose Route Frequency Provider Last Rate Last Admin     PRN Meds:    Current Facility-Administered Medications   Medication Dose Route Frequency Provider Last Rate Last Admin    [START ON 11/7/2024] acetaminophen (TYLENOL) tablet 650 mg  650 mg Oral Q4H PRN Marina Bain PA-C        benzocaine-menthol (CHLORASEPTIC) 6-10 MG lozenge 1 lozenge  1 lozenge Buccal Q1H PRN Marina Bain PA-C        [START ON 11/7/2024] bisacodyl (DULCOLAX) suppository 10 mg  10 mg Rectal Daily PRN Marina Bain PA-C        calcium carbonate (TUMS) chewable tablet 1,000 mg  1,000 mg Oral 4x Daily PRN Marina Bain PA-C        HYDROmorphone (DILAUDID) injection 0.2 mg  0.2 mg  Intravenous Q2H PRN Bain, Marina DONTA PA-C   0.2 mg at 11/03/24 2239    HYDROmorphone (DILAUDID) injection 0.4 mg  0.4 mg Intravenous Q2H PRN Bain, Marina DONTA PA-C        hydrOXYzine HCl (ATARAX) tablet 10 mg  10 mg Oral Q6H PRN Dean Wong, DO   10 mg at 11/05/24 2132    lidocaine (LMX4) cream   Topical Q1H PRN Bain, Marina BRIE LONGO-C        lidocaine 1 % 0.1-1 mL  0.1-1 mL Other Q1H PRN Bain, Marina DONTA PA-C        magnesium hydroxide (MILK OF MAGNESIA) suspension 30 mL  30 mL Oral Daily PRN Bain, Marina DONTA PA-C        naloxone (NARCAN) injection 0.2 mg  0.2 mg Intravenous Q2 Min PRN Bain, Marina DONTA PA-C        Or    naloxone (NARCAN) injection 0.4 mg  0.4 mg Intravenous Q2 Min PRN Bain, Marina DONTA PA-C        Or    naloxone (NARCAN) injection 0.2 mg  0.2 mg Intramuscular Q2 Min PRN Bain, Marina DONTA, PA-C        Or    naloxone (NARCAN) injection 0.4 mg  0.4 mg Intramuscular Q2 Min PRN Bain, Marina DONTA PA-C        nitroGLYcerin (NITROSTAT) sublingual tablet 0.4 mg  0.4 mg Sublingual Q5 Min PRN Bain, Marina DONTA PA-C        ondansetron (ZOFRAN ODT) ODT tab 4 mg  4 mg Oral Q6H PRN Bani, Marina DONTA PA-C        Or    ondansetron (ZOFRAN) injection 4 mg  4 mg Intravenous Q6H PRN Odell, Marina DONTA PA-C        oxyCODONE (ROXICODONE) tablet 5 mg  5 mg Oral Q4H PRN Bain, Mraina DONTA PA-C   5 mg at 11/06/24 0932    Or    oxyCODONE IR (ROXICODONE) tablet 10 mg  10 mg Oral Q4H PRN Bain, Marina BRIE LONGO-C        sodium chloride (PF) 0.9% PF flush 3 mL  3 mL Intracatheter q1 min prn Odell, Marina BRIE LONGO-C

## 2024-11-07 ENCOUNTER — APPOINTMENT (OUTPATIENT)
Dept: PHYSICAL THERAPY | Facility: CLINIC | Age: 84
DRG: 521 | End: 2024-11-07
Payer: COMMERCIAL

## 2024-11-07 ENCOUNTER — APPOINTMENT (OUTPATIENT)
Dept: CARDIOLOGY | Facility: CLINIC | Age: 84
DRG: 521 | End: 2024-11-07
Attending: HOSPITALIST
Payer: COMMERCIAL

## 2024-11-07 LAB
ANION GAP SERPL CALCULATED.3IONS-SCNC: 12 MMOL/L (ref 7–15)
BUN SERPL-MCNC: 25.7 MG/DL (ref 8–23)
CALCIUM SERPL-MCNC: 8.4 MG/DL (ref 8.8–10.4)
CHLORIDE SERPL-SCNC: 101 MMOL/L (ref 98–107)
CREAT SERPL-MCNC: 0.98 MG/DL (ref 0.67–1.17)
EGFRCR SERPLBLD CKD-EPI 2021: 76 ML/MIN/1.73M2
ERYTHROCYTE [DISTWIDTH] IN BLOOD BY AUTOMATED COUNT: 13.9 % (ref 10–15)
GLUCOSE SERPL-MCNC: 108 MG/DL (ref 70–99)
HCO3 SERPL-SCNC: 20 MMOL/L (ref 22–29)
HCT VFR BLD AUTO: 32.1 % (ref 40–53)
HGB BLD-MCNC: 10.9 G/DL (ref 13.3–17.7)
LVEF ECHO: NORMAL
MAGNESIUM SERPL-MCNC: 1.7 MG/DL (ref 1.7–2.3)
MCH RBC QN AUTO: 31.1 PG (ref 26.5–33)
MCHC RBC AUTO-ENTMCNC: 34 G/DL (ref 31.5–36.5)
MCV RBC AUTO: 92 FL (ref 78–100)
PLATELET # BLD AUTO: 144 10E3/UL (ref 150–450)
POTASSIUM SERPL-SCNC: 4 MMOL/L (ref 3.4–5.3)
RBC # BLD AUTO: 3.5 10E6/UL (ref 4.4–5.9)
SODIUM SERPL-SCNC: 133 MMOL/L (ref 135–145)
TROPONIN T SERPL HS-MCNC: 18 NG/L
TROPONIN T SERPL HS-MCNC: 20 NG/L
WBC # BLD AUTO: 12.4 10E3/UL (ref 4–11)

## 2024-11-07 PROCEDURE — 250N000011 HC RX IP 250 OP 636: Performed by: SPECIALIST/TECHNOLOGIST

## 2024-11-07 PROCEDURE — 93306 TTE W/DOPPLER COMPLETE: CPT | Mod: 26 | Performed by: INTERNAL MEDICINE

## 2024-11-07 PROCEDURE — 83735 ASSAY OF MAGNESIUM: CPT | Performed by: HOSPITALIST

## 2024-11-07 PROCEDURE — 84520 ASSAY OF UREA NITROGEN: CPT | Performed by: HOSPITALIST

## 2024-11-07 PROCEDURE — 250N000013 HC RX MED GY IP 250 OP 250 PS 637: Performed by: HOSPITALIST

## 2024-11-07 PROCEDURE — 84484 ASSAY OF TROPONIN QUANT: CPT | Performed by: HOSPITALIST

## 2024-11-07 PROCEDURE — 97116 GAIT TRAINING THERAPY: CPT | Mod: GP | Performed by: PHYSICAL THERAPIST

## 2024-11-07 PROCEDURE — 93010 ELECTROCARDIOGRAM REPORT: CPT | Performed by: INTERNAL MEDICINE

## 2024-11-07 PROCEDURE — 120N000001 HC R&B MED SURG/OB

## 2024-11-07 PROCEDURE — 85014 HEMATOCRIT: CPT | Performed by: HOSPITALIST

## 2024-11-07 PROCEDURE — 85048 AUTOMATED LEUKOCYTE COUNT: CPT | Performed by: HOSPITALIST

## 2024-11-07 PROCEDURE — 36415 COLL VENOUS BLD VENIPUNCTURE: CPT | Performed by: HOSPITALIST

## 2024-11-07 PROCEDURE — 80048 BASIC METABOLIC PNL TOTAL CA: CPT | Performed by: HOSPITALIST

## 2024-11-07 PROCEDURE — 99232 SBSQ HOSP IP/OBS MODERATE 35: CPT | Performed by: HOSPITALIST

## 2024-11-07 PROCEDURE — 93005 ELECTROCARDIOGRAM TRACING: CPT

## 2024-11-07 PROCEDURE — 250N000013 HC RX MED GY IP 250 OP 250 PS 637: Performed by: SPECIALIST/TECHNOLOGIST

## 2024-11-07 PROCEDURE — 255N000002 HC RX 255 OP 636: Performed by: HOSPITALIST

## 2024-11-07 PROCEDURE — 999N000208 ECHOCARDIOGRAM COMPLETE

## 2024-11-07 PROCEDURE — 82435 ASSAY OF BLOOD CHLORIDE: CPT | Performed by: HOSPITALIST

## 2024-11-07 PROCEDURE — 97530 THERAPEUTIC ACTIVITIES: CPT | Mod: GP | Performed by: PHYSICAL THERAPIST

## 2024-11-07 RX ORDER — METOPROLOL SUCCINATE 25 MG/1
12.5 TABLET, EXTENDED RELEASE ORAL 2 TIMES DAILY
Qty: 30 TABLET | Refills: 0 | Status: SHIPPED | OUTPATIENT
Start: 2024-11-07 | End: 2024-11-08

## 2024-11-07 RX ADMIN — OXYCODONE HYDROCHLORIDE 5 MG: 5 TABLET ORAL at 05:59

## 2024-11-07 RX ADMIN — ACETAMINOPHEN 650 MG: 325 TABLET, FILM COATED ORAL at 12:59

## 2024-11-07 RX ADMIN — METOPROLOL SUCCINATE 12.5 MG: 25 TABLET, EXTENDED RELEASE ORAL at 15:17

## 2024-11-07 RX ADMIN — SENNOSIDES AND DOCUSATE SODIUM 1 TABLET: 8.6; 5 TABLET ORAL at 08:06

## 2024-11-07 RX ADMIN — PERFLUTREN 2 ML: 6.52 INJECTION, SUSPENSION INTRAVENOUS at 14:08

## 2024-11-07 RX ADMIN — ACETAMINOPHEN 650 MG: 325 TABLET, FILM COATED ORAL at 21:27

## 2024-11-07 RX ADMIN — ENOXAPARIN SODIUM 40 MG: 40 INJECTION SUBCUTANEOUS at 05:59

## 2024-11-07 RX ADMIN — AMOXICILLIN AND CLAVULANATE POTASSIUM 1 TABLET: 875; 125 TABLET, FILM COATED ORAL at 21:32

## 2024-11-07 RX ADMIN — AMOXICILLIN AND CLAVULANATE POTASSIUM 1 TABLET: 875; 125 TABLET, FILM COATED ORAL at 08:06

## 2024-11-07 RX ADMIN — ATORVASTATIN CALCIUM 80 MG: 40 TABLET, FILM COATED ORAL at 21:32

## 2024-11-07 RX ADMIN — ACETAMINOPHEN 975 MG: 325 TABLET, FILM COATED ORAL at 05:59

## 2024-11-07 RX ADMIN — TAMSULOSIN HYDROCHLORIDE 0.4 MG: 0.4 CAPSULE ORAL at 08:06

## 2024-11-07 ASSESSMENT — ACTIVITIES OF DAILY LIVING (ADL)
ADLS_ACUITY_SCORE: 15.25
ADLS_ACUITY_SCORE: 0
ADLS_ACUITY_SCORE: 15.25
ADLS_ACUITY_SCORE: 15.25
ADLS_ACUITY_SCORE: 0
ADLS_ACUITY_SCORE: 15.25
ADLS_ACUITY_SCORE: 15.25
ADLS_ACUITY_SCORE: 0
ADLS_ACUITY_SCORE: 15.25
ADLS_ACUITY_SCORE: 0
ADLS_ACUITY_SCORE: 15.25
ADLS_ACUITY_SCORE: 0
ADLS_ACUITY_SCORE: 15.25
ADLS_ACUITY_SCORE: 15.25
ADLS_ACUITY_SCORE: 0
ADLS_ACUITY_SCORE: 15.25

## 2024-11-07 NOTE — PROGRESS NOTES
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     Pt was admitted here at [facility] under observation from 10/10-10/11 after a fall resulting in a traumatic SAH.  His warfarin was held and he was seen by NSG.  Repeat CT head during that stay was stable.  He was discharged with holding of his blood thinners.  He had subsequent CT head on 10/24 with resolution of hemorrhage.  He has been off his blood thinners since that admission.      #Fall c/b right femoral neck fx s/p right hip hemiarthroplasty: Pt was at his [employer] and fell getting into the elevator.  He did have dizziness prior to the fall.  No LOC.  No CP or palpitations.  Had immediate pain to right hip.  Brought into the ER for evaluation.   -In ER, pt afebrile and HDS.  Saturating OK on RA.  XR of pelvis showed acute right femoral neck fx.  CT head showed no acute hemorrhage or fracture.  CBC with mild leukocytosis.  BMP with creatinine 1.24 which is close to baseline.  UA without infection.   -Ortho consulted. Underwent surgical repair with right hip hemiarthroplasty on 11/4.    -Prn pain meds, activity post operatively.  Monitor Hgb.  EBL noted to be 100 ml.  Hgb at 10.6 on 10/6  -Consulted NSG given recent SAH.  They are OK with lovenox or ASA post-op for ppx.       CLARK  Cough  Possible left lower lobe infiltrate  -Patient complains of dyspnea on exertion with walking, also has a productive cough.  Given chest x-ray findings of left lower lobe infiltrates we will start him on Augmentin.  COVID and influenza on admission was negative  -He does not have significant lower  extremity edema and lungs sound clear, Chest x-ray no evidence of volume overload.  Hold off Lasix  -He continues to complain of dyspnea on exertion, will obtain echocardiogram to evaluate any cardiac etiology.    Right sided reproducible chest pain  - CT angio chest no evidence of Pulmonary embolism on Nov 2, CXR no evidence of rib fracture  - likely related to fall, ECG no T wave or ST changes noted    Mild BETZAIDA ruled out  -His creatinine initially seems to be trending upward slowly, will encourage oral intake avoid further IV fluids given his history of heart failure  -Improved today monitor    Hyponatremia  -Somewhat improved from yesterday after 1 L of IV fluid    #Chronic Afib: Previosuly on warfarin.  Now with 2 falls in space of about 3 weeks with complications.  Holding warfarin and ppx OK per NSG.  Defer resumption of warfarin to primary care given his recurrent falls but would likely hold it until functional status improved and he is not falling.    -Reduced dose of metoprolol succinate to 25 mg BID from 50 mg BID.  Hold parameters on metoprolol.    -Will likely need reduction in BP meds on discharge given recurrent falls; likely contributing.   -Blood pressures have still been borderline low and patient unable to tolerate a beta-blocker  -Today complaints of palpitations will order telemetry, ECG has been normal sinus rhythm    #Leukocytosis: No infectious symptoms.  UA without infection.  Suspect stress from fracture and surgery.  Monitor.  Repeat CBC improved     #CAD with h/o MI in 2010. History of V fib arrest s/p ICD. Chronic HFrEF: metoprolol with hold parameters. Continue statin.   #BPH: Continue flomax.    #SABINE: CPAP        # Code status: Full  # DVT: Lovenox  # IVF: None          Medically Ready for Discharge: Anticipated Tomorrow                    Ronit King MD    Subjective   Chief Complaint:  Fall  Subjective: Feels tired today.  States he felt short of breath this morning along with  "palpitations.  Otherwise still has the right-sided chest pain.  Still feels short of breath.  No other complaints.        Objective   BP 90/57   Pulse 86   Temp 96.8  F (36  C) (Temporal)   Resp 20   Ht 1.854 m (6' 1\")   Wt 95.3 kg (210 lb)   SpO2 97%   BMI 27.71 kg/m       Physical Exam  General: Pt in NAD, normal appearance  HEENT: OP clear MMM, no JVD  Lungs: Clear to Auscultation Bilateral, normal breathing  without accessory muscle usage, no wheezing, rhonchi or crackles  Cardiac: +S1, S2, RRR, no MRG, no edema  Abdominal: normal bowel sounds, NT/ND  Skin: warm, dry, normal turgor, no rash  Psyche: A& O x3, appropriate affect             Intake/Output Summary (Last 24 hours) at 11/6/2024 1356  Last data filed at 11/6/2024 1300  Gross per 24 hour   Intake 1240 ml   Output 1650 ml   Net -410 ml           Labs and Imaging Results:      Recent Labs   Lab 11/07/24  0626 11/06/24  0637   WBC 12.4* 14.9*   HGB 10.9* 10.6*   * 135*        Recent Labs   Lab 11/07/24  0626 11/06/24  1155   * 134*   CO2 20* 21*   BUN 25.7* 29.7*        Recent Labs   Lab 11/02/24  0241   INR 1.05      No results for input(s): \"CKMB\" in the last 168 hours.    Invalid input(s): \"TROPONINT\"     Recent Labs   Lab 11/02/24  0241   ALBUMIN 3.7   AST 27   ALT 25   ALKPHOS 103        Micro:     Radio:  XR Chest Port 1 View   Final Result   IMPRESSION: Increased mild streaky opacities at the left lung base may   be developing airspace disease. Normal cardiac silhouette. Left chest   pacer is stable.      MICHELLE DIETZ MD            SYSTEM ID:  [id number]      XR Pelvis w Hip Port Right 1 View   Final Result   IMPRESSION:   Postoperative changes right hip implant for treatment of   a femoral neck fracture. Hardware is well-seated without fracture or   dislocation. Bones are demineralized.       RONAK POPE MD            SYSTEM ID:  [id number]      Head CT w/o contrast   Final Result   IMPRESSION: Negative for acute intracranial " hemorrhage, hydrocephalus or transcortical infarct. No skull fracture.      XR Pelvis w Hip Right 1 View   Final Result   IMPRESSION: Acute right femoral neck fracture with superolateral displacement. There is normal joint alignment. Mild degenerative changes throughout the pelvis.      Echocardiogram Complete    (Results Pending)           Medications:      Scheduled Meds:    Current Facility-Administered Medications   Medication Dose Route Frequency Provider Last Rate Last Admin    amoxicillin-clavulanate (AUGMENTIN) 875-125 MG per tablet 1 tablet  1 tablet Oral Q12H [facility] (08/20) Ronit King MD   1 tablet at 11/07/24 0806    atorvastatin (LIPITOR) tablet 80 mg  80 mg Oral At Bedtime BainMarina, PA-C   80 mg at 11/06/24 2251    enoxaparin ANTICOAGULANT (LOVENOX) injection 40 mg  40 mg Subcutaneous Q24H Marina Bain PA-C   40 mg at 11/07/24 0559    metoprolol succinate ER (TOPROL-XL) 24 hr half-tab 12.5 mg  12.5 mg Oral BID Ronit King MD        polyethylene glycol (MIRALAX) Packet 17 g  17 g Oral Daily Marina Bain PA-C        senna-docusate (SENOKOT-S/PERICOLACE) 8.6-50 MG per tablet 1 tablet  1 tablet Oral BID Odell Marina R, PA-C   1 tablet at 11/07/24 0806    Or    senna-docusate (SENOKOT-S/PERICOLACE) 8.6-50 MG per tablet 2 tablet  2 tablet Oral BID Marina Bain PA-C        sodium chloride (PF) 0.9% PF flush 3 mL  3 mL Intracatheter Q8H Odell Marina DONTA, PA-C   3 mL at 11/06/24 0516    tamsulosin (FLOMAX) capsule 0.4 mg  0.4 mg Oral Daily Bain Marina R, PA-C   0.4 mg at 11/07/24 0806     Continuous Infusions:    Current Facility-Administered Medications   Medication Dose Route Frequency Provider Last Rate Last Admin     PRN Meds:    Current Facility-Administered Medications   Medication Dose Route Frequency Provider Last Rate Last Admin    acetaminophen (TYLENOL) tablet 650 mg  650 mg Oral Q4H PRN Marina Bain PA-C        benzocaine-menthol (CHLORASEPTIC) 6-10 MG lozenge 1 lozenge  1 lozenge Buccal Q1H  PRN Bain, Marina DONTA PA-C        bisacodyl (DULCOLAX) suppository 10 mg  10 mg Rectal Daily PRN Odell, Marina DONTA PA-C        calcium carbonate (TUMS) chewable tablet 1,000 mg  1,000 mg Oral 4x Daily PRN Bain, Marina DONTA PA-C        HYDROmorphone (DILAUDID) injection 0.2 mg  0.2 mg Intravenous Q2H PRN Bain, Marina DONTA PA-C   0.2 mg at 11/03/24 2239    HYDROmorphone (DILAUDID) injection 0.4 mg  0.4 mg Intravenous Q2H PRN Bain, Marina DONTA PA-C        hydrOXYzine HCl (ATARAX) tablet 10 mg  10 mg Oral Q6H PRN Dean Wong DO   10 mg at 11/05/24 2132    lidocaine (LMX4) cream   Topical Q1H PRN Bain, Marina BRIE LONGO-C        lidocaine 1 % 0.1-1 mL  0.1-1 mL Other Q1H PRN Bain, Marina DONTA PA-C        magnesium hydroxide (MILK OF MAGNESIA) suspension 30 mL  30 mL Oral Daily PRN Bain, Marina DONTA PA-C        naloxone (NARCAN) injection 0.2 mg  0.2 mg Intravenous Q2 Min PRN Bain, Marina DONTA PA-C        Or    naloxone (NARCAN) injection 0.4 mg  0.4 mg Intravenous Q2 Min PRN Bain, Marina R, PA-C        Or    naloxone (NARCAN) injection 0.2 mg  0.2 mg Intramuscular Q2 Min PRN Bain, Marina R, PA-C        Or    naloxone (NARCAN) injection 0.4 mg  0.4 mg Intramuscular Q2 Min PRN Bain, Marina DONTA PA-C        nitroGLYcerin (NITROSTAT) sublingual tablet 0.4 mg  0.4 mg Sublingual Q5 Min PRN Bain, Marina DONTA PA-C        ondansetron (ZOFRAN ODT) ODT tab 4 mg  4 mg Oral Q6H PRN Odell, Marina DONTA PA-C        Or    ondansetron (ZOFRAN) injection 4 mg  4 mg Intravenous Q6H PRN Bain, Marina DONTA PA-C        oxyCODONE (ROXICODONE) tablet 5 mg  5 mg Oral Q4H PRN Bain, Marina DONTA PA-C   5 mg at 11/07/24 0559    Or    oxyCODONE IR (ROXICODONE) tablet 10 mg  10 mg Oral Q4H PRN Marina Bain PA-C        sodium chloride (PF) 0.9% PF flush 3 mL  3 mL Intracatheter q1 min prn Marina Bain PA-C

## 2024-11-07 NOTE — PROGRESS NOTES
Orthopedic Surgery  Constantino Thorne  11/07/2024     Admit Date:  11/3/2024  POD: 3 Days Post-Op   Procedure(s):  Right hip hemiarthroplasty     Patient resting comfortably in bed  Pain controlled at rest.   Tolerating oral intake.    Denies nausea or vomiting  Denies chest pain or shortness of breath    Temp:  [96.8  F (36  C)-99.1  F (37.3  C)] 96.8  F (36  C)  Pulse:  [74-88] 88  Resp:  [20] 20  BP: ()/(56-70) 114/57  SpO2:  [92 %-98 %] 97 %    Alert and oriented  Dressing is clean, dry, and intact.   Minimal erythema of the surrounding skin.   Bilateral calves are soft, non-tender.  Right lower extremity is NVI.  Sensation intact bilateral lower extremities  Patient able to resist dorsi and plantar flexion bilaterally  +Dp pulse    Labs:  Recent Labs   Lab Test 11/07/24  0626 11/06/24  0637 11/05/24  0619   WBC 12.4* 14.9* 21.3*   HGB 10.9* 10.6* 11.0*   * 135* 140*     Recent Labs   Lab Test 11/02/24  0241 10/11/24  0528 10/10/24  1045   INR 1.05 1.43* 2.03*       1. PLAN:   Continue lovenox 40 mg for DVT prophylaxis x 30 days. PTA coumadin will be held until follow-up with PCP.    Mobilize with PT/OT    WBAT Right LE with walker, no hyperext, no IR, no pivot/twist .     Continue current pain regimen   Dressings: Keep intact.  Change if >60% saturated or peeling off.    Follow-up: 2 weeks post-op with Dr Chacko team    2. Disposition   Anticipate d/c to TCU. Ortho stable       Margy Kinsey PA-C

## 2024-11-07 NOTE — PLAN OF CARE
"Goal Outcome Evaluation:      Plan of Care Reviewed With: patient    Overall Patient Progress: no changeOverall Patient Progress: no change    Outcome Evaluation: No acute changes overnight, plan for TCU at d/c    [id number]  A/Ox4, [place], VSS, on RA, no IV access, Ax1 W/GB, dressing to RLE CDI, given scheduled APAP for pain, mild cough, plan for discharge to TCU    Problem: Adult Inpatient Plan of Care  Goal: Plan of Care Review  Description: The Plan of Care Review/Shift note should be completed every shift.  The Outcome Evaluation is a brief statement about your assessment that the patient is improving, declining, or no change.  This information will be displayed automatically on your shift  note.  Outcome: Progressing  Flowsheets (Taken 11/7/2024 0774)  Outcome Evaluation: No acute changes overnight, plan for TCU at d/c  Plan of Care Reviewed With: patient  Overall Patient Progress: no change  Goal: Patient-Specific Goal (Individualized)  Description: You can add care plan individualizations to a care plan. Examples of Individualization might be:  \"Parent requests to be called daily at 9am for status\", \"I have a hard time hearing out of my right ear\", or \"Do not touch me to wake me up as it startles  me\".  Outcome: Progressing  Goal: Absence of Hospital-Acquired Illness or Injury  Outcome: Progressing  Intervention: Identify and Manage Fall Risk  Recent Flowsheet Documentation  Taken 11/6/2024 2132 by Indy Dominique RN  Safety Promotion/Fall Prevention:   activity supervised   assistive device/personal items within reach   clutter free environment maintained   nonskid shoes/slippers when out of bed   room near nurse's station   room organization consistent   safety round/check completed  Intervention: Prevent and Manage VTE (Venous Thromboembolism) Risk  Recent Flowsheet Documentation  Taken 11/6/2024 2132 by Indy Dominique RN  VTE Prevention/Management: SCDs on (sequential compression devices)  Intervention: " Prevent Infection  Recent Flowsheet Documentation  Taken 11/6/2024 2145 by Indy Dominique, RN  Infection Prevention:   environmental surveillance performed   rest/sleep promoted   single patient room provided  Goal: Optimal Comfort and Wellbeing  Outcome: Progressing  Goal: Readiness for Transition of Care  Outcome: Progressing     Problem: Orthopaedic Fracture  Goal: Absence of Bleeding  Outcome: Progressing  Goal: Bowel Elimination  Outcome: Progressing  Goal: Absence of Embolism Signs and Symptoms  Outcome: Progressing  Intervention: Prevent or Manage Embolism Risk  Recent Flowsheet Documentation  Taken 11/6/2024 2132 by Indy Dominique RN  VTE Prevention/Management: SCDs on (sequential compression devices)  Goal: Fracture Stability  Outcome: Progressing  Goal: Optimal Functional Ability  Outcome: Progressing  Goal: Absence of Infection Signs and Symptoms  Outcome: Progressing  Goal: Effective Tissue Perfusion  Outcome: Progressing  Goal: Optimal Pain Control and Function  Outcome: Progressing  Goal: Effective Oxygenation and Ventilation  Outcome: Progressing  Intervention: Promote Airway Secretion Clearance  Recent Flowsheet Documentation  Taken 11/6/2024 2132 by Indy Dominique RN  Cough And Deep Breathing: done with encouragement     Problem: Hip Arthroplasty  Goal: Optimal Coping  Outcome: Progressing  Goal: Absence of Bleeding  Outcome: Progressing  Goal: Effective Bowel Elimination  Outcome: Progressing  Goal: Fluid and Electrolyte Balance  Outcome: Progressing  Goal: Optimal Functional Ability  Outcome: Progressing  Goal: Absence of Infection Signs and Symptoms  Outcome: Progressing  Goal: Intact Neurovascular Status  Outcome: Progressing  Goal: Anesthesia/Sedation Recovery  Outcome: Progressing  Intervention: Optimize Anesthesia Recovery  Recent Flowsheet Documentation  Taken 11/6/2024 2132 by Indy Dominique RN  Safety Promotion/Fall Prevention:   activity supervised   assistive device/personal items  within reach   clutter free environment maintained   nonskid shoes/slippers when out of bed   room near nurse's station   room organization consistent   safety round/check completed  Goal: Optimal Pain Control and Function  Outcome: Progressing  Goal: Nausea and Vomiting Relief  Outcome: Progressing  Intervention: Prevent or Manage Nausea and Vomiting  Recent Flowsheet Documentation  Taken 11/6/2024 2132 by Indy Dominique RN  Nausea/Vomiting Interventions: (denies) other (see comments)  Goal: Effective Urinary Elimination  Outcome: Progressing  Goal: Effective Oxygenation and Ventilation  Outcome: Progressing  Intervention: Optimize Oxygenation and Ventilation  Recent Flowsheet Documentation  Taken 11/6/2024 2132 by Indy Dominique RN  Cough And Deep Breathing: done with encouragement     Problem: Comorbidity Management  Goal: Blood Pressure in Desired Range  Outcome: Progressing  Intervention: Maintain Blood Pressure Management  Recent Flowsheet Documentation  Taken 11/6/2024 2132 by Indy Doimnique RN  Medication Review/Management: medications reviewed     Problem: Pneumonia  Goal: Fluid Balance  Outcome: Progressing  Goal: Resolution of Infection Signs and Symptoms  Outcome: Progressing  Goal: Effective Oxygenation and Ventilation  Outcome: Progressing  Intervention: Promote Airway Secretion Clearance  Recent Flowsheet Documentation  Taken 11/6/2024 2132 by Indy Dominique RN  Cough And Deep Breathing: done with encouragement

## 2024-11-07 NOTE — PLAN OF CARE
"Shortness of breath/CLARK mainly this AM and with activity. Had right sided chest pain. ECHO. EKG. Troponin. Wife at bedside. RA. Lung sounds left LL fine crackles. Hoarse voice. Phlegm in throat. Productive cough. Augmentin.   Goal Outcome Evaluation: plan is TCU when medically ready.     Patient vital signs are at baseline: Yes RA. Tele. SR with PVCs. Pacemaker.   Patient able to ambulate as they were prior to admission or with assist devices provided by therapies during their stay:  No,  Reason:  walking improved in hallway with 1 assist, GB, walker.   Patient MUST void prior to discharge:  Yes  Patient able to tolerate oral intake:  Yes  Pain has adequate pain control using Oral analgesics:  Yes. Requesting to only take tylenol. \"Oxy making me too sleepy.\"  Does patient have an identified :  Yes Gale  Has goal D/C date and time been discussed with patient:  Yes TCU when medically ready.   Mild edema to feet. Call light and bed/chair alarms on for safety. Dressing clean and dry.   Pleasant.        Plan of Care Reviewed With: patient  Problem: Adult Inpatient Plan of Care  Goal: Plan of Care Review  Description: The Plan of Care Review/Shift note should be completed every shift.  The Outcome Evaluation is a brief statement about your assessment that the patient is improving, declining, or no change.  This information will be displayed automatically on your shift  note.  Outcome: Not Progressing  Flowsheets (Taken 11/7/2024 3081)  Plan of Care Reviewed With: patient  Overall Patient Progress: no change  Goal: Patient-Specific Goal (Individualized)  Description: You can add care plan individualizations to a care plan. Examples of Individualization might be:  \"Parent requests to be called daily at 9am for status\", \"I have a hard time hearing out of my right ear\", or \"Do not touch me to wake me up as it startles  me\".  Outcome: Not Progressing  Goal: Absence of Hospital-Acquired Illness or Injury  Outcome: Not " Was the patient seen in the last year in this department? Yes    Does patient have an active prescription for medications requested? No     Received Request Via: Pharmacy   Progressing  Intervention: Identify and Manage Fall Risk  Recent Flowsheet Documentation  Taken 11/7/2024 1017 by Lynn Alejandra, RN  Safety Promotion/Fall Prevention:   activity supervised   assistive device/personal items within reach   clutter free environment maintained   nonskid shoes/slippers when out of bed   room near nurse's station   room organization consistent   safety round/check completed  Intervention: Prevent and Manage VTE (Venous Thromboembolism) Risk  Recent Flowsheet Documentation  Taken 11/7/2024 1017 by Lynn Alejandra, RN  VTE Prevention/Management: SCDs on (sequential compression devices)  Intervention: Prevent Infection  Recent Flowsheet Documentation  Taken 11/7/2024 1017 by Lynn Alejandra, RN  Infection Prevention:   environmental surveillance performed   rest/sleep promoted   single patient room provided  Goal: Optimal Comfort and Wellbeing  Outcome: Not Progressing  Intervention: Monitor Pain and Promote Comfort  Recent Flowsheet Documentation  Taken 11/7/2024 1300 by Lynn Alejandra RN  Pain Management Interventions: medication (see MAR)  Goal: Readiness for Transition of Care  Outcome: Not Progressing     Problem: Orthopaedic Fracture  Goal: Absence of Bleeding  Outcome: Not Progressing  Intervention: Monitor and Manage Fracture Bleeding  Recent Flowsheet Documentation  Taken 11/7/2024 1017 by Lynn Alejandra, RN  Bleeding Management: dressing monitored  Goal: Bowel Elimination  Outcome: Not Progressing  Intervention: Promote Effective Bowel Elimination  Recent Flowsheet Documentation  Taken 11/7/2024 1017 by Lynn Alejandra, RN  Bowel Elimination Promotion:   adequate fluid intake promoted   ambulation promoted  Goal: Absence of Embolism Signs and Symptoms  Outcome: Not Progressing  Intervention: Prevent or Manage Embolism Risk  Recent Flowsheet Documentation  Taken 11/7/2024 1017 by Lynn Alejandra RN  VTE Prevention/Management:  SCDs on (sequential compression devices)  Goal: Fracture Stability  Outcome: Not Progressing  Goal: Optimal Functional Ability  Outcome: Not Progressing  Intervention: Optimize Functional Ability  Recent Flowsheet Documentation  Taken 11/7/2024 1600 by Lynn Alejandra RN  Activity Management:   ambulated outside room   up in chair  Taken 11/7/2024 1017 by Lynn Alejandra, RN  Self-Care Promotion: independence encouraged  Range of Motion: active ROM (range of motion) encouraged  Activity Management:   ambulated in room   up in chair  Goal: Absence of Infection Signs and Symptoms  Outcome: Not Progressing  Goal: Effective Tissue Perfusion  Outcome: Not Progressing  Goal: Optimal Pain Control and Function  Outcome: Not Progressing  Intervention: Manage Acute Orthopaedic-Related Pain  Recent Flowsheet Documentation  Taken 11/7/2024 1300 by Lynn Alejandra RN  Pain Management Interventions: medication (see MAR)  Goal: Effective Oxygenation and Ventilation  Outcome: Not Progressing  Intervention: Promote Airway Secretion Clearance  Recent Flowsheet Documentation  Taken 11/7/2024 1600 by Lynn Alejandra RN  Activity Management:   ambulated outside room   up in chair  Taken 11/7/2024 1017 by Lynn Alejandra RN  Cough And Deep Breathing: done with encouragement  Activity Management:   ambulated in room   up in chair     Problem: Hip Arthroplasty  Goal: Optimal Coping  Outcome: Not Progressing  Goal: Absence of Bleeding  Outcome: Not Progressing  Intervention: Monitor and Manage Bleeding  Recent Flowsheet Documentation  Taken 11/7/2024 1017 by Lynn Alejandra, RN  Bleeding Management: dressing monitored  Goal: Effective Bowel Elimination  Outcome: Not Progressing  Goal: Fluid and Electrolyte Balance  Outcome: Not Progressing  Goal: Optimal Functional Ability  Outcome: Not Progressing  Intervention: Promote Optimal Functional Status  Recent Flowsheet Documentation  Taken 11/7/2024  1600 by Lynn Alejandra, RN  Assistive Device Utilized:   gait belt   walker  Activity Management:   ambulated outside room   up in chair  Taken 11/7/2024 1017 by Lynn Alejandra RN  Self-Care Promotion: independence encouraged  Assistive Device Utilized:   gait belt   walker  Activity Management:   ambulated in room   up in chair  Goal: Absence of Infection Signs and Symptoms  Outcome: Not Progressing  Goal: Intact Neurovascular Status  Outcome: Not Progressing  Goal: Anesthesia/Sedation Recovery  Outcome: Not Progressing  Intervention: Optimize Anesthesia Recovery  Recent Flowsheet Documentation  Taken 11/7/2024 1017 by Lynn Alejandra, RN  Safety Promotion/Fall Prevention:   activity supervised   assistive device/personal items within reach   clutter free environment maintained   nonskid shoes/slippers when out of bed   room near nurse's station   room organization consistent   safety round/check completed  Administration (IS): instruction provided, follow-up  Level Incentive Spirometer (mL): 2000  Goal: Optimal Pain Control and Function  Outcome: Not Progressing  Intervention: Prevent or Manage Pain  Recent Flowsheet Documentation  Taken 11/7/2024 1300 by Lynn Alejandra RN  Pain Management Interventions: medication (see MAR)  Goal: Nausea and Vomiting Relief  Outcome: Not Progressing  Intervention: Prevent or Manage Nausea and Vomiting  Recent Flowsheet Documentation  Taken 11/7/2024 1017 by Lynn Alejandra RN  Nausea/Vomiting Interventions: (denies) other (see comments)  Goal: Effective Urinary Elimination  Outcome: Not Progressing  Goal: Effective Oxygenation and Ventilation  Outcome: Not Progressing  Intervention: Optimize Oxygenation and Ventilation  Recent Flowsheet Documentation  Taken 11/7/2024 1600 by Lynn Alejandra, RN  Activity Management:   ambulated outside room   up in chair  Taken 11/7/2024 1017 by Lynn Alejandra, RN  Cough And Deep Breathing:  done with encouragement  Activity Management:   ambulated in room   up in chair     Problem: Comorbidity Management  Goal: Blood Pressure in Desired Range  Outcome: Not Progressing  Intervention: Maintain Blood Pressure Management  Recent Flowsheet Documentation  Taken 11/7/2024 1017 by Lynn Alejandra, RN  Medication Review/Management: medications reviewed     Problem: Pneumonia  Goal: Fluid Balance  Outcome: Not Progressing  Goal: Resolution of Infection Signs and Symptoms  Outcome: Not Progressing  Goal: Effective Oxygenation and Ventilation  Outcome: Not Progressing  Intervention: Promote Airway Secretion Clearance  Recent Flowsheet Documentation  Taken 11/7/2024 1600 by Lynn Alejandra, RN  Activity Management:   ambulated outside room   up in chair  Taken 11/7/2024 1017 by Lynn Alejandra, RN  Cough And Deep Breathing: done with encouragement  Activity Management:   ambulated in room   up in chair       Overall Patient Progress: no changeOverall Patient Progress: no change

## 2024-11-08 ENCOUNTER — APPOINTMENT (OUTPATIENT)
Dept: GENERAL RADIOLOGY | Facility: CLINIC | Age: 84
DRG: 521 | End: 2024-11-08
Attending: HOSPITALIST
Payer: COMMERCIAL

## 2024-11-08 LAB
MAGNESIUM SERPL-MCNC: 1.9 MG/DL (ref 1.7–2.3)
POTASSIUM SERPL-SCNC: 4.1 MMOL/L (ref 3.4–5.3)

## 2024-11-08 PROCEDURE — 250N000013 HC RX MED GY IP 250 OP 250 PS 637: Performed by: SPECIALIST/TECHNOLOGIST

## 2024-11-08 PROCEDURE — 84132 ASSAY OF SERUM POTASSIUM: CPT | Performed by: HOSPITALIST

## 2024-11-08 PROCEDURE — 120N000001 HC R&B MED SURG/OB

## 2024-11-08 PROCEDURE — 250N000011 HC RX IP 250 OP 636: Performed by: SPECIALIST/TECHNOLOGIST

## 2024-11-08 PROCEDURE — 99232 SBSQ HOSP IP/OBS MODERATE 35: CPT | Performed by: HOSPITALIST

## 2024-11-08 PROCEDURE — 71045 X-RAY EXAM CHEST 1 VIEW: CPT

## 2024-11-08 PROCEDURE — 83735 ASSAY OF MAGNESIUM: CPT | Performed by: HOSPITALIST

## 2024-11-08 PROCEDURE — 250N000013 HC RX MED GY IP 250 OP 250 PS 637: Performed by: HOSPITALIST

## 2024-11-08 PROCEDURE — 36415 COLL VENOUS BLD VENIPUNCTURE: CPT | Performed by: HOSPITALIST

## 2024-11-08 RX ORDER — MAGNESIUM OXIDE 400 MG/1
400 TABLET ORAL EVERY 4 HOURS
Status: COMPLETED | OUTPATIENT
Start: 2024-11-08 | End: 2024-11-08

## 2024-11-08 RX ORDER — METOPROLOL SUCCINATE 25 MG/1
12.5 TABLET, EXTENDED RELEASE ORAL DAILY
Qty: 30 TABLET | Refills: 0 | Status: SHIPPED | OUTPATIENT
Start: 2024-11-08 | End: 2024-11-09

## 2024-11-08 RX ADMIN — METOPROLOL SUCCINATE 12.5 MG: 25 TABLET, EXTENDED RELEASE ORAL at 21:32

## 2024-11-08 RX ADMIN — METOPROLOL SUCCINATE 12.5 MG: 25 TABLET, EXTENDED RELEASE ORAL at 12:45

## 2024-11-08 RX ADMIN — ATORVASTATIN CALCIUM 80 MG: 40 TABLET, FILM COATED ORAL at 21:36

## 2024-11-08 RX ADMIN — POLYETHYLENE GLYCOL 3350 17 G: 17 POWDER, FOR SOLUTION ORAL at 08:39

## 2024-11-08 RX ADMIN — Medication 400 MG: at 21:32

## 2024-11-08 RX ADMIN — AMOXICILLIN AND CLAVULANATE POTASSIUM 1 TABLET: 875; 125 TABLET, FILM COATED ORAL at 21:32

## 2024-11-08 RX ADMIN — AMOXICILLIN AND CLAVULANATE POTASSIUM 1 TABLET: 875; 125 TABLET, FILM COATED ORAL at 08:39

## 2024-11-08 RX ADMIN — ENOXAPARIN SODIUM 40 MG: 40 INJECTION SUBCUTANEOUS at 06:31

## 2024-11-08 RX ADMIN — TAMSULOSIN HYDROCHLORIDE 0.4 MG: 0.4 CAPSULE ORAL at 08:40

## 2024-11-08 RX ADMIN — Medication 400 MG: at 17:12

## 2024-11-08 RX ADMIN — SENNOSIDES AND DOCUSATE SODIUM 1 TABLET: 8.6; 5 TABLET ORAL at 08:40

## 2024-11-08 ASSESSMENT — ACTIVITIES OF DAILY LIVING (ADL)
ADLS_ACUITY_SCORE: 0
ADLS_ACUITY_SCORE: 15.25
ADLS_ACUITY_SCORE: 0
ADLS_ACUITY_SCORE: 15.25
ADLS_ACUITY_SCORE: 0

## 2024-11-08 NOTE — DISCHARGE SUMMARY
Discharge Summary  Hospitalist Service      Constantino Thorne MRN# [id number]   YOB: 1940 Age: 84 year old     Date of Admission:  11/3/2024  Date of Discharge:  11/8/2024  Admitting Physician:  Venkat Matias MD  Discharge Physician: Ronit King MD   Discharging Service: Hospitalist Service     Primary Provider: Thom Barnes  Primary Care Physician Phone Number: [phone]         Discharge Diagnoses/Problem Oriented Hospital Course (Providers):      Discharge Diagnoses   #Fall c/b right femoral neck fx s/p right hip hemiarthroplasty  #Possible left lower lobe infiltrate  #Mild BETZAIDA ruled out  #Chronic Afib  Hospital Course   Constantino Thorne is a 84 year old gentleman with hyperlipidemia, hypertension, atrial fibrillation on chronic warfarin (held since 10/10/2024), ischemic cardiomyopathy, CAD with prior MI and cardiac arrest with ICD in place, CKD, and SABINE who presents after a fall.     Pt was admitted here at [facility] under observation from 10/10-10/11 after a fall resulting in a traumatic SAH.  His warfarin was held and he was seen by NSG.  Repeat CT head during that stay was stable.  He was discharged with holding of his blood thinners.  He had subsequent CT head on 10/24 with resolution of hemorrhage.  He has been off his blood thinners since that admission.      Fall c/b right femoral neck fx s/p right hip hemiarthroplasty: Pt was at his [employer] and fell getting into the elevator.  He did have dizziness prior to the fall.  No LOC.  No CP or palpitations.  Had immediate pain to right hip.  Brought into the ER for evaluation.   -In ER, pt afebrile and HDS.  Saturating OK on RA.  XR of pelvis showed acute right femoral neck fx.  CT head showed no acute hemorrhage or fracture.  CBC with mild leukocytosis.  BMP with creatinine 1.24 which is close to baseline.  UA without infection.   -Ortho consulted. Underwent surgical repair with right hip hemiarthroplasty on 11/4.    -Prn pain meds, activity post  operatively.  Monitor Hgb.  EBL noted to be 100 ml.  Hgb at 10.6 on 10/6  -Consulted NSG given recent SAH.  They are OK with lovenox or ASA post-op for ppx.       CLARK  Cough  Possible left lower lobe infiltrate  -Patient complains of dyspnea on exertion with walking, also has a productive cough.  Given chest x-ray findings of left lower lobe infiltrates we will start him on Augmentin.  COVID and influenza on admission was negative  -He does not have significant lower extremity edema and lungs sound clear, Chest x-ray today possible volume overload, however, since initiation of antibiotics symptoms seem to be improving.  Hold off Lasix  -Echocardiogram for chest pain evaluation is unchanged, troponin's negative, ECG unchanged     Right sided reproducible chest pain  - CT angio chest no evidence of Pulmonary embolism on Nov 2, CXR no evidence of rib fracture  - likely related to fall, ECG no T wave or ST changes noted, as above     Mild BETZAIDA ruled out  -His creatinine initially seems to be trending upward slowly, will encourage oral intake avoid further IV fluids given his history of heart failure  -Improved today monitor     Hyponatremia  -Somewhat improved  after 1 L of IV fluid     #Chronic Afib: Previosuly on warfarin.  Now with 2 falls in space of about 3 weeks with complications.  Holding warfarin and ppx OK per NSG.  Defer resumption of warfarin to primary care given his recurrent falls but would likely hold it until functional status improved and he is not falling.    -Reduced dose of metoprolol succinate to 25 mg BID from 50 mg BID.  Hold parameters on metoprolol.    -Will likely need reduction in BP meds on discharge given recurrent falls; likely contributing.   -restarted his bb but at a lower dose, recommend titrate up as his BP can tolerate    #Leukocytosis: No infectious symptoms.  UA without infection.  Suspect stress from fracture and surgery.  Monitor.  Repeat CBC improved     #CAD with h/o MI in 2010.  History of V fib arrest s/p ICD. Chronic HFrEF: metoprolol with hold parameters. Continue statin.   #BPH: Continue flomax.    #SABINE: CPAP                Code Status:      Full Code         Important Results:                  Pending Results:        Unresulted Labs Ordered in the Past 30 Days of this Admission       No orders found from 10/4/2024 to 11/4/2024.                 Discharge Instructions and Follow-Up:      Follow-up Appointments     Follow Up and recommended labs and tests      Please call as soon as possible to make an appointment to be seen in Dr. Darinel Chacko's clinic at 2 weeks postop for a recheck of your surgical   site, possible repeat x-rays, and wound care. If you are at a TCU at this   time and they have x-ray capabilities, you may complete your wound care   and x-rays at your TCU and have them send your images to Dr. Chacko's   office.     Dr. Chacko's care coordinator is Ekaterina. Please contact her at   [phone] to schedule an appointment. Dr. Chacko's care team's fax   number is [phone].     Dr. Chacko sees patients at 3 clinic locations:  [facility]  [address] [place] [address]  [facility]  [address] [address] [place] [address] [place] [address]  [facility]  [address], [place] [address]      Please call the on-call phone number [phone] during evenings, nights   and weekends for any urgent needs. Prescription refills must be done   during business hours by contacting your surgical team.        Follow Up and recommended labs and tests      Follow up with Nursing home physician.  No follow up labs or test are   needed.  Titrate up metoprolol as blood pressures can tolerate, currently evening   doses on hold        Follow-up and recommended labs and tests       Your clinic appointment with [facility] on 11/14/24   has been CANCELED and no further follow up is needed.  You have been   cleared for activity by Neurosurgery. Please refer to Orthopedics   instruction on when to resume your Warfarin.                 Discharge Disposition:        Discharged to short-term care facility          Discharge Medications:        Current Discharge Medication List        START taking these medications    Details   acetaminophen (TYLENOL) 325 MG tablet Take 3 tablets (975 mg) by mouth every 8 hours.  Qty: 100 tablet, Refills: 0    Associated Diagnoses: S/P hip hemiarthroplasty      amoxicillin-clavulanate (AUGMENTIN) 875-125 MG tablet Take 1 tablet by mouth every 12 hours for 6 days.  Qty: 12 tablet, Refills: 0    Associated Diagnoses: Pneumonia of left lung due to infectious organism, unspecified part of lung      enoxaparin ANTICOAGULANT (LOVENOX) 40 MG/0.4ML syringe Inject 0.4 mLs (40 mg) subcutaneously every 24 hours.    Associated Diagnoses: S/P hip hemiarthroplasty      oxyCODONE (ROXICODONE) 5 MG tablet Take 1 tablet (5 mg) by mouth every 6 hours as needed for moderate to severe pain.  Qty: 20 tablet, Refills: 0    Associated Diagnoses: S/P hip hemiarthroplasty      senna-docusate (SENOKOT-S/PERICOLACE) 8.6-50 MG tablet Take 1 tablet by mouth 2 times daily.  Qty: 30 tablet, Refills: 0    Associated Diagnoses: S/P hip hemiarthroplasty           CONTINUE these medications which have CHANGED    Details   metoprolol succinate ER (TOPROL XL) 25 MG 24 hr tablet Take 0.5 tablets (12.5 mg) by mouth daily.  Qty: 30 tablet, Refills: 0    Associated Diagnoses: HFrEF (heart failure with reduced ejection fraction) (H)           CONTINUE these medications which have NOT CHANGED    Details   atorvastatin (LIPITOR) 80 MG tablet Take 80 mg by mouth At Bedtime       tamsulosin (FLOMAX) 0.4 MG capsule Take 0.4 mg by mouth daily      nitroGLYcerin (NITROSTAT) 0.4 MG sublingual tablet Place 1 Tablet under tongue as needed. If no relief after 5 min call 911;continue 1 tab every 5 min max 3 tab     "  triamcinolone (KENALOG) 0.1 % external cream Apply topically 2 times daily as needed for irritation.                  Allergies:         Allergies   Allergen Reactions    Lisinopril Unknown and Cough    Spironolactone Unknown and Diarrhea            Consultations This Hospital Stay:        Consultation during this admission received from orthopedics          Condition and Physical Exam on Discharge:        Discharge condition: Stable   Discharge vitals: Blood pressure 111/68, pulse 71, temperature 98.1  F (36.7  C), temperature source Temporal, resp. rate 18, height 1.854 m (6' 1\"), weight 95.3 kg (210 lb), SpO2 98%.   General: Pt in NAD, normal appearance  HEENT: OP clear MMM, no JVD  Lungs: Clear to Auscultation Bilateral, normal breathing  without accessory muscle usage, no wheezing, rhonchi or crackles  Cardiac: +S1, S2, RRR, no MRG, no edema  Abdominal: normal bowel sounds, NT/ND, no hepatosplenomegaly  Skin: warm, dry, normal turgor, no rash  Psyche: A& O x3, appropriate affect            Discharge Orders for Skilled Facility (from Discharge Orders):        After Care Instructions       Activity      Your activity upon discharge: activity as tolerated        Diet      Follow this diet upon discharge: Current Diet:Orders Placed This Encounter      Advance Diet as Tolerated: Regular Diet Adult        Fall precautions      General info for SNF      Length of Stay Estimate: Short Term Care: Estimated # of Days <30  Condition at Discharge: Stable  Level of care:skilled   Rehabilitation Potential: Good  Admission H&P remains valid and up-to-date: Yes  Recent Chemotherapy: N/A  Use Nursing Home Standing Orders: Yes        General info for SNF      Length of Stay Estimate: Short Term Care: Estimated # of Days <30  Condition at Discharge: Improving  Level of care:skilled   Rehabilitation Potential: Fair  Admission H&P remains valid and up-to-date: Yes  Recent Chemotherapy: N/A  Use Nursing Home Standing Orders: Yes  "       Mantoux instructions      Give two-step Mantoux (PPD) Per Facility Policy Yes        Weight bearing status      Weight bearing as tolerated        Wound care (specify)      Site:   right hip  Instructions:  Do not submerge in water. Cover for showers. Change if >60% saturation.                     Rehab orders for Skilled Facility (from Discharge Orders):      Referrals     Future Labs/Procedures    Occupational Therapy Adult Consult     Comments:    Evaluate and treat as clinically indicated.    Reason:  Right femoral neck fracture s/p right hip hemiarthroplasty   (11/4/2024)    Physical Therapy Adult Consult     Comments:    Evaluate and treat as clinically indicated.    Reason:  Right femoral neck fracture s/p right hip hemiarthroplasty   (11/4/2024)             Discharge Time:      \Greater than 30 minutes.        Image Results From This Hospital Stay (For Non-EPIC Providers):        Results for orders placed or performed during the hospital encounter of 11/03/24   Head CT w/o contrast    Narrative    EXAM: CT HEAD W/O CONTRAST  LOCATION: [facility]  DATE: 11/3/2024    INDICATION: eval for trauma  COMPARISON: 10/10/2024, 10/24/2024.  TECHNIQUE: Routine CT Head without IV contrast. Multiplanar reformats. Dose reduction techniques were used.    FINDINGS:  INTRACRANIAL CONTENTS: No intracranial hemorrhage, extraaxial collection, or mass effect.  No CT evidence of acute infarct. Mild to moderate presumed chronic small vessel ischemic changes. Mild to moderate generalized volume loss. No hydrocephalus.     VISUALIZED ORBITS/SINUSES/MASTOIDS: No intraorbital abnormality. No paranasal sinus mucosal disease. No middle ear or mastoid effusion.    BONES/SOFT TISSUES: No acute abnormality.      Impression    IMPRESSION: Negative for acute intracranial hemorrhage, hydrocephalus or transcortical infarct. No skull fracture.   XR Pelvis w Hip Right 1 View    Narrative    EXAM: XR PELVIS AND HIP  RIGHT 1 VIEW  LOCATION: [facility]  DATE: 11/3/2024    INDICATION: fall, pain  COMPARISON: None.      Impression    IMPRESSION: Acute right femoral neck fracture with superolateral displacement. There is normal joint alignment. Mild degenerative changes throughout the pelvis.   XR Pelvis w Hip Port Right 1 View    Narrative    PELVIS AND RIGHT HIP PORTABLE ONE VIEW 11/4/2024 12:00 PM     HISTORY: Status post hip surgery.    COMPARISON: 11/3/2024.       Impression    IMPRESSION:   Postoperative changes right hip implant for treatment of  a femoral neck fracture. Hardware is well-seated without fracture or  dislocation. Bones are demineralized.     RONAK POPE MD         SYSTEM ID:  [id number]   XR Chest Port 1 View    Narrative    XR CHEST PORT 1 VIEW   11/6/2024 12:02 PM     HISTORY: Cough    COMPARISON: 10/10/2024.      Impression    IMPRESSION: Increased mild streaky opacities at the left lung base may  be developing airspace disease. Normal cardiac silhouette. Left chest  pacer is stable.    MICHELLE DIETZ MD         SYSTEM ID:  [id number]   XR Chest Port 1 View    Narrative    CHEST ONE VIEW  11/8/2024 11:44 AM     HISTORY: crackles bilateral    COMPARISON: CT chest performed on 11/6/2024      Impression    IMPRESSION: Stable streaky opacities in the left lung base which may  reflect pneumonia. Mild bilateral interstitial opacities are also  present in the upper lungs and appear unchanged, possibly reflecting  edema. Stable borderline enlarged cardiomediastinal silhouette. Left  chest wall pacemaker device is unchanged. Severe degenerative changes  are present along the right shoulder. Moderate degenerative changes  are present on the left shoulder.    TUCKER SOARES MD         SYSTEM ID:  [id number]   Echocardiogram Complete     Value    LVEF  35-40%    Narrative    [id number]  [id number]  [id number]  [id number]^ANWER^ANI^[facility]  [facility]  [address] [place] [address]     Name: DESIREE SKAGGS  MRN: [id number]  : 1940  Study Date: 2024 01:58 PM  Age: 84 yrs  Gender: Male  Patient Location: [facility]  Reason For Study: SOB  Ordering Physician: CAN SORIA  Referring Physician: Thom Barnes  Performed By: Pattie Pereyra, YOMI     BSA: 2.2 m2  Height: 73 in  Weight: 210 lb  HR: 82  BP: 113/70 mmHg  ______________________________________________________________________________  Procedure  Complete Portable Echo Adult. Definity (NDC #34175-084) given intravenously.  ______________________________________________________________________________  Interpretation Summary     There is mild concentric left ventricular hypertrophy.  The visual ejection fraction is 35-40%.  Moderate - large area of severe hypokinesis to akinesis of the anteroseptal,  apical wall  The left atrium is moderately dilated.  The right atrium is mildly dilated.  There is trace to mild mitral regurgitation.  There is mild (1+) tricuspid regurgitation.  Right ventricular systolic pressure is elevated, consistent with moderate  pulmonary hypertension.  TDS-Definity used (no complications)  Aortic root dilatation is present.  Ascending aorta dilatation is present.  Compared to prior study, there is no significant change.  ______________________________________________________________________________  Left Ventricle  The left ventricle is normal in size. There is mild concentric left  ventricular hypertrophy. The visual ejection fraction is 35-40%. Moderate -  large area of severe hypokinesis to akinesis of the anteroseptal, apical wall.     Right Ventricle  There is a catheter/pacemaker lead seen in the right ventricle. The right  ventricle is normal in structure, function and size.     Atria  The left atrium is moderately dilated. The right atrium is mildly dilated.  There is no color Doppler evidence of an atrial shunt.     Mitral Valve  The mitral valve  leaflets appear thickened, but open well. There is trace to  mild mitral regurgitation.     Tricuspid Valve  The tricuspid valve is normal in structure and function. There is mild (1+)  tricuspid regurgitation. The right ventricular systolic pressure is  approximated at 37.2 mmHg plus the right atrial pressure. Right ventricular  systolic pressure is elevated, consistent with moderate pulmonary  hypertension.     Aortic Valve  There is mild trileaflet aortic sclerosis. There is trace aortic  regurgitation.     Pulmonic Valve  The pulmonic valve is not well seen, but is grossly normal. There is no  pulmonic valvular regurgitation.     Vessels  Aortic root dilatation is present. Ascending aorta dilatation is present.     Pericardium  There is no pericardial effusion.     ______________________________________________________________________________  MMode/2D Measurements & Calculations  IVSd: 1.2 cm  LVIDd: 5.4 cm  LVIDs: 3.7 cm  LVPWd: 1.2 cm  IVC diam: 1.6 cm  FS: 31.1 %  LV mass(C)d: 274.8 grams  LV mass(C)dI: 125.1 grams/m2     Ao root diam: 4.2 cm  asc Aorta Diam: 4.0 cm  Ao root diam index Ht(cm/m): 2.3  Ao root diam index BSA (cm/m2): 1.9  Asc Ao diam index BSA (cm/m2): 1.8  Asc Ao diam index Ht(cm/m): 2.2  EF Biplane: 37.8 %  LA Volume (BP): 92.3 ml  LA Volume Index (BP): 42.0 ml/m2  RV Base: 4.7 cm     RWT: 0.45  TAPSE: 2.8 cm     Doppler Measurements & Calculations  MV E max montrell: 74.4 cm/sec  MV A max montrell: 104.0 cm/sec  MV E/A: 0.72  MV dec time: 0.25 sec  PA acc time: 0.08 sec  PI end-d montrell: 94.0 cm/sec  TR max montrell: 305.1 cm/sec  TR max P.2 mmHg  E/E' av.5  Lateral E/e': 8.2  Medial E/e': 10.7  RV S Montrell: 19.7 cm/sec     ______________________________________________________________________________  Report approved by: Carlo Joyce 2024 03:06 PM                 Most Recent Lab Results In EPIC (For Non-EPIC Providers):    Most Recent 3 CBC's:  Recent Labs   Lab Test 24  0626  11/06/24  0637 11/05/24  0619   WBC 12.4* 14.9* 21.3*   HGB 10.9* 10.6* 11.0*   MCV 92 92 92   * 135* 140*      Most Recent 3 BMP's:  Recent Labs   Lab Test 11/07/24  0626 11/06/24  1155 11/06/24  0637 11/05/24  0619   * 134*  --  133*   POTASSIUM 4.0 3.7  --  4.2   CHLORIDE 101 101  --  101   CO2 20* 21*  --  21*   BUN 25.7* 29.7*  --  24.7*   CR 0.98 1.10  --  1.07   ANIONGAP 12 12  --  11   ZACH 8.4* 8.7*  --  8.4*   * 126* 100* 130*     Most Recent 3 Troponin's:No lab results found.  Most Recent 3 INR's:  Recent Labs   Lab Test 11/02/24  0241 10/11/24  0528 10/10/24  1045   INR 1.05 1.43* 2.03*     Most Recent 2 LFT's:  Recent Labs   Lab Test 11/02/24  0241 11/02/16  1930   AST 27 28   ALT 25 38   ALKPHOS 103 109   BILITOTAL 0.6 0.6     Most Recent Cholesterol Panel:No lab results found.  Most Recent 6 Bacteria Isolates From Any Culture (See EPIC Reports for Culture Details):No lab results found.  Most Recent TSH, T4 and HgbA1c:No lab results found.

## 2024-11-08 NOTE — PROGRESS NOTES
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     Pt was admitted here at [facility] under observation from 10/10-10/11 after a fall resulting in a traumatic SAH.  His warfarin was held and he was seen by NSG.  Repeat CT head during that stay was stable.  He was discharged with holding of his blood thinners.  He had subsequent CT head on 10/24 with resolution of hemorrhage.  He has been off his blood thinners since that admission.      Fall c/b right femoral neck fx s/p right hip hemiarthroplasty: Pt was at his [employer] and fell getting into the elevator.  He did have dizziness prior to the fall.  No LOC.  No CP or palpitations.  Had immediate pain to right hip.  Brought into the ER for evaluation.   -In ER, pt afebrile and HDS.  Saturating OK on RA.  XR of pelvis showed acute right femoral neck fx.  CT head showed no acute hemorrhage or fracture.  CBC with mild leukocytosis.  BMP with creatinine 1.24 which is close to baseline.  UA without infection.   -Ortho consulted. Underwent surgical repair with right hip hemiarthroplasty on 11/4.    -Prn pain meds, activity post operatively.  Monitor Hgb.  EBL noted to be 100 ml.  Hgb at 10.6 on 10/6  -Consulted NSG given recent SAH.  They are OK with lovenox or ASA post-op for ppx.       CLARK  Cough  Possible left lower lobe infiltrate  -Patient complains of dyspnea on exertion with walking, also has a productive cough.  Given chest x-ray findings of left lower lobe infiltrates  -Started on Augmentin.  COVID and influenza on admission was negative  -He does not have significant lower extremity edema  and lungs sound clear, Chest x-ray today possible volume overload, however, since initiation of antibiotics symptoms seem to be improving.  Hold off Lasix  -Echocardiogram for chest pain evaluation is unchanged, troponin's negative, ECG unchanged     Right sided reproducible chest pain  - CT angio chest no evidence of Pulmonary embolism on Nov 2, CXR no evidence of rib fracture  - likely related to fall, ECG no T wave or ST changes noted, as above     Mild BETZAIDA ruled out  -His creatinine initially seems to be trending upward slowly, will encourage oral intake avoid further IV fluids given his history of heart failure  -Improved today monitor     Hyponatremia  -Somewhat improved  after 1 L of IV fluid     #Chronic Afib: Previosuly on warfarin.  Now with 2 falls in space of about 3 weeks with complications.  Holding warfarin and ppx OK per NSG.  Defer resumption of warfarin to primary care given his recurrent falls but would likely hold it until functional status improved and he is not falling.    -Reduced dose of metoprolol succinate to 25 mg BID from 50 mg BID.  Hold parameters on metoprolol.    -Will likely need reduction in BP meds on discharge given recurrent falls; likely contributing.   -Patient appears to be going into SVT and atrial fibrillation. He did not receive metoprolol dose this AM, suspect this is reason HR were elevated, will give metoprolol 12.5 mg and observe     #Leukocytosis: No infectious symptoms.  UA without infection.  Suspect stress from fracture and surgery.  Monitor.  Repeat CBC improved     #CAD with h/o MI in 2010. History of V fib arrest s/p ICD. Chronic HFrEF: metoprolol with hold parameters. Continue statin.   #BPH: Continue flomax.    #SABINE: CPAP        # Code status: Full  # DVT: Lovenox  # IVF: None          Medically Ready for Discharge: Anticipated Tomorrow                    Ronit King MD    Subjective   Chief Complaint:  Fall  Subjective: Feels tired today.  Does not feel  "symptoms when in afib or SVT today.  Otherwise, feels better walking better, sob improved, cough still there        Objective   /64 (BP Location: Left arm)   Pulse 76   Temp 98.1  F (36.7  C) (Temporal)   Resp 18   Ht 1.854 m (6' 1\")   Wt 95.3 kg (210 lb)   SpO2 95%   BMI 27.71 kg/m       Physical Exam  General: Pt in NAD, normal appearance  HEENT: OP clear MMM, no JVD  Lungs: bibasilar crackles, normal breathing  without accessory muscle usage, no wheezing, rhonchi or crackles  Cardiac: +S1, S2, RRR, +murmur, no edema  Abdominal: normal bowel sounds, NT/ND  Skin: warm, dry, normal turgor, no rash  Psyche: A& O x3, appropriate affect             Intake/Output Summary (Last 24 hours) at 11/6/2024 1356  Last data filed at 11/6/2024 1300  Gross per 24 hour   Intake 1240 ml   Output 1650 ml   Net -410 ml           Labs and Imaging Results:      Recent Labs   Lab 11/07/24  0626 11/06/24  0637   WBC 12.4* 14.9*   HGB 10.9* 10.6*   * 135*        Recent Labs   Lab 11/07/24  0626 11/06/24  1155   * 134*   CO2 20* 21*   BUN 25.7* 29.7*        Recent Labs   Lab 11/02/24  0241   INR 1.05      No results for input(s): \"CKMB\" in the last 168 hours.    Invalid input(s): \"TROPONINT\"     Recent Labs   Lab 11/02/24  0241   ALBUMIN 3.7   AST 27   ALT 25   ALKPHOS 103        Micro:     Radio:  XR Chest Port 1 View   Final Result   IMPRESSION: Stable streaky opacities in the left lung base which may   reflect pneumonia. Mild bilateral interstitial opacities are also   present in the upper lungs and appear unchanged, possibly reflecting   edema. Stable borderline enlarged cardiomediastinal silhouette. Left   chest wall pacemaker device is unchanged. Severe degenerative changes   are present along the right shoulder. Moderate degenerative changes   are present on the left shoulder.      TUCKER SOARES MD            SYSTEM ID:  [id number]      Echocardiogram Complete   Final Result      XR Chest Port 1 View   Final " Result   IMPRESSION: Increased mild streaky opacities at the left lung base may   be developing airspace disease. Normal cardiac silhouette. Left chest   pacer is stable.      MICHELLE DIETZ MD            SYSTEM ID:  [id number]      XR Pelvis w Hip Port Right 1 View   Final Result   IMPRESSION:   Postoperative changes right hip implant for treatment of   a femoral neck fracture. Hardware is well-seated without fracture or   dislocation. Bones are demineralized.       RONAK POPE MD            SYSTEM ID:  [id number]      Head CT w/o contrast   Final Result   IMPRESSION: Negative for acute intracranial hemorrhage, hydrocephalus or transcortical infarct. No skull fracture.      XR Pelvis w Hip Right 1 View   Final Result   IMPRESSION: Acute right femoral neck fracture with superolateral displacement. There is normal joint alignment. Mild degenerative changes throughout the pelvis.              Medications:      Scheduled Meds:    Current Facility-Administered Medications   Medication Dose Route Frequency Provider Last Rate Last Admin    amoxicillin-clavulanate (AUGMENTIN) 875-125 MG per tablet 1 tablet  1 tablet Oral Q12H [facility] (08/20) Ronit King MD   1 tablet at 11/08/24 0839    atorvastatin (LIPITOR) tablet 80 mg  80 mg Oral At Bedtime Marina Bain PA-C   80 mg at 11/07/24 2132    enoxaparin ANTICOAGULANT (LOVENOX) injection 40 mg  40 mg Subcutaneous Q24H Marina Bain PA-C   40 mg at 11/08/24 0631    metoprolol succinate ER (TOPROL-XL) 24 hr half-tab 12.5 mg  12.5 mg Oral BID Ronit King MD   12.5 mg at 11/08/24 1245    polyethylene glycol (MIRALAX) Packet 17 g  17 g Oral Daily Marina Bain PA-C   17 g at 11/08/24 0839    senna-docusate (SENOKOT-S/PERICOLACE) 8.6-50 MG per tablet 1 tablet  1 tablet Oral BID Marina Bain PA-C   1 tablet at 11/08/24 0840    Or    senna-docusate (SENOKOT-S/PERICOLACE) 8.6-50 MG per tablet 2 tablet  2 tablet Oral BID Marina Bain PA-C        sodium chloride (PF) 0.9% PF  flush 3 mL  3 mL Intracatheter Q8H Marina Bain PA-C   3 mL at 11/08/24 1246    tamsulosin (FLOMAX) capsule 0.4 mg  0.4 mg Oral Daily Marina Bain PA-C   0.4 mg at 11/08/24 0840     Continuous Infusions:    Current Facility-Administered Medications   Medication Dose Route Frequency Provider Last Rate Last Admin     PRN Meds:    Current Facility-Administered Medications   Medication Dose Route Frequency Provider Last Rate Last Admin    acetaminophen (TYLENOL) tablet 650 mg  650 mg Oral Q4H PRN Marina Bain PA-C   650 mg at 11/07/24 2127    benzocaine-menthol (CHLORASEPTIC) 6-10 MG lozenge 1 lozenge  1 lozenge Buccal Q1H PRN Marina Bain PA-C        bisacodyl (DULCOLAX) suppository 10 mg  10 mg Rectal Daily PRN Marina Bain PA-C        calcium carbonate (TUMS) chewable tablet 1,000 mg  1,000 mg Oral 4x Daily PRN Marina Bain PA-C        HYDROmorphone (DILAUDID) injection 0.2 mg  0.2 mg Intravenous Q2H PRN Marina Bain PA-C   0.2 mg at 11/03/24 2239    HYDROmorphone (DILAUDID) injection 0.4 mg  0.4 mg Intravenous Q2H PRN Marina Bain PA-C        hydrOXYzine HCl (ATARAX) tablet 10 mg  10 mg Oral Q6H PRN Dean Wong, DO   10 mg at 11/05/24 2132    lidocaine (LMX4) cream   Topical Q1H PRN Marina Bain PA-C        lidocaine 1 % 0.1-1 mL  0.1-1 mL Other Q1H PRN Marina Bain PA-C        magnesium hydroxide (MILK OF MAGNESIA) suspension 30 mL  30 mL Oral Daily PRN Marina Bain PA-C        naloxone (NARCAN) injection 0.2 mg  0.2 mg Intravenous Q2 Min PRN Odell Marina DONTA PA-C        Or    naloxone (NARCAN) injection 0.4 mg  0.4 mg Intravenous Q2 Min PRN Marina Bain PA-C        Or    naloxone (NARCAN) injection 0.2 mg  0.2 mg Intramuscular Q2 Min PRN Marina Bain PA-C        Or    naloxone (NARCAN) injection 0.4 mg  0.4 mg Intramuscular Q2 Min PRN Bain, Marina LONGO PA-C        nitroGLYcerin (NITROSTAT) sublingual tablet 0.4 mg  0.4 mg Sublingual Q5 Min PRN Odell, Marina LONGO PA-C        ondansetron (ZOFRAN ODT) ODT tab 4 mg  4 mg  Oral Q6H PRN Marina Bain PA-C        Or    ondansetron (ZOFRAN) injection 4 mg  4 mg Intravenous Q6H PRN Marina Bain PA-C        oxyCODONE (ROXICODONE) tablet 5 mg  5 mg Oral Q4H PRN Marina Bain PA-C   5 mg at 11/07/24 0559    Or    oxyCODONE IR (ROXICODONE) tablet 10 mg  10 mg Oral Q4H PRN Marina Bain PA-C        sodium chloride (PF) 0.9% PF flush 3 mL  3 mL Intracatheter q1 min prn Marina Bain PA-C

## 2024-11-08 NOTE — PLAN OF CARE
"Goal Outcome Evaluation:      Plan of Care Reviewed With: patient    Overall Patient Progress: improvingOverall Patient Progress: improving    Outcome Evaluation: Ax1 GB and walker. On RA. Using bedside urinal. Requested PRN tylenol. Slept pleasantly throughout night. Plan of discharge to [facility] today. Pt had 9 beats of SVT asymptomatic. VS are stable. MD notified.       Problem: Adult Inpatient Plan of Care  Goal: Plan of Care Review  Description: The Plan of Care Review/Shift note should be completed every shift.  The Outcome Evaluation is a brief statement about your assessment that the patient is improving, declining, or no change.  This information will be displayed automatically on your shift  note.  Outcome: Progressing  Flowsheets (Taken 11/8/2024 5253)  Outcome Evaluation: Ax1 GB and walker. On RA. Using bedside urinal. Requested PRN tylenol. Slept pleasantly throughout night. Plan of discharge to [facility] today.  Plan of Care Reviewed With: patient  Overall Patient Progress: improving  Goal: Patient-Specific Goal (Individualized)  Description: You can add care plan individualizations to a care plan. Examples of Individualization might be:  \"Parent requests to be called daily at 9am for status\", \"I have a hard time hearing out of my right ear\", or \"Do not touch me to wake me up as it startles  me\".  Outcome: Progressing  Goal: Absence of Hospital-Acquired Illness or Injury  Outcome: Progressing  Intervention: Identify and Manage Fall Risk  Recent Flowsheet Documentation  Taken 11/7/2024 2142 by Heather Dominique RN  Safety Promotion/Fall Prevention:   activity supervised   assistive device/personal items within reach   room near nurse's station   safety round/check completed  Intervention: Prevent Skin Injury  Recent Flowsheet Documentation  Taken 11/7/2024 2142 by Heather Dominique RN  Body Position: supine, legs elevated  Intervention: Prevent and Manage VTE (Venous Thromboembolism) " Risk  Recent Flowsheet Documentation  Taken 11/7/2024 2142 by Heather Dominique RN  VTE Prevention/Management: SCDs on (sequential compression devices)  Intervention: Prevent Infection  Recent Flowsheet Documentation  Taken 11/7/2024 2142 by Heather Dominique RN  Infection Prevention:   cohorting utilized   hand hygiene promoted   rest/sleep promoted   single patient room provided  Goal: Optimal Comfort and Wellbeing  Outcome: Progressing  Intervention: Monitor Pain and Promote Comfort  Recent Flowsheet Documentation  Taken 11/7/2024 2124 by Heather Dominique RN  Pain Management Interventions: medication (see MAR)  Goal: Readiness for Transition of Care  Outcome: Progressing     Problem: Orthopaedic Fracture  Goal: Absence of Bleeding  Outcome: Progressing  Goal: Bowel Elimination  Outcome: Progressing  Goal: Absence of Embolism Signs and Symptoms  Outcome: Progressing  Intervention: Prevent or Manage Embolism Risk  Recent Flowsheet Documentation  Taken 11/7/2024 2142 by Heather Dominique RN  VTE Prevention/Management: SCDs on (sequential compression devices)  Goal: Fracture Stability  Outcome: Progressing  Goal: Optimal Functional Ability  Outcome: Progressing  Goal: Absence of Infection Signs and Symptoms  Outcome: Progressing  Goal: Effective Tissue Perfusion  Outcome: Progressing  Goal: Optimal Pain Control and Function  Outcome: Progressing  Intervention: Manage Acute Orthopaedic-Related Pain  Recent Flowsheet Documentation  Taken 11/7/2024 2124 by Heather Dominique RN  Pain Management Interventions: medication (see MAR)  Goal: Effective Oxygenation and Ventilation  Outcome: Progressing  Intervention: Promote Airway Secretion Clearance  Recent Flowsheet Documentation  Taken 11/7/2024 2142 by Heather Dominique RN  Cough And Deep Breathing: done independently per patient  Intervention: Optimize Oxygenation and Ventilation  Recent Flowsheet Documentation  Taken 11/7/2024 2142 by Heather Dominique RN  Head  of Bed (HOB) Positioning: HOB at 20-30 degrees     Problem: Hip Arthroplasty  Goal: Optimal Coping  Outcome: Progressing  Goal: Absence of Bleeding  Outcome: Progressing  Goal: Effective Bowel Elimination  Outcome: Progressing  Goal: Fluid and Electrolyte Balance  Outcome: Progressing  Goal: Optimal Functional Ability  Outcome: Progressing  Goal: Absence of Infection Signs and Symptoms  Outcome: Progressing  Goal: Intact Neurovascular Status  Outcome: Progressing  Goal: Anesthesia/Sedation Recovery  Outcome: Progressing  Intervention: Optimize Anesthesia Recovery  Recent Flowsheet Documentation  Taken 11/7/2024 2142 by Heather Dominique RN  Safety Promotion/Fall Prevention:   activity supervised   assistive device/personal items within reach   room near nurse's station   safety round/check completed  Goal: Optimal Pain Control and Function  Outcome: Progressing  Intervention: Prevent or Manage Pain  Recent Flowsheet Documentation  Taken 11/7/2024 2124 by Heather Dominique RN  Pain Management Interventions: medication (see MAR)  Goal: Nausea and Vomiting Relief  Outcome: Progressing  Goal: Effective Urinary Elimination  Outcome: Progressing  Goal: Effective Oxygenation and Ventilation  Outcome: Progressing  Intervention: Optimize Oxygenation and Ventilation  Recent Flowsheet Documentation  Taken 11/7/2024 2142 by Heather Dominique RN  Cough And Deep Breathing: done independently per patient  Head of Bed (HOB) Positioning: HOB at 20-30 degrees     Problem: Comorbidity Management  Goal: Blood Pressure in Desired Range  Outcome: Progressing  Intervention: Maintain Blood Pressure Management  Recent Flowsheet Documentation  Taken 11/7/2024 2142 by Heather Dominique RN  Medication Review/Management: medications reviewed     Problem: Pneumonia  Goal: Fluid Balance  Outcome: Progressing  Goal: Resolution of Infection Signs and Symptoms  Outcome: Progressing  Goal: Effective Oxygenation and Ventilation  Outcome:  Progressing  Intervention: Promote Airway Secretion Clearance  Recent Flowsheet Documentation  Taken 11/7/2024 2142 by Heather Dominique RN  Cough And Deep Breathing: done independently per patient  Intervention: Optimize Oxygenation and Ventilation  Recent Flowsheet Documentation  Taken 11/7/2024 2142 by Heather Dominique RN  Head of Bed (HOB) Positioning: HOB at 20-30 degrees     Problem: Adult Inpatient Plan of Care  Goal: Absence of Hospital-Acquired Illness or Injury  Intervention: Prevent and Manage VTE (Venous Thromboembolism) Risk  Recent Flowsheet Documentation  Taken 11/7/2024 2142 by Heather Dominique RN  VTE Prevention/Management: SCDs on (sequential compression devices)     Problem: Adult Inpatient Plan of Care  Goal: Optimal Comfort and Wellbeing  Outcome: Progressing  Intervention: Monitor Pain and Promote Comfort  Recent Flowsheet Documentation  Taken 11/7/2024 2124 by Heather Dominique RN  Pain Management Interventions: medication (see MAR)     Problem: Adult Inpatient Plan of Care  Goal: Readiness for Transition of Care  Outcome: Progressing     Problem: Orthopaedic Fracture  Goal: Optimal Pain Control and Function  Outcome: Progressing  Intervention: Manage Acute Orthopaedic-Related Pain  Recent Flowsheet Documentation  Taken 11/7/2024 2124 by Heather Dominique RN  Pain Management Interventions: medication (see MAR)     Problem: Hip Arthroplasty  Goal: Optimal Coping  Outcome: Progressing     Problem: Comorbidity Management  Goal: Blood Pressure in Desired Range  Outcome: Progressing  Intervention: Maintain Blood Pressure Management  Recent Flowsheet Documentation  Taken 11/7/2024 2142 by Heather Dominique RN  Medication Review/Management: medications reviewed

## 2024-11-08 NOTE — PROGRESS NOTES
Orthopedic Surgery  Constantino Thorne  11/08/2024     Admit Date:  11/3/2024  POD: 4 Days Post-Op   Procedure(s):  Right hip hemiarthroplasty     Patient resting comfortably in chair  Endorsing right hip pain and stiffness today involving surgical site. Think walking and mobilizing will help with pain and support staff walking in kimbrough now.   Tolerating oral intake.    Denies nausea or vomiting  Denies chest pain or shortness of breath    Temp:  [97.6  F (36.4  C)-98.5  F (36.9  C)] 98.1  F (36.7  C)  Pulse:  [71-85] 76  Resp:  [16-20] 18  BP: (104-132)/(58-74) 105/64  SpO2:  [93 %-98 %] 95 %    Alert and oriented  Dressing is clean, dry, and intact.   Minimal erythema of the surrounding skin.   Bilateral calves are soft, non-tender.  Right lower extremity is NVI.  Sensation intact bilateral lower extremities  Patient able to resist dorsi and plantar flexion bilaterally  +Dp pulse    Labs:  Recent Labs   Lab Test 11/07/24  0626 11/06/24  0637 11/05/24  0619   WBC 12.4* 14.9* 21.3*   HGB 10.9* 10.6* 11.0*   * 135* 140*     Recent Labs   Lab Test 11/02/24  0241 10/11/24  0528 10/10/24  1045   INR 1.05 1.43* 2.03*       1. PLAN:   Continue lovenox 40 mg for DVT prophylaxis x 30 days. PTA coumadin will be held until follow-up with PCP.    Mobilize with PT/OT    WBAT Right LE with walker, no hyperext, no IR, no pivot/twist .     Continue current pain regimen. Patient going for a walk to help with pain and trying ace wrap for compression. He continues to apply ice.    Dressings: Keep intact.  Change if >60% saturated or peeling off.    Follow-up: 2 weeks post-op with Dr Chacko team    2. Disposition   Anticipate d/c to TCU. Ortho stable. Expecting discharge 11/9       Paty Molina PA-C

## 2024-11-08 NOTE — PROGRESS NOTES
Care Management Follow Up    Length of Stay (days): 5    Expected Discharge Date: 11/09/2024     Concerns to be Addressed:       Patient plan of care discussed at interdisciplinary rounds: Yes    Anticipated Discharge Disposition: Skilled Nursing Facility, Transitional Care  Anticipated Discharge Services: None  Anticipated Discharge DME: None    Patient/family educated on Medicare website which has current facility and service quality ratings: yes  Education Provided on the Discharge Plan: Yes  Patient/Family in Agreement with the Plan: yes    Referrals Placed by CM/SW: Post Acute Facilities  Private pay costs discussed: private room/amenity fees and transportation costs      Additional Information:  Per hospitalist, patient is not medically ready for discharge today.     Patient has been accepted for TCU placement at discharge at [facility], they were updated patient no longer discharging today.     Addendum 1457: Per [facility] admissions, they might not have a bed available over the weekend now. Follow up with them if patient is med ready over the weekend.     Next Steps: Follow up w TCU if patient is med ready for discharge over the weekend, [facility]    Irma He RN, BSN  Inpatient Care Coordination  [facility]  [phone]

## 2024-11-09 VITALS
BODY MASS INDEX: 27.83 KG/M2 | TEMPERATURE: 98 F | DIASTOLIC BLOOD PRESSURE: 70 MMHG | WEIGHT: 210 LBS | SYSTOLIC BLOOD PRESSURE: 122 MMHG | HEART RATE: 67 BPM | OXYGEN SATURATION: 95 % | HEIGHT: 73 IN | RESPIRATION RATE: 18 BRPM

## 2024-11-09 LAB
ATRIAL RATE - MUSE: 70 BPM
ATRIAL RATE - MUSE: 81 BPM
DIASTOLIC BLOOD PRESSURE - MUSE: NORMAL MMHG
DIASTOLIC BLOOD PRESSURE - MUSE: NORMAL MMHG
INTERPRETATION ECG - MUSE: NORMAL
INTERPRETATION ECG - MUSE: NORMAL
MAGNESIUM SERPL-MCNC: 1.9 MG/DL (ref 1.7–2.3)
P AXIS - MUSE: 22 DEGREES
P AXIS - MUSE: 70 DEGREES
PR INTERVAL - MUSE: 138 MS
PR INTERVAL - MUSE: 164 MS
QRS DURATION - MUSE: 92 MS
QRS DURATION - MUSE: 98 MS
QT - MUSE: 358 MS
QT - MUSE: 386 MS
QTC - MUSE: 415 MS
QTC - MUSE: 416 MS
R AXIS - MUSE: -14 DEGREES
R AXIS - MUSE: 14 DEGREES
SYSTOLIC BLOOD PRESSURE - MUSE: NORMAL MMHG
SYSTOLIC BLOOD PRESSURE - MUSE: NORMAL MMHG
T AXIS - MUSE: 61 DEGREES
T AXIS - MUSE: 87 DEGREES
VENTRICULAR RATE- MUSE: 70 BPM
VENTRICULAR RATE- MUSE: 81 BPM

## 2024-11-09 PROCEDURE — 36415 COLL VENOUS BLD VENIPUNCTURE: CPT | Performed by: HOSPITALIST

## 2024-11-09 PROCEDURE — 250N000011 HC RX IP 250 OP 636: Performed by: SPECIALIST/TECHNOLOGIST

## 2024-11-09 PROCEDURE — 250N000013 HC RX MED GY IP 250 OP 250 PS 637: Performed by: SPECIALIST/TECHNOLOGIST

## 2024-11-09 PROCEDURE — 99239 HOSP IP/OBS DSCHRG MGMT >30: CPT | Performed by: HOSPITALIST

## 2024-11-09 PROCEDURE — 83735 ASSAY OF MAGNESIUM: CPT | Performed by: HOSPITALIST

## 2024-11-09 PROCEDURE — 250N000013 HC RX MED GY IP 250 OP 250 PS 637: Performed by: HOSPITALIST

## 2024-11-09 RX ORDER — METOPROLOL SUCCINATE 25 MG/1
12.5 TABLET, EXTENDED RELEASE ORAL 2 TIMES DAILY
Qty: 30 TABLET | Refills: 0 | Status: SHIPPED | OUTPATIENT
Start: 2024-11-09 | End: 2024-11-15

## 2024-11-09 RX ORDER — MAGNESIUM OXIDE 400 MG/1
400 TABLET ORAL EVERY 4 HOURS
Status: COMPLETED | OUTPATIENT
Start: 2024-11-09 | End: 2024-11-09

## 2024-11-09 RX ADMIN — METOPROLOL SUCCINATE 12.5 MG: 25 TABLET, EXTENDED RELEASE ORAL at 08:06

## 2024-11-09 RX ADMIN — TAMSULOSIN HYDROCHLORIDE 0.4 MG: 0.4 CAPSULE ORAL at 08:06

## 2024-11-09 RX ADMIN — ENOXAPARIN SODIUM 40 MG: 40 INJECTION SUBCUTANEOUS at 06:05

## 2024-11-09 RX ADMIN — POLYETHYLENE GLYCOL 3350 17 G: 17 POWDER, FOR SOLUTION ORAL at 08:06

## 2024-11-09 RX ADMIN — Medication 400 MG: at 09:17

## 2024-11-09 RX ADMIN — Medication 400 MG: at 12:04

## 2024-11-09 RX ADMIN — SENNOSIDES AND DOCUSATE SODIUM 1 TABLET: 8.6; 5 TABLET ORAL at 08:06

## 2024-11-09 RX ADMIN — AMOXICILLIN AND CLAVULANATE POTASSIUM 1 TABLET: 875; 125 TABLET, FILM COATED ORAL at 08:06

## 2024-11-09 ASSESSMENT — ACTIVITIES OF DAILY LIVING (ADL)
ADLS_ACUITY_SCORE: 0

## 2024-11-09 NOTE — PROGRESS NOTES
Orthopedic Surgery  Constantino Thorne  2024  Admit Date:  11/3/2024  POD: 5 Days Post-Op  Procedure(s):  Right hip hemiarthroplasty    Patient resting comfortably in bed.    Patient states his hip pain is better today.   Tolerating oral intake.    Denies nausea or vomiting.  Per nursing, the patient's HR was up in the 140s last night.   Patient denies any SOB or chest pain.     Alert and orient to person, place, and time.  Vital Sign Ranges  Temperature Temp  Av.1  F (36.7  C)  Min: 97.9  F (36.6  C)  Max: 98.4  F (36.9  C)   Blood pressure Systolic (24hrs), Av , Min:104 , Max:124        Diastolic (24hrs), Av, Min:49, Max:71      Pulse Pulse  Av.9  Min: 67  Max: 79   Respirations Resp  Av.7  Min: 16  Max: 18   Pulse oximetry SpO2  Av.2 %  Min: 95 %  Max: 96 %       Dressing is clean, dry, and intact.   Minimal erythema of the surrounding skin.   Bilateral calves are soft, non-tender.   Bilateral lower extremity is NVI.  Sensation intact bilateral lower extremities.  5/5 motor with resisted dorsi and plantar flexion bilaterally.  +DP pulse.     Labs:  Recent Labs   Lab Test 24  1515 24  0626 24  1155   POTASSIUM 4.1 4.0 3.7     Recent Labs   Lab Test 24  0626 24  0637 24  0619   HGB 10.9* 10.6* 11.0*     Recent Labs   Lab Test 24  0241 10/11/24  0528 10/10/24  1045   INR 1.05 1.43* 2.03*     Recent Labs   Lab Test 24  0626 24  0637 24  0619   * 135* 140*       1. PLAN:              Continue lovenox 40 mg for DVT prophylaxis x 30 days. PTA coumadin will be held until follow-up with PCP.               Mobilize with PT/OT               WBAT Right LE with walker, no hyperext, no IR, no pivot/twist .                Continue current pain regimen              Dressings: Keep intact.  Change if >60% saturated or peeling off.               Follow-up: 2 weeks post-op with Dr Chacko team     2. Disposition              Anticipate  d/c to TCU. Ortho stable    Genevieve Mcclendon PA-C

## 2024-11-09 NOTE — PLAN OF CARE
"Orientation: AAOx4,  Pain: denies pain  O2: RA  GI/: uses urinal for voiding  Activity: A1 GB with walker.  Diet: regular diet  Protocols: Mg  Major shift events:  Plan: discharge to [facility] today if medically stable  Problem: Adult Inpatient Plan of Care  Goal: Plan of Care Review  Description: The Plan of Care Review/Shift note should be completed every shift.  The Outcome Evaluation is a brief statement about your assessment that the patient is improving, declining, or no change.  This information will be displayed automatically on your shift  note.  Outcome: Progressing  Flowsheets (Taken 11/9/2024 0150)  Plan of Care Reviewed With: patient  Overall Patient Progress: improving  Goal: Patient-Specific Goal (Individualized)  Description: You can add care plan individualizations to a care plan. Examples of Individualization might be:  \"Parent requests to be called daily at 9am for status\", \"I have a hard time hearing out of my right ear\", or \"Do not touch me to wake me up as it startles  me\".  Outcome: Progressing  Goal: Absence of Hospital-Acquired Illness or Injury  Outcome: Progressing  Intervention: Identify and Manage Fall Risk  Recent Flowsheet Documentation  Taken 11/8/2024 2138 by Azael Jensen RN  Safety Promotion/Fall Prevention:   activity supervised   assistive device/personal items within reach   increased rounding and observation   clutter free environment maintained   lighting adjusted   mobility aid in reach   nonskid shoes/slippers when out of bed   patient and family education   safety round/check completed  Intervention: Prevent Skin Injury  Recent Flowsheet Documentation  Taken 11/8/2024 2138 by Azael Jensen RN  Body Position: supine, head elevated  Skin Protection: adhesive use limited  Intervention: Prevent and Manage VTE (Venous Thromboembolism) Risk  Recent Flowsheet Documentation  Taken 11/8/2024 2138 by Azael Jensen RN  VTE Prevention/Management: SCDs on (sequential " compression devices)  Intervention: Prevent Infection  Recent Flowsheet Documentation  Taken 11/8/2024 2138 by Azael Jensen RN  Infection Prevention:   environmental surveillance performed   hand hygiene promoted   single patient room provided  Goal: Optimal Comfort and Wellbeing  Outcome: Progressing  Goal: Readiness for Transition of Care  Outcome: Progressing     Problem: Orthopaedic Fracture  Goal: Absence of Bleeding  Outcome: Progressing  Intervention: Monitor and Manage Fracture Bleeding  Recent Flowsheet Documentation  Taken 11/8/2024 2138 by Azael Jensen RN  Bleeding Management: dressing monitored  Goal: Bowel Elimination  Outcome: Progressing  Intervention: Promote Effective Bowel Elimination  Recent Flowsheet Documentation  Taken 11/8/2024 2138 by Azael Jensen RN  Bowel Elimination Management: toileting offered  Bowel Elimination Promotion:   ambulation promoted   adequate fluid intake promoted  Goal: Absence of Embolism Signs and Symptoms  Outcome: Progressing  Intervention: Prevent or Manage Embolism Risk  Recent Flowsheet Documentation  Taken 11/8/2024 2138 by Azael Jensen RN  VTE Prevention/Management: SCDs on (sequential compression devices)  Goal: Fracture Stability  Outcome: Progressing  Goal: Optimal Functional Ability  Outcome: Progressing  Intervention: Optimize Functional Ability  Recent Flowsheet Documentation  Taken 11/8/2024 2138 by Azael Jensen RN  Self-Care Promotion:   independence encouraged   BADL personal objects within reach   meal set-up provided   adaptive equipment use encouraged  Range of Motion: active ROM (range of motion) encouraged  Activity Management: activity encouraged  Positioning/Transfer Devices:   in use   pillows  Goal: Absence of Infection Signs and Symptoms  Outcome: Progressing  Intervention: Prevent or Manage Infection  Recent Flowsheet Documentation  Taken 11/8/2024 2138 by Azael Jensen RN  Infection Management: aseptic technique  maintained  Goal: Effective Tissue Perfusion  Outcome: Progressing  Intervention: Prevent or Manage Neurovascular Compromise  Recent Flowsheet Documentation  Taken 11/8/2024 2138 by Azael Jensen RN  Compartment Syndrome Management: active flexion/extension encouraged  Goal: Optimal Pain Control and Function  Outcome: Progressing  Goal: Effective Oxygenation and Ventilation  Outcome: Progressing  Intervention: Promote Airway Secretion Clearance  Recent Flowsheet Documentation  Taken 11/8/2024 2138 by Azael Jensen RN  Breathing Techniques/Airway Clearance: deep/controlled cough encouraged  Cough And Deep Breathing: done independently per patient  Activity Management: activity encouraged  Intervention: Optimize Oxygenation and Ventilation  Recent Flowsheet Documentation  Taken 11/8/2024 2138 by Azael Jensen RN  Airway/Ventilation Management: airway patency maintained  Head of Bed (HOB) Positioning: HOB at 30 degrees     Problem: Hip Arthroplasty  Goal: Optimal Coping  Outcome: Progressing  Goal: Absence of Bleeding  Outcome: Progressing  Intervention: Monitor and Manage Bleeding  Recent Flowsheet Documentation  Taken 11/8/2024 2138 by Azael Jensen RN  Bleeding Management: dressing monitored  Goal: Effective Bowel Elimination  Outcome: Progressing  Intervention: Enhance Bowel Motility and Elimination  Recent Flowsheet Documentation  Taken 11/8/2024 2138 by Azael Jensen RN  Bowel Motility Enhancement:   fluid intake encouraged   ambulation promoted  Bowel Elimination Management: toileting offered  Goal: Fluid and Electrolyte Balance  Outcome: Progressing  Goal: Optimal Functional Ability  Outcome: Progressing  Intervention: Promote Optimal Functional Status  Recent Flowsheet Documentation  Taken 11/8/2024 2138 by Azael Jensen RN  Self-Care Promotion:   independence encouraged   BADL personal objects within reach   meal set-up provided   adaptive equipment use encouraged  Assistive Device Utilized:    gait belt   walker  Activity Management: activity encouraged  Goal: Absence of Infection Signs and Symptoms  Outcome: Progressing  Intervention: Prevent or Manage Infection  Recent Flowsheet Documentation  Taken 11/8/2024 2138 by Azael Jensen RN  Infection Management: aseptic technique maintained  Goal: Intact Neurovascular Status  Outcome: Progressing  Intervention: Prevent or Manage Neurovascular Compromise  Recent Flowsheet Documentation  Taken 11/8/2024 2138 by Azael Jensen RN  Compartment Syndrome Management: active flexion/extension encouraged  Goal: Anesthesia/Sedation Recovery  Outcome: Progressing  Intervention: Optimize Anesthesia Recovery  Recent Flowsheet Documentation  Taken 11/8/2024 2138 by Azael Jensen RN  Safety Promotion/Fall Prevention:   activity supervised   assistive device/personal items within reach   increased rounding and observation   clutter free environment maintained   lighting adjusted   mobility aid in reach   nonskid shoes/slippers when out of bed   patient and family education   safety round/check completed  Administration (IS):   instruction provided, follow-up   proper technique demonstrated  Goal: Optimal Pain Control and Function  Outcome: Progressing  Goal: Nausea and Vomiting Relief  Outcome: Progressing  Intervention: Prevent or Manage Nausea and Vomiting  Recent Flowsheet Documentation  Taken 11/8/2024 2138 by Azael Jensen RN  Nausea/Vomiting Interventions: (denies) other (see comments)  Goal: Effective Urinary Elimination  Outcome: Progressing  Intervention: Monitor and Manage Urinary Retention  Recent Flowsheet Documentation  Taken 11/8/2024 2138 by Azael Jensen RN  Urinary Elimination Promotion: toileting device within reach  Goal: Effective Oxygenation and Ventilation  Outcome: Progressing  Intervention: Optimize Oxygenation and Ventilation  Recent Flowsheet Documentation  Taken 11/8/2024 2138 by Azael Jensen RN  Cough And Deep Breathing: done  independently per patient  Airway/Ventilation Management: airway patency maintained  Activity Management: activity encouraged  Head of Bed (HOB) Positioning: HOB at 30 degrees     Problem: Comorbidity Management  Goal: Blood Pressure in Desired Range  Outcome: Progressing  Intervention: Maintain Blood Pressure Management  Recent Flowsheet Documentation  Taken 11/8/2024 2138 by Azael Jensen RN  Medication Review/Management: medications reviewed     Problem: Pneumonia  Goal: Fluid Balance  Outcome: Progressing  Goal: Resolution of Infection Signs and Symptoms  Outcome: Progressing  Intervention: Prevent Infection Progression  Recent Flowsheet Documentation  Taken 11/8/2024 2138 by Azael Jensen RN  Infection Management: aseptic technique maintained  Goal: Effective Oxygenation and Ventilation  Outcome: Progressing  Intervention: Promote Airway Secretion Clearance  Recent Flowsheet Documentation  Taken 11/8/2024 2138 by Azael Jensen RN  Breathing Techniques/Airway Clearance: deep/controlled cough encouraged  Cough And Deep Breathing: done independently per patient  Activity Management: activity encouraged  Intervention: Optimize Oxygenation and Ventilation  Recent Flowsheet Documentation  Taken 11/8/2024 2138 by Azael Jensen RN  Airway/Ventilation Management: airway patency maintained  Head of Bed (HOB) Positioning: HOB at 30 degrees   Goal Outcome Evaluation:      Plan of Care Reviewed With: patient    Overall Patient Progress: improvingOverall Patient Progress: improving

## 2024-11-09 NOTE — PLAN OF CARE
Physical Therapy Discharge Summary    Reason for therapy discharge:    Discharged to transitional care facility.    Progress towards therapy goal(s). See goals on Care Plan in [facility] electronic health record for goal details.  Goals not met.  Barriers to achieving goals:   discharge from facility.    Therapy recommendation(s):    Continued therapy is recommended.  Rationale/Recommendations:  TCU recommended for further progression of strength and IND mobility.

## 2024-11-09 NOTE — DISCHARGE SUMMARY
Discharge Summary  Hospitalist Service      Constantino Thorne MRN# [id number]   YOB: 1940 Age: 84 year old     Date of Admission:  11/3/2024  Date of Discharge:  11/9/2024  Admitting Physician:  Venkat Matias MD  Discharge Physician: Ronit King MD   Discharging Service: Hospitalist Service     Primary Provider: Thom Barnes  Primary Care Physician Phone Number: [phone]         Discharge Diagnoses/Problem Oriented Hospital Course (Providers):      Discharge Diagnoses   Fall c/b right femoral neck fx s/p right hip hemiarthroplasty  Possible left lower lobe infiltrate  Mild BETZAIDA ruled out  Hyponatremia  Chronic Afib  Hospital Course   Constantino Thorne is a 84 year old gentleman with hyperlipidemia, hypertension, atrial fibrillation on chronic warfarin (held since 10/10/2024), ischemic cardiomyopathy, CAD with prior MI and cardiac arrest with ICD in place, CKD, and SABINE who presents after a fall.     Pt was admitted here at [facility] under observation from 10/10-10/11 after a fall resulting in a traumatic SAH.  His warfarin was held and he was seen by NSG.  Repeat CT head during that stay was stable.  He was discharged with holding of his blood thinners.  He had subsequent CT head on 10/24 with resolution of hemorrhage.  He has been off his blood thinners since that admission.      Post op recovering well. Treated for possible pneumonia with Augmentin. For his atrial fib/svt metoprolol continued. His anticoagulation has been held until his fall risk is reduced, and continued discussion with is PCP about restarting anticoagulation.    Fall c/b right femoral neck fx s/p right hip hemiarthroplasty: Pt was at his [employer] and fell getting into the elevator.  He did have dizziness prior to the fall.  No LOC.  No CP or palpitations.  Had immediate pain to right hip.  Brought into the ER for evaluation.   -In ER, pt afebrile and HDS.  Saturating OK on RA.  XR of pelvis showed acute right femoral neck fx.  CT head  showed no acute hemorrhage or fracture.  CBC with mild leukocytosis.  BMP with creatinine 1.24 which is close to baseline.  UA without infection.   -Ortho consulted. Underwent surgical repair with right hip hemiarthroplasty on 11/4.    -Prn pain meds, activity post operatively.  Monitor Hgb.  EBL noted to be 100 ml.  Hgb at 10.6 on 10/6  -Consulted NSG given recent SAH.  They are OK with lovenox or ASA post-op for ppx.       CLARK  Cough  Possible left lower lobe infiltrate  -Patient complains of dyspnea on exertion with walking, also has a productive cough.  Given chest x-ray findings of left lower lobe infiltrates  -Started on Augmentin.  COVID and influenza on admission was negative  -He does not have significant lower extremity edema and lungs sound clear, Chest x-ray today possible volume overload, however, since initiation of antibiotics symptoms seem to be improving.  Hold off Lasix  -Echocardiogram for chest pain evaluation is unchanged, troponin's negative, ECG unchanged     Right sided reproducible chest pain  - CT angio chest no evidence of Pulmonary embolism on Nov 2, CXR no evidence of rib fracture  - likely related to fall, ECG no T wave or ST changes noted, as above     Mild BETZAIDA ruled out  -His creatinine initially seems to be trending upward slowly, will encourage oral intake avoid further IV fluids given his history of heart failure  -Improved today monitor     Hyponatremia  -Somewhat improved  after 1 L of IV fluid     #Chronic Afib: Previosuly on warfarin.  Now with 2 falls in space of about 3 weeks with complications.  Holding warfarin and ppx OK per NSG.  Defer resumption of warfarin to primary care given his recurrent falls but would likely hold it until functional status improved and he is not falling.    -Reduced dose of metoprolol succinate to 25 mg BID from 50 mg BID.  Hold parameters on metoprolol.    -Will likely need reduction in BP meds on discharge given recurrent falls; likely  contributing.   -Patient appears to be going into SVT and atrial fibrillation. He did not receive metoprolol dose this AM, suspect this is reason HR were elevated, will give metoprolol 12.5 mg and observe. No further episodes with metoprolol 12.5 mg BID     #Leukocytosis: No infectious symptoms.  UA without infection.  Suspect stress from fracture and surgery.  Monitor.  Repeat CBC improved     #CAD with h/o MI in 2010. History of V fib arrest s/p ICD. Chronic HFrEF: metoprolol with hold parameters. Continue statin.   #BPH: Continue flomax.    #SABINE: CPAP             Code Status:      Full Code         Important Results:                  Pending Results:        Unresulted Labs Ordered in the Past 30 Days of this Admission       No orders found from 10/4/2024 to 11/4/2024.                 Discharge Instructions and Follow-Up:      Follow-up Appointments     Follow Up and recommended labs and tests      Please call as soon as possible to make an appointment to be seen in Dr. Darinel Chacko's clinic at 2 weeks postop for a recheck of your surgical   site, possible repeat x-rays, and wound care. If you are at a TCU at this   time and they have x-ray capabilities, you may complete your wound care   and x-rays at your TCU and have them send your images to Dr. Chacko's   office.     Dr. Chacko's care coordinator is Ekaterina. Please contact her at   [phone] to schedule an appointment. Dr. Chacko's care team's fax   number is [phone].     Dr. Chacko sees patients at 3 clinic locations:  [facility]  [address], [place] [address]  [facility]  [address] [address] [place] [address] [place] [address]  [facility] [place]  [address], [place] [address]      Please call the on-call phone number [phone] during evenings, nights   and weekends for any urgent needs. Prescription refills must be done   during business hours by contacting your  surgical team.        Follow Up and recommended labs and tests      Follow up with Nursing home physician.  No follow up labs or test are   needed.  Titrate up metoprolol as blood pressures can tolerate, currently evening   doses on hold        Follow-up and recommended labs and tests       Your clinic appointment with [facility] on 11/14/24   has been CANCELED and no further follow up is needed. You have been   cleared for activity by Neurosurgery. Please refer to Orthopedics   instruction on when to resume your Warfarin.                 Discharge Disposition:        Discharged to short-term care facility          Discharge Medications:        Current Discharge Medication List        START taking these medications    Details   acetaminophen (TYLENOL) 325 MG tablet Take 3 tablets (975 mg) by mouth every 8 hours.  Qty: 100 tablet, Refills: 0    Associated Diagnoses: S/P hip hemiarthroplasty      amoxicillin-clavulanate (AUGMENTIN) 875-125 MG tablet Take 1 tablet by mouth every 12 hours for 3 days.  Qty: 6 tablet, Refills: 0    Associated Diagnoses: Pneumonia of left lung due to infectious organism, unspecified part of lung      enoxaparin ANTICOAGULANT (LOVENOX) 40 MG/0.4ML syringe Inject 0.4 mLs (40 mg) subcutaneously every 24 hours.    Associated Diagnoses: S/P hip hemiarthroplasty      oxyCODONE (ROXICODONE) 5 MG tablet Take 1 tablet (5 mg) by mouth every 6 hours as needed for moderate to severe pain.  Qty: 20 tablet, Refills: 0    Associated Diagnoses: S/P hip hemiarthroplasty      senna-docusate (SENOKOT-S/PERICOLACE) 8.6-50 MG tablet Take 1 tablet by mouth 2 times daily.  Qty: 30 tablet, Refills: 0    Associated Diagnoses: S/P hip hemiarthroplasty           CONTINUE these medications which have CHANGED    Details   metoprolol succinate ER (TOPROL XL) 25 MG 24 hr tablet Take 0.5 tablets (12.5 mg) by mouth 2 times daily.  Qty: 30 tablet, Refills: 0    Associated Diagnoses: HFrEF (heart failure  "with reduced ejection fraction) (H)           CONTINUE these medications which have NOT CHANGED    Details   atorvastatin (LIPITOR) 80 MG tablet Take 80 mg by mouth At Bedtime       tamsulosin (FLOMAX) 0.4 MG capsule Take 0.4 mg by mouth daily      nitroGLYcerin (NITROSTAT) 0.4 MG sublingual tablet Place 1 Tablet under tongue as needed. If no relief after 5 min call 911;continue 1 tab every 5 min max 3 tab      triamcinolone (KENALOG) 0.1 % external cream Apply topically 2 times daily as needed for irritation.                  Allergies:         Allergies   Allergen Reactions    Lisinopril Unknown and Cough    Spironolactone Unknown and Diarrhea            Consultations This Hospital Stay:        Consultation during this admission received from orthopedics          Condition and Physical Exam on Discharge:        Discharge condition: Stable   Discharge vitals: Blood pressure 122/70, pulse 67, temperature 98  F (36.7  C), temperature source Temporal, resp. rate 18, height 1.854 m (6' 1\"), weight 95.3 kg (210 lb), SpO2 95%.   General: Pt in NAD, normal appearance  HEENT: OP clear MMM, no JVD  Lungs: Clear to Auscultation Bilateral, normal breathing  without accessory muscle usage, no wheezing, rhonchi or crackles  Cardiac: +S1, S2, RRR, no MRG, no edema  Abdominal: normal bowel sounds, NT/ND, no hepatosplenomegaly  Skin: warm, dry, normal turgor, no rash  Psyche: A& O x3, appropriate affect            Discharge Orders for Skilled Facility (from Discharge Orders):        After Care Instructions       Activity      Your activity upon discharge: activity as tolerated        Diet      Follow this diet upon discharge: Current Diet:Orders Placed This Encounter      Advance Diet as Tolerated: Regular Diet Adult        Fall precautions      General info for SNF      Length of Stay Estimate: Short Term Care: Estimated # of Days <30  Condition at Discharge: Stable  Level of care:skilled   Rehabilitation Potential: " Good  Admission H&P remains valid and up-to-date: Yes  Recent Chemotherapy: N/A  Use Nursing Home Standing Orders: Yes        General info for SNF      Length of Stay Estimate: Short Term Care: Estimated # of Days <30  Condition at Discharge: Improving  Level of care:skilled   Rehabilitation Potential: Fair  Admission H&P remains valid and up-to-date: Yes  Recent Chemotherapy: N/A  Use Nursing Home Standing Orders: Yes        Mantoux instructions      Give two-step Mantoux (PPD) Per Facility Policy Yes        Weight bearing status      Weight bearing as tolerated        Wound care (specify)      Site:   right hip  Instructions:  Do not submerge in water. Cover for showers. Change if >60% saturation.                     Rehab orders for Skilled Facility (from Discharge Orders):      Referrals     Future Labs/Procedures    Occupational Therapy Adult Consult     Comments:    Evaluate and treat as clinically indicated.    Reason:  Right femoral neck fracture s/p right hip hemiarthroplasty   (11/4/2024)    Physical Therapy Adult Consult     Comments:    Evaluate and treat as clinically indicated.    Reason:  Right femoral neck fracture s/p right hip hemiarthroplasty   (11/4/2024)             Discharge Time:      Greater than 30 minutes.        Image Results From This Hospital Stay (For Non-EPIC Providers):        Results for orders placed or performed during the hospital encounter of 11/03/24   Head CT w/o contrast    Narrative    EXAM: CT HEAD W/O CONTRAST  LOCATION: [facility]  DATE: 11/3/2024    INDICATION: eval for trauma  COMPARISON: 10/10/2024, 10/24/2024.  TECHNIQUE: Routine CT Head without IV contrast. Multiplanar reformats. Dose reduction techniques were used.    FINDINGS:  INTRACRANIAL CONTENTS: No intracranial hemorrhage, extraaxial collection, or mass effect.  No CT evidence of acute infarct. Mild to moderate presumed chronic small vessel ischemic changes. Mild to moderate generalized  volume loss. No hydrocephalus.     VISUALIZED ORBITS/SINUSES/MASTOIDS: No intraorbital abnormality. No paranasal sinus mucosal disease. No middle ear or mastoid effusion.    BONES/SOFT TISSUES: No acute abnormality.      Impression    IMPRESSION: Negative for acute intracranial hemorrhage, hydrocephalus or transcortical infarct. No skull fracture.   XR Pelvis w Hip Right 1 View    Narrative    EXAM: XR PELVIS AND HIP RIGHT 1 VIEW  LOCATION: [facility]  DATE: 11/3/2024    INDICATION: fall, pain  COMPARISON: None.      Impression    IMPRESSION: Acute right femoral neck fracture with superolateral displacement. There is normal joint alignment. Mild degenerative changes throughout the pelvis.   XR Pelvis w Hip Port Right 1 View    Narrative    PELVIS AND RIGHT HIP PORTABLE ONE VIEW 11/4/2024 12:00 PM     HISTORY: Status post hip surgery.    COMPARISON: 11/3/2024.       Impression    IMPRESSION:   Postoperative changes right hip implant for treatment of  a femoral neck fracture. Hardware is well-seated without fracture or  dislocation. Bones are demineralized.     RONAK POPE MD         SYSTEM ID:  [id number]   XR Chest Port 1 View    Narrative    XR CHEST PORT 1 VIEW   11/6/2024 12:02 PM     HISTORY: Cough    COMPARISON: 10/10/2024.      Impression    IMPRESSION: Increased mild streaky opacities at the left lung base may  be developing airspace disease. Normal cardiac silhouette. Left chest  pacer is stable.    MICHELLE DIETZ MD         SYSTEM ID:  [id number]   XR Chest Port 1 View    Narrative    CHEST ONE VIEW  11/8/2024 11:44 AM     HISTORY: crackles bilateral    COMPARISON: CT chest performed on 11/6/2024      Impression    IMPRESSION: Stable streaky opacities in the left lung base which may  reflect pneumonia. Mild bilateral interstitial opacities are also  present in the upper lungs and appear unchanged, possibly reflecting  edema. Stable borderline enlarged cardiomediastinal silhouette.  Left  chest wall pacemaker device is unchanged. Severe degenerative changes  are present along the right shoulder. Moderate degenerative changes  are present on the left shoulder.    TUCKER SOARES MD         SYSTEM ID:  [id number]   Echocardiogram Complete     Value    LVEF  35-40%    Narrative    [id number]  [id number]  [id number]  [id number]^ESTELLA^CAN^SHEILA     [facility]  Echocardiography Laboratory  [address] [place] [address]     Name: DESIREE SKAGGS  MRN: [id number]  : 1940  Study Date: 2024 01:58 PM  Age: 84 yrs  Gender: Male  Patient Location: [facility]  Reason For Study: SOB  Ordering Physician: CAN SORIA  Referring Physician: Thom Barnes  Performed By: Pattie Pereyra RDCS     BSA: 2.2 m2  Height: 73 in  Weight: 210 lb  HR: 82  BP: 113/70 mmHg  ______________________________________________________________________________  Procedure  Complete Portable Echo Adult. Definity (NDC #45210-503) given intravenously.  ______________________________________________________________________________  Interpretation Summary     There is mild concentric left ventricular hypertrophy.  The visual ejection fraction is 35-40%.  Moderate - large area of severe hypokinesis to akinesis of the anteroseptal,  apical wall  The left atrium is moderately dilated.  The right atrium is mildly dilated.  There is trace to mild mitral regurgitation.  There is mild (1+) tricuspid regurgitation.  Right ventricular systolic pressure is elevated, consistent with moderate  pulmonary hypertension.  TDS-Definity used (no complications)  Aortic root dilatation is present.  Ascending aorta dilatation is present.  Compared to prior study, there is no significant change.  ______________________________________________________________________________  Left Ventricle  The left ventricle is normal in size. There is mild concentric left  ventricular hypertrophy. The visual ejection fraction is  35-40%. Moderate -  large area of severe hypokinesis to akinesis of the anteroseptal, apical wall.     Right Ventricle  There is a catheter/pacemaker lead seen in the right ventricle. The right  ventricle is normal in structure, function and size.     Atria  The left atrium is moderately dilated. The right atrium is mildly dilated.  There is no color Doppler evidence of an atrial shunt.     Mitral Valve  The mitral valve leaflets appear thickened, but open well. There is trace to  mild mitral regurgitation.     Tricuspid Valve  The tricuspid valve is normal in structure and function. There is mild (1+)  tricuspid regurgitation. The right ventricular systolic pressure is  approximated at 37.2 mmHg plus the right atrial pressure. Right ventricular  systolic pressure is elevated, consistent with moderate pulmonary  hypertension.     Aortic Valve  There is mild trileaflet aortic sclerosis. There is trace aortic  regurgitation.     Pulmonic Valve  The pulmonic valve is not well seen, but is grossly normal. There is no  pulmonic valvular regurgitation.     Vessels  Aortic root dilatation is present. Ascending aorta dilatation is present.     Pericardium  There is no pericardial effusion.     ______________________________________________________________________________  MMode/2D Measurements & Calculations  IVSd: 1.2 cm  LVIDd: 5.4 cm  LVIDs: 3.7 cm  LVPWd: 1.2 cm  IVC diam: 1.6 cm  FS: 31.1 %  LV mass(C)d: 274.8 grams  LV mass(C)dI: 125.1 grams/m2     Ao root diam: 4.2 cm  asc Aorta Diam: 4.0 cm  Ao root diam index Ht(cm/m): 2.3  Ao root diam index BSA (cm/m2): 1.9  Asc Ao diam index BSA (cm/m2): 1.8  Asc Ao diam index Ht(cm/m): 2.2  EF Biplane: 37.8 %  LA Volume (BP): 92.3 ml  LA Volume Index (BP): 42.0 ml/m2  RV Base: 4.7 cm     RWT: 0.45  TAPSE: 2.8 cm     Doppler Measurements & Calculations  MV E max sebas: 74.4 cm/sec  MV A max sebas: 104.0 cm/sec  MV E/A: 0.72  MV dec time: 0.25 sec  PA acc time: 0.08 sec  PI end-d  montrell: 94.0 cm/sec  TR max montrell: 305.1 cm/sec  TR max P.2 mmHg  E/E' av.5  Lateral E/e': 8.2  Medial E/e': 10.7  RV S Montrell: 19.7 cm/sec     ______________________________________________________________________________  Report approved by: Carlo Joyce 2024 03:06 PM                 Most Recent Lab Results In EPIC (For Non-EPIC Providers):    Most Recent 3 CBC's:  Recent Labs   Lab Test 24  0624  0637 24  0619   WBC 12.4* 14.9* 21.3*   HGB 10.9* 10.6* 11.0*   MCV 92 92 92   * 135* 140*      Most Recent 3 BMP's:  Recent Labs   Lab Test 24  1515 24  0626 24  1155 24  0637 24  0619   NA  --  133* 134*  --  133*   POTASSIUM 4.1 4.0 3.7  --  4.2   CHLORIDE  --  101 101  --  101   CO2  --  20* 21*  --  21*   BUN  --  25.7* 29.7*  --  24.7*   CR  --  0.98 1.10  --  1.07   ANIONGAP  --  12 12  --  11   ZACH  --  8.4* 8.7*  --  8.4*   GLC  --  108* 126* 100* 130*     Most Recent 3 Troponin's:No lab results found.  Most Recent 3 INR's:  Recent Labs   Lab Test 24  0241 10/11/24  0528 10/10/24  1045   INR 1.05 1.43* 2.03*     Most Recent 2 LFT's:  Recent Labs   Lab Test 24  02416  1930   AST 27 28   ALT 25 38   ALKPHOS 103 109   BILITOTAL 0.6 0.6     Most Recent Cholesterol Panel:No lab results found.  Most Recent 6 Bacteria Isolates From Any Culture (See EPIC Reports for Culture Details):No lab results found.  Most Recent TSH, T4 and HgbA1c:No lab results found.

## 2024-11-09 NOTE — PLAN OF CARE
"Goal Outcome Evaluation:       Plan of Care Reviewed With: patient     Overall Patient Progress: improvingOverall    Outcome Evaluation: Pt did not discharge today d/t Tele tech stating pt had episode of . MD was paged. Pt said not SOB or chest pain. VSS afebrile RA    Potassium 4.1 Magnesium 1.9 MD place order for RN protocol to manage Magnesium. Replacement first dose given.   Remote tele SR HR 70's per tech  Voiding. Up assist of 1 GB and walker. Tolerated recliner and walks in the kimbrough X2.   Cms intact. PO Augmentin. Declined pain medication today. Pain 3 out of 10.   [facility]      Problem: Adult Inpatient Plan of Care  Goal: Plan of Care Review  Description: The Plan of Care Review/Shift note should be completed every shift.  The Outcome Evaluation is a brief statement about your assessment that the patient is improving, declining, or no change.  This information will be displayed automatically on your shift  note.  11/8/2024 1835 by Elizabeth Peña RN  Outcome: Progressing  Flowsheets (Taken 11/8/2024 1835)  Outcome Evaluation: Wife at bedside today.  Plan of Care Reviewed With:   patient   spouse  Overall Patient Progress: improving  11/8/2024 1834 by Elizabeth Peña RN  Outcome: Progressing  11/8/2024 1833 by Elizabeth Peña RN  Outcome: Progressing  Flowsheets (Taken 11/8/2024 1833)  Outcome Evaluation: Pt did not discharge today d/t Tele tech stating pt had episode of . MD was paged.  Goal: Patient-Specific Goal (Individualized)  Description: You can add care plan individualizations to a care plan. Examples of Individualization might be:  \"Parent requests to be called daily at 9am for status\", \"I have a hard time hearing out of my right ear\", or \"Do not touch me to wake me up as it startles  me\".  11/8/2024 1835 by Elizabeth Peña RN  Outcome: Progressing  11/8/2024 1834 by Elizabeth Peña RN  Outcome: " Progressing  11/8/2024 1833 by Elizabeth Peña RN  Outcome: Progressing  Goal: Absence of Hospital-Acquired Illness or Injury  11/8/2024 1835 by Elizabeth Peña RN  Outcome: Progressing  11/8/2024 1834 by Elizabeth Peña RN  Outcome: Progressing  11/8/2024 1833 by Elizabeth Peña RN  Outcome: Progressing  Intervention: Identify and Manage Fall Risk  Recent Flowsheet Documentation  Taken 11/8/2024 0837 by Elizabeth Peña RN  Safety Promotion/Fall Prevention:   activity supervised   assistive device/personal items within reach   increased rounding and observation   clutter free environment maintained   lighting adjusted   mobility aid in reach   nonskid shoes/slippers when out of bed   patient and family education   safety round/check completed  Intervention: Prevent Skin Injury  Recent Flowsheet Documentation  Taken 11/8/2024 0837 by Elizabeth Peña RN  Body Position: supine, head elevated  Skin Protection: adhesive use limited  Intervention: Prevent and Manage VTE (Venous Thromboembolism) Risk  Recent Flowsheet Documentation  Taken 11/8/2024 0837 by Elizabeth Peña RN  VTE Prevention/Management: SCDs on (sequential compression devices)  Intervention: Prevent Infection  Recent Flowsheet Documentation  Taken 11/8/2024 0837 by Elizabeth Peña RN  Infection Prevention:   environmental surveillance performed   hand hygiene promoted   single patient room provided  Goal: Optimal Comfort and Wellbeing  11/8/2024 1835 by Elizabeth Peña RN  Outcome: Progressing  11/8/2024 1834 by Elizabeth Peña RN  Outcome: Progressing  11/8/2024 1833 by Elizabeth Peña RN  Outcome: Progressing  Intervention: Monitor Pain and Promote Comfort  Recent Flowsheet Documentation  Taken 11/8/2024 1713 by Elizabeth Peña RN  Pain Management Interventions:   cold applied   pain management plan reviewed with patient/caregiver  Taken 11/8/2024 0837 by Elizabeth Peña RN  Pain Management  Interventions: medication (see MAR)  Goal: Readiness for Transition of Care  11/8/2024 1835 by Elizabeth Peña RN  Outcome: Progressing  11/8/2024 1834 by Elizabeth Peña RN  Outcome: Progressing  11/8/2024 1833 by Elizabeth Peña RN  Outcome: Progressing     Problem: Orthopaedic Fracture  Goal: Absence of Bleeding  11/8/2024 1835 by Elizabeth Peña RN  Outcome: Progressing  11/8/2024 1834 by Elizabeth Peña RN  Outcome: Progressing  11/8/2024 1833 by Elizabeth Peña RN  Outcome: Progressing  Intervention: Monitor and Manage Fracture Bleeding  Recent Flowsheet Documentation  Taken 11/8/2024 0837 by Elizabeth Peña RN  Bleeding Management: dressing monitored  Goal: Bowel Elimination  11/8/2024 1835 by Elizabeth Peña RN  Outcome: Progressing  11/8/2024 1834 by Elizabeth Peña RN  Outcome: Progressing  11/8/2024 1833 by Elizabeth Peña RN  Outcome: Progressing  Intervention: Promote Effective Bowel Elimination  Recent Flowsheet Documentation  Taken 11/8/2024 0837 by Elizabeth Peña RN  Bowel Elimination Management: toileting offered  Bowel Elimination Promotion:   ambulation promoted   adequate fluid intake promoted  Goal: Absence of Embolism Signs and Symptoms  11/8/2024 1835 by Elizabeth Peña RN  Outcome: Progressing  11/8/2024 1834 by Elizabeth Peña RN  Outcome: Progressing  11/8/2024 1833 by Elizabeth Peña RN  Outcome: Progressing  Intervention: Prevent or Manage Embolism Risk  Recent Flowsheet Documentation  Taken 11/8/2024 0837 by Elizabeth Peña RN  VTE Prevention/Management: SCDs on (sequential compression devices)  Goal: Fracture Stability  11/8/2024 1835 by Elizabeth Peña RN  Outcome: Progressing  11/8/2024 1834 by Elizabeth Peña RN  Outcome: Progressing  11/8/2024 1833 by Elizabeth Peña RN  Outcome: Progressing  Goal: Optimal Functional Ability  11/8/2024 1835 by Elizabeth Peña RN  Outcome: Progressing  11/8/2024 1834 by  Elizabeth Peña RN  Outcome: Progressing  11/8/2024 1833 by Elizabeth Peña RN  Outcome: Progressing  Intervention: Optimize Functional Ability  Recent Flowsheet Documentation  Taken 11/8/2024 0837 by Elizabeth Pñea RN  Self-Care Promotion:   independence encouraged   BADL personal objects within reach   meal set-up provided   adaptive equipment use encouraged  Range of Motion: active ROM (range of motion) encouraged  Activity Management: activity encouraged  Positioning/Transfer Devices:   in use   pillows  Goal: Absence of Infection Signs and Symptoms  11/8/2024 1835 by Elizabeth Peña RN  Outcome: Progressing  11/8/2024 1834 by Elizabeth Peña RN  Outcome: Progressing  11/8/2024 1833 by Elizabeth Peña RN  Outcome: Progressing  Intervention: Prevent or Manage Infection  Recent Flowsheet Documentation  Taken 11/8/2024 0837 by Elizabeth Peña RN  Infection Management: aseptic technique maintained  Goal: Effective Tissue Perfusion  11/8/2024 1835 by Elizabeth Peña RN  Outcome: Progressing  11/8/2024 1834 by Elizabeth Peña RN  Outcome: Progressing  11/8/2024 1833 by Elizabeth Peña RN  Outcome: Progressing  Intervention: Prevent or Manage Neurovascular Compromise  Recent Flowsheet Documentation  Taken 11/8/2024 0837 by Elizabeth Peña RN  Compartment Syndrome Management: active flexion/extension encouraged  Goal: Optimal Pain Control and Function  11/8/2024 1835 by Elizabeth Peña RN  Outcome: Progressing  11/8/2024 1834 by Elizabeth Peña RN  Outcome: Progressing  11/8/2024 1833 by Elizabeth Peña RN  Outcome: Progressing  Intervention: Manage Acute Orthopaedic-Related Pain  Recent Flowsheet Documentation  Taken 11/8/2024 1713 by Elizabeth Peña RN  Pain Management Interventions:   cold applied   pain management plan reviewed with patient/caregiver  Taken 11/8/2024 0837 by Elizabeth Peña RN  Pain Management Interventions: medication (see MAR)  Goal:  Effective Oxygenation and Ventilation  11/8/2024 1835 by Elizabeth Peña RN  Outcome: Progressing  11/8/2024 1834 by Elizabeth Peña RN  Outcome: Progressing  11/8/2024 1833 by Elizabeth Peña RN  Outcome: Progressing  Intervention: Promote Airway Secretion Clearance  Recent Flowsheet Documentation  Taken 11/8/2024 0837 by Elizabeth Peña RN  Breathing Techniques/Airway Clearance: deep/controlled cough encouraged  Cough And Deep Breathing: done independently per patient  Activity Management: activity encouraged  Intervention: Optimize Oxygenation and Ventilation  Recent Flowsheet Documentation  Taken 11/8/2024 0837 by Elizabeth Peña RN  Airway/Ventilation Management: airway patency maintained  Head of Bed (HOB) Positioning: HOB at 30 degrees     Problem: Hip Arthroplasty  Goal: Optimal Coping  11/8/2024 1835 by Elizabeth Peña RN  Outcome: Progressing  11/8/2024 1834 by Elizabeth Peña RN  Outcome: Progressing  11/8/2024 1833 by Elizabeth Peña RN  Outcome: Progressing  Goal: Absence of Bleeding  11/8/2024 1835 by Elizabeth Peña RN  Outcome: Progressing  11/8/2024 1834 by Elizabeth Peña RN  Outcome: Progressing  11/8/2024 1833 by Elizabeth Peña RN  Outcome: Progressing  Intervention: Monitor and Manage Bleeding  Recent Flowsheet Documentation  Taken 11/8/2024 0837 by Elizabeth Peña RN  Bleeding Management: dressing monitored  Goal: Effective Bowel Elimination  11/8/2024 1835 by Elizabeth Peña RN  Outcome: Progressing  11/8/2024 1834 by Elizabeth Peña RN  Outcome: Progressing  11/8/2024 1833 by Elizabeth Peña RN  Outcome: Progressing  Intervention: Enhance Bowel Motility and Elimination  Recent Flowsheet Documentation  Taken 11/8/2024 0837 by Elizabeth Peña RN  Bowel Motility Enhancement:   fluid intake encouraged   ambulation promoted  Bowel Elimination Management: toileting offered  Goal: Fluid and Electrolyte Balance  11/8/2024 1835 by  Elizabeth Peña RN  Outcome: Progressing  11/8/2024 1834 by Elizabeth Peña RN  Outcome: Progressing  11/8/2024 1833 by Elizabeth Peña RN  Outcome: Progressing  Goal: Optimal Functional Ability  11/8/2024 1835 by Elizabeth Peña RN  Outcome: Progressing  11/8/2024 1834 by Elizabeth Peña RN  Outcome: Progressing  11/8/2024 1833 by Elizabeth Peña RN  Outcome: Progressing  Intervention: Promote Optimal Functional Status  Recent Flowsheet Documentation  Taken 11/8/2024 0837 by Elizabeth Peña RN  Self-Care Promotion:   independence encouraged   BADL personal objects within reach   meal set-up provided   adaptive equipment use encouraged  Assistive Device Utilized:   gait belt   walker  Activity Management: activity encouraged  Goal: Absence of Infection Signs and Symptoms  11/8/2024 1835 by Elizabeth Peña RN  Outcome: Progressing  11/8/2024 1834 by Elizabeth Peña RN  Outcome: Progressing  11/8/2024 1833 by Elizabeth Peña RN  Outcome: Progressing  Intervention: Prevent or Manage Infection  Recent Flowsheet Documentation  Taken 11/8/2024 0837 by Elizabeth Peña RN  Infection Management: aseptic technique maintained  Goal: Intact Neurovascular Status  11/8/2024 1835 by Elizabeth Peña RN  Outcome: Progressing  11/8/2024 1834 by Elizabeth Peña RN  Outcome: Progressing  11/8/2024 1833 by Elizabeth Peña RN  Outcome: Progressing  Intervention: Prevent or Manage Neurovascular Compromise  Recent Flowsheet Documentation  Taken 11/8/2024 0837 by Elizabeth Peña RN  Compartment Syndrome Management: active flexion/extension encouraged  Goal: Anesthesia/Sedation Recovery  11/8/2024 1835 by Elizabeth Peña RN  Outcome: Progressing  11/8/2024 1834 by Elizabeth Peña RN  Outcome: Progressing  11/8/2024 1833 by Elizabeth Peña RN  Outcome: Progressing  Intervention: Optimize Anesthesia Recovery  Recent Flowsheet Documentation  Taken 11/8/2024 0837 by Mariana  Elizabeth EVANS RN  Safety Promotion/Fall Prevention:   activity supervised   assistive device/personal items within reach   increased rounding and observation   clutter free environment maintained   lighting adjusted   mobility aid in reach   nonskid shoes/slippers when out of bed   patient and family education   safety round/check completed  Administration (IS):   instruction provided, follow-up   proper technique demonstrated  Level Incentive Spirometer (mL): 2000  Number of Repetitions (IS): 5  Goal: Optimal Pain Control and Function  11/8/2024 1835 by Elizabeth Peña RN  Outcome: Progressing  11/8/2024 1834 by Elizabeth Peña RN  Outcome: Progressing  11/8/2024 1833 by Elizabeth Peña RN  Outcome: Progressing  Intervention: Prevent or Manage Pain  Recent Flowsheet Documentation  Taken 11/8/2024 1713 by Elizabeth Peña RN  Pain Management Interventions:   cold applied   pain management plan reviewed with patient/caregiver  Taken 11/8/2024 0837 by Elizabeth Peña RN  Pain Management Interventions: medication (see MAR)  Goal: Nausea and Vomiting Relief  11/8/2024 1835 by Elizabeth Peña RN  Outcome: Progressing  11/8/2024 1834 by Elizabeth Peña RN  Outcome: Progressing  11/8/2024 1833 by Elizabeth Peña RN  Outcome: Progressing  Intervention: Prevent or Manage Nausea and Vomiting  Recent Flowsheet Documentation  Taken 11/8/2024 0837 by Elizabeth Peña RN  Nausea/Vomiting Interventions: (denies) other (see comments)  Goal: Effective Urinary Elimination  11/8/2024 1835 by Elizabeth Peña RN  Outcome: Progressing  11/8/2024 1834 by Elizabeth Peña RN  Outcome: Progressing  11/8/2024 1833 by Elizabeth Peña RN  Outcome: Progressing  Intervention: Monitor and Manage Urinary Retention  Recent Flowsheet Documentation  Taken 11/8/2024 0837 by Elizabeth Peña RN  Urinary Elimination Promotion: toileting device within reach  Goal: Effective Oxygenation and  Ventilation  11/8/2024 1835 by Elizabeth Peña RN  Outcome: Progressing  11/8/2024 1834 by Elizabeth Peña RN  Outcome: Progressing  11/8/2024 1833 by Elizabeth Peña RN  Outcome: Progressing  Intervention: Optimize Oxygenation and Ventilation  Recent Flowsheet Documentation  Taken 11/8/2024 0837 by Elizabeth Peña RN  Cough And Deep Breathing: done independently per patient  Airway/Ventilation Management: airway patency maintained  Activity Management: activity encouraged  Head of Bed (HOB) Positioning: HOB at 30 degrees     Problem: Comorbidity Management  Goal: Blood Pressure in Desired Range  11/8/2024 1835 by Elizabeth Peña RN  Outcome: Progressing  11/8/2024 1834 by Elizabeth Peña RN  Outcome: Progressing  11/8/2024 1833 by Elizabeth Peña RN  Outcome: Progressing  Intervention: Maintain Blood Pressure Management  Recent Flowsheet Documentation  Taken 11/8/2024 0837 by Elizabeth Peña RN  Medication Review/Management: medications reviewed     Problem: Pneumonia  Goal: Fluid Balance  11/8/2024 1835 by Elizabeth Peña RN  Outcome: Progressing  11/8/2024 1834 by Elizabeth Peña RN  Outcome: Progressing  11/8/2024 1833 by Elizabeth Peña RN  Outcome: Progressing  Goal: Resolution of Infection Signs and Symptoms  11/8/2024 1835 by Elizabeth Peña RN  Outcome: Progressing  11/8/2024 1834 by Elizabeth Peña RN  Outcome: Progressing  11/8/2024 1833 by Elizabeth Peña RN  Outcome: Progressing  Intervention: Prevent Infection Progression  Recent Flowsheet Documentation  Taken 11/8/2024 0837 by Elizabeth Peña RN  Infection Management: aseptic technique maintained  Goal: Effective Oxygenation and Ventilation  11/8/2024 1835 by Elizabeth Peña RN  Outcome: Progressing  11/8/2024 1834 by Elizabeth Peña RN  Outcome: Progressing  11/8/2024 1833 by Elizabeth Peña RN  Outcome: Progressing  Intervention: Promote Airway Secretion Clearance  Recent  Flowsheet Documentation  Taken 11/8/2024 0837 by Elizabeth Peña RN  Breathing Techniques/Airway Clearance: deep/controlled cough encouraged  Cough And Deep Breathing: done independently per patient  Activity Management: activity encouraged  Intervention: Optimize Oxygenation and Ventilation  Recent Flowsheet Documentation  Taken 11/8/2024 0837 by Elizabeth Peña RN  Airway/Ventilation Management: airway patency maintained  Head of Bed (HOB) Positioning: HOB at 30 degrees

## 2024-11-09 NOTE — PROGRESS NOTES
Care Management Discharge Note    Discharge Date: 11/09/2024       Discharge Disposition: Skilled Nursing Facility, Transitional Care    Discharge Services: None    Discharge DME: None    Discharge Transportation: agency, family or friend will provide    Private pay costs discussed: Not applicable    Does the patient's insurance plan have a 3 day qualifying hospital stay waiver?  No    PAS Confirmation Code: [id number]  Patient/family educated on Medicare website which has current facility and service quality ratings: yes    Education Provided on the Discharge Plan: Yes  Persons Notified of Discharge Plans: Pt and wife  Patient/Family in Agreement with the Plan: yes    Handoff Referral Completed: No, handoff not indicated or clinically appropriate    Additional Information:  Writer informed pt is medically ready for discharge today.  [facility] has no beds available until Wednesday. [facility] (private no fee) and [facility] (shared) can accept today, informed pt and wife.  Wife concerned about ability to drive to either of those locations. Discussed more in depth and they elected to go to [facility] today. Wife or Dtg will provide transportation 3pm. Informed [facility] and sent discharge orders. Informed provider.     Galilea Burrows [facility]   Inpatient Care Coordination  [facility]  [phone]

## 2024-11-11 ENCOUNTER — LAB REQUISITION (OUTPATIENT)
Dept: LAB | Facility: CLINIC | Age: 84
End: 2024-11-11
Payer: COMMERCIAL

## 2024-11-11 ENCOUNTER — TRANSITIONAL CARE UNIT VISIT (OUTPATIENT)
Dept: GERIATRICS | Facility: CLINIC | Age: 84
End: 2024-11-11
Payer: COMMERCIAL

## 2024-11-11 ENCOUNTER — PATIENT OUTREACH (OUTPATIENT)
Dept: CARE COORDINATION | Facility: CLINIC | Age: 84
End: 2024-11-11

## 2024-11-11 ENCOUNTER — DOCUMENTATION ONLY (OUTPATIENT)
Dept: GERIATRICS | Facility: CLINIC | Age: 84
End: 2024-11-11

## 2024-11-11 VITALS
WEIGHT: 220 LBS | SYSTOLIC BLOOD PRESSURE: 118 MMHG | DIASTOLIC BLOOD PRESSURE: 87 MMHG | TEMPERATURE: 97 F | HEIGHT: 73 IN | RESPIRATION RATE: 18 BRPM | HEART RATE: 98 BPM | BODY MASS INDEX: 29.16 KG/M2 | OXYGEN SATURATION: 99 %

## 2024-11-11 DIAGNOSIS — I48.19 PERSISTENT ATRIAL FIBRILLATION (H): ICD-10-CM

## 2024-11-11 DIAGNOSIS — N40.0 BENIGN PROSTATIC HYPERPLASIA, UNSPECIFIED WHETHER LOWER URINARY TRACT SYMPTOMS PRESENT: ICD-10-CM

## 2024-11-11 DIAGNOSIS — Z79.01 LONG TERM CURRENT USE OF ANTICOAGULANT THERAPY: ICD-10-CM

## 2024-11-11 DIAGNOSIS — G47.33 OSA (OBSTRUCTIVE SLEEP APNEA): ICD-10-CM

## 2024-11-11 DIAGNOSIS — I10 ESSENTIAL HYPERTENSION: ICD-10-CM

## 2024-11-11 DIAGNOSIS — S06.6X0D SUBARACHNOID HEMORRHAGE FOLLOWING INJURY, NO LOSS OF CONSCIOUSNESS, SUBSEQUENT ENCOUNTER: ICD-10-CM

## 2024-11-11 DIAGNOSIS — N18.31 STAGE 3A CHRONIC KIDNEY DISEASE (H): ICD-10-CM

## 2024-11-11 DIAGNOSIS — Z11.1 ENCOUNTER FOR SCREENING FOR RESPIRATORY TUBERCULOSIS: ICD-10-CM

## 2024-11-11 DIAGNOSIS — S72.001A CLOSED DISPLACED FRACTURE OF RIGHT FEMORAL NECK (H): Primary | ICD-10-CM

## 2024-11-11 DIAGNOSIS — M62.81 GENERALIZED MUSCLE WEAKNESS: ICD-10-CM

## 2024-11-11 DIAGNOSIS — I50.20 HFREF (HEART FAILURE WITH REDUCED EJECTION FRACTION) (H): ICD-10-CM

## 2024-11-11 PROCEDURE — 99310 SBSQ NF CARE HIGH MDM 45: CPT | Performed by: NURSE PRACTITIONER

## 2024-11-11 NOTE — PROGRESS NOTES
[place] [facility]  PRIMARY CARE PROVIDER AND CLINIC:  Thom Barnes MD, [address] [place]  / [place] [address]  Chief Complaint   Patient presents with    [facility]     [place] Medical Record Number:  [id number]  Place of Service where encounter took place:  [facility] [[id number]]    Constantino Thorne  is a 84 year old  (1940), admitted to the above facility from  [facility]. Hospital stay 11/3/24 through 11/9/24..  Admitted to this facility for  rehab, medical management, and nursing care.    HPI:    HPI information obtained from: facility chart records, facility staff, and patient report.   Brief Summary of Hospital Course:   Updates on Status Since Skilled nursing Admission:     Patient Constantino Thorne is a 84 yr old male admitted to [facility] for rehabilitation s/post hospitalization [facility] 11/3-11/9/24 for fall and right hip fracture and repair  CT head and UA negative for acute finding. Treated for complication pneumonia with Augmentin.     S/post hospitalization [facility] 10/10-10/11/24 for fall with SAH  Repeat CT head during show stable    PMHx hyperlipidemia, hypertension, atrial fibrillation on chronic warfarin (held since 10/10/2024), ischemic cardiomyopathy, CAD with prior MI and cardiac arrest with ICD in place, CKD, and SABINE   Hard of hearing, has hearing aids  Lives in [facility]    Today   States pain managed with current pain medications with scheduled Tylenol and as needed oxycodone  Patient reports some foggy head with oxycodone (can consider lower to 2.5mg)  Wife hoping for short stay, if at TCU in 2 wks she would like xray done at TCU   Patient walking with walker in therapies today  Has aquacel dressing intact, small amount bloody drainage noted    States regular elimination and had bowel movement yesterday  Reports regular urination, clear yellow output in urinal    Denies chest pain or  shortness of breath  Reports occasional dysphagia, ongoing prior to hospital admission    Wife at visit and questions answered   plan to visit with patient and review discharge planning    Patient reports was falling at home due to low BPs  No confusion noted    Has long toe nails, wife request podiatrist visit    Hard of hearing, has hearing aids-does not want to bring in to TCU             CODE STATUS/ADVANCE DIRECTIVES DISCUSSION:   CPR/Full code   Patient's living condition: lives with spouse  ALLERGIES: Lisinopril and Spironolactone  PAST MEDICAL HISTORY:  has a past medical history of A-fib (H), CAD (coronary artery disease), Hyperlipidemia LDL goal <100, Hypertension, ICD (implantable cardioverter-defibrillator) in place, Old myocardial infarction, SABINE (obstructive sleep apnea), Subdural hematoma (H) (10/10/2024), and Syncope (10/10/2024).  PAST SURGICAL HISTORY:   has a past surgical history that includes Open reduction internal fixation hip bipolar (Right, 11/4/2024).  FAMILY HISTORY: family history includes Cancer in his sister.  SOCIAL HISTORY:   reports that he has never smoked. He does not have any smokeless tobacco history on file.    Post Discharge Medication Reconciliation Status: discharge medications reconciled and changed, per note/orders    Current Outpatient Medications   Medication Sig Dispense Refill    acetaminophen (TYLENOL) 325 MG tablet Take 3 tablets (975 mg) by mouth every 8 hours. 100 tablet 0    amoxicillin-clavulanate (AUGMENTIN) 875-125 MG tablet Take 1 tablet by mouth every 12 hours for 3 days. 6 tablet 0    atorvastatin (LIPITOR) 80 MG tablet Take 80 mg by mouth At Bedtime       enoxaparin ANTICOAGULANT (LOVENOX) 40 MG/0.4ML syringe Inject 0.4 mLs (40 mg) subcutaneously every 24 hours.      metoprolol succinate ER (TOPROL XL) 25 MG 24 hr tablet Take 0.5 tablets (12.5 mg) by mouth 2 times daily. 30 tablet 0    nitroGLYcerin (NITROSTAT) 0.4 MG sublingual tablet Place 1  "Tablet under tongue as needed. If no relief after 5 min call 911;continue 1 tab every 5 min max 3 tab      oxyCODONE (ROXICODONE) 5 MG tablet Take 1 tablet (5 mg) by mouth every 6 hours as needed for moderate to severe pain. 20 tablet 0    senna-docusate (SENOKOT-S/PERICOLACE) 8.6-50 MG tablet Take 1 tablet by mouth 2 times daily. 30 tablet 0    tamsulosin (FLOMAX) 0.4 MG capsule Take 0.4 mg by mouth daily      triamcinolone (KENALOG) 0.1 % external cream Apply topically 2 times daily as needed for irritation.         ROS:  10 point ROS of systems including Constitutional, Eyes, Respiratory, Cardiovascular, Gastroenterology, Genitourinary, Integumentary, Musculoskeletal, Psychiatric were all negative except for pertinent positives noted in my HPI.    Vitals:  /87   Pulse 98   Temp 97  F (36.1  C)   Resp 18   Ht 1.854 m (6' 1\")   Wt 99.8 kg (220 lb)   SpO2 99%   BMI 29.03 kg/m    Exam:  GENERAL APPEARANCE:  Alert, in no distress  ENT:  Mouth and posterior oropharynx normal, hard of hearing   EYES:  EOM, conjunctivae, lids, pupils and irises normal  NECK:  No adenopathy,masses or thyromegaly  RESP:  respiratory effort and palpation of chest normal, lungs clear to auscultation , no respiratory distress  CV:  Palpation and auscultation of heart done , regular rate and rhythm  ABDOMEN:  normal bowel sounds, soft, nontender  M/S:   sitting in bed  SKIN:  Inspection of skin and subcutaneous tissueaquacel dressing intact, with small amount bloody drainage right hip, no edema, or redness bilateral lower extremity, pulses noted bilateral feet, long toenails.  NEURO:   Cranial nerves 2-12 are normal tested and grossly at patient's baseline  PSYCH:  oriented X 3    Lab/Diagnostic data:  Labs done in SNF are in [place] EPIC. Please refer to them using [employer] Everywhere.    Last Comprehensive Metabolic Panel:  Lab Results   Component Value Date     (L) 11/07/2024    POTASSIUM 4.1 11/08/2024    CHLORIDE 101 " "11/07/2024    CO2 20 (L) 11/07/2024    ANIONGAP 12 11/07/2024     (H) 11/07/2024    BUN 25.7 (H) 11/07/2024    CR 0.98 11/07/2024    GFRESTIMATED 76 11/07/2024    ZACH 8.4 (L) 11/07/2024       Lab Results   Component Value Date    WBC 12.4 11/07/2024    WBC 7.1 06/17/2020     Lab Results   Component Value Date    RBC 3.50 11/07/2024    RBC 4.21 06/17/2020     Lab Results   Component Value Date    HGB 10.9 11/07/2024    HGB 13.0 06/17/2020     Lab Results   Component Value Date    HCT 32.1 11/07/2024    HCT 40.7 06/17/2020     No components found for: \"MCT\"  Lab Results   Component Value Date    MCV 92 11/07/2024    MCV 97 06/17/2020     Lab Results   Component Value Date    MCH 31.1 11/07/2024    MCH 30.9 06/17/2020     Lab Results   Component Value Date    MCHC 34.0 11/07/2024    MCHC 31.9 06/17/2020     Lab Results   Component Value Date    RDW 13.9 11/07/2024    RDW 13.2 06/17/2020     Lab Results   Component Value Date     11/07/2024     06/17/2020         ASSESSMENT/PLAN:    Fall c/b right femoral neck fx s/p right hip hemiarthroplasty  weakness  Aquacel drsg remain until follow up. Change if >50% saturated. Small amount bloody drainage at this time. No signs and symptoms infection  Follow up ortho [facility], if at TCU in 2wks can complete xray onsite  Weight bearing as tolerated, walking with walker in therapies  Continue therapies,  involved in safe plan home  Continue tylenol scheduled and Prn oxycodone for pain management (consider lower to 2.5mg if has confusion)-wean off as able  Hemoglobin   Date Value Ref Range Status   11/07/2024 10.9 (L) 13.3 - 17.7 g/dL Final   11/06/2024 10.6 (L) 13.3 - 17.7 g/dL Final   06/17/2020 13.0 (L) 13.3 - 17.7 g/dL Final   06/16/2020 13.4 13.3 - 17.7 g/dL Final     Neurosurgery consulted given recent SAH and approve Lovenox or asa post up,   Patient is on 30day post-op Lovenox course, follow up primary care provider regarding further " anticoagulation (review risk vs benefits if continue to fall)  Offer ice for right hip     Dyspnea on exertion  Cough  Possible left lower lobe infiltrate  Continue Augmentin course.  COVID and influenza negative  Echocardiogram for chest pain evaluation is unchanged, troponin's negative, ECG unchanged  Not appear fluid up, no edema in lower extremity and lung sounds clear  Wt Readings from Last 2 Encounters:   11/11/24 99.8 kg (220 lb)   11/03/24 95.3 kg (210 lb)   Monitor     Right sided reproducible chest pain  Per hospital note:   - CT angio chest no evidence of Pulmonary embolism on Nov 2, CXR no evidence of rib fracture  - likely related to fall, ECG no T wave or ST changes noted, as above  No c/o chest pain at TCU     Dysphagia  Speech therapy evaluate and treat   Monitor     Chronic kidney disease  Creatinine   Date Value Ref Range Status   11/07/2024 0.98 0.67 - 1.17 mg/dL Final   06/17/2020 1.13 0.66 - 1.25 mg/dL Final   Chronic managed   Mild hyponatremia  Sodium   Date Value Ref Range Status   11/07/2024 133 (L) 135 - 145 mmol/L Final   06/17/2020 139 133 - 144 mmol/L Final   Monitor      Chronic Afib  On Lovenox x 30days/post hip repair for DVT prophylaxis, NSG approve.   Coumadin had been on hold due to fall with SDH and recurrent falls  To review risk vs benefit anticoagulation with primary care provider if ongoing falls  metoprolol succinate reduced to 12.5 mg BID from 50 mg BID.  Hold parameters on metoprolol due to hypotension   Per hospital note: Patient appears going into SVT and atrial fibrillation. Had not receive metoprolol dose AM, suspect reason HR were elevated-given metoprolol 12.5 mg and no further SVT  Follow-up pacemaker check as advised     Leukocytosis  No infectious symptoms. Suspect stress from fracture and surgery.  Improving  Monitor   WBC   Date Value Ref Range Status   06/17/2020 7.1 4.0 - 11.0 10e9/L Final     WBC Count   Date Value Ref Range Status   11/07/2024 12.4 (H) 4.0 -  11.0 10e3/uL Final     CAD with h/o MI in 2010.   History of V fib arrest s/p ICD.   Chronic HFrEF  Vitals stable room air  Not appear fluid up  Wt Readings from Last 2 Encounters:   11/11/24 99.8 kg (220 lb)   11/03/24 95.3 kg (210 lb)     Hr and blood pressure managed  Avoid hypotension, Metoprolol has hold parameters  Continue Continue statin  On Lovenox at this time    BPH  Reports regular elimination  Clear yellow urine noted   Continue flomax    SABINE  Continue CPAP    Thick toe nails  Podiatrist referral      clinic appointment [facility] on 11/14/24 CANCELED and no further follow up is needed. You have been cleared for activity by Neurosurgery. .    Follow up BMP,CBC 11/13/24       Total time spent with patient visit at the skilled nursing facility was 45 min including patient visit and review of past records. Greater than 50% of total time spent with counseling and coordinating care due to discussion on pain management, functional goals, Afib management, bowel movement management and discharge planning.     Electronically signed by:  STEPHEN Garcia CNP

## 2024-11-11 NOTE — PROGRESS NOTES
Connected Care Resource Center: Connected Care Resource [place]    Background: Transitional Care Management program identified per system criteria and reviewed by [facility] team for possible outreach.    Assessment: Upon chart review, CCRC Team member will not proceed with patient outreach related to this episode of Transitional Care Management program due to reason below:    Non-[facility] TCU: CCRC team member noted patient discharged to TCU/ARU/LTACH. Patient is not established with a [facility] currently supported by Primary Care-Care Coordination therefore handoff to Primary Care-Care Coordination is not appropriate at this time.    Plan: Transitional Care Management episode addressed appropriately per reason noted above.      Juliana Jauregui  Community Health Worker  [facility], [facility]  [phone]      *Connected Care Resource Team does NOT follow patient ongoing. Referrals are identified based on internal discharge reports and the outreach is to ensure patient has an understanding of their discharge instructions.

## 2024-11-11 NOTE — LETTER
11/11/2024      Constantino Thorne  [address]  [place] [address]        [place] [facility]  PRIMARY CARE PROVIDER AND CLINIC:  Thom Barnes MD, [address] [place]  / [place] [address]  Chief Complaint   Patient presents with     \[facility] [place] [facility]\"" Care     [place] Medical Record Number:  [id number]  Place of Service where encounter took place:  [facility] [[id number]]    Constantino Thorne  is a 84 year old  (1940), admitted to the above facility from  [facility]. Hospital stay 11/3/24 through 11/9/24..  Admitted to this facility for  rehab, medical management, and nursing care.    HPI:    HPI information obtained from: facility chart records, facility staff, and patient report.   Brief Summary of Hospital Course:   Updates on Status Since Skilled nursing Admission:     Patient Constantino Thorne is a 84 yr old male admitted to [facility] for rehabilitation s/post hospitalization [facility] 11/3-11/9/24 for fall and right hip fracture and repair  CT head and UA negative for acute finding. Treated for complication pneumonia with Augmentin.     S/post hospitalization [facility] 10/10-10/11/24 for fall with SAH  Repeat CT head during show stable    PMHx hyperlipidemia, hypertension, atrial fibrillation on chronic warfarin (held since 10/10/2024), ischemic cardiomyopathy, CAD with prior MI and cardiac arrest with ICD in place, CKD, and SABINE   Hard of hearing, has hearing aids  Lives in [facility]    Today   States pain managed with current pain medications with scheduled Tylenol and as needed oxycodone  Patient reports some foggy head with oxycodone (can consider lower to 2.5mg)  Wife hoping for short stay, if at TCU in 2 wks she would like xray done at TCU   Patient walking with walker in therapies today  Has aquacel dressing intact, small amount bloody drainage noted    States regular elimination and had bowel movement  yesterday  Reports regular urination, clear yellow output in urinal    Denies chest pain or shortness of breath  Reports occasional dysphagia, ongoing prior to hospital admission    Wife at visit and questions answered   plan to visit with patient and review discharge planning    Patient reports was falling at home due to low BPs  No confusion noted    Has long toe nails, wife request podiatrist visit    Hard of hearing, has hearing aids-does not want to bring in to TCU             CODE STATUS/ADVANCE DIRECTIVES DISCUSSION:   CPR/Full code   Patient's living condition: lives with spouse  ALLERGIES: Lisinopril and Spironolactone  PAST MEDICAL HISTORY:  has a past medical history of A-fib (H), CAD (coronary artery disease), Hyperlipidemia LDL goal <100, Hypertension, ICD (implantable cardioverter-defibrillator) in place, Old myocardial infarction, SABINE (obstructive sleep apnea), Subdural hematoma (H) (10/10/2024), and Syncope (10/10/2024).  PAST SURGICAL HISTORY:   has a past surgical history that includes Open reduction internal fixation hip bipolar (Right, 11/4/2024).  FAMILY HISTORY: family history includes Cancer in his sister.  SOCIAL HISTORY:   reports that he has never smoked. He does not have any smokeless tobacco history on file.    Post Discharge Medication Reconciliation Status: discharge medications reconciled and changed, per note/orders    Current Outpatient Medications   Medication Sig Dispense Refill     acetaminophen (TYLENOL) 325 MG tablet Take 3 tablets (975 mg) by mouth every 8 hours. 100 tablet 0     amoxicillin-clavulanate (AUGMENTIN) 875-125 MG tablet Take 1 tablet by mouth every 12 hours for 3 days. 6 tablet 0     atorvastatin (LIPITOR) 80 MG tablet Take 80 mg by mouth At Bedtime        enoxaparin ANTICOAGULANT (LOVENOX) 40 MG/0.4ML syringe Inject 0.4 mLs (40 mg) subcutaneously every 24 hours.       metoprolol succinate ER (TOPROL XL) 25 MG 24 hr tablet Take 0.5 tablets (12.5 mg)  "by mouth 2 times daily. 30 tablet 0     nitroGLYcerin (NITROSTAT) 0.4 MG sublingual tablet Place 1 Tablet under tongue as needed. If no relief after 5 min call 911;continue 1 tab every 5 min max 3 tab       oxyCODONE (ROXICODONE) 5 MG tablet Take 1 tablet (5 mg) by mouth every 6 hours as needed for moderate to severe pain. 20 tablet 0     senna-docusate (SENOKOT-S/PERICOLACE) 8.6-50 MG tablet Take 1 tablet by mouth 2 times daily. 30 tablet 0     tamsulosin (FLOMAX) 0.4 MG capsule Take 0.4 mg by mouth daily       triamcinolone (KENALOG) 0.1 % external cream Apply topically 2 times daily as needed for irritation.         ROS:  10 point ROS of systems including Constitutional, Eyes, Respiratory, Cardiovascular, Gastroenterology, Genitourinary, Integumentary, Musculoskeletal, Psychiatric were all negative except for pertinent positives noted in my HPI.    Vitals:  /87   Pulse 98   Temp 97  F (36.1  C)   Resp 18   Ht 1.854 m (6' 1\")   Wt 99.8 kg (220 lb)   SpO2 99%   BMI 29.03 kg/m    Exam:  GENERAL APPEARANCE:  Alert, in no distress  ENT:  Mouth and posterior oropharynx normal, hard of hearing   EYES:  EOM, conjunctivae, lids, pupils and irises normal  NECK:  No adenopathy,masses or thyromegaly  RESP:  respiratory effort and palpation of chest normal, lungs clear to auscultation , no respiratory distress  CV:  Palpation and auscultation of heart done , regular rate and rhythm  ABDOMEN:  normal bowel sounds, soft, nontender  M/S:   sitting in bed  SKIN:  Inspection of skin and subcutaneous tissueaquacel dressing intact, with small amount bloody drainage right hip, no edema, or redness bilateral lower extremity, pulses noted bilateral feet, long toenails.  NEURO:   Cranial nerves 2-12 are normal tested and grossly at patient's baseline  PSYCH:  oriented X 3    Lab/Diagnostic data:  Labs done in SNF are in [place] EPIC. Please refer to them using [employer] Everywhere.    Last Comprehensive Metabolic " "Panel:  Lab Results   Component Value Date     (L) 11/07/2024    POTASSIUM 4.1 11/08/2024    CHLORIDE 101 11/07/2024    CO2 20 (L) 11/07/2024    ANIONGAP 12 11/07/2024     (H) 11/07/2024    BUN 25.7 (H) 11/07/2024    CR 0.98 11/07/2024    GFRESTIMATED 76 11/07/2024    ZACH 8.4 (L) 11/07/2024       Lab Results   Component Value Date    WBC 12.4 11/07/2024    WBC 7.1 06/17/2020     Lab Results   Component Value Date    RBC 3.50 11/07/2024    RBC 4.21 06/17/2020     Lab Results   Component Value Date    HGB 10.9 11/07/2024    HGB 13.0 06/17/2020     Lab Results   Component Value Date    HCT 32.1 11/07/2024    HCT 40.7 06/17/2020     No components found for: \"MCT\"  Lab Results   Component Value Date    MCV 92 11/07/2024    MCV 97 06/17/2020     Lab Results   Component Value Date    MCH 31.1 11/07/2024    MCH 30.9 06/17/2020     Lab Results   Component Value Date    MCHC 34.0 11/07/2024    MCHC 31.9 06/17/2020     Lab Results   Component Value Date    RDW 13.9 11/07/2024    RDW 13.2 06/17/2020     Lab Results   Component Value Date     11/07/2024     06/17/2020         ASSESSMENT/PLAN:    Fall c/b right femoral neck fx s/p right hip hemiarthroplasty  weakness  Aquacel drsg remain until follow up. Change if >50% saturated. Small amount bloody drainage at this time. No signs and symptoms infection  Follow up ortho [facility], if at TCU in 2wks can complete xray onsite  Weight bearing as tolerated, walking with walker in therapies  Continue therapies,  involved in safe plan home  Continue tylenol scheduled and Prn oxycodone for pain management (consider lower to 2.5mg if has confusion)-wean off as able  Hemoglobin   Date Value Ref Range Status   11/07/2024 10.9 (L) 13.3 - 17.7 g/dL Final   11/06/2024 10.6 (L) 13.3 - 17.7 g/dL Final   06/17/2020 13.0 (L) 13.3 - 17.7 g/dL Final   06/16/2020 13.4 13.3 - 17.7 g/dL Final     Neurosurgery consulted given recent SAH and approve Lovenox " or asa post up,   Patient is on 30day post-op Lovenox course, follow up primary care provider regarding further anticoagulation (review risk vs benefits if continue to fall)  Offer ice for right hip     Dyspnea on exertion  Cough  Possible left lower lobe infiltrate  Continue Augmentin course.  COVID and influenza negative  Echocardiogram for chest pain evaluation is unchanged, troponin's negative, ECG unchanged  Not appear fluid up, no edema in lower extremity and lung sounds clear  Wt Readings from Last 2 Encounters:   11/11/24 99.8 kg (220 lb)   11/03/24 95.3 kg (210 lb)   Monitor     Right sided reproducible chest pain  Per hospital note:   - CT angio chest no evidence of Pulmonary embolism on Nov 2, CXR no evidence of rib fracture  - likely related to fall, ECG no T wave or ST changes noted, as above  No c/o chest pain at TCU     Dysphagia  Speech therapy evaluate and treat   Monitor     Chronic kidney disease  Creatinine   Date Value Ref Range Status   11/07/2024 0.98 0.67 - 1.17 mg/dL Final   06/17/2020 1.13 0.66 - 1.25 mg/dL Final   Chronic managed   Mild hyponatremia  Sodium   Date Value Ref Range Status   11/07/2024 133 (L) 135 - 145 mmol/L Final   06/17/2020 139 133 - 144 mmol/L Final   Monitor      Chronic Afib  On Lovenox x 30days/post hip repair for DVT prophylaxis, NSG approve.   Coumadin had been on hold due to fall with SDH and recurrent falls  To review risk vs benefit anticoagulation with primary care provider if ongoing falls  metoprolol succinate reduced to 12.5 mg BID from 50 mg BID.  Hold parameters on metoprolol due to hypotension   Per hospital note: Patient appears going into SVT and atrial fibrillation. Had not receive metoprolol dose AM, suspect reason HR were elevated-given metoprolol 12.5 mg and no further SVT  Follow-up pacemaker check as advised     Leukocytosis  No infectious symptoms. Suspect stress from fracture and surgery.  Improving  Monitor   WBC   Date Value Ref Range Status    06/17/2020 7.1 4.0 - 11.0 10e9/L Final     WBC Count   Date Value Ref Range Status   11/07/2024 12.4 (H) 4.0 - 11.0 10e3/uL Final     CAD with h/o MI in 2010.   History of V fib arrest s/p ICD.   Chronic HFrEF  Vitals stable room air  Not appear fluid up  Wt Readings from Last 2 Encounters:   11/11/24 99.8 kg (220 lb)   11/03/24 95.3 kg (210 lb)     Hr and blood pressure managed  Avoid hypotension, Metoprolol has hold parameters  Continue Continue statin  On Lovenox at this time    BPH  Reports regular elimination  Clear yellow urine noted   Continue flomax    SABINE  Continue CPAP    Thick toe nails  Podiatrist referral      clinic appointment [facility]surgery on 11/14/24 CANCELED and no further follow up is needed. You have been cleared for activity by Neurosurgery. .    Follow up BMP,CBC 11/13/24       Total time spent with patient visit at the skilled nursing facility was 45 min including patient visit and review of past records. Greater than 50% of total time spent with counseling and coordinating care due to discussion on pain management, functional goals, Afib management, bowel movement management and discharge planning.     Electronically signed by:  STEPHEN Garcia CNP                       Sincerely,        STEPHEN Garcia CNP

## 2024-11-12 PROCEDURE — 86481 TB AG RESPONSE T-CELL SUSP: CPT | Performed by: NURSE PRACTITIONER

## 2024-11-12 PROCEDURE — P9604 ONE-WAY ALLOW PRORATED TRIP: HCPCS | Performed by: NURSE PRACTITIONER

## 2024-11-12 PROCEDURE — 36415 COLL VENOUS BLD VENIPUNCTURE: CPT | Performed by: NURSE PRACTITIONER

## 2024-11-13 LAB
GAMMA INTERFERON BACKGROUND BLD IA-ACNC: 0.04 IU/ML
M TB IFN-G BLD-IMP: NEGATIVE
M TB IFN-G CD4+ BCKGRND COR BLD-ACNC: 3.11 IU/ML
MITOGEN IGNF BCKGRD COR BLD-ACNC: 0 IU/ML
MITOGEN IGNF BCKGRD COR BLD-ACNC: 0 IU/ML
QUANTIFERON MITOGEN: 3.15 IU/ML
QUANTIFERON NIL TUBE: 0.04 IU/ML
QUANTIFERON TB1 TUBE: 0.04 IU/ML
QUANTIFERON TB2 TUBE: 0.04

## 2024-11-14 ENCOUNTER — DOCUMENTATION ONLY (OUTPATIENT)
Dept: OTHER | Facility: CLINIC | Age: 84
End: 2024-11-14
Payer: COMMERCIAL

## 2024-11-14 VITALS
HEART RATE: 75 BPM | RESPIRATION RATE: 18 BRPM | HEIGHT: 73 IN | OXYGEN SATURATION: 95 % | WEIGHT: 216.5 LBS | DIASTOLIC BLOOD PRESSURE: 78 MMHG | SYSTOLIC BLOOD PRESSURE: 142 MMHG | TEMPERATURE: 97.1 F | BODY MASS INDEX: 28.69 KG/M2

## 2024-11-14 NOTE — PROGRESS NOTES
[place] [facility] DISCHARGE SUMMARY  PATIENT'S NAME: Constantino Thorne  YOB: 1940  MEDICAL RECORD NUMBER:  [id number]  Place of Service where encounter took place:  [facility] [place] ([place]) [[id number]]    PRIMARY CARE PROVIDER AND CLINIC RESPONSIBLE AFTER TRANSFER:   Thom Barnes MD, [address] [facility] / [facility] [address]    Non-FMG Provider     Transferring providers: STEPHEN Garcia CNP; Andi Turpin MD  Recent Hospitalization/ED:  [facility] stay 11/3/24 to 11/9/24.  Date of SNF Admission:  11/9/24  Date of SNF (anticipated) Discharge:  11/16/24  Discharged to: previous independent home  Cognitive Scores/ Physical Function/ DME:   Transfers CGA  Bed mobility SBA  Ambulating 150' FWW CGA  SLUMS 29/30  Grooming/hygiene ind  UB dressing SBA  LB dressing mod  Toileting min    [facility] [address]  [phone]    CODE STATUS/ADVANCE DIRECTIVES DISCUSSION:  Full Code   ALLERGIES: Lisinopril and Spironolactone    DISCHARGE DIAGNOSIS/NURSING FACILITY COURSE:     Patient Constantino Thorne is a 84 yr old male admitted to [facility] for rehabilitation s/post hospitalization [facility] 11/3-11/9/24 for fall and right hip fracture and repair  CT head and UA negative for acute finding. Treated for complication pneumonia with Augmentin.     S/post hospitalization [facility] 10/10-10/11/24 for fall with SAH  Repeat CT head during show stable    PMHx hyperlipidemia, hypertension, atrial fibrillation on chronic warfarin (held since 10/10/2024), ischemic cardiomyopathy, CAD with prior MI and cardiac arrest with ICD in place, CKD, and SABINE   Hard of hearing, has hearing aids  Lives in [place]      Fall c/b right femoral neck fx s/p right hip hemiarthroplasty  weakness  Aquacel drsg remain until follow up. Change if >50% saturated. Small amount bloody drainage at this time. No signs and symptoms  infection  Follow up ortho [facility], if at TCU in 2wks can complete xray onsite  Xray was obtained at TCU this week and sent to ortho   Weight bearing as tolerated, walking with walker in therapies  Progressing in therapies and plans to discharge home with homecare  Continue tylenol scheduled. Discontinue oxycodone due to patient report not using   Hemoglobin   Date Value Ref Range Status   11/07/2024 10.9 (L) 13.3 - 17.7 g/dL Final   11/06/2024 10.6 (L) 13.3 - 17.7 g/dL Final   06/17/2020 13.0 (L) 13.3 - 17.7 g/dL Final   06/16/2020 13.4 13.3 - 17.7 g/dL Final   Neurosurgery consulted given recent SAH and approve Lovenox or asa post up,   Patient is on 30day post-op Lovenox course, follow up primary care provider regarding further anticoagulation (review risk vs benefits if continue to fall)  Staff to educate patient and wife on Lovenox administration prior to discharge   Offer ice for right hip     Dyspnea on exertion  Cough  Possible left lower lobe infiltrate  Completed Augmentin course.  COVID and influenza negative  Echocardiogram for chest pain evaluation is unchanged, troponin's negative, ECG unchanged  Not appear fluid up, no edema in lower extremity and lung sounds clear  Wt Readings from Last 2 Encounters:   11/14/24 98.2 kg (216 lb 8 oz)   11/11/24 99.8 kg (220 lb)     Right sided reproducible chest pain  Per hospital note:   - CT angio chest no evidence of Pulmonary embolism on Nov 2, CXR no evidence of rib fracture  - likely related to fall, ECG no T wave or ST changes noted, as above  No c/o chest pain at TCU     Dysphagia  Speech therapy evaluate and treat   No signs and symptoms aspiration and no c/o dysphagia today  Monitor     Chronic kidney disease  Creatinine   Date Value Ref Range Status   11/07/2024 0.98 0.67 - 1.17 mg/dL Final   06/17/2020 1.13 0.66 - 1.25 mg/dL Final     Chronic managed   Mild hyponatremia  Sodium   Date Value Ref Range Status   11/07/2024 133 (L) 135 - 145 mmol/L  Final   06/17/2020 139 133 - 144 mmol/L Final     Improved  Follow up BMP,CBC within 1 wk  Monitor      Chronic Afib  On Lovenox x 30days/post hip repair for DVT prophylaxis, NSG approve.   Coumadin had been on hold due to fall with SDH and recurrent falls  To review risk vs benefit anticoagulation with primary care provider if ongoing falls  metoprolol succinate reduced to 12.5 mg BID from 50 mg 2 x daily (of note staff error and only administer once daily-updated staff regarding).  Hold parameters on metoprolol due to hypotension   Follow-up pacemaker check as advised     Leukocytosis  No infectious symptoms. Suspect stress from fracture and surgery.  Improving  Monitor     WBC   Date Value Ref Range Status   06/17/2020 7.1 4.0 - 11.0 10e9/L Final     WBC Count   Date Value Ref Range Status   11/07/2024 12.4 (H) 4.0 - 11.0 10e3/uL Final     CAD with h/o MI in 2010.   History of V fib arrest s/p ICD.   Chronic HFrEF  Vitals stable room air  Not appear fluid up  Wt Readings from Last 2 Encounters:   11/14/24 98.2 kg (216 lb 8 oz)   11/11/24 99.8 kg (220 lb)   Hr and blood pressure managed  Avoid hypotension, Metoprolol has hold parameters  Continue Continue statin  On Lovenox at this time    BPH  Reports regular elimination  Clear yellow urine noted   Continue flomax    SABINE  Continue CPAP    Thick toe nails  Podiatrist referral      clinic appointment [facility] on 11/14/24 CANCELED and no further follow up is needed. You have been cleared for activity by Neurosurgery. .    Follow up BMP,CBC 11/13/24 (missed at TCU), follow up BMP,CBC within 1 week      Reviewed with patient and wife medications and follow up         Past Medical History:  has a past medical history of A-fib (H), CAD (coronary artery disease), Hyperlipidemia LDL goal <100, Hypertension, ICD (implantable cardioverter-defibrillator) in place, Old myocardial infarction, SABINE (obstructive sleep apnea), Subdural hematoma (H)  "(10/10/2024), and Syncope (10/10/2024).    Discharge Medications:    Current Outpatient Medications   Medication Sig Dispense Refill    acetaminophen (TYLENOL) 325 MG tablet Take 3 tablets (975 mg) by mouth every 8 hours. 100 tablet 0    atorvastatin (LIPITOR) 80 MG tablet Take 80 mg by mouth At Bedtime       enoxaparin ANTICOAGULANT (LOVENOX) 40 MG/0.4ML syringe Inject 0.4 mLs (40 mg) subcutaneously every 24 hours.      metoprolol succinate ER (TOPROL XL) 25 MG 24 hr tablet Take 0.5 tablets (12.5 mg) by mouth 2 times daily. 30 tablet 0    nitroGLYcerin (NITROSTAT) 0.4 MG sublingual tablet Place 1 Tablet under tongue as needed. If no relief after 5 min call 911;continue 1 tab every 5 min max 3 tab      senna-docusate (SENOKOT-S/PERICOLACE) 8.6-50 MG tablet Take 1 tablet by mouth 2 times daily. 30 tablet 0    tamsulosin (FLOMAX) 0.4 MG capsule Take 0.4 mg by mouth daily      triamcinolone (KENALOG) 0.1 % external cream Apply topically 2 times daily as needed for irritation.         Medication Changes/Rationale:   See HPI    Controlled medications sent with patient:   not applicable/none     ROS:   4 point ROS including Respiratory, CV, GI and , other than that noted in the HPI,  is negative    Physical Exam:   Vitals: BP (!) 142/78   Pulse 75   Temp 97.1  F (36.2  C)   Resp 18   Ht 1.854 m (6' 1\")   Wt 98.2 kg (216 lb 8 oz)   SpO2 95%   BMI 28.56 kg/m    BMI= Body mass index is 28.56 kg/m .  GENERAL APPEARANCE:  Alert, in no distress  RESP:  respiratory effort and palpation of chest normal, lungs clear to auscultation , no respiratory distress  CV:  Palpation and auscultation of heart done , regular rate and rhythm  ABDOMEN:  normal bowel sounds, soft, nontender  M/S:   sitting on bed  SKIN:  Inspection of skin and subcutaneous tissue baseline  NEURO:   Cranial nerves 2-12 are normal tested and grossly at patient's baseline  PSYCH:  oriented X 3     SNF labs: Labs done in SNF are in [place] EPIC. Please " "refer to them using EPIC/Care Everywhere.  Last Comprehensive Metabolic Panel:  Lab Results   Component Value Date     (L) 11/07/2024    POTASSIUM 4.1 11/08/2024    CHLORIDE 101 11/07/2024    CO2 20 (L) 11/07/2024    ANIONGAP 12 11/07/2024     (H) 11/07/2024    BUN 25.7 (H) 11/07/2024    CR 0.98 11/07/2024    GFRESTIMATED 76 11/07/2024    ZACH 8.4 (L) 11/07/2024       Lab Results   Component Value Date    WBC 12.4 11/07/2024    WBC 7.1 06/17/2020     Lab Results   Component Value Date    RBC 3.50 11/07/2024    RBC 4.21 06/17/2020     Lab Results   Component Value Date    HGB 10.9 11/07/2024    HGB 13.0 06/17/2020     Lab Results   Component Value Date    HCT 32.1 11/07/2024    HCT 40.7 06/17/2020     No components found for: \"MCT\"  Lab Results   Component Value Date    MCV 92 11/07/2024    MCV 97 06/17/2020     Lab Results   Component Value Date    MCH 31.1 11/07/2024    MCH 30.9 06/17/2020     Lab Results   Component Value Date    MCHC 34.0 11/07/2024    MCHC 31.9 06/17/2020     Lab Results   Component Value Date    RDW 13.9 11/07/2024    RDW 13.2 06/17/2020     Lab Results   Component Value Date     11/07/2024     06/17/2020         DISCHARGE PLAN:  Follow up labs:BMP,CBC within 1 wk  Medical Follow Up:      Follow up with primary care provider in 1-2 weeks  MTM referral needed and placed by this provider: No  Current [place] scheduled appointments:       Discharge Services: Home Care:  Occupational Therapy, Physical Therapy, Registered Nurse, Home Health Aide, and From:  [facility]      TOTAL DISCHARGE TIME:   Greater than 30 minutes  Electronically signed by:  STEPHEN Garcia CNP     Home care Face to Face documentation done in [facility] attached to Home care orders for homecare.                 "

## 2024-11-15 ENCOUNTER — DISCHARGE SUMMARY NURSING HOME (OUTPATIENT)
Dept: GERIATRICS | Facility: CLINIC | Age: 84
End: 2024-11-15
Payer: COMMERCIAL

## 2024-11-15 DIAGNOSIS — S72.001A CLOSED DISPLACED FRACTURE OF RIGHT FEMORAL NECK (H): Primary | ICD-10-CM

## 2024-11-15 DIAGNOSIS — I10 ESSENTIAL HYPERTENSION: ICD-10-CM

## 2024-11-15 DIAGNOSIS — M62.81 GENERALIZED MUSCLE WEAKNESS: ICD-10-CM

## 2024-11-15 DIAGNOSIS — N18.31 STAGE 3A CHRONIC KIDNEY DISEASE (H): ICD-10-CM

## 2024-11-15 DIAGNOSIS — Z96.649 S/P HIP HEMIARTHROPLASTY: ICD-10-CM

## 2024-11-15 DIAGNOSIS — I50.20 HFREF (HEART FAILURE WITH REDUCED EJECTION FRACTION) (H): ICD-10-CM

## 2024-11-15 DIAGNOSIS — I48.19 PERSISTENT ATRIAL FIBRILLATION (H): ICD-10-CM

## 2024-11-15 DIAGNOSIS — S06.6X0D SUBARACHNOID HEMORRHAGE FOLLOWING INJURY, NO LOSS OF CONSCIOUSNESS, SUBSEQUENT ENCOUNTER: ICD-10-CM

## 2024-11-15 RX ORDER — ENOXAPARIN SODIUM 100 MG/ML
40 INJECTION SUBCUTANEOUS EVERY 24 HOURS
Qty: 0.4 ML | Refills: 11 | Status: SHIPPED | OUTPATIENT
Start: 2024-11-15

## 2024-11-15 RX ORDER — AMOXICILLIN 250 MG
1 CAPSULE ORAL 2 TIMES DAILY
Qty: 30 TABLET | Refills: 0 | Status: SHIPPED | OUTPATIENT
Start: 2024-11-15

## 2024-11-15 RX ORDER — ACETAMINOPHEN 325 MG/1
975 TABLET ORAL EVERY 8 HOURS
Qty: 100 TABLET | Refills: 0 | Status: SHIPPED | OUTPATIENT
Start: 2024-11-15

## 2024-11-15 RX ORDER — METOPROLOL SUCCINATE 25 MG/1
12.5 TABLET, EXTENDED RELEASE ORAL 2 TIMES DAILY
Qty: 30 TABLET | Refills: 0 | Status: SHIPPED | OUTPATIENT
Start: 2024-11-15

## 2024-11-15 NOTE — LETTER
11/15/2024      Constantino Thorne  [address]  [place] [address]        [place] [facility] DISCHARGE SUMMARY  PATIENT'S NAME: Constantino Thorne  YOB: 1940  MEDICAL RECORD NUMBER:  [id number]  Place of Service where encounter took place:  [facility] [place] ([place]) [[id number]]    PRIMARY CARE PROVIDER AND CLINIC RESPONSIBLE AFTER TRANSFER:   Thom Barnes MD, [address] [facility] / [place] [address]    Non-G Provider     Transferring providers: STEPHEN Garcia CNP; Andi Turpin MD  Recent Hospitalization/ED:  [facility] stay 11/3/24 to 11/9/24.  Date of SNF Admission:  11/9/24  Date of SNF (anticipated) Discharge:  11/16/24  Discharged to: previous independent home  Cognitive Scores/ Physical Function/ DME:   Transfers CGA  Bed mobility SBA  Ambulating 150' FWW CGA  SLUMS 29/30  Grooming/hygiene ind  UB dressing SBA  LB dressing mod  Toileting min    [facility] [address] [place]  [phone]    CODE STATUS/ADVANCE DIRECTIVES DISCUSSION:  Full Code   ALLERGIES: Lisinopril and Spironolactone    DISCHARGE DIAGNOSIS/NURSING FACILITY COURSE:     Patient Constantino Thorne is a 84 yr old male admitted to [facility] 11/3-11/9/24 for fall and right hip fracture and repair  CT head and UA negative for acute finding. Treated for complication pneumonia with Augmentin.     S/post hospitalization [facility] 10/10-10/11/24 for fall with SAH  Repeat CT head during show stable    PMHx hyperlipidemia, hypertension, atrial fibrillation on chronic warfarin (held since 10/10/2024), ischemic cardiomyopathy, CAD with prior MI and cardiac arrest with ICD in place, CKD, and SABINE   Hard of hearing, has hearing aids  Lives in [place]      Fall c/b right femoral neck fx s/p right hip hemiarthroplasty  weakness  Aquacel drsg remain until follow up. Change if >50%  saturated. Small amount bloody drainage at this time. No signs and symptoms infection  Follow up ortho [facility], if at TCU in 2wks can complete xray onsite  Xray was obtained at TCU this week and sent to ortho   Weight bearing as tolerated, walking with walker in therapies  Progressing in therapies and plans to discharge home with homecare  Continue tylenol scheduled. Discontinue oxycodone due to patient report not using   Hemoglobin   Date Value Ref Range Status   11/07/2024 10.9 (L) 13.3 - 17.7 g/dL Final   11/06/2024 10.6 (L) 13.3 - 17.7 g/dL Final   06/17/2020 13.0 (L) 13.3 - 17.7 g/dL Final   06/16/2020 13.4 13.3 - 17.7 g/dL Final   Neurosurgery consulted given recent SAH and approve Lovenox or asa post up,   Patient is on 30day post-op Lovenox course, follow up primary care provider regarding further anticoagulation (review risk vs benefits if continue to fall)  Staff to educate patient and wife on Lovenox administration prior to discharge   Offer ice for right hip     Dyspnea on exertion  Cough  Possible left lower lobe infiltrate  Completed Augmentin course.  COVID and influenza negative  Echocardiogram for chest pain evaluation is unchanged, troponin's negative, ECG unchanged  Not appear fluid up, no edema in lower extremity and lung sounds clear  Wt Readings from Last 2 Encounters:   11/14/24 98.2 kg (216 lb 8 oz)   11/11/24 99.8 kg (220 lb)     Right sided reproducible chest pain  Per hospital note:   - CT angio chest no evidence of Pulmonary embolism on Nov 2, CXR no evidence of rib fracture  - likely related to fall, ECG no T wave or ST changes noted, as above  No c/o chest pain at TCU     Dysphagia  Speech therapy evaluate and treat   No signs and symptoms aspiration and no c/o dysphagia today  Monitor     Chronic kidney disease  Creatinine   Date Value Ref Range Status   11/07/2024 0.98 0.67 - 1.17 mg/dL Final   06/17/2020 1.13 0.66 - 1.25 mg/dL Final     Chronic managed   Mild  hyponatremia  Sodium   Date Value Ref Range Status   11/07/2024 133 (L) 135 - 145 mmol/L Final   06/17/2020 139 133 - 144 mmol/L Final     Improved  Follow up BMP,CBC within 1 wk  Monitor      Chronic Afib  On Lovenox x 30days/post hip repair for DVT prophylaxis, NSG approve.   Coumadin had been on hold due to fall with SDH and recurrent falls  To review risk vs benefit anticoagulation with primary care provider if ongoing falls  metoprolol succinate reduced to 12.5 mg BID from 50 mg 2 x daily (of note staff error and only administer once daily-updated staff regarding).  Hold parameters on metoprolol due to hypotension   Follow-up pacemaker check as advised     Leukocytosis  No infectious symptoms. Suspect stress from fracture and surgery.  Improving  Monitor     WBC   Date Value Ref Range Status   06/17/2020 7.1 4.0 - 11.0 10e9/L Final     WBC Count   Date Value Ref Range Status   11/07/2024 12.4 (H) 4.0 - 11.0 10e3/uL Final     CAD with h/o MI in 2010.   History of V fib arrest s/p ICD.   Chronic HFrEF  Vitals stable room air  Not appear fluid up  Wt Readings from Last 2 Encounters:   11/14/24 98.2 kg (216 lb 8 oz)   11/11/24 99.8 kg (220 lb)   Hr and blood pressure managed  Avoid hypotension, Metoprolol has hold parameters  Continue Continue statin  On Lovenox at this time    BPH  Reports regular elimination  Clear yellow urine noted   Continue flomax    SABINE  Continue CPAP    Thick toe nails  Podiatrist referral      clinic appointment [facility] on 11/14/24 CANCELED and no further follow up is needed. You have been cleared for activity by Neurosurgery. .    Follow up BMP,CBC 11/13/24 (missed at TCU), follow up BMP,CBC within 1 week      Reviewed with patient and wife medications and follow up         Past Medical History:  has a past medical history of A-fib (H), CAD (coronary artery disease), Hyperlipidemia LDL goal <100, Hypertension, ICD (implantable cardioverter-defibrillator) in place,  "Old myocardial infarction, SABINE (obstructive sleep apnea), Subdural hematoma (H) (10/10/2024), and Syncope (10/10/2024).    Discharge Medications:    Current Outpatient Medications   Medication Sig Dispense Refill     acetaminophen (TYLENOL) 325 MG tablet Take 3 tablets (975 mg) by mouth every 8 hours. 100 tablet 0     atorvastatin (LIPITOR) 80 MG tablet Take 80 mg by mouth At Bedtime        enoxaparin ANTICOAGULANT (LOVENOX) 40 MG/0.4ML syringe Inject 0.4 mLs (40 mg) subcutaneously every 24 hours.       metoprolol succinate ER (TOPROL XL) 25 MG 24 hr tablet Take 0.5 tablets (12.5 mg) by mouth 2 times daily. 30 tablet 0     nitroGLYcerin (NITROSTAT) 0.4 MG sublingual tablet Place 1 Tablet under tongue as needed. If no relief after 5 min call 911;continue 1 tab every 5 min max 3 tab       senna-docusate (SENOKOT-S/PERICOLACE) 8.6-50 MG tablet Take 1 tablet by mouth 2 times daily. 30 tablet 0     tamsulosin (FLOMAX) 0.4 MG capsule Take 0.4 mg by mouth daily       triamcinolone (KENALOG) 0.1 % external cream Apply topically 2 times daily as needed for irritation.         Medication Changes/Rationale:   See HPI    Controlled medications sent with patient:   not applicable/none     ROS:   4 point ROS including Respiratory, CV, GI and , other than that noted in the HPI,  is negative    Physical Exam:   Vitals: BP (!) 142/78   Pulse 75   Temp 97.1  F (36.2  C)   Resp 18   Ht 1.854 m (6' 1\")   Wt 98.2 kg (216 lb 8 oz)   SpO2 95%   BMI 28.56 kg/m    BMI= Body mass index is 28.56 kg/m .  GENERAL APPEARANCE:  Alert, in no distress  RESP:  respiratory effort and palpation of chest normal, lungs clear to auscultation , no respiratory distress  CV:  Palpation and auscultation of heart done , regular rate and rhythm  ABDOMEN:  normal bowel sounds, soft, nontender  M/S:   sitting on bed  SKIN:  Inspection of skin and subcutaneous tissue baseline  NEURO:   Cranial nerves 2-12 are normal tested and grossly at patient's " "baseline  PSYCH:  oriented X 3     SNF labs: Labs done in SNF are in [place] EPIC. Please refer to them using [facility]/Care Everywhere.  Last Comprehensive Metabolic Panel:  Lab Results   Component Value Date     (L) 11/07/2024    POTASSIUM 4.1 11/08/2024    CHLORIDE 101 11/07/2024    CO2 20 (L) 11/07/2024    ANIONGAP 12 11/07/2024     (H) 11/07/2024    BUN 25.7 (H) 11/07/2024    CR 0.98 11/07/2024    GFRESTIMATED 76 11/07/2024    ZACH 8.4 (L) 11/07/2024       Lab Results   Component Value Date    WBC 12.4 11/07/2024    WBC 7.1 06/17/2020     Lab Results   Component Value Date    RBC 3.50 11/07/2024    RBC 4.21 06/17/2020     Lab Results   Component Value Date    HGB 10.9 11/07/2024    HGB 13.0 06/17/2020     Lab Results   Component Value Date    HCT 32.1 11/07/2024    HCT 40.7 06/17/2020     No components found for: \"MCT\"  Lab Results   Component Value Date    MCV 92 11/07/2024    MCV 97 06/17/2020     Lab Results   Component Value Date    MCH 31.1 11/07/2024    MCH 30.9 06/17/2020     Lab Results   Component Value Date    MCHC 34.0 11/07/2024    MCHC 31.9 06/17/2020     Lab Results   Component Value Date    RDW 13.9 11/07/2024    RDW 13.2 06/17/2020     Lab Results   Component Value Date     11/07/2024     06/17/2020         DISCHARGE PLAN:  Follow up labs:BMP,CBC within 1 wk  Medical Follow Up:      Follow up with primary care provider in 1-2 weeks  MTM referral needed and placed by this provider: No  Current [place] scheduled appointments:       Discharge Services: Home Care:  Occupational Therapy, Physical Therapy, Registered Nurse, Home Health Aide, and From:  [facility]      TOTAL DISCHARGE TIME:   Greater than 30 minutes  Electronically signed by:  STEPHEN Garcia CNP     Home care Face to Face documentation done in EPIC attached to Home care orders for homecare.                     Sincerely,        STEPHEN Garcia CNP      "

## 2024-11-18 DIAGNOSIS — Z96.649 S/P HIP HEMIARTHROPLASTY: ICD-10-CM

## 2024-11-18 RX ORDER — ENOXAPARIN SODIUM 100 MG/ML
40 INJECTION SUBCUTANEOUS EVERY 24 HOURS
Qty: 12 ML | Refills: 0 | Status: SHIPPED | OUTPATIENT
Start: 2024-11-18

## 2024-11-26 ENCOUNTER — LAB REQUISITION (OUTPATIENT)
Dept: LAB | Facility: CLINIC | Age: 84
End: 2024-11-26
Payer: COMMERCIAL

## 2024-11-26 DIAGNOSIS — Z96.641 PRESENCE OF RIGHT ARTIFICIAL HIP JOINT: ICD-10-CM

## 2024-11-26 DIAGNOSIS — S72.001A FRACTURE OF UNSPECIFIED PART OF NECK OF RIGHT FEMUR, INITIAL ENCOUNTER FOR CLOSED FRACTURE (H): ICD-10-CM

## 2024-11-26 DIAGNOSIS — E87.1 HYPO-OSMOLALITY AND HYPONATREMIA: ICD-10-CM

## 2024-11-26 LAB
ANION GAP SERPL CALCULATED.3IONS-SCNC: 15 MMOL/L (ref 7–15)
BASOPHILS # BLD AUTO: 0.1 10E3/UL (ref 0–0.2)
BASOPHILS NFR BLD AUTO: 1 %
BUN SERPL-MCNC: 23.6 MG/DL (ref 8–23)
CALCIUM SERPL-MCNC: 9.3 MG/DL (ref 8.8–10.4)
CHLORIDE SERPL-SCNC: 100 MMOL/L (ref 98–107)
CREAT SERPL-MCNC: 1.13 MG/DL (ref 0.67–1.17)
EGFRCR SERPLBLD CKD-EPI 2021: 64 ML/MIN/1.73M2
EOSINOPHIL # BLD AUTO: 0.3 10E3/UL (ref 0–0.7)
EOSINOPHIL NFR BLD AUTO: 5 %
ERYTHROCYTE [DISTWIDTH] IN BLOOD BY AUTOMATED COUNT: 14.2 % (ref 10–15)
GLUCOSE SERPL-MCNC: 115 MG/DL (ref 70–99)
HCO3 SERPL-SCNC: 22 MMOL/L (ref 22–29)
HCT VFR BLD AUTO: 38.3 % (ref 40–53)
HGB BLD-MCNC: 12.5 G/DL (ref 13.3–17.7)
IMM GRANULOCYTES # BLD: 0 10E3/UL
IMM GRANULOCYTES NFR BLD: 0 %
LYMPHOCYTES # BLD AUTO: 1.5 10E3/UL (ref 0.8–5.3)
LYMPHOCYTES NFR BLD AUTO: 20 %
MCH RBC QN AUTO: 30.9 PG (ref 26.5–33)
MCHC RBC AUTO-ENTMCNC: 32.6 G/DL (ref 31.5–36.5)
MCV RBC AUTO: 95 FL (ref 78–100)
MONOCYTES # BLD AUTO: 0.7 10E3/UL (ref 0–1.3)
MONOCYTES NFR BLD AUTO: 9 %
NEUTROPHILS # BLD AUTO: 4.8 10E3/UL (ref 1.6–8.3)
NEUTROPHILS NFR BLD AUTO: 65 %
NRBC # BLD AUTO: 0 10E3/UL
NRBC BLD AUTO-RTO: 0 /100
PLATELET # BLD AUTO: 252 10E3/UL (ref 150–450)
POTASSIUM SERPL-SCNC: 4.6 MMOL/L (ref 3.4–5.3)
RBC # BLD AUTO: 4.04 10E6/UL (ref 4.4–5.9)
SODIUM SERPL-SCNC: 137 MMOL/L (ref 135–145)
WBC # BLD AUTO: 7.3 10E3/UL (ref 4–11)

## 2025-07-09 RX ORDER — WARFARIN SODIUM 5 MG/1
TABLET ORAL
Status: ON HOLD | COMMUNITY
Start: 2025-07-08 | End: 2025-07-21

## 2025-07-09 RX ORDER — AMIODARONE HYDROCHLORIDE 200 MG/1
200 TABLET ORAL EVERY MORNING
Status: ON HOLD | COMMUNITY
Start: 2025-05-05 | End: 2025-07-21

## 2025-07-09 NOTE — PROVIDER NOTIFICATION
07/09/25 1103   Discharge Planning   Patient/Family Anticipates Transition to home with family   Concerns to be Addressed adjustment to diagnosis/illness   Living Arrangements   People in Home spouse   Type of Residence Private Residence   Is your private residence a single family home or apartment? Apartment   Number of Stairs, Within Home, Primary other (see comments)  (elevator)   Stair Railings, Within Home, Primary railings safe and in good condition;other (see comments)  (Senior Living, 3rd floor apartment)   Once home, are you able to live on one level? Yes   Which level?   (N/A)   Bathroom Shower/Tub Walk-in shower   Equipment Currently Used at Home grab bar, tub/shower;grab bar, toilet;shower chair;raised toilet seat;walker, standard   Support System   Support Systems Spouse/Significant Other   Do you have someone available to stay with you one or two nights once you are home? Yes   Medical Clearance   Date of Physical 07/10/25   Clinic Name [facility] in [place]   It is recommended that you call and check with any specialty providers before surgery to see if you need surgical clearance.  Do you see any specialty providers outside of your primary care provider? Yes  (Cardiology blessing given on 06/2/2025)   Blood   Known Bleeding Disorder or Coagulopathy No   Does the patient have any Islam/cultural preferences related to blood products? No   What are your blood product preferences? patient accepts blood in an emergency   Falls Risk   Has the patient been identified as a high falls risk before surgery? (fallen in the last 6 months, history of falls, unsteady gait, or balance issues) Yes   Did an order get placed to attend outpatient pre-surgery balance evaluation?   (not yet)   Relationship/Environment   Name(s) of People in Home Halie, Wife   Disability/Function   Does the patient have the assistive device with them yes  (Patient will be bringing his cane)

## 2025-07-15 NOTE — PHARMACY-ADMISSION MEDICATION HISTORY
Admission Medication History    Admission medication history was completed by a pre-admitting RN.    PTA Med List   Medication Sig Last Dose/Taking    amiodarone (PACERONE) 200 MG tablet Take 200 mg by mouth every morning. Taking    atorvastatin (LIPITOR) 80 MG tablet Take 80 mg by mouth At Bedtime  Taking    metoprolol succinate ER (TOPROL XL) 25 MG 24 hr tablet Take 0.5 tablets (12.5 mg) by mouth 2 times daily. Taking    nitroGLYcerin (NITROSTAT) 0.4 MG sublingual tablet Place 1 Tablet under tongue as needed. If no relief after 5 min call 911;continue 1 tab every 5 min max 3 tab Taking    warfarin ANTICOAGULANT (COUMADIN/JANTOVEN) 5 MG tablet Take by mouth 7/12: Hold; 7/13: Hold; 7/14: Hold; 7/15: Hold; 7/16: Hold; Otherwise 5 mg every M, F; 2.5 mg all other days or as directed. Your dose may change. Call [facility] [phone] with questions. Taking

## 2025-07-17 ENCOUNTER — ANESTHESIA (OUTPATIENT)
Dept: SURGERY | Facility: CLINIC | Age: 85
End: 2025-07-17
Payer: COMMERCIAL

## 2025-07-17 ENCOUNTER — APPOINTMENT (OUTPATIENT)
Dept: GENERAL RADIOLOGY | Facility: CLINIC | Age: 85
DRG: 466 | End: 2025-07-17
Payer: COMMERCIAL

## 2025-07-17 ENCOUNTER — APPOINTMENT (OUTPATIENT)
Dept: GENERAL RADIOLOGY | Facility: CLINIC | Age: 85
DRG: 466 | End: 2025-07-17
Attending: ORTHOPAEDIC SURGERY
Payer: COMMERCIAL

## 2025-07-17 ENCOUNTER — ANESTHESIA EVENT (OUTPATIENT)
Dept: SURGERY | Facility: CLINIC | Age: 85
End: 2025-07-17
Payer: COMMERCIAL

## 2025-07-17 ENCOUNTER — HOSPITAL ENCOUNTER (INPATIENT)
Facility: CLINIC | Age: 85
DRG: 466 | End: 2025-07-17
Attending: ORTHOPAEDIC SURGERY | Admitting: ORTHOPAEDIC SURGERY
Payer: COMMERCIAL

## 2025-07-17 VITALS
HEIGHT: 71 IN | BODY MASS INDEX: 29.78 KG/M2 | TEMPERATURE: 97.8 F | DIASTOLIC BLOOD PRESSURE: 68 MMHG | WEIGHT: 212.74 LBS | HEART RATE: 60 BPM | OXYGEN SATURATION: 93 % | SYSTOLIC BLOOD PRESSURE: 133 MMHG | RESPIRATION RATE: 14 BRPM

## 2025-07-17 DIAGNOSIS — I25.10 ATHEROSCLEROSIS OF CORONARY ARTERY WITHOUT ANGINA PECTORIS, UNSPECIFIED VESSEL OR LESION TYPE, UNSPECIFIED WHETHER NATIVE OR TRANSPLANTED HEART: ICD-10-CM

## 2025-07-17 DIAGNOSIS — Z95.810 AUTOMATIC IMPLANTABLE CARDIOVERTER-DEFIBRILLATOR IN SITU: ICD-10-CM

## 2025-07-17 DIAGNOSIS — I48.19 PERSISTENT ATRIAL FIBRILLATION (H): ICD-10-CM

## 2025-07-17 DIAGNOSIS — Z96.649 S/P REVISION OF TOTAL HIP: ICD-10-CM

## 2025-07-17 DIAGNOSIS — I10 ESSENTIAL HYPERTENSION: ICD-10-CM

## 2025-07-17 DIAGNOSIS — I48.20 CHRONIC ATRIAL FIBRILLATION (H): ICD-10-CM

## 2025-07-17 DIAGNOSIS — Z79.01 LONG TERM CURRENT USE OF ANTICOAGULANT THERAPY: ICD-10-CM
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
## 2025-07-17 DIAGNOSIS — S72.001A CLOSED DISPLACED FRACTURE OF RIGHT FEMORAL NECK (H): ICD-10-CM

## 2025-07-17 DIAGNOSIS — N40.1 BENIGN PROSTATIC HYPERPLASIA WITH LOWER URINARY TRACT SYMPTOMS, SYMPTOM DETAILS UNSPECIFIED: ICD-10-CM

## 2025-07-17 DIAGNOSIS — I25.5 ISCHEMIC CARDIOMYOPATHY: ICD-10-CM

## 2025-07-17 DIAGNOSIS — I25.10 ATHEROSCLEROSIS OF CORONARY ARTERY OF NATIVE HEART WITHOUT ANGINA PECTORIS, UNSPECIFIED VESSEL OR LESION TYPE: ICD-10-CM

## 2025-07-17 DIAGNOSIS — E78.5 HYPERLIPIDEMIA, UNSPECIFIED HYPERLIPIDEMIA TYPE: ICD-10-CM

## 2025-07-17 DIAGNOSIS — G47.33 OSA (OBSTRUCTIVE SLEEP APNEA): ICD-10-CM

## 2025-07-17 DIAGNOSIS — I25.2 OLD MI (MYOCARDIAL INFARCTION): ICD-10-CM

## 2025-07-17 DIAGNOSIS — I46.9 CARDIAC ARREST (H): ICD-10-CM

## 2025-07-17 DIAGNOSIS — I50.20 HFREF (HEART FAILURE WITH REDUCED EJECTION FRACTION) (H): ICD-10-CM

## 2025-07-17 DIAGNOSIS — Z96.641 S/P REVISION OF RIGHT TOTAL HIP: ICD-10-CM

## 2025-07-17 DIAGNOSIS — Z87.81 S/P RIGHT HIP FRACTURE: Primary | ICD-10-CM

## 2025-07-17 DIAGNOSIS — N18.30 STAGE 3 CHRONIC KIDNEY DISEASE, UNSPECIFIED WHETHER STAGE 3A OR 3B CKD (H): ICD-10-CM

## 2025-07-17 LAB
ABO + RH BLD: NORMAL
ALBUMIN SERPL BCG-MCNC: 3.1 G/DL (ref 3.5–5.2)
ALBUMIN SERPL BCG-MCNC: 3.3 G/DL (ref 3.5–5.2)
ALBUMIN UR-MCNC: 30 MG/DL
ALP SERPL-CCNC: 100 U/L (ref 40–150)
ALP SERPL-CCNC: 101 U/L (ref 40–150)
ALT SERPL W P-5'-P-CCNC: 22 U/L (ref 0–70)
ALT SERPL W P-5'-P-CCNC: 23 U/L (ref 0–70)
ANION GAP SERPL CALCULATED.3IONS-SCNC: 12 MMOL/L (ref 7–15)
ANION GAP SERPL CALCULATED.3IONS-SCNC: 14 MMOL/L (ref 7–15)
ANION GAP SERPL CALCULATED.3IONS-SCNC: 14 MMOL/L (ref 7–15)
APPEARANCE UR: CLEAR
AST SERPL W P-5'-P-CCNC: 28 U/L (ref 0–45)
AST SERPL W P-5'-P-CCNC: 29 U/L (ref 0–45)
BASE EXCESS BLDV CALC-SCNC: -0.9 MMOL/L (ref -3–3)
BASOPHILS # BLD AUTO: 0 10E3/UL (ref 0–0.2)
BASOPHILS # BLD AUTO: 0 10E3/UL (ref 0–0.2)
BASOPHILS NFR BLD AUTO: 0 %
BASOPHILS NFR BLD AUTO: 0 %
BILIRUB SERPL-MCNC: 1 MG/DL
BILIRUB SERPL-MCNC: 1 MG/DL
BILIRUB UR QL STRIP: NEGATIVE
BLD GP AB SCN SERPL QL: NEGATIVE
BUN SERPL-MCNC: 25.2 MG/DL (ref 8–23)
BUN SERPL-MCNC: 25.7 MG/DL (ref 8–23)
BUN SERPL-MCNC: 25.8 MG/DL (ref 8–23)
CA-I BLD-MCNC: 4.4 MG/DL (ref 4.4–5.2)
CALCIUM SERPL-MCNC: 8.1 MG/DL (ref 8.8–10.4)
CALCIUM SERPL-MCNC: 8.3 MG/DL (ref 8.8–10.4)
CALCIUM SERPL-MCNC: 8.4 MG/DL (ref 8.8–10.4)
CHLORIDE SERPL-SCNC: 103 MMOL/L (ref 98–107)
CHLORIDE SERPL-SCNC: 104 MMOL/L (ref 98–107)
CHLORIDE SERPL-SCNC: 105 MMOL/L (ref 98–107)
COLOR UR AUTO: YELLOW
CREAT SERPL-MCNC: 1.19 MG/DL (ref 0.67–1.17)
CREAT SERPL-MCNC: 1.22 MG/DL (ref 0.67–1.17)
CREAT SERPL-MCNC: 1.24 MG/DL (ref 0.67–1.17)
EGFRCR SERPLBLD CKD-EPI 2021: 57 ML/MIN/1.73M2
EGFRCR SERPLBLD CKD-EPI 2021: 58 ML/MIN/1.73M2
EGFRCR SERPLBLD CKD-EPI 2021: 60 ML/MIN/1.73M2
EOSINOPHIL # BLD AUTO: 0 10E3/UL (ref 0–0.7)
EOSINOPHIL # BLD AUTO: 0 10E3/UL (ref 0–0.7)
EOSINOPHIL NFR BLD AUTO: 0 %
EOSINOPHIL NFR BLD AUTO: 0 %
ERYTHROCYTE [DISTWIDTH] IN BLOOD BY AUTOMATED COUNT: 14.2 % (ref 10–15)
ERYTHROCYTE [DISTWIDTH] IN BLOOD BY AUTOMATED COUNT: 14.2 % (ref 10–15)
GLUCOSE BLDC GLUCOMTR-MCNC: 144 MG/DL (ref 70–99)
GLUCOSE BLDC GLUCOMTR-MCNC: 186 MG/DL (ref 70–99)
GLUCOSE BLDC GLUCOMTR-MCNC: 202 MG/DL (ref 70–99)
GLUCOSE BLDC GLUCOMTR-MCNC: 89 MG/DL (ref 70–99)
GLUCOSE SERPL-MCNC: 147 MG/DL (ref 70–99)
GLUCOSE SERPL-MCNC: 158 MG/DL (ref 70–99)
GLUCOSE SERPL-MCNC: 201 MG/DL (ref 70–99)
GLUCOSE UR STRIP-MCNC: NEGATIVE MG/DL
GRANULAR CAST: 1 /LPF (ref ?–1)
HCO3 BLDV-SCNC: 24 MMOL/L (ref 21–28)
HCO3 SERPL-SCNC: 19 MMOL/L (ref 22–29)
HCO3 SERPL-SCNC: 19 MMOL/L (ref 22–29)
HCO3 SERPL-SCNC: 21 MMOL/L (ref 22–29)
HCT VFR BLD AUTO: 29.9 % (ref 40–53)
HCT VFR BLD AUTO: 31.1 % (ref 40–53)
HGB BLD-MCNC: 10.2 G/DL (ref 13.3–17.7)
HGB BLD-MCNC: 10.3 G/DL (ref 13.3–17.7)
HGB BLD-MCNC: 10.3 G/DL (ref 13.3–17.7)
HGB UR QL STRIP: ABNORMAL
HYALINE CASTS: 4 /LPF
IMM GRANULOCYTES # BLD: 0.1 10E3/UL
IMM GRANULOCYTES # BLD: 0.1 10E3/UL
IMM GRANULOCYTES NFR BLD: 1 %
IMM GRANULOCYTES NFR BLD: 1 %
INR PPP: 1.29 (ref 0.85–1.15)
KETONES UR STRIP-MCNC: 10 MG/DL
LACTATE SERPL-SCNC: 1.7 MMOL/L (ref 0.7–2)
LEUKOCYTE ESTERASE UR QL STRIP: NEGATIVE
LYMPHOCYTES # BLD AUTO: 0.5 10E3/UL (ref 0.8–5.3)
LYMPHOCYTES # BLD AUTO: 0.6 10E3/UL (ref 0.8–5.3)
LYMPHOCYTES NFR BLD AUTO: 2 %
LYMPHOCYTES NFR BLD AUTO: 4 %
MCH RBC QN AUTO: 32 PG (ref 26.5–33)
MCH RBC QN AUTO: 32.7 PG (ref 26.5–33)
MCHC RBC AUTO-ENTMCNC: 33.1 G/DL (ref 31.5–36.5)
MCHC RBC AUTO-ENTMCNC: 34.1 G/DL (ref 31.5–36.5)
MCV RBC AUTO: 96 FL (ref 78–100)
MCV RBC AUTO: 96 FL (ref 78–100)
MCV RBC AUTO: 97 FL (ref 78–100)
MONOCYTES # BLD AUTO: 0.4 10E3/UL (ref 0–1.3)
MONOCYTES # BLD AUTO: 0.7 10E3/UL (ref 0–1.3)
MONOCYTES NFR BLD AUTO: 2 %
MONOCYTES NFR BLD AUTO: 4 %
MUCOUS THREADS #/AREA URNS LPF: PRESENT /LPF
NEUTROPHILS # BLD AUTO: 15.9 10E3/UL (ref 1.6–8.3)
NEUTROPHILS # BLD AUTO: 18.3 10E3/UL (ref 1.6–8.3)
NEUTROPHILS NFR BLD AUTO: 93 %
NEUTROPHILS NFR BLD AUTO: 93 %
NITRATE UR QL: NEGATIVE
NRBC # BLD AUTO: 0 10E3/UL
NRBC # BLD AUTO: 0 10E3/UL
NRBC BLD AUTO-RTO: 0 /100
NRBC BLD AUTO-RTO: 0 /100
O2/TOTAL GAS SETTING VFR VENT: 2 %
OXYHGB MFR BLDV: 82 % (ref 70–75)
PCO2 BLDV: 42 MM HG (ref 40–50)
PH BLDV: 7.38 [PH] (ref 7.32–7.43)
PH UR STRIP: 5.5 [PH] (ref 5–7)
PLATELET # BLD AUTO: 148 10E3/UL (ref 150–450)
PLATELET # BLD AUTO: 160 10E3/UL (ref 150–450)
PO2 BLDV: 50 MM HG (ref 25–47)
POTASSIUM SERPL-SCNC: 4.4 MMOL/L (ref 3.4–5.3)
POTASSIUM SERPL-SCNC: 4.5 MMOL/L (ref 3.4–5.3)
POTASSIUM SERPL-SCNC: 4.6 MMOL/L (ref 3.4–5.3)
PROCALCITONIN SERPL IA-MCNC: 0.05 NG/ML
PROT SERPL-MCNC: 5.6 G/DL (ref 6.4–8.3)
PROT SERPL-MCNC: 5.7 G/DL (ref 6.4–8.3)
PROTHROMBIN TIME: 16.2 SECONDS (ref 11.8–14.8)
RBC # BLD AUTO: 3.12 10E6/UL (ref 4.4–5.9)
RBC # BLD AUTO: 3.22 10E6/UL (ref 4.4–5.9)
RBC URINE: 3 /HPF
SAO2 % BLDV: 83.5 % (ref 70–75)
SODIUM SERPL-SCNC: 136 MMOL/L (ref 135–145)
SODIUM SERPL-SCNC: 137 MMOL/L (ref 135–145)
SODIUM SERPL-SCNC: 138 MMOL/L (ref 135–145)
SP GR UR STRIP: 1.03 (ref 1–1.03)
SPECIMEN EXP DATE BLD: NORMAL
TROPONIN T SERPL HS-MCNC: 15 NG/L
TROPONIN T SERPL HS-MCNC: 16 NG/L
UROBILINOGEN UR STRIP-MCNC: NORMAL MG/DL
WBC # BLD AUTO: 17.1 10E3/UL (ref 4–11)
WBC # BLD AUTO: 19.7 10E3/UL (ref 4–11)
WBC URINE: 2 /HPF

## 2025-07-17 PROCEDURE — 85018 HEMOGLOBIN: CPT | Performed by: STUDENT IN AN ORGANIZED HEALTH CARE EDUCATION/TRAINING PROGRAM

## 2025-07-17 PROCEDURE — 250N000013 HC RX MED GY IP 250 OP 250 PS 637: Performed by: STUDENT IN AN ORGANIZED HEALTH CARE EDUCATION/TRAINING PROGRAM

## 2025-07-17 PROCEDURE — 360N000077 HC SURGERY LEVEL 4, PER MIN: Performed by: ORTHOPAEDIC SURGERY

## 2025-07-17 PROCEDURE — 710N000010 HC RECOVERY PHASE 1, LEVEL 2, PER MIN: Performed by: ORTHOPAEDIC SURGERY

## 2025-07-17 PROCEDURE — 86901 BLOOD TYPING SEROLOGIC RH(D): CPT

## 2025-07-17 PROCEDURE — 0SR90JA REPLACEMENT OF RIGHT HIP JOINT WITH SYNTHETIC SUBSTITUTE, UNCEMENTED, OPEN APPROACH: ICD-10-PCS | Performed by: ORTHOPAEDIC SURGERY

## 2025-07-17 PROCEDURE — 258N000003 HC RX IP 258 OP 636: Performed by: ANESTHESIOLOGY

## 2025-07-17 PROCEDURE — 258N000001 HC RX 258: Performed by: ORTHOPAEDIC SURGERY

## 2025-07-17 PROCEDURE — 85610 PROTHROMBIN TIME: CPT | Performed by: STUDENT IN AN ORGANIZED HEALTH CARE EDUCATION/TRAINING PROGRAM

## 2025-07-17 PROCEDURE — 81003 URINALYSIS AUTO W/O SCOPE: CPT | Performed by: INTERNAL MEDICINE

## 2025-07-17 PROCEDURE — 3E033XZ INTRODUCTION OF VASOPRESSOR INTO PERIPHERAL VEIN, PERCUTANEOUS APPROACH: ICD-10-PCS | Performed by: ORTHOPAEDIC SURGERY

## 2025-07-17 PROCEDURE — 272N000002 HC OR SUPPLY OTHER OPNP: Performed by: ORTHOPAEDIC SURGERY

## 2025-07-17 PROCEDURE — 82805 BLOOD GASES W/O2 SATURATION: CPT | Performed by: STUDENT IN AN ORGANIZED HEALTH CARE EDUCATION/TRAINING PROGRAM

## 2025-07-17 PROCEDURE — 82247 BILIRUBIN TOTAL: CPT | Performed by: INTERNAL MEDICINE

## 2025-07-17 PROCEDURE — C1713 ANCHOR/SCREW BN/BN,TIS/BN: HCPCS | Performed by: ORTHOPAEDIC SURGERY

## 2025-07-17 PROCEDURE — 87040 BLOOD CULTURE FOR BACTERIA: CPT | Performed by: INTERNAL MEDICINE

## 2025-07-17 PROCEDURE — 120N000004 HC R&B MS OVERFLOW

## 2025-07-17 PROCEDURE — 36415 COLL VENOUS BLD VENIPUNCTURE: CPT | Performed by: ANESTHESIOLOGY

## 2025-07-17 PROCEDURE — 36415 COLL VENOUS BLD VENIPUNCTURE: CPT | Performed by: STUDENT IN AN ORGANIZED HEALTH CARE EDUCATION/TRAINING PROGRAM

## 2025-07-17 PROCEDURE — 250N000009 HC RX 250: Performed by: ANESTHESIOLOGY

## 2025-07-17 PROCEDURE — 82330 ASSAY OF CALCIUM: CPT | Performed by: INTERNAL MEDICINE

## 2025-07-17 PROCEDURE — 250N000025 HC SEVOFLURANE, PER MIN: Performed by: ORTHOPAEDIC SURGERY

## 2025-07-17 PROCEDURE — 85018 HEMOGLOBIN: CPT | Performed by: INTERNAL MEDICINE

## 2025-07-17 PROCEDURE — 250N000013 HC RX MED GY IP 250 OP 250 PS 637

## 2025-07-17 PROCEDURE — 250N000009 HC RX 250: Performed by: ORTHOPAEDIC SURGERY

## 2025-07-17 PROCEDURE — 250N000011 HC RX IP 250 OP 636: Performed by: NURSE ANESTHETIST, CERTIFIED REGISTERED

## 2025-07-17 PROCEDURE — 82374 ASSAY BLOOD CARBON DIOXIDE: CPT | Performed by: STUDENT IN AN ORGANIZED HEALTH CARE EDUCATION/TRAINING PROGRAM

## 2025-07-17 PROCEDURE — 999N000065 XR PELVIS AND HIP PORTABLE RIGHT 1 VIEW

## 2025-07-17 PROCEDURE — 370N000017 HC ANESTHESIA TECHNICAL FEE, PER MIN: Performed by: ORTHOPAEDIC SURGERY

## 2025-07-17 PROCEDURE — 250N000009 HC RX 250: Performed by: NURSE ANESTHETIST, CERTIFIED REGISTERED

## 2025-07-17 PROCEDURE — 86923 COMPATIBILITY TEST ELECTRIC: CPT | Performed by: HOSPITALIST

## 2025-07-17 PROCEDURE — 250N000011 HC RX IP 250 OP 636

## 2025-07-17 PROCEDURE — 84145 PROCALCITONIN (PCT): CPT | Performed by: INTERNAL MEDICINE

## 2025-07-17 PROCEDURE — 85018 HEMOGLOBIN: CPT | Performed by: ANESTHESIOLOGY

## 2025-07-17 PROCEDURE — 99291 CRITICAL CARE FIRST HOUR: CPT | Performed by: INTERNAL MEDICINE

## 2025-07-17 PROCEDURE — 83605 ASSAY OF LACTIC ACID: CPT | Performed by: STUDENT IN AN ORGANIZED HEALTH CARE EDUCATION/TRAINING PROGRAM

## 2025-07-17 PROCEDURE — 36415 COLL VENOUS BLD VENIPUNCTURE: CPT

## 2025-07-17 PROCEDURE — 258N000003 HC RX IP 258 OP 636

## 2025-07-17 PROCEDURE — 272N000001 HC OR GENERAL SUPPLY STERILE: Performed by: ORTHOPAEDIC SURGERY

## 2025-07-17 PROCEDURE — 36415 COLL VENOUS BLD VENIPUNCTURE: CPT | Performed by: INTERNAL MEDICINE

## 2025-07-17 PROCEDURE — 84484 ASSAY OF TROPONIN QUANT: CPT | Performed by: STUDENT IN AN ORGANIZED HEALTH CARE EDUCATION/TRAINING PROGRAM

## 2025-07-17 PROCEDURE — 258N000003 HC RX IP 258 OP 636: Performed by: STUDENT IN AN ORGANIZED HEALTH CARE EDUCATION/TRAINING PROGRAM

## 2025-07-17 PROCEDURE — 0SPR0JZ REMOVAL OF SYNTHETIC SUBSTITUTE FROM RIGHT HIP JOINT, FEMORAL SURFACE, OPEN APPROACH: ICD-10-PCS | Performed by: ORTHOPAEDIC SURGERY

## 2025-07-17 PROCEDURE — C1776 JOINT DEVICE (IMPLANTABLE): HCPCS | Performed by: ORTHOPAEDIC SURGERY

## 2025-07-17 PROCEDURE — 258N000003 HC RX IP 258 OP 636: Performed by: NURSE ANESTHETIST, CERTIFIED REGISTERED

## 2025-07-17 PROCEDURE — 999N000141 HC STATISTIC PRE-PROCEDURE NURSING ASSESSMENT: Performed by: ORTHOPAEDIC SURGERY

## 2025-07-17 PROCEDURE — 250N000011 HC RX IP 250 OP 636: Performed by: ORTHOPAEDIC SURGERY

## 2025-07-17 PROCEDURE — 999N000063 XR HIP PORT RIGHT 1 VIEW

## 2025-07-17 PROCEDURE — 99291 CRITICAL CARE FIRST HOUR: CPT | Performed by: STUDENT IN AN ORGANIZED HEALTH CARE EDUCATION/TRAINING PROGRAM

## 2025-07-17 DEVICE — IMPLANTABLE DEVICE: Type: IMPLANTABLE DEVICE | Site: HIP | Status: FUNCTIONAL

## 2025-07-17 DEVICE — IMP SCR STRK LOW PROFILE HEX 6.5X25MM 7030-6525: Type: IMPLANTABLE DEVICE | Site: HIP | Status: FUNCTIONAL

## 2025-07-17 DEVICE — IMP CABLE DALL MILES BEADED CBL W/SLEEVE SET 2MM 6704-0-520: Type: IMPLANTABLE DEVICE | Site: HIP | Status: FUNCTIONAL

## 2025-07-17 DEVICE — IMP INSERT ACET STRK MDM COCR HIP 0DEG 46MM SZ F 626-00-46F: Type: IMPLANTABLE DEVICE | Site: HIP | Status: FUNCTIONAL

## 2025-07-17 DEVICE — IMP SCR STRK LOW PROFILE HEX 6.5X30MM 7030-6530: Type: IMPLANTABLE DEVICE | Site: HIP | Status: FUNCTIONAL

## 2025-07-17 RX ORDER — DIPHENHYDRAMINE HYDROCHLORIDE 50 MG/ML
12.5 INJECTION, SOLUTION INTRAMUSCULAR; INTRAVENOUS EVERY 6 HOURS PRN
Status: DISCONTINUED | OUTPATIENT
Start: 2025-07-17 | End: 2025-07-17 | Stop reason: HOSPADM

## 2025-07-17 RX ORDER — CEFAZOLIN SODIUM/WATER 2 G/20 ML
2 SYRINGE (ML) INTRAVENOUS
Status: COMPLETED | OUTPATIENT
Start: 2025-07-17 | End: 2025-07-17

## 2025-07-17 RX ORDER — SODIUM CHLORIDE, SODIUM LACTATE, POTASSIUM CHLORIDE, CALCIUM CHLORIDE 600; 310; 30; 20 MG/100ML; MG/100ML; MG/100ML; MG/100ML
INJECTION, SOLUTION INTRAVENOUS CONTINUOUS
Status: DISCONTINUED | OUTPATIENT
Start: 2025-07-17 | End: 2025-07-17 | Stop reason: HOSPADM

## 2025-07-17 RX ORDER — DEXAMETHASONE SODIUM PHOSPHATE 4 MG/ML
INJECTION, SOLUTION INTRA-ARTICULAR; INTRALESIONAL; INTRAMUSCULAR; INTRAVENOUS; SOFT TISSUE PRN
Status: DISCONTINUED | OUTPATIENT
Start: 2025-07-17 | End: 2025-07-17

## 2025-07-17 RX ORDER — LIDOCAINE 40 MG/G
CREAM TOPICAL
Status: DISCONTINUED | OUTPATIENT
Start: 2025-07-17 | End: 2025-07-17 | Stop reason: HOSPADM

## 2025-07-17 RX ORDER — SODIUM CHLORIDE, SODIUM LACTATE, POTASSIUM CHLORIDE, CALCIUM CHLORIDE 600; 310; 30; 20 MG/100ML; MG/100ML; MG/100ML; MG/100ML
INJECTION, SOLUTION INTRAVENOUS CONTINUOUS
Status: ACTIVE | OUTPATIENT
Start: 2025-07-17 | End: 2025-07-18

## 2025-07-17 RX ORDER — ONDANSETRON 2 MG/ML
INJECTION INTRAMUSCULAR; INTRAVENOUS PRN
Status: DISCONTINUED | OUTPATIENT
Start: 2025-07-17 | End: 2025-07-17

## 2025-07-17 RX ORDER — AMOXICILLIN 250 MG
1 CAPSULE ORAL 2 TIMES DAILY
Status: DISCONTINUED | OUTPATIENT
Start: 2025-07-17 | End: 2025-07-21 | Stop reason: HOSPADM

## 2025-07-17 RX ORDER — TRANEXAMIC ACID 650 MG/1
1950 TABLET ORAL ONCE
Status: COMPLETED | OUTPATIENT
Start: 2025-07-17 | End: 2025-07-17

## 2025-07-17 RX ORDER — HYDROMORPHONE HCL IN WATER/PF 6 MG/30 ML
0.2 PATIENT CONTROLLED ANALGESIA SYRINGE INTRAVENOUS EVERY 4 HOURS PRN
Status: DISCONTINUED | OUTPATIENT
Start: 2025-07-17 | End: 2025-07-21 | Stop reason: HOSPADM

## 2025-07-17 RX ORDER — AMOXICILLIN 250 MG
1 CAPSULE ORAL 2 TIMES DAILY PRN
Status: DISCONTINUED | OUTPATIENT
Start: 2025-07-17 | End: 2025-07-21 | Stop reason: HOSPADM

## 2025-07-17 RX ORDER — METHADONE HYDROCHLORIDE 10 MG/ML
INJECTION, SOLUTION INTRAMUSCULAR; INTRAVENOUS; SUBCUTANEOUS PRN
Status: DISCONTINUED | OUTPATIENT
Start: 2025-07-17 | End: 2025-07-17

## 2025-07-17 RX ORDER — ONDANSETRON 4 MG/1
4 TABLET, ORALLY DISINTEGRATING ORAL EVERY 6 HOURS PRN
Status: DISCONTINUED | OUTPATIENT
Start: 2025-07-17 | End: 2025-07-17 | Stop reason: DRUGHIGH

## 2025-07-17 RX ORDER — HYDROMORPHONE HCL IN WATER/PF 6 MG/30 ML
0.1 PATIENT CONTROLLED ANALGESIA SYRINGE INTRAVENOUS EVERY 4 HOURS PRN
Status: DISCONTINUED | OUTPATIENT
Start: 2025-07-17 | End: 2025-07-21 | Stop reason: HOSPADM

## 2025-07-17 RX ORDER — NALOXONE HYDROCHLORIDE 0.4 MG/ML
0.1 INJECTION, SOLUTION INTRAMUSCULAR; INTRAVENOUS; SUBCUTANEOUS
Status: DISCONTINUED | OUTPATIENT
Start: 2025-07-17 | End: 2025-07-17 | Stop reason: HOSPADM

## 2025-07-17 RX ORDER — DEXAMETHASONE SODIUM PHOSPHATE 4 MG/ML
4 INJECTION, SOLUTION INTRA-ARTICULAR; INTRALESIONAL; INTRAMUSCULAR; INTRAVENOUS; SOFT TISSUE
Status: DISCONTINUED | OUTPATIENT
Start: 2025-07-17 | End: 2025-07-17 | Stop reason: HOSPADM

## 2025-07-17 RX ORDER — BISACODYL 10 MG
10 SUPPOSITORY, RECTAL RECTAL DAILY PRN
Status: DISCONTINUED | OUTPATIENT
Start: 2025-07-17 | End: 2025-07-21 | Stop reason: HOSPADM

## 2025-07-17 RX ORDER — AMIODARONE HYDROCHLORIDE 200 MG/1
200 TABLET ORAL EVERY MORNING
Status: DISCONTINUED | OUTPATIENT
Start: 2025-07-18 | End: 2025-07-21 | Stop reason: HOSPADM

## 2025-07-17 RX ORDER — ROPIVACAINE IN 0.9% SOD CHL/PF 0.1 %
.01-.2 PLASTIC BAG, INJECTION (ML) EPIDURAL CONTINUOUS
Status: DISCONTINUED | OUTPATIENT
Start: 2025-07-17 | End: 2025-07-17

## 2025-07-17 RX ORDER — ONDANSETRON 4 MG/1
4 TABLET, ORALLY DISINTEGRATING ORAL EVERY 6 HOURS PRN
Status: DISCONTINUED | OUTPATIENT
Start: 2025-07-17 | End: 2025-07-21 | Stop reason: HOSPADM

## 2025-07-17 RX ORDER — KETOROLAC TROMETHAMINE 30 MG/ML
INJECTION, SOLUTION INTRAMUSCULAR; INTRAVENOUS PRN
Status: DISCONTINUED | OUTPATIENT
Start: 2025-07-17 | End: 2025-07-17

## 2025-07-17 RX ORDER — ONDANSETRON 2 MG/ML
4 INJECTION INTRAMUSCULAR; INTRAVENOUS EVERY 30 MIN PRN
Status: DISCONTINUED | OUTPATIENT
Start: 2025-07-17 | End: 2025-07-17 | Stop reason: HOSPADM

## 2025-07-17 RX ORDER — SODIUM CHLORIDE, SODIUM LACTATE, POTASSIUM CHLORIDE, CALCIUM CHLORIDE 600; 310; 30; 20 MG/100ML; MG/100ML; MG/100ML; MG/100ML
INJECTION, SOLUTION INTRAVENOUS CONTINUOUS PRN
Status: DISCONTINUED | OUTPATIENT
Start: 2025-07-17 | End: 2025-07-17

## 2025-07-17 RX ORDER — DIPHENHYDRAMINE HCL 12.5MG/5ML
12.5 LIQUID (ML) ORAL EVERY 6 HOURS PRN
Status: DISCONTINUED | OUTPATIENT
Start: 2025-07-17 | End: 2025-07-17 | Stop reason: HOSPADM

## 2025-07-17 RX ORDER — NOREPINEPHRINE BITARTRATE 0.02 MG/ML
.01-.6 INJECTION, SOLUTION INTRAVENOUS CONTINUOUS
Status: DISCONTINUED | OUTPATIENT
Start: 2025-07-17 | End: 2025-07-19

## 2025-07-17 RX ORDER — CEFAZOLIN SODIUM 2 G/50ML
2 SOLUTION INTRAVENOUS EVERY 8 HOURS
Status: COMPLETED | OUTPATIENT
Start: 2025-07-17 | End: 2025-07-18

## 2025-07-17 RX ORDER — ONDANSETRON 4 MG/1
4 TABLET, ORALLY DISINTEGRATING ORAL EVERY 30 MIN PRN
Status: DISCONTINUED | OUTPATIENT
Start: 2025-07-17 | End: 2025-07-17 | Stop reason: HOSPADM

## 2025-07-17 RX ORDER — CEFAZOLIN SODIUM/WATER 2 G/20 ML
2 SYRINGE (ML) INTRAVENOUS SEE ADMIN INSTRUCTIONS
Status: DISCONTINUED | OUTPATIENT
Start: 2025-07-17 | End: 2025-07-17 | Stop reason: HOSPADM

## 2025-07-17 RX ORDER — ACETAMINOPHEN 325 MG/1
975 TABLET ORAL ONCE
Status: COMPLETED | OUTPATIENT
Start: 2025-07-17 | End: 2025-07-17

## 2025-07-17 RX ORDER — AMOXICILLIN 250 MG
2 CAPSULE ORAL 2 TIMES DAILY PRN
Status: DISCONTINUED | OUTPATIENT
Start: 2025-07-17 | End: 2025-07-21 | Stop reason: HOSPADM

## 2025-07-17 RX ORDER — MAGNESIUM SULFATE HEPTAHYDRATE 40 MG/ML
2 INJECTION, SOLUTION INTRAVENOUS
Status: DISCONTINUED | OUTPATIENT
Start: 2025-07-17 | End: 2025-07-17 | Stop reason: HOSPADM

## 2025-07-17 RX ORDER — NICOTINE POLACRILEX 4 MG
15-30 LOZENGE BUCCAL
Status: DISCONTINUED | OUTPATIENT
Start: 2025-07-17 | End: 2025-07-21 | Stop reason: HOSPADM

## 2025-07-17 RX ORDER — OXYCODONE HYDROCHLORIDE 5 MG/1
5 TABLET ORAL EVERY 4 HOURS PRN
Status: DISCONTINUED | OUTPATIENT
Start: 2025-07-17 | End: 2025-07-21 | Stop reason: HOSPADM

## 2025-07-17 RX ORDER — LIDOCAINE 40 MG/G
CREAM TOPICAL
Status: DISCONTINUED | OUTPATIENT
Start: 2025-07-17 | End: 2025-07-21 | Stop reason: HOSPADM

## 2025-07-17 RX ORDER — HYDROXYZINE HYDROCHLORIDE 10 MG/1
10 TABLET, FILM COATED ORAL EVERY 6 HOURS PRN
Status: DISCONTINUED | OUTPATIENT
Start: 2025-07-17 | End: 2025-07-21 | Stop reason: HOSPADM

## 2025-07-17 RX ORDER — POLYETHYLENE GLYCOL 3350 17 G/17G
17 POWDER, FOR SOLUTION ORAL DAILY
Status: DISCONTINUED | OUTPATIENT
Start: 2025-07-18 | End: 2025-07-21 | Stop reason: HOSPADM

## 2025-07-17 RX ORDER — VANCOMYCIN HYDROCHLORIDE 1 G/20ML
INJECTION, POWDER, LYOPHILIZED, FOR SOLUTION INTRAVENOUS PRN
Status: DISCONTINUED | OUTPATIENT
Start: 2025-07-17 | End: 2025-07-17 | Stop reason: HOSPADM

## 2025-07-17 RX ORDER — EPHEDRINE SULFATE 50 MG/ML
25 INJECTION, SOLUTION INTRAVENOUS ONCE
Status: COMPLETED | OUTPATIENT
Start: 2025-07-17 | End: 2025-07-17

## 2025-07-17 RX ORDER — METHADONE HYDROCHLORIDE 10 MG/ML
2 INJECTION, SOLUTION INTRAMUSCULAR; INTRAVENOUS; SUBCUTANEOUS 3 TIMES DAILY PRN
Status: DISCONTINUED | OUTPATIENT
Start: 2025-07-17 | End: 2025-07-17 | Stop reason: HOSPADM

## 2025-07-17 RX ORDER — SODIUM CHLORIDE, SODIUM LACTATE, POTASSIUM CHLORIDE, CALCIUM CHLORIDE 600; 310; 30; 20 MG/100ML; MG/100ML; MG/100ML; MG/100ML
INJECTION, SOLUTION INTRAVENOUS CONTINUOUS
Status: DISCONTINUED | OUTPATIENT
Start: 2025-07-17 | End: 2025-07-17

## 2025-07-17 RX ORDER — ONDANSETRON 2 MG/ML
4 INJECTION INTRAMUSCULAR; INTRAVENOUS EVERY 6 HOURS PRN
Status: DISCONTINUED | OUTPATIENT
Start: 2025-07-17 | End: 2025-07-17 | Stop reason: DRUGHIGH

## 2025-07-17 RX ORDER — PROCHLORPERAZINE MALEATE 5 MG/1
5 TABLET ORAL EVERY 6 HOURS PRN
Status: DISCONTINUED | OUTPATIENT
Start: 2025-07-17 | End: 2025-07-21 | Stop reason: HOSPADM

## 2025-07-17 RX ORDER — ATORVASTATIN CALCIUM 40 MG/1
80 TABLET, FILM COATED ORAL AT BEDTIME
Status: DISCONTINUED | OUTPATIENT
Start: 2025-07-17 | End: 2025-07-21 | Stop reason: HOSPADM

## 2025-07-17 RX ORDER — KETOROLAC TROMETHAMINE 15 MG/ML
15 INJECTION, SOLUTION INTRAMUSCULAR; INTRAVENOUS
Status: DISCONTINUED | OUTPATIENT
Start: 2025-07-17 | End: 2025-07-17 | Stop reason: HOSPADM

## 2025-07-17 RX ORDER — FAMOTIDINE 20 MG/1
20 TABLET, FILM COATED ORAL DAILY
Status: DISCONTINUED | OUTPATIENT
Start: 2025-07-17 | End: 2025-07-21 | Stop reason: HOSPADM

## 2025-07-17 RX ORDER — PROPOFOL 10 MG/ML
INJECTION, EMULSION INTRAVENOUS PRN
Status: DISCONTINUED | OUTPATIENT
Start: 2025-07-17 | End: 2025-07-17

## 2025-07-17 RX ORDER — ALBUTEROL SULFATE 0.83 MG/ML
2.5 SOLUTION RESPIRATORY (INHALATION) EVERY 4 HOURS PRN
Status: DISCONTINUED | OUTPATIENT
Start: 2025-07-17 | End: 2025-07-17 | Stop reason: HOSPADM

## 2025-07-17 RX ORDER — ONDANSETRON 2 MG/ML
4 INJECTION INTRAMUSCULAR; INTRAVENOUS EVERY 6 HOURS PRN
Status: DISCONTINUED | OUTPATIENT
Start: 2025-07-17 | End: 2025-07-21 | Stop reason: HOSPADM

## 2025-07-17 RX ORDER — DEXTROSE MONOHYDRATE 25 G/50ML
25-50 INJECTION, SOLUTION INTRAVENOUS
Status: DISCONTINUED | OUTPATIENT
Start: 2025-07-17 | End: 2025-07-21 | Stop reason: HOSPADM

## 2025-07-17 RX ORDER — ACETAMINOPHEN 325 MG/1
975 TABLET ORAL EVERY 8 HOURS
Status: COMPLETED | OUTPATIENT
Start: 2025-07-17 | End: 2025-07-20

## 2025-07-17 RX ORDER — LABETALOL HYDROCHLORIDE 5 MG/ML
10 INJECTION, SOLUTION INTRAVENOUS
Status: DISCONTINUED | OUTPATIENT
Start: 2025-07-17 | End: 2025-07-17 | Stop reason: HOSPADM

## 2025-07-17 RX ORDER — LIDOCAINE HYDROCHLORIDE 20 MG/ML
INJECTION, SOLUTION INFILTRATION; PERINEURAL PRN
Status: DISCONTINUED | OUTPATIENT
Start: 2025-07-17 | End: 2025-07-17

## 2025-07-17 RX ORDER — HYDRALAZINE HYDROCHLORIDE 20 MG/ML
10 INJECTION INTRAMUSCULAR; INTRAVENOUS EVERY 10 MIN PRN
Status: DISCONTINUED | OUTPATIENT
Start: 2025-07-17 | End: 2025-07-17 | Stop reason: HOSPADM

## 2025-07-17 RX ADMIN — SENNOSIDES AND DOCUSATE SODIUM 1 TABLET: 50; 8.6 TABLET ORAL at 20:31

## 2025-07-17 RX ADMIN — OXYCODONE HYDROCHLORIDE 2.5 MG: 5 TABLET ORAL at 18:06

## 2025-07-17 RX ADMIN — LIDOCAINE HYDROCHLORIDE 50 MG: 20 INJECTION, SOLUTION INFILTRATION; PERINEURAL at 10:43

## 2025-07-17 RX ADMIN — ATORVASTATIN CALCIUM 80 MG: 40 TABLET, FILM COATED ORAL at 21:56

## 2025-07-17 RX ADMIN — PHENYLEPHRINE HYDROCHLORIDE 100 MCG: 10 INJECTION INTRAVENOUS at 14:19

## 2025-07-17 RX ADMIN — SODIUM CHLORIDE, SODIUM LACTATE, POTASSIUM CHLORIDE, AND CALCIUM CHLORIDE: .6; .31; .03; .02 INJECTION, SOLUTION INTRAVENOUS at 10:36

## 2025-07-17 RX ADMIN — KETOROLAC TROMETHAMINE 15 MG: 30 INJECTION, SOLUTION INTRAMUSCULAR at 10:43

## 2025-07-17 RX ADMIN — ACETAMINOPHEN 975 MG: 325 TABLET ORAL at 18:07

## 2025-07-17 RX ADMIN — ONDANSETRON 4 MG: 2 INJECTION INTRAMUSCULAR; INTRAVENOUS at 10:43

## 2025-07-17 RX ADMIN — TRANEXAMIC ACID 1950 MG: 650 TABLET ORAL at 10:00

## 2025-07-17 RX ADMIN — PHENYLEPHRINE HYDROCHLORIDE 100 MCG: 10 INJECTION INTRAVENOUS at 14:11

## 2025-07-17 RX ADMIN — SODIUM CHLORIDE, SODIUM LACTATE, POTASSIUM CHLORIDE, AND CALCIUM CHLORIDE: .6; .31; .03; .02 INJECTION, SOLUTION INTRAVENOUS at 19:55

## 2025-07-17 RX ADMIN — ROCURONIUM BROMIDE 50 MG: 50 INJECTION, SOLUTION INTRAVENOUS at 10:43

## 2025-07-17 RX ADMIN — SENNOSIDES AND DOCUSATE SODIUM 1 TABLET: 50; 8.6 TABLET ORAL at 19:59

## 2025-07-17 RX ADMIN — SODIUM CHLORIDE, SODIUM LACTATE, POTASSIUM CHLORIDE, AND CALCIUM CHLORIDE: .6; .31; .03; .02 INJECTION, SOLUTION INTRAVENOUS at 13:46

## 2025-07-17 RX ADMIN — Medication 2 G: at 10:36

## 2025-07-17 RX ADMIN — DEXAMETHASONE SODIUM PHOSPHATE 8 MG: 4 INJECTION, SOLUTION INTRA-ARTICULAR; INTRALESIONAL; INTRAMUSCULAR; INTRAVENOUS; SOFT TISSUE at 10:43

## 2025-07-17 RX ADMIN — PROPOFOL 120 MG: 10 INJECTION, EMULSION INTRAVENOUS at 10:43

## 2025-07-17 RX ADMIN — PHENYLEPHRINE HYDROCHLORIDE 100 MCG: 10 INJECTION INTRAVENOUS at 14:08

## 2025-07-17 RX ADMIN — EPHEDRINE SULFATE 25 MG: 50 INJECTION, SOLUTION INTRAVENOUS at 14:35

## 2025-07-17 RX ADMIN — NOREPINEPHRINE BITARTRATE 0.02 MCG/KG/MIN: 0.02 INJECTION, SOLUTION INTRAVENOUS at 16:11

## 2025-07-17 RX ADMIN — FAMOTIDINE 20 MG: 10 INJECTION, SOLUTION INTRAVENOUS at 18:18

## 2025-07-17 RX ADMIN — SUGAMMADEX 50 MG: 100 INJECTION, SOLUTION INTRAVENOUS at 13:56

## 2025-07-17 RX ADMIN — OXYCODONE HYDROCHLORIDE 2.5 MG: 5 TABLET ORAL at 21:56

## 2025-07-17 RX ADMIN — CEFAZOLIN SODIUM 2 G: 2 SOLUTION INTRAVENOUS at 18:20

## 2025-07-17 RX ADMIN — ACETAMINOPHEN 975 MG: 325 TABLET ORAL at 10:00

## 2025-07-17 RX ADMIN — SUGAMMADEX 50 MG: 100 INJECTION, SOLUTION INTRAVENOUS at 13:53

## 2025-07-17 RX ADMIN — Medication 10 MG: at 10:43

## 2025-07-17 RX ADMIN — PHENYLEPHRINE HYDROCHLORIDE 100 MCG: 10 INJECTION INTRAVENOUS at 14:21

## 2025-07-17 RX ADMIN — SODIUM CHLORIDE, SODIUM LACTATE, POTASSIUM CHLORIDE, AND CALCIUM CHLORIDE: .6; .31; .03; .02 INJECTION, SOLUTION INTRAVENOUS at 10:21

## 2025-07-17 RX ADMIN — SODIUM CHLORIDE, SODIUM LACTATE, POTASSIUM CHLORIDE, AND CALCIUM CHLORIDE: .6; .31; .03; .02 INJECTION, SOLUTION INTRAVENOUS at 12:34

## 2025-07-17 RX ADMIN — SODIUM CHLORIDE, SODIUM LACTATE, POTASSIUM CHLORIDE, AND CALCIUM CHLORIDE: .6; .31; .03; .02 INJECTION, SOLUTION INTRAVENOUS at 17:29

## 2025-07-17 RX ADMIN — DEXMEDETOMIDINE HYDROCHLORIDE 0.4 MCG/KG/HR: 100 INJECTION, SOLUTION INTRAVENOUS at 10:43

## 2025-07-17 RX ADMIN — PHENYLEPHRINE HYDROCHLORIDE 0.4 MCG/KG/MIN: 10 INJECTION INTRAVENOUS at 10:43

## 2025-07-17 ASSESSMENT — ACTIVITIES OF DAILY LIVING (ADL)
ADLS_ACUITY_SCORE: 41
ADLS_ACUITY_SCORE: 49
ADLS_ACUITY_SCORE: 43
ADLS_ACUITY_SCORE: 48
ADLS_ACUITY_SCORE: 43
ADLS_ACUITY_SCORE: 41
ADLS_ACUITY_SCORE: 48
ADLS_ACUITY_SCORE: 39
ADLS_ACUITY_SCORE: 49

## 2025-07-17 NOTE — CONSULTS
[facility]    ICU History and Physical    Primary Team: Hospitalist  Reason for Critical Care Admission: Post-operative hypotension  Admitting Physician: Rory Cantrell MD  Date of Admission:  7/17/2025    Assessment: Critical Care   Constantino Thorne is a 84 year old male admitted on 7/17/2025. He developed hypotension after a re-exploration of right DANYEL unresponsive to initial IVF bolus then two 100 mcg doses of phenylepherine.  Small decrease in hemoglobin during the case.  History of CAD and CHF but no angina at present and no signs of CHF.       Plan: Critical Care   Neuro/ pain/ sedation:  - Monitor neurological status. Notify provider for any acute changes in exam  - Avoid oversedation    Pulmonary:  History of SABINE but no other pulmonary disorder.  On BiPAP at home    - supplemental oxygen to keep saturation about 92%  - BiPAP at night 15/5 per outpatient regimen    Cardiovascular:  History of CAD and severe CHF with EF of 30%.  No chest pain at present.  Hypotensive after surgery on low dose levophed, received 3.5 IVF bolus.  Denies chest pain, EKG only showing paced rhythm, troponin pending.    - Levophed for MAP > 65  - May need additional IVF; continue 100 ml/hr for now  - Serial Hb  - Follow-up troponin and trend  - Watch for development of CHF after fluid administration  - Hold beta blocker     GI/Nutrition:  - Diet: NPO for now; re-assess volume status    Renal/ Fluid Balance:   History of CKD.  Follow-up creat and K pending    - CMP on arrival to ICVU; further management based on creatinine and potassium  Endocrine:  No active issues.  Will monitor BS    ID:  No active signs of infection but hypotensive after surgery.  Received Ancef    - CBC  - Blood cultures x 2  - Urinalysis and culture  - Procalcitonin  - Hold on antibiotics at present time    Hematology:  Acute blood loss during surgery was one liter with decrease in hemoglobin to 10.3 from pre-op from 11-24 of 12.5).  " Will need serial hemoglobin and following of wound for signs of ongoing bleeding.  Was on warfarin as outpatient.    - Serial Hb; repeat now stable, will recheck at 2200  - Transfusion threshold of 7 (unless EKG shows ischemic changes or troponin positive)- EKG with only paced rhythm  - Check INR    MSK:   - PT and OT consulted. Appreciate recommendations.  - Orthopedics following     Lines/ tubes/ drains:  Conley Catheter: PRESENT, indication: ICU only: hourly urine output needed for patient care  Lines: None       Prophylaxis:  - DVT Prophylaxis: Pneumatic Compression Devices  - PUD Prophylaxis: None necessary    Code Status: Full Code      Disposition:  - ICU      Critical care time exclusive of procedures: 70 minutes    Clinically Significant Risk Factors Present on Admission                # Drug Induced Coagulation Defect: home medication list includes an anticoagulant medication    # Hypertension: Noted on problem list  # Chronic heart failure with reduced ejection fraction: last echo with EF <40%  # Circulatory Shock: required vasopressors within past 24 hours       # Anemia: based on hgb <11       # Overweight: Estimated body mass index is 28.93 kg/m  as calculated from the following:    Height as of this encounter: 1.803 m (5' 10.98\").    Weight as of this encounter: 94 kg (207 lb 4.8 oz).           # Anemia: based on hgb <11         Charli Martino MD  [facility]  Securely message with [employer] (more info)  Text page via [employer] Paging/Directory     ______________________________________________________________________    Chief Complaint   Hypotension after re-exploration right DANYEL    History is obtained from the patient    History of Present Illness   Constantino Thorne is a 84 year old male who has a past medical history of CAD and CHF (EF 30% on ECHO 7-23). s/p cardiac arrest in 2010 with MI and vfib, underwent ICD at that time, HTN, paroxysmal afib s/p ablation, hyperlipidemia, and " SABINE who underwent right DANYEL last year and subsequently developed recurrent hip pain.  Today underwent re-exploration of DANYEL.  Was hypotensive after the procedure with SBP in 60's; received IVF bolus and two doses of 100 mcg phenylepherine. Total fluid given was 3.5L, estimated blood loss during the procedure was one liter.  Due to continued hypotensiion he was started on peripheral Levophed.  No repeat labs ordered; pre-op hemoglobin was 12.4 in 11-24, was 10/3 at 1430.    Prior to admission he is on amiodarone, metoprolol 12.5 BID, and statin.  Of note was had relative bradycardia during case and during period of post-operative hypotension.    Patient denies chest pain or SOB.  Some hip pain.    Review of Systems    The 10 point Review of Systems is negative other than noted in the HPI or here.     Past Medical History    I have reviewed this patient's medical history and updated it with pertinent information if needed.   Past Medical History:   Diagnosis Date    A-fib (H)     Antiplatelet or antithrombotic long-term use     Arrhythmia     CAD (coronary artery disease)     Hyperlipidemia LDL goal <100     Hypertension     ICD (implantable cardioverter-defibrillator) in place     Old myocardial infarction     SABINE (obstructive sleep apnea)     Pacemaker     Stented coronary artery     Subdural hematoma (H) 10/10/2024    Syncope 10/10/2024       Past Surgical History   I have reviewed this patient's surgical history and updated it with pertinent information if needed.  Past Surgical History:   Procedure Laterality Date    OPEN REDUCTION INTERNAL FIXATION HIP BIPOLAR Right 11/4/2024    Procedure: Right hip hemiarthroplasty;  Surgeon: Darinel Chacko MD;  Location:  OR       Social History   I have reviewed this patient's social history and updated it with pertinent information if needed.  Social History     Tobacco Use    Smoking status: Never    Smokeless tobacco: Never   Substance Use Topics    Alcohol use: Never     Drug use: Never       Family History   I have reviewed this patient's family history and updated it with pertinent information if needed.  Family History   Problem Relation Age of Onset    Cancer Sister        Prior to Admission Medications   Prior to Admission Medications   Prescriptions Last Dose Informant Patient Reported? Taking?   amiodarone (PACERONE) 200 MG tablet 7/17/2025 Morning  Yes Yes   Sig: Take 200 mg by mouth every morning.   atorvastatin (LIPITOR) 80 MG tablet 7/16/2025 Evening  Yes Yes   Sig: Take 80 mg by mouth At Bedtime    metoprolol succinate ER (TOPROL XL) 25 MG 24 hr tablet 7/17/2025 Morning  No Yes   Sig: Take 0.5 tablets (12.5 mg) by mouth 2 times daily.   nitroGLYcerin (NITROSTAT) 0.4 MG sublingual tablet   Yes Yes   Sig: Place 1 Tablet under tongue as needed. If no relief after 5 min call 911;continue 1 tab every 5 min max 3 tab   warfarin ANTICOAGULANT (COUMADIN/JANTOVEN) 5 MG tablet 7/13/2025  Yes Yes   Sig: Take by mouth 7/12: Hold; 7/13: Hold; 7/14: Hold; 7/15: Hold; 7/16: Hold; Otherwise 5 mg every M, F; 2.5 mg all other days or as directed. Your dose may change. Call [facility] [phone] with questions.      Facility-Administered Medications: None     Allergies   Allergies   Allergen Reactions    Lisinopril Unknown and Cough    Spironolactone Unknown and Diarrhea       Physical Exam   Vital Signs: Temp: 97.2  F (36.2  C) Temp src: Temporal BP: 105/63 Pulse: 59   Resp: 13 SpO2: 98 % O2 Device: Nasal cannula Oxygen Delivery: 2 LPM  Weight: 207 lbs 4.8 oz    General: Awake and alert, comfortable  HEENT: PERRL, EOMIB, sclera clear, no JVD  Neuro: Awake and alert, appropriate  CV: RRR, no mgr  Pulm: Decreased BS in bases, o/w clear  Abd: + BS, soft   : Conley in place  Extremities: Right hip dressed, no drainage or bleeding present  Skin: No concerning lesions  Incisions: Intact and dry  Psych: Appropriate, no concerns.    Data   I reviewed all medications,  new labs and imaging results over the last 24 hours.  Arterial Blood Gases   No lab results found in last 7 days.    Complete Blood Count   Recent Labs   Lab 07/17/25  1424   HGB 10.3*       Basic Metabolic Panel  Recent Labs   Lab 07/17/25  0943   GLC 89       Liver Function Tests  No lab results found in last 7 days.    Pancreatic Enzymes  No lab results found in last 7 days.    Coagulation Profile  No lab results found in last 7 days.    IMAGING:  Recent Results (from the past 24 hours)   XR Hip Port Right 1 View    Narrative    This exam was marked as non-reportable because it will not be read by a   radiologist or a [place] non-radiologist provider.         XR Pelvis w Hip Port Right 1 View    Narrative    EXAM: XR PELVIS AND HIP PORTABLE RIGHT 1 VIEW  LOCATION: [facility]  DATE: 7/17/2025    INDICATION: Status post Hip surgery  COMPARISON: None.      Impression    IMPRESSION: Postoperative changes right total hip arthroplasty. Cerclage wire fixation. Left hip negative for fracture with mild degenerative change. Visualized pelvis negative for fracture. Post procedural air on the right hip joint.      Total Critical care time excluding time for teaching and procedures was 70 minutes

## 2025-07-17 NOTE — OR NURSING
Orders received for Levophed. 2nd PIV site started per protocol. Dr. Cantrell notified of patient condition and ICU notified.

## 2025-07-17 NOTE — H&P
[facility]    History and Physical - Hospitalist Service       Date of Admission:  7/17/2025    Assessment & Plan      Constantino Thorne is a 84 year old male admitted on 7/17/2025.     He has history of CAD, with prior MI in 2010, history of VFib cardiac arrest at the time of his MI, ischemic cardiomyopathy with LVEF 30-35%, paroxysmal atrial fibrillation, PVCs and CKD. He has an ICD in place.     He underwent elective revision of right cemented hemiarthroplasty to total hip arthroplasty.  He lost about a liter of blood during the surgery.  ICU admission was requested because patient has been persistently hypotensive with systolics ranging from 70-90.  He has got a couple of phenylephrine pushes from anesthesiologist and is going to start on norepinephrine drip.  His postoperative hemoglobin was 10.3 and has got 3.5 liters of IV fluid already.    He denies any chest pain, shortness of breath or dizziness.  Only complaints of dry mouth.  Lactate 1.7 and troponin 15.  EKG shows atrial paced rhythm.  BMP with BUN/creatinine of 25.8/1.19.  WBC count of 17.1 with hemoglobin 10.2 and platelet 148.  INR 1.29.    Postoperative hypotension:  - Hypotension is likely due to acute blood loss during the surgery (estimated blood loss 1 L).  Postoperative hemoglobin is 10.2 which is not significantly different from a preoperative hemoglobin but it will take time for blood to reequilibrate.  - Patient is already got 3.5 L of IV fluids during intra and postoperative period.  Will continue LR at 100 mL/h for 1 L and then reevaluate.  Recheck hemoglobin in the morning, transfuse for hemoglobin less than 8.    Revision of right cemented hemiarthroplasty to total hip arthroplasty:  - This was done due to persistent pain.  - Postoperative management per orthopedics..    History of ischemic cardiomyopathy/chronic systolic CHF:  History of frequent PVCs:  CAD with prior MI in 2010:  Persistent atrial fibrillation:  -   "Last echocardiogram on 7/15/2025 showed the EF of 30% which is unchanged compared to his previous echocardiograms.  Not on daily diuretics at home.  Lot on lisinopril and spironolactone due to intolerance.  - Not on aspirin given he is on warfarin.  - Persistent atrial fibrillation.  His [id number] score of 5.  Continue amiodarone.  Resume metoprolol XL 12.5 mg twice daily once blood pressure is improved.  Resume Coumadin once hemoglobin stable, likely tomorrow.    Hyperlipidemia:  - Continued on atorvastatin 80 mg daily.    Sleep apnea:  - Continues CPAP.    Urinary retention:  - Patient was not able to urinate in the immediate postoperative period and Conley was inserted.    Family communication.  - Discussed with wife at bedside.    Disposition  - ICU.            Diet: Advance Diet as Tolerated: Regular Diet Adult    DVT Prophylaxis: Pneumatic Compression Devices  Conley Catheter: PRESENT, indication: ICU only: hourly urine output needed for patient care  Lines: None     Cardiac Monitoring: ACTIVE order. Indication: Procedural area  Code Status: Full Code      Clinically Significant Risk Factors Present on Admission                # Drug Induced Coagulation Defect: home medication list includes an anticoagulant medication    # Hypertension: Noted on problem list  # Chronic heart failure with reduced ejection fraction: last echo with EF <40%  # Circulatory Shock: required vasopressors within past 24 hours       # Anemia: based on hgb <11       # Overweight: Estimated body mass index is 28.93 kg/m  as calculated from the following:    Height as of this encounter: 1.803 m (5' 10.98\").    Weight as of this encounter: 94 kg (207 lb 4.8 oz).              Disposition Plan     Medically Ready for Discharge: Anticipated in 2-4 Days           Enrike Bolivar MD  Hospitalist Service  [facility]  Securely message with [employer] (more info)  Text page via [facility] Paging/Directory "     ______________________________________________________________________    Chief Complaint   Hypotension after hip arthroplasty    History is obtained from the patient.  Discussed with anesthesiologist  Dr. Bautista.    History of Present Illness   Constantino Thorne is a 84 year old male admitted on 7/17/2025.     He has history of CAD, with prior MI in 2010, history of VFib cardiac arrest at the time of his MI, ischemic cardiomyopathy with LVEF 30-35%, paroxysmal atrial fibrillation, PVCs and CKD. He has an ICD in place.     He underwent elective revision of right cemented hemiarthroplasty to total hip arthroplasty.  He lost about a liter of blood during the surgery.  ICU admission was requested because patient has been persistently hypotensive with systolics ranging from 70-90.  He has got a couple of phenylephrine pushes from anesthesiologist and is going to start on norepinephrine drip.  His postoperative hemoglobin was 10.3 and has got 3.5 liters of IV fluid already.    He denies any chest pain, shortness of breath or dizziness.  Only complaints of dry mouth.  Lactate 1.7 and troponin 15.  EKG shows atrial paced rhythm.  BMP with BUN/creatinine of 25.8/1.19.  WBC count of 17.1 with hemoglobin 10.2 and platelet 148.  INR 1.29.      Past Medical History    Past Medical History:   Diagnosis Date    A-fib (H)     Antiplatelet or antithrombotic long-term use     Arrhythmia     CAD (coronary artery disease)     Hyperlipidemia LDL goal <100     Hypertension     ICD (implantable cardioverter-defibrillator) in place     Old myocardial infarction     SABINE (obstructive sleep apnea)     Pacemaker     Stented coronary artery     Subdural hematoma (H) 10/10/2024    Syncope 10/10/2024       Past Surgical History   Past Surgical History:   Procedure Laterality Date    OPEN REDUCTION INTERNAL FIXATION HIP BIPOLAR Right 11/4/2024    Procedure: Right hip hemiarthroplasty;  Surgeon: Darinel Chacko MD;  Location: [facility] OR       Prior  to Admission Medications   Prior to Admission Medications   Prescriptions Last Dose Informant Patient Reported? Taking?   amiodarone (PACERONE) 200 MG tablet 7/17/2025 Morning  Yes Yes   Sig: Take 200 mg by mouth every morning.   atorvastatin (LIPITOR) 80 MG tablet 7/16/2025 Evening  Yes Yes   Sig: Take 80 mg by mouth At Bedtime    metoprolol succinate ER (TOPROL XL) 25 MG 24 hr tablet 7/17/2025 Morning  No Yes   Sig: Take 0.5 tablets (12.5 mg) by mouth 2 times daily.   nitroGLYcerin (NITROSTAT) 0.4 MG sublingual tablet   Yes Yes   Sig: Place 1 Tablet under tongue as needed. If no relief after 5 min call 911;continue 1 tab every 5 min max 3 tab   warfarin ANTICOAGULANT (COUMADIN/JANTOVEN) 5 MG tablet 7/13/2025  Yes Yes   Sig: Take by mouth 7/12: Hold; 7/13: Hold; 7/14: Hold; 7/15: Hold; 7/16: Hold; Otherwise 5 mg every M, F; 2.5 mg all other days or as directed. Your dose may change. Call [facility] [phone] with questions.      Facility-Administered Medications: None        Review of Systems    The 10 point Review of Systems is negative other than noted in the HPI or here.      Physical Exam   Vital Signs: Temp: 97  F (36.1  C) Temp src: Temporal BP: (!) 80/49 Pulse: 59   Resp: 12 SpO2: 96 % O2 Device: Nasal cannula Oxygen Delivery: 2 LPM  Weight: 207 lbs 4.8 oz    General Appearance: Alert awake and oriented x 3.  Dry mucosa.  Respiratory: Clear to auscultation  Cardiovascular: S1-S2 normal  GI: Soft and nontender  Skin: No rash  Other: No edema.  The hip incision is clean.      Medical Decision Making       Time spent critical care is 45 minutes  patient has been seen by intensivist Dr. Moreira.    Data     I have personally reviewed the following data over the past 24 hrs:    17.1 (H)  \   10.2 (L)   / 148 (L)     138 105 25.8 (H) /  144 (H)   4.6 21 (L) 1.19 (H) \     Trop: 15 BNP: N/A     Procal: N/A CRP: N/A Lactic Acid: 1.7       INR:  1.29 (H) PTT:  N/A   D-dimer:  N/A  Fibrinogen:  N/A       Imaging results reviewed over the past 24 hrs:   Recent Results (from the past 24 hours)   XR Hip Port Right 1 View    Narrative    This exam was marked as non-reportable because it will not be read by a   radiologist or a [place] non-radiologist provider.         XR Pelvis w Hip Port Right 1 View    Narrative    EXAM: XR PELVIS AND HIP PORTABLE RIGHT 1 VIEW  LOCATION: [facility]  DATE: 7/17/2025    INDICATION: Status post Hip surgery  COMPARISON: None.      Impression    IMPRESSION: Postoperative changes right total hip arthroplasty. Cerclage wire fixation. Left hip negative for fracture with mild degenerative change. Visualized pelvis negative for fracture. Post procedural air on the right hip joint.

## 2025-07-17 NOTE — PROVIDER NOTIFICATION
Working on getting cement out, taking x-rays - busy time to call at 2 hours, called family intra-operatively at [id number]

## 2025-07-17 NOTE — ANESTHESIA CARE TRANSFER NOTE
Patient: Constantino Thorne    Procedure: Procedure(s):  Conversion right hip hemiarthroplasty to right total hip arthroplasty       Diagnosis: Pain due to hip joint prosthesis [T84.84XA, Z96.649]  Diagnosis Additional Information: No value filed.    Anesthesia Type:   General     Note:    Oropharynx: oropharynx clear of all foreign objects and spontaneously breathing  Level of Consciousness: awake  Oxygen Supplementation: face mask    Independent Airway: airway patency satisfactory and stable  Dentition: dentition unchanged  Vital Signs Stable: post-procedure vital signs reviewed and stable  Report to RN Given: handoff report given  Patient transferred to: PACU  Comments: First two SBPs in the 60's. Two doses of 100 mcg phenylephrine given, as charted. SBP's now 106-109. Lab present to draw Hgb. Dr. Bautista aware. Patient awake and alert.  Handoff Report: Identifed the Patient, Identified the Reponsible Provider, Reviewed the pertinent medical history, Discussed the surgical course, Reviewed Intra-OP anesthesia mangement and issues during anesthesia, Set expectations for post-procedure period and Allowed opportunity for questions and acknowledgement of understanding      Vitals:  Vitals Value Taken Time   /63 07/17/25 14:20   Temp     Pulse 59 07/17/25 14:22   Resp 13 07/17/25 14:22   SpO2 99 % 07/17/25 14:22   Vitals shown include unfiled device data.    Electronically Signed By: STEPHEN Arcos CRNA  July 17, 2025  2:23 PM

## 2025-07-17 NOTE — ANESTHESIA PREPROCEDURE EVALUATION
Anesthesia Pre-Procedure Evaluation    Patient: Constantino Thorne   MRN: [id number] : 1940          Procedure : Procedure(s):  Conversion right hip hemiarthroplasty to right total hip arthroplasty         Past Medical History:   Diagnosis Date    A-fib (H)     Antiplatelet or antithrombotic long-term use     Arrhythmia     CAD (coronary artery disease)     Hyperlipidemia LDL goal <100     Hypertension     ICD (implantable cardioverter-defibrillator) in place     Old myocardial infarction     SABINE (obstructive sleep apnea)     Pacemaker     Stented coronary artery     Subdural hematoma (H) 10/10/2024    Syncope 10/10/2024      Past Surgical History:   Procedure Laterality Date    OPEN REDUCTION INTERNAL FIXATION HIP BIPOLAR Right 2024    Procedure: Right hip hemiarthroplasty;  Surgeon: Darinel Chacko MD;  Location: [facility] OR      Allergies   Allergen Reactions    Lisinopril Unknown and Cough    Spironolactone Unknown and Diarrhea      Social History     Tobacco Use    Smoking status: Never    Smokeless tobacco: Never   Substance Use Topics    Alcohol use: Never      Wt Readings from Last 1 Encounters:   25 94 kg (207 lb 4.8 oz)        Anesthesia Evaluation   Pt has had prior anesthetic.         ROS/MED HX  ENT/Pulmonary:     (+) sleep apnea,                                       Neurologic:       Cardiovascular:     (+)  hypertension- -  CAD -  - -   Taking blood thinners          fainting (syncope). pacemaker,                   Previous cardiac testing   Echo: Date:  Results:  There is mild concentric left ventricular hypertrophy.  The visual ejection fraction is 35-40%.  Moderate - large area of severe hypokinesis to akinesis of the anteroseptal,  apical wall  The left atrium is moderately dilated.  The right atrium is mildly dilated.  There is trace to mild mitral regurgitation.  There is mild (1+) tricuspid regurgitation.  Right ventricular systolic pressure is elevated, consistent with  moderate  pulmonary hypertension.  TDS-Definity used (no complications)  Aortic root dilatation is present.  Ascending aorta dilatation is present.  Compared to prior study, there is no significant change.    Stress Test:  Date: Results:    ECG Reviewed:  Date: Results:    Cath:  Date: Results:      METS/Exercise Tolerance:     Hematologic:  - neg hematologic  ROS     Musculoskeletal:   (+)  arthritis,             GI/Hepatic:  - neg GI/hepatic ROS     Renal/Genitourinary:     (+) renal disease, type: CRI, Pt does not require dialysis,           Endo:     (+)               Obesity,       Psychiatric/Substance Use:       Infectious Disease:  - neg infectious disease ROS     Malignancy:       Other:              Physical Exam  Airway  Mallampati: II  TM distance: >3 FB  Neck ROM: full  Mouth opening: >= 4 cm    Cardiovascular   Rhythm: regular  Rate: normal rate     Dental   (+) Modest Abnormalities - crowns, retainers, 1 or 2 missing teeth      Pulmonary - normal exam      Neurological - normal exam  He appears awake, alert and oriented x3.    Other Findings       OUTSIDE LABS:  CBC:   Lab Results   Component Value Date    WBC 7.3 11/26/2024    WBC 12.4 (H) 11/07/2024    HGB 12.5 (L) 11/26/2024    HGB 10.9 (L) 11/07/2024    HCT 38.3 (L) 11/26/2024    HCT 32.1 (L) 11/07/2024     11/26/2024     (L) 11/07/2024     BMP:   Lab Results   Component Value Date     11/26/2024     (L) 11/07/2024    POTASSIUM 4.6 11/26/2024    POTASSIUM 4.1 11/08/2024    CHLORIDE 100 11/26/2024    CHLORIDE 101 11/07/2024    CO2 22 11/26/2024    CO2 20 (L) 11/07/2024    BUN 23.6 (H) 11/26/2024    BUN 25.7 (H) 11/07/2024    CR 1.13 11/26/2024    CR 0.98 11/07/2024    GLC 89 07/17/2025     (H) 11/26/2024     COAGS:   Lab Results   Component Value Date    PTT 35 10/10/2024    INR 1.05 11/02/2024     POC:   Lab Results   Component Value Date     (H) 03/13/2010     HEPATIC:   Lab Results   Component Value Date  "   ALBUMIN 3.7 11/02/2024    PROTTOTAL 6.8 11/02/2024    ALT 25 11/02/2024    AST 27 11/02/2024    ALKPHOS 103 11/02/2024    BILITOTAL 0.6 11/02/2024     OTHER:   Lab Results   Component Value Date    PH 7.49 (H) 03/05/2010    LACT 0.9 02/25/2010    ZACH 9.3 11/26/2024    PHOS 5.1 (H) 03/08/2010    MAG 1.9 11/09/2024    LIPASE 324 (H) 03/01/2010    AMYLASE 130 (H) 03/12/2010    TSH 2.31 02/16/2010       Anesthesia Plan    ASA Status:  3      NPO Status: NPO Appropriate   Anesthesia Type: General.  Airway: oral.  Induction: intravenous.  Maintenance: Balanced.   Techniques and Equipment:       - Monitoring Plan: standard ASA monitoring     Consents    Anesthesia Plan(s) and associated risks, benefits, and realistic alternatives discussed. Questions answered and patient/representative(s) expressed understanding.     - Discussed: anesthesiologist     - Discussed with:  Patient        - Pt is DNR/DNI Status: no DNR     Blood Consent:      - Discussed with: not discussed.     Postoperative Care    Pain management: non-narcotic analgesics, plan for postoperative opioid use, multimodal analgesia.     Comments:    Other Comments:   Methadone 10 mg  Dexadron 8 mg  Zofran 4 mg  Toradol 15 mg  Precedex  Sugammadex if paralytic to be used                Maurice Bautista MD    I have reviewed the pertinent notes and labs in the chart from the past 30 days and (re)examined the patient.  Any updates or changes from those notes are reflected in this note.    Clinically Significant Risk Factors Present on Admission                # Drug Induced Coagulation Defect: home medication list includes an anticoagulant medication    # Hypertension: Noted on problem list  # Chronic heart failure with reduced ejection fraction: last echo with EF <40%          # Overweight: Estimated body mass index is 28.93 kg/m  as calculated from the following:    Height as of this encounter: 1.803 m (5' 10.98\").    Weight as of this encounter: 94 kg (207 lb " 4.8 oz).

## 2025-07-17 NOTE — ANESTHESIA POSTPROCEDURE EVALUATION
Patient: Constantino Thorne    Procedure: Procedure(s):  Conversion right hip hemiarthroplasty to right total hip arthroplasty       Anesthesia Type:  General    Note:  Disposition: Admission; ICU            ICU Sign Out: Anesthesiologist/ICU physician sign out WAS performed   Postop Pain Control: Uneventful            Sign Out: Well controlled pain   PONV: No   Neuro/Psych: Uneventful            Sign Out: Acceptable/Baseline neuro status   Airway/Respiratory: Uneventful            Sign Out: Acceptable/Baseline resp. status   CV/Hemodynamics:             Sign Out: Detailed CV status               Blood Pressure: HypOtension; Pressors (Persistent mild hypotension despite fluids and intermittent pressor boluses.  Mentation normal, no chest pain, no SOB.  Pain well controlled. Not overly sedated.  Low dose norepi started in PACU)               Rate/Rhythm: Normal HR; PM dependent   Other NRE:    DID A NON-ROUTINE EVENT OCCUR?            Last vitals:  Vitals Value Taken Time   /66 07/17/25 16:47   Temp 97.2  F (36.2  C) 07/17/25 16:25   Pulse 65 07/17/25 16:47   Resp 31 07/17/25 16:40   SpO2 98 % 07/17/25 16:49   Vitals shown include unfiled device data.    Electronically Signed By: Maurice Bautista MD  July 17, 2025  5:00 PM

## 2025-07-17 NOTE — OR NURSING
Dr. Bautista notified of patients blood pressure trending back down. Fluid bolus and ephedrine completed. Awaiting further orders

## 2025-07-17 NOTE — ANESTHESIA PROCEDURE NOTES
Airway       Patient location during procedure: OR       Procedure Start/Stop Times: 7/17/2025 10:45 AM  Staff -        CRNA: Nasima Esparza APRN CRNA       Performed By: CRNA  Consent for Airway        Urgency: elective  Indications and Patient Condition       Indications for airway management: natalie-procedural       Induction type:intravenous       Mask difficulty assessment: 1 - vent by mask    Final Airway Details       Final airway type: endotracheal airway       Successful airway: ETT - single  Endotracheal Airway Details        ETT size (mm): 8.0       Cuffed: yes       Successful intubation technique: video laryngoscopy       VL Blade Size: Glidescope 4       Grade View of Cords: 1       Adjucts: stylet       Position: Right       Bite block used: Soft    Post intubation assessment        Placement verified by: capnometry        Number of attempts at approach: 1       Secured with: tape       Ease of procedure: easy       Dentition: Intact and Unchanged    Medication(s) Administered   Medication Administration Time: 7/17/2025 10:45 AM         None known

## 2025-07-17 NOTE — OP NOTE
Preoperative diagnosis:  Painful right hip hemiarthroplasty     Postoperative diagnosis:  As above     Procedure:  Revision right cemented hemiarthroplasty to total hip arthroplasty    Modifier:  22 modifier should be applied to this case for the following reason, the patient underwent removal of a cemented femoral stem which greatly increased the surgical time by more than 50% of what is typical.  In addition, a required utilization of specialized instrumentation specifically, the PIA device for cement removal     Surgeon:  Rory Cantrell MD     Assistant:  Diamond Lovell PA-C  A physician assistant was available for the case and participated to decrease the patient's morbidity with positioning, retraction during the procedure, manipulation of the limb and closure of the surgical wound.     Anesthesia:  General    Estimated blood loss:  1000 cc     Complications:  Femoral canal perforation necessitating utilization of a 235 mm femoral stem     Indication for procedure:  Constantino Thorne is an 84 year old male who had previously undergone a right hip hemiarthroplasty last year.  Unfortunately, over the past several months, they have been having increasingly severe thigh and groin pain.  Serial x-rays demonstrated loosening of the cement mantle around the proximal femoral stem.  As such, the risks, benefits and alternatives to proceeding with revision total hip arthroplasty were discussed with the patient.  This included but was not limited to continued postoperative pain, intraoperative fracture necessitating further surgery, failure of ingrowth components, dislocating hip, leg-length discrepancy and thromboembolic events. The patient expressed understand the risks and stated his preference to proceed with surgery today.     Description of the procedure:  Constantino was met in the preoperative area by myself. Informed consent was obtained as outlined above. They were in agreement. Thus initials were placed on the right leg  indicating the correct surgical site. The patient was then brought to the operating room where a spine anesthetic was induced by anesthesia service. This was successful. The patient was placed lateral on the operating table ensuring all bony prominences well padded. The pelvis was positioned with a hip positioner. The right hip was then prepped and draped in sterile fashion time-out was called by the surgical team. The correct patient, hospital identification number, procedure to be performed and laterality were confirmed. All in attendance were in agreement.      A posterior approach to the hip was utilized. Curvilinear incision was created over the distal 3rd of the greater trochanter in line with the femur. Hemostasis was achived with Bovie electrocautery. The gluteal fascia was encountered and this was incised in line with the fibers. IT band was split distally. The hip was then internally rotated to expose the posterior structures.  There was a large tear of the anterior third of the abductor tendon.  I elected to proceed with a posterior approach to the hip with later abductor repair to prevent further disruption of the anterior soft tissue envelope.  The piriformis and posterior capsule was released with Bovie electrocautery. These were tagged for later repair. The hip was then dislocated and the femoral head was removed. Posterior capsule was debulked. At this point, we proceeded with removal of the total hip arthroplasty.     A proximal femoral elevator was placed exposing the proximal femur. The bone prosthetic interface was dissected using Bovie electrocautery.  The femoral stem was grossly loose and readily extracted from the cement mantle with a stem extractor.  Attention was turned to the acetabulum.     A C-shaped retractor was placed over the anterior aspect of the acetabulum exposing the acetabular component.  The acetabulum was exposed circumferentially with retractors.  The acetabular labrum was  resected sharply.  Pulmonary was resected with electrocautery.  The acetabular was sequentially reamed to accept a 56 mm component which had excellent anterior and posterior fit.  A 56 mm acetabular shell was then impacted in the place ensuring appropriate anteversion and abduction angles.  2 screws were placed for adjunct fixation.  A dual mobility liner was placed given the history of previous hip surgery.  Retractors were removed and attention was turned to reconstructing the hip.     The proximal cement mantle was loose and readily removed from the femur.  Distally, this was well-fixed.  Initially, the PIA device was utilized to disrupt the cement mantle proximally.  Unfortunately, the device failed during the case prior to removal of the distal cement plug.  This was subsequently disrupted with osteotomes and a drill.  After this was removed, an opening reamer was placed in the femoral canal.  An x-ray was obtained.  This demonstrated a perforation of the lateral aspect of the femur.  At this point, the endosteal cortex of the femur was gently reamed by hand and serial x-rays were obtained to ensure engagement of the femoral stem and the isthmus of the femur.  I utilized a 235 mm stem to bypass the cortical defect by more than 2 cortical diameters.  The femur was subsequently reamed to accept a size 235 x 16 mm restoration modular stem.  The stem was then impacted into place.  The proximal femur was prepared to accept a +0 body.  Trials were placed the hip was reduced and trialed. Optimal length and stability was obtained with a +0 mm head. Hip stability was quite good in external rotation and full extension, position of sleep and internal rotation 90 .  Hip was dislocated and the final dual mobility components were impacted into place.  After this the surgical bed was again irrigated and the hip capsule was repaired with #5.  Ethibond.  The IT band was closed with a combination of 2. Ethibond and 2. PDS. The  gluteal fascia was closed with PDS in running fashion. Fred's fascia deeply was closed with 0 PDS and the deep dermis was closed with 2-0 PDS and skin was closed with staples.      All needle, instrument and sponge counts were correct at the conclusion of the procedure in accordance with hospital protocol. A sterile dressing was placed and the patient was subsequently transferred safely to hospital bed.     Postoperative Plan  The patient will return to the hospital david for pain control and monitoring.  Weightbearing status will be 50% with trochanteric precautions. Will utilize aspirin for DVT prophylaxis.     Rory Cantrell MD

## 2025-07-18 ENCOUNTER — APPOINTMENT (OUTPATIENT)
Dept: CARDIOLOGY | Facility: CLINIC | Age: 85
DRG: 466 | End: 2025-07-18
Attending: STUDENT IN AN ORGANIZED HEALTH CARE EDUCATION/TRAINING PROGRAM
Payer: COMMERCIAL

## 2025-07-18 LAB
ANION GAP SERPL CALCULATED.3IONS-SCNC: 10 MMOL/L (ref 7–15)
ATRIAL RATE - MUSE: 60 BPM
BASOPHILS # BLD AUTO: 0 10E3/UL (ref 0–0.2)
BASOPHILS NFR BLD AUTO: 0 %
BUN SERPL-MCNC: 28.7 MG/DL (ref 8–23)
BURR CELLS BLD QL SMEAR: ABNORMAL
CALCIUM SERPL-MCNC: 8.1 MG/DL (ref 8.8–10.4)
CHLORIDE SERPL-SCNC: 103 MMOL/L (ref 98–107)
CREAT SERPL-MCNC: 1.3 MG/DL (ref 0.67–1.17)
DIASTOLIC BLOOD PRESSURE - MUSE: NORMAL MMHG
EGFRCR SERPLBLD CKD-EPI 2021: 54 ML/MIN/1.73M2
EOSINOPHIL # BLD AUTO: 0 10E3/UL (ref 0–0.7)
EOSINOPHIL NFR BLD AUTO: 0 %
ERYTHROCYTE [DISTWIDTH] IN BLOOD BY AUTOMATED COUNT: 14.2 % (ref 10–15)
GLUCOSE BLDC GLUCOMTR-MCNC: 128 MG/DL (ref 70–99)
GLUCOSE BLDC GLUCOMTR-MCNC: 129 MG/DL (ref 70–99)
GLUCOSE BLDC GLUCOMTR-MCNC: 139 MG/DL (ref 70–99)
GLUCOSE BLDC GLUCOMTR-MCNC: 204 MG/DL (ref 70–99)
GLUCOSE SERPL-MCNC: 161 MG/DL (ref 70–99)
HCO3 SERPL-SCNC: 21 MMOL/L (ref 22–29)
HCT VFR BLD AUTO: 24.6 % (ref 40–53)
HGB BLD-MCNC: 8 G/DL (ref 13.3–17.7)
HGB BLD-MCNC: 8.2 G/DL (ref 13.3–17.7)
IMM GRANULOCYTES # BLD: 0.2 10E3/UL
IMM GRANULOCYTES NFR BLD: 1 %
INTERPRETATION ECG - MUSE: NORMAL
LACTATE SERPL-SCNC: 2.3 MMOL/L (ref 0.7–2)
LACTATE SERPL-SCNC: 2.4 MMOL/L (ref 0.7–2)
LACTATE SERPL-SCNC: 2.6 MMOL/L (ref 0.7–2)
LVEF ECHO: NORMAL
LYMPHOCYTES # BLD AUTO: 1 10E3/UL (ref 0.8–5.3)
LYMPHOCYTES NFR BLD AUTO: 5 %
MCH RBC QN AUTO: 32.3 PG (ref 26.5–33)
MCHC RBC AUTO-ENTMCNC: 33.3 G/DL (ref 31.5–36.5)
MCV RBC AUTO: 96 FL (ref 78–100)
MCV RBC AUTO: 97 FL (ref 78–100)
MONOCYTES # BLD AUTO: 1.8 10E3/UL (ref 0–1.3)
MONOCYTES NFR BLD AUTO: 9 %
NEUTROPHILS # BLD AUTO: 16.2 10E3/UL (ref 1.6–8.3)
NEUTROPHILS NFR BLD AUTO: 85 %
NRBC # BLD AUTO: 0 10E3/UL
NRBC BLD AUTO-RTO: 0 /100
P AXIS - MUSE: NORMAL DEGREES
PLAT MORPH BLD: ABNORMAL
PLATELET # BLD AUTO: 139 10E3/UL (ref 150–450)
POTASSIUM SERPL-SCNC: 4.3 MMOL/L (ref 3.4–5.3)
PR INTERVAL - MUSE: 236 MS
QRS DURATION - MUSE: 90 MS
QT - MUSE: 442 MS
QTC - MUSE: 442 MS
R AXIS - MUSE: -45 DEGREES
RBC # BLD AUTO: 2.54 10E6/UL (ref 4.4–5.9)
RBC MORPH BLD: ABNORMAL
SODIUM SERPL-SCNC: 134 MMOL/L (ref 135–145)
SYSTOLIC BLOOD PRESSURE - MUSE: NORMAL MMHG
T AXIS - MUSE: 11 DEGREES
VENTRICULAR RATE- MUSE: 60 BPM
WBC # BLD AUTO: 19.2 10E3/UL (ref 4–11)

## 2025-07-18 PROCEDURE — 250N000013 HC RX MED GY IP 250 OP 250 PS 637: Performed by: HOSPITALIST

## 2025-07-18 PROCEDURE — 255N000002 HC RX 255 OP 636: Performed by: STUDENT IN AN ORGANIZED HEALTH CARE EDUCATION/TRAINING PROGRAM

## 2025-07-18 PROCEDURE — 99233 SBSQ HOSP IP/OBS HIGH 50: CPT | Performed by: HOSPITALIST

## 2025-07-18 PROCEDURE — 85018 HEMOGLOBIN: CPT | Performed by: HOSPITALIST

## 2025-07-18 PROCEDURE — 84132 ASSAY OF SERUM POTASSIUM: CPT | Performed by: STUDENT IN AN ORGANIZED HEALTH CARE EDUCATION/TRAINING PROGRAM

## 2025-07-18 PROCEDURE — C8929 TTE W OR WO FOL WCON,DOPPLER: HCPCS

## 2025-07-18 PROCEDURE — 36415 COLL VENOUS BLD VENIPUNCTURE: CPT | Performed by: STUDENT IN AN ORGANIZED HEALTH CARE EDUCATION/TRAINING PROGRAM

## 2025-07-18 PROCEDURE — 250N000013 HC RX MED GY IP 250 OP 250 PS 637: Performed by: STUDENT IN AN ORGANIZED HEALTH CARE EDUCATION/TRAINING PROGRAM

## 2025-07-18 PROCEDURE — 99291 CRITICAL CARE FIRST HOUR: CPT | Performed by: INTERNAL MEDICINE

## 2025-07-18 PROCEDURE — 83605 ASSAY OF LACTIC ACID: CPT | Performed by: HOSPITALIST

## 2025-07-18 PROCEDURE — 36415 COLL VENOUS BLD VENIPUNCTURE: CPT | Performed by: HOSPITALIST

## 2025-07-18 PROCEDURE — 83605 ASSAY OF LACTIC ACID: CPT | Performed by: INTERNAL MEDICINE

## 2025-07-18 PROCEDURE — 258N000003 HC RX IP 258 OP 636: Performed by: INTERNAL MEDICINE

## 2025-07-18 PROCEDURE — 85018 HEMOGLOBIN: CPT | Performed by: STUDENT IN AN ORGANIZED HEALTH CARE EDUCATION/TRAINING PROGRAM

## 2025-07-18 PROCEDURE — 36415 COLL VENOUS BLD VENIPUNCTURE: CPT | Performed by: INTERNAL MEDICINE

## 2025-07-18 PROCEDURE — 258N000003 HC RX IP 258 OP 636: Performed by: STUDENT IN AN ORGANIZED HEALTH CARE EDUCATION/TRAINING PROGRAM

## 2025-07-18 PROCEDURE — 250N000011 HC RX IP 250 OP 636: Performed by: HOSPITALIST

## 2025-07-18 PROCEDURE — 250N000011 HC RX IP 250 OP 636

## 2025-07-18 PROCEDURE — 93306 TTE W/DOPPLER COMPLETE: CPT | Mod: 26 | Performed by: INTERNAL MEDICINE

## 2025-07-18 PROCEDURE — 250N000013 HC RX MED GY IP 250 OP 250 PS 637

## 2025-07-18 PROCEDURE — 200N000001 HC R&B ICU

## 2025-07-18 RX ORDER — WARFARIN SODIUM 5 MG/1
5 TABLET ORAL
Status: COMPLETED | OUTPATIENT
Start: 2025-07-18 | End: 2025-07-18

## 2025-07-18 RX ORDER — SODIUM CHLORIDE, SODIUM LACTATE, POTASSIUM CHLORIDE, CALCIUM CHLORIDE 600; 310; 30; 20 MG/100ML; MG/100ML; MG/100ML; MG/100ML
INJECTION, SOLUTION INTRAVENOUS CONTINUOUS
Status: ACTIVE | OUTPATIENT
Start: 2025-07-18 | End: 2025-07-18

## 2025-07-18 RX ORDER — ENOXAPARIN SODIUM 100 MG/ML
40 INJECTION SUBCUTANEOUS EVERY 24 HOURS
Status: DISCONTINUED | OUTPATIENT
Start: 2025-07-18 | End: 2025-07-21 | Stop reason: HOSPADM

## 2025-07-18 RX ADMIN — ENOXAPARIN SODIUM 40 MG: 40 INJECTION SUBCUTANEOUS at 14:08

## 2025-07-18 RX ADMIN — SENNOSIDES AND DOCUSATE SODIUM 1 TABLET: 50; 8.6 TABLET ORAL at 09:13

## 2025-07-18 RX ADMIN — ACETAMINOPHEN 975 MG: 325 TABLET ORAL at 09:13

## 2025-07-18 RX ADMIN — ACETAMINOPHEN 975 MG: 325 TABLET ORAL at 17:20

## 2025-07-18 RX ADMIN — SODIUM CHLORIDE, SODIUM LACTATE, POTASSIUM CHLORIDE, AND CALCIUM CHLORIDE 1000 ML: .6; .31; .03; .02 INJECTION, SOLUTION INTRAVENOUS at 09:09

## 2025-07-18 RX ADMIN — SENNOSIDES AND DOCUSATE SODIUM 1 TABLET: 50; 8.6 TABLET ORAL at 20:04

## 2025-07-18 RX ADMIN — OXYCODONE HYDROCHLORIDE 5 MG: 5 TABLET ORAL at 04:00

## 2025-07-18 RX ADMIN — SODIUM CHLORIDE, SODIUM LACTATE, POTASSIUM CHLORIDE, AND CALCIUM CHLORIDE: .6; .31; .03; .02 INJECTION, SOLUTION INTRAVENOUS at 04:21

## 2025-07-18 RX ADMIN — POLYETHYLENE GLYCOL 3350 17 G: 17 POWDER, FOR SOLUTION ORAL at 09:13

## 2025-07-18 RX ADMIN — WARFARIN SODIUM 5 MG: 5 TABLET ORAL at 17:19

## 2025-07-18 RX ADMIN — ATORVASTATIN CALCIUM 80 MG: 40 TABLET, FILM COATED ORAL at 21:35

## 2025-07-18 RX ADMIN — FAMOTIDINE 20 MG: 20 TABLET, FILM COATED ORAL at 09:15

## 2025-07-18 RX ADMIN — CEFAZOLIN SODIUM 2 G: 2 SOLUTION INTRAVENOUS at 01:52

## 2025-07-18 RX ADMIN — ACETAMINOPHEN 975 MG: 325 TABLET ORAL at 01:51

## 2025-07-18 RX ADMIN — AMIODARONE HYDROCHLORIDE 200 MG: 200 TABLET ORAL at 09:13

## 2025-07-18 RX ADMIN — HUMAN ALBUMIN MICROSPHERES AND PERFLUTREN 3 ML (DILUTED): 10; .22 INJECTION, SOLUTION INTRAVENOUS at 14:28

## 2025-07-18 ASSESSMENT — ACTIVITIES OF DAILY LIVING (ADL)
ADLS_ACUITY_SCORE: 49
ADLS_ACUITY_SCORE: 48
ADLS_ACUITY_SCORE: 49
ADLS_ACUITY_SCORE: 48
ADLS_ACUITY_SCORE: 49
ADLS_ACUITY_SCORE: 48
ADLS_ACUITY_SCORE: 49
ADLS_ACUITY_SCORE: 48
ADLS_ACUITY_SCORE: 49

## 2025-07-18 NOTE — PROGRESS NOTES
"ORTHOPAEDIC SURGERY STAFF PROGRESS NOTE    Resting comfortably with pain medication. To ICU overnight for BP support. Denies chest pain, shortness of breath, lightheadedness.    /60   Pulse 60   Temp 97.9  F (36.6  C) (Oral)   Resp 14   Ht 1.803 m (5' 10.98\")   Wt 97.3 kg (214 lb 8.1 oz)   SpO2 93%   BMI 29.93 kg/m      RLE-  Dressing clean/dry/intact  Compartments soft  +extensor hallucis longus, flexor hallucis longus, tibialis anterior, gastroc-soleus complex, Quadriceps Sensation intact to light touch superficial peroneal, deep peroneal, sural, saphenous, tibial nerve distributions  Palpable DP pulse     S/P R DANYEL revision POD 1    Doing well overall   Pain control  DVT ppx -OK to restart coumadin when BP stabilizes  Hgb 8.2 this AM  - Patient OK with transfusion if needed below 8  PT/OT  50% weight bearing  Dispo pending hemodynamic stabilization     Rory Cantrell MD   [facility] [place] [facility] [place]  [phone]    "

## 2025-07-18 NOTE — PROGRESS NOTES
[facility]    Hospitalist Progress Note      Assessment & Plan   Constantino Thorne is a 84 year old male w a history of CAD, with prior MI in 2010, history of VFib cardiac arrest at the time of his MI, ischemic cardiomyopathy with LVEF 30-35%, paroxysmal atrial fibrillation, PVCs and CKD who presents with post-operative hypotension after elective revision of right cemented hemiarthroplasty to total hip arthroplasty.      # Hypovolemic shock secondary to acute blood loss and volume loss in the setting of operative procedure: Patient underwent revision of right cemented hemiarthroplasty to total hip arthroplasty on 7/17.  Estimated blood loss was noted to be 1000 cc with complication noted of femoral canal perforation needing femoral stem.  ICU admission was requested because patient has been persistently hypotensive with systolics ranging from 70-90 postoperatively.  He has got a couple of phenylephrine pushes from anesthesiologist and is going to start on norepinephrine drip.  His postoperative hemoglobin was 10.3 and has got 3.5 liters of IV fluid already.  -Patient denies chest pain, shortness of breath or dizziness/lightheadedness.  His EKG shows an atrial paced rhythm.  Lactic acid within normal limits and troponin negative.  He has a mild leukocytosis postoperatively and immediate postoperative hemoglobin 10.2.  Patient on low-dose norepinephrine after being given 3-1/2 L of IV fluid.  -On 7/18, hemoglobin down to 8.2 after significant IV fluid resuscitation.  He is on very low-dose norepinephrine.  Giving 1 additional IV fluid bolus and then recheck hemoglobin this afternoon.  Potentially he may need 1 unit PRBCs.  -Wean norepinephrine as able.  -He is having an echocardiogram done to evaluate cardiac function and to assess volume status.    #Revision of right cemented hemiarthroplasty to total hip arthroplasty: This was done due to persistent pain.  Postoperative management per  orthopedics.  Will restart his Coumadin once his blood pressure and hemoglobin stabilizes.  Therapies consulted.  50% weightbearing.  Incentive spirometer and up to the chair.     #History of ischemic cardiomyopathy/chronic systolic CHF.  History of frequent PVCs.  CAD with prior MI in 2010.  Persistent atrial fibrillation: Last echocardiogram on 7/15/2025 showed the EF of 30% which is unchanged compared to his previous echocardiograms.  Not on daily diuretics at home.  Not on lisinopril and spironolactone due to intolerance.  Not on aspirin given he is on warfarin.  His JLIBM2Lsti score of 5.    -Continue amiodarone.  Resume metoprolol XL 12.5 mg twice daily once blood pressure is improved.  Resume Coumadin once hemoglobin stable.  Possibly this evening.       #Hyperlipidemia: Continued on atorvastatin 80 mg daily.  #Sleep apnea: Continues CPAP.  #CKD2-3: Baseline creatinine seems to be around 1.29 based on his preoperative labs.  Close to baseline.  Monitoring.  #Urinary retention: Patient was not able to urinate in the immediate postoperative period and Conley was inserted.  Voiding trial post-op soon.   #Obesity: Complicates cares    DVT Prophylaxis: as above. Resume warfarin pending stability of hgb, BP's.  Will have ppx lovenox onboard to prevent blood clots until INR a bit higher.   Code Status: Full Code  Medically Ready for Discharge: Anticipated in 2-4 Days    Discussed with intensivist.     Stefan Kapadia MD    Interval History   Assumed care.  Patient feels well.  Denies any chest pain or shortness of breath.  No significant postoperative pain.  No nausea or vomiting.  He is on very low-dose norepinephrine.    -Data reviewed today: I reviewed all new labs and imaging results over the last 24 hours. I personally reviewed     Physical Exam   Temp: 97.9  F (36.6  C) Temp src: Oral BP: 102/60 Pulse: 60   Resp: 14 SpO2: 93 % O2 Device: BiPAP/CPAP Oxygen Delivery: 2 LPM  Vitals:    07/17/25 0951 07/17/25 4265  07/18/25 0400   Weight: 94 kg (207 lb 4.8 oz) 96.5 kg (212 lb 11.9 oz) 97.3 kg (214 lb 8.1 oz)     Vital Signs with Ranges  Temp:  [96.8  F (36  C)-97.9  F (36.6  C)] 97.9  F (36.6  C)  Pulse:  [59-66] 60  Resp:  [9-26] 14  BP: ()/(43-90) 102/60  SpO2:  [90 %-99 %] 93 %  I/O last 3 completed shifts:  In: 4673.22 [P.O.:240; I.V.:4383.22; IV Piggyback:50]  Out: 2235 [Urine:1235; Blood:1000]    Constitutional: Nontoxic, NAD.  Obese  HEENT: Normocephalic. MMM, No elevation of JVD noted.   Respiratory: Nl WOB, some diminished breath sounds at the bases but no wheezes.  Cardiovascular: ICD noted in the left chest wall.  Regular, no murmur  GI: Obese, BS+, NT, ND  Skin/Integumen: WWP.  Minimal edema.  Surgical site appears clean and dry.  Not significantly swollen  Neuro: CNII-XII intact. Moves all extremities. No tremor. A&Ox3.    Medications   Current Facility-Administered Medications   Medication Dose Route Frequency Provider Last Rate Last Admin    lactated ringers infusion   Intravenous Continuous Harriet Bangura  mL/hr at 07/18/25 0700 Rate Verify at 07/18/25 0700    norepinephrine (LEVOPHED) 4 mg in  mL infusion PREMIX  0.01-0.6 mcg/kg/min Intravenous Continuous Enrike Bolivar MD 3.6 mL/hr at 07/18/25 0700 0.01 mcg/kg/min at 07/18/25 0700     Current Facility-Administered Medications   Medication Dose Route Frequency Provider Last Rate Last Admin    acetaminophen (TYLENOL) tablet 975 mg  975 mg Oral Q8H Diamond Lovell PA-C   975 mg at 07/18/25 0151    amiodarone (PACERONE) tablet 200 mg  200 mg Oral QAM Enrike Bolivar MD        atorvastatin (LIPITOR) tablet 80 mg  80 mg Oral At Bedtime Enrike Bolivar MD   80 mg at 07/17/25 2156    famotidine (PEPCID) tablet 20 mg  20 mg Oral Daily Diamond Lovell PA-C        Or    famotidine (PEPCID) injection 20 mg  20 mg Intravenous Daily Diamond Lovell PA-C   20 mg at 07/17/25 1818    lactated ringers BOLUS 1,000 mL  1,000 mL Intravenous Once Salena,  Charli FERNANDEZ MD        polyethylene glycol (MIRALAX) Packet 17 g  17 g Oral Daily Diamond Lovell PA-C        senna-docusate (SENOKOT-S/PERICOLACE) 8.6-50 MG per tablet 1 tablet  1 tablet Oral BID Diamond Lovell PA-C   1 tablet at 07/17/25 2031    sodium chloride (PF) 0.9% PF flush 3 mL  3 mL Intracatheter Q8H Diamond Lovell PA-C           Data   Recent Labs   Lab 07/18/25  0808 07/18/25  0524 07/18/25  0400 07/17/25  2343 07/17/25  1950 07/17/25  1948 07/17/25  1749 07/17/25  1700 07/17/25  1651   WBC  --  19.2*  --   --  19.7*  --   --   --  17.1*   HGB  --  8.2*  --   --  10.3*  --   --   --  10.2*   MCV  --  97  --   --  97  --   --   --  96   PLT  --  139*  --   --  160  --   --   --  148*   INR  --   --   --   --   --   --   --   --  1.29*   NA  --  134*  --   --  136  --  137  --  138   POTASSIUM  --  4.3  --   --  4.5  --  4.4  --  4.6   CHLORIDE  --  103  --   --  103  --  104  --  105   CO2  --  21*  --   --  19*  --  19*  --  21*   BUN  --  28.7*  --   --  25.7*  --  25.2*  --  25.8*   CR  --  1.30*  --   --  1.24*  --  1.22*  --  1.19*   ANIONGAP  --  10  --   --  14  --  14  --  12   ZACH  --  8.1*  --   --  8.4*  --  8.3*  --  8.1*   * 161* 128*   < > 201*   < > 158*   < > 147*   ALBUMIN  --   --   --   --  3.3*  --  3.1*  --   --    PROTTOTAL  --   --   --   --  5.7*  --  5.6*  --   --    BILITOTAL  --   --   --   --  1.0  --  1.0  --   --    ALKPHOS  --   --   --   --  100  --  101  --   --    ALT  --   --   --   --  22  --  23  --   --    AST  --   --   --   --  29  --  28  --   --     < > = values in this interval not displayed.       Recent Results (from the past 24 hours)   XR Hip Port Right 1 View    Narrative    This exam was marked as non-reportable because it will not be read by a   radiologist or a [place] non-radiologist provider.         XR Pelvis w Hip Port Right 1 View    Narrative    EXAM: XR PELVIS AND HIP PORTABLE RIGHT 1 VIEW  LOCATION: [facility]  DATE:  7/17/2025    INDICATION: Status post Hip surgery  COMPARISON: None.      Impression    IMPRESSION: Postoperative changes right total hip arthroplasty. Cerclage wire fixation. Left hip negative for fracture with mild degenerative change. Visualized pelvis negative for fracture. Post procedural air on the right hip joint.

## 2025-07-18 NOTE — PROGRESS NOTES
[facility]    ICU History and Physical    Primary Team: Hospitalist  Reason for Critical Care Admission: Post-operative hypotension  Admitting Physician: Rory Cantrell MD  Date of Admission:  7/17/2025    Assessment: Critical Care   Constantino Thorne is a 84 year old male admitted on 7/17/2025. He developed hypotension after a re-exploration of right DANYEL unresponsive to initial IVF bolus then two 100 mcg doses of phenylepherine.  Small decrease in hemoglobin during the case.  History of CAD and CHF but no angina at present and no signs of CHF.    Interim events:   Remained on low dose Levophed overnight; increased lactic acid level this morning, urine output is marginal.  Mental status is good and appears comfortable.  Further decrease in hemoglobin.       Plan: Critical Care   Neuro/ pain/ sedation:  - Monitor neurological status. Notify provider for any acute changes in exam.  Awake alert and interactive this morning.  - Avoid oversedation    Pulmonary:  History of SABINE but no other pulmonary disorder.  On BiPAP at home- tolerated overnight.    - supplemental oxygen to keep saturation about 92%  - BiPAP at night 15/5 per outpatient regimen    Cardiovascular:  History of CAD and severe CHF with EF of 30%.  No chest pain at present.  Hypotensive after surgery on low dose levophed, received 3.5 IVF bolus.  Denies chest pain, EKG only showing paced rhythm, troponin normal x 2.  Remains on Levophed overnight, urine output marginal, lactic acid level is increasing.  Hemoglobin continues to decrease    - Levophed for MAP > 65  - One liter LR bolus this morning  - Serial Hb- repeat early afternoon, transfuse if < 7  - Watch for development of CHF after fluid administration- currently no dyspnea or PND  - Continue to hold beta blocker   - Serial lactates    GI/Nutrition:  - Diet: NPO for now; re-assess volume status    Renal/ Fluid Balance:   History of CKD.  Increased creatinine this am likely  "secondary to volume shifts and hypotension.  Potassium ok.    - Recheck creatinine in am    Endocrine:  No active issues.  Will monitor BS    ID:  No active signs of infection but hypotensive after surgery.  Received Ancef    - CBC- Increased WBC  - Blood cultures x 2- negative to date  - Urinalysis and culture  - Procalcitonin- Normal  - Hold on antibiotics at present time.  Continue to follow for signs of infection.    Hematology:  Acute blood loss during surgery was one liter with decrease in hemoglobin to 10.3 from pre-op from 11-24 of 12.5).  Will need serial hemoglobin and following of wound for signs of ongoing bleeding.  Was on warfarin as outpatient.    - Serial Hb;   - Transfusion threshold of 7.  History of CAD but no signs for angina at present      MSK:   - PT and OT consulted. Appreciate recommendations.  - Orthopedics following     Lines/ tubes/ drains:  Conley Catheter: PRESENT, indication: Acute retention or obstruction  Lines: None       Prophylaxis:  - DVT Prophylaxis: Pneumatic Compression Devices  - PUD Prophylaxis: None necessary    Code Status: Full Code      Disposition:  - ICU      Critical care time exclusive of procedures: 70 minutes    Clinically Significant Risk Factors         # Hyponatremia: Lowest Na = 134 mmol/L in last 2 days, will monitor as appropriate       # Hypoalbuminemia: Lowest albumin = 3.1 g/dL at 7/17/2025  5:49 PM, will monitor as appropriate  # Coagulation Defect: INR = 1.29 (Ref range: 0.85 - 1.15) and/or PTT = N/A, will monitor for bleeding    # Hypertension: Noted on problem list  # Chronic heart failure with reduced ejection fraction: last echo with EF <40%           # Overweight: Estimated body mass index is 29.93 kg/m  as calculated from the following:    Height as of this encounter: 1.803 m (5' 10.98\").    Weight as of this encounter: 97.3 kg (214 lb 8.1 oz)., PRESENT ON ADMISSION                 Charli Martino MD  [facility]  Securely " message with Tea (more info)  Text page via [facility] Paging/Directory     ______________________________________________________________________    Chief Complaint   Hypotension after re-exploration right DANYEL    History is obtained from the patient    History of Present Illness   Constantino Thorne is a 84 year old male who has a past medical history of CAD and CHF (EF 30% on ECHO 7-23). s/p cardiac arrest in 2010 with MI and vfib, underwent ICD at that time, HTN, paroxysmal afib s/p ablation, hyperlipidemia, and SABINE who underwent right DANYEL last year and subsequently developed recurrent hip pain.  Today underwent re-exploration of DANYEL.  Was hypotensive after the procedure with SBP in 60's; received IVF bolus and two doses of 100 mcg phenylepherine. Total fluid given was 3.5L, estimated blood loss during the procedure was one liter.  Due to continued hypotensiion he was started on peripheral Levophed.  No repeat labs ordered; pre-op hemoglobin was 12.4 in 11-24, was 10/3 at 1430.    Prior to admission he is on amiodarone, metoprolol 12.5 BID, and statin.  Of note was had relative bradycardia during case and during period of post-operative hypotension.    Patient denies chest pain or SOB.  Some hip pain.    24 hour events: Remains on low dose Levophed; UOP is marginal.  No complaints this am, awake and alert, interactive.  Hemoglobin is decreased, lactic acid level is increased.  Denies CP, PND, or SOB.  Tolerated BiPAP overnight.    Review of Systems    The 10 point Review of Systems is negative other than noted in the HPI or here.     Past Medical History    I have reviewed this patient's medical history and updated it with pertinent information if needed.   Past Medical History:   Diagnosis Date    A-fib (H)     Antiplatelet or antithrombotic long-term use     Arrhythmia     CAD (coronary artery disease)     Hyperlipidemia LDL goal <100     Hypertension     ICD (implantable cardioverter-defibrillator) in place     Old  myocardial infarction     SABINE (obstructive sleep apnea)     Pacemaker     Stented coronary artery     Subdural hematoma (H) 10/10/2024    Syncope 10/10/2024       Past Surgical History   I have reviewed this patient's surgical history and updated it with pertinent information if needed.  Past Surgical History:   Procedure Laterality Date    OPEN REDUCTION INTERNAL FIXATION HIP BIPOLAR Right 11/4/2024    Procedure: Right hip hemiarthroplasty;  Surgeon: Darinel Chacko MD;  Location: [facility] OR       Social History   I have reviewed this patient's social history and updated it with pertinent information if needed.  Social History     Tobacco Use    Smoking status: Never    Smokeless tobacco: Never   Substance Use Topics    Alcohol use: Never    Drug use: Never       Family History   I have reviewed this patient's family history and updated it with pertinent information if needed.  Family History   Problem Relation Age of Onset    Cancer Sister        Prior to Admission Medications   Prior to Admission Medications   Prescriptions Last Dose Informant Patient Reported? Taking?   amiodarone (PACERONE) 200 MG tablet 7/17/2025 Morning  Yes Yes   Sig: Take 200 mg by mouth every morning.   atorvastatin (LIPITOR) 80 MG tablet 7/16/2025 Evening  Yes Yes   Sig: Take 80 mg by mouth At Bedtime    metoprolol succinate ER (TOPROL XL) 25 MG 24 hr tablet 7/17/2025 Morning  No Yes   Sig: Take 0.5 tablets (12.5 mg) by mouth 2 times daily.   nitroGLYcerin (NITROSTAT) 0.4 MG sublingual tablet   Yes Yes   Sig: Place 1 Tablet under tongue as needed. If no relief after 5 min call 911;continue 1 tab every 5 min max 3 tab   warfarin ANTICOAGULANT (COUMADIN/JANTOVEN) 5 MG tablet 7/13/2025  Yes Yes   Sig: Take by mouth 7/12: Hold; 7/13: Hold; 7/14: Hold; 7/15: Hold; 7/16: Hold; Otherwise 5 mg every M, F; 2.5 mg all other days or as directed. Your dose may change. Call [facility] [phone] with questions.       Facility-Administered Medications: None     Allergies   Allergies   Allergen Reactions    Lisinopril Unknown and Cough    Spironolactone Unknown and Diarrhea       Physical Exam   Vital Signs: Temp: 97.2  F (36.2  C) Temp src: Temporal BP: 98/57 Pulse: 62   Resp: 14 SpO2: (!) 90 % O2 Device: None (Room air) Oxygen Delivery: 2 LPM  Weight: 214 lbs 8.12 oz    General: Awake and alert, comfortable  HEENT: PERRL, EOMIB, sclera clear, no JVD  Neuro: Awake and alert, appropriate  CV: RRR, no mgr  Pulm: Decreased BS in bases, o/w clear  Abd: + BS, soft   : Conley in place  Extremities: Right hip dressed, no drainage or bleeding present  Skin: No concerning lesions  Incisions: Intact and dry  Psych: Appropriate, no concerns.    Data   I reviewed all medications, new labs and imaging results over the last 24 hours.  Arterial Blood Gases   No lab results found in last 7 days.    Complete Blood Count   Recent Labs   Lab 07/18/25  0524 07/17/25  1950 07/17/25  1651 07/17/25  1424   WBC 19.2* 19.7* 17.1*  --    HGB 8.2* 10.3* 10.2* 10.3*   * 160 148*  --        Basic Metabolic Panel  Recent Labs   Lab 07/18/25  0808 07/18/25  0524 07/18/25  0400 07/17/25  2343 07/17/25  1950 07/17/25  1948 07/17/25  1749 07/17/25  1700 07/17/25  1651   NA  --  134*  --   --  136  --  137  --  138   POTASSIUM  --  4.3  --   --  4.5  --  4.4  --  4.6   CHLORIDE  --  103  --   --  103  --  104  --  105   CO2  --  21*  --   --  19*  --  19*  --  21*   BUN  --  28.7*  --   --  25.7*  --  25.2*  --  25.8*   CR  --  1.30*  --   --  1.24*  --  1.22*  --  1.19*   * 161* 128* 186* 201*   < > 158*   < > 147*    < > = values in this interval not displayed.       Liver Function Tests  Recent Labs   Lab 07/17/25  1950 07/17/25  1749 07/17/25  1651   AST 29 28  --    ALT 22 23  --    ALKPHOS 100 101  --    BILITOTAL 1.0 1.0  --    ALBUMIN 3.3* 3.1*  --    INR  --   --  1.29*       Pancreatic Enzymes  No lab results found in last 7  days.    Coagulation Profile  Recent Labs   Lab 07/17/25  1651   INR 1.29*       IMAGING:  Recent Results (from the past 24 hours)   XR Hip Port Right 1 View    Narrative    This exam was marked as non-reportable because it will not be read by a   radiologist or a [place] non-radiologist provider.         XR Pelvis w Hip Port Right 1 View    Narrative    EXAM: XR PELVIS AND HIP PORTABLE RIGHT 1 VIEW  LOCATION: [facility]  DATE: 7/17/2025    INDICATION: Status post Hip surgery  COMPARISON: None.      Impression    IMPRESSION: Postoperative changes right total hip arthroplasty. Cerclage wire fixation. Left hip negative for fracture with mild degenerative change. Visualized pelvis negative for fracture. Post procedural air on the right hip joint.      Total Critical care time excluding time for teaching and procedures was 40 minutes

## 2025-07-18 NOTE — PLAN OF CARE
ICU End of Shift Summary.  For vital signs and complete assessments, please see documentation flowsheets.     Neuro: A&O. Pain to R hip- managed well with tylenol, ice packs, and prn oxycodone.  Cardiac: tele A paced, BP soft- attempted to wean down levo (see flowsheets), remains on 0.01  Resp: lungs clear, satting >90 on room air. Using home cpap overnight.  GI: soft, tolerating regular diet.   : garcia in place. Uop 50-75ml q2 hours- did discuss with tele hub- continued LR @ 100.   Skin: no skin concerns. R hip aquacel c/d/I.  Lines: piv x2  Drips: LR, Levo    Shift Events:     Plan (Upcoming Events): wean off levo as able. Continue ancef, pain control. Work with therapies today.  Discharge/Transfer Needs:     Goal Outcome Evaluation:      Plan of Care Reviewed With: patient    Overall Patient Progress: no changeOverall Patient Progress: no change    Outcome Evaluation: VSS, remains on low dose levo, pain controlled with ice/tylenol/oxy      Problem: Delirium  Goal: Optimal Coping  Outcome: Progressing  Intervention: Optimize Psychosocial Adjustment to Delirium  Recent Flowsheet Documentation  Taken 7/18/2025 0400 by Mansi Vasquez RN  Supportive Measures:   active listening utilized   verbalization of feelings encouraged  Taken 7/17/2025 2345 by Mansi Vasquez RN  Supportive Measures:   active listening utilized   verbalization of feelings encouraged  Taken 7/17/2025 2000 by Mansi Vasquez RN  Supportive Measures:   active listening utilized   verbalization of feelings encouraged  Family/Support System Care: self-care encouraged  Goal: Improved Behavioral Control  Outcome: Progressing  Intervention: Prevent and Manage Agitation  Recent Flowsheet Documentation  Taken 7/18/2025 0400 by Mansi Vasquez RN  Environment Familiarity/Consistency: familiar objects from home provided  Taken 7/17/2025 2345 by aMnsi Vasquez RN  Environment Familiarity/Consistency: familiar objects from home  provided  Taken 7/17/2025 2000 by Mansi Vasquez RN  Environment Familiarity/Consistency: familiar objects from home provided  Intervention: Minimize Safety Risk  Recent Flowsheet Documentation  Taken 7/18/2025 0400 by Mansi Vasquez RN  Enhanced Safety Measures: pain management  Trust Relationship/Rapport:   care explained   choices provided   questions answered   questions encouraged   reassurance provided   thoughts/feelings acknowledged  Taken 7/17/2025 2345 by Mansi Vasquez RN  Enhanced Safety Measures: pain management  Trust Relationship/Rapport:   care explained   choices provided   questions answered   questions encouraged   reassurance provided   thoughts/feelings acknowledged  Taken 7/17/2025 2000 by Mansi Vasquez RN  Enhanced Safety Measures: pain management  Trust Relationship/Rapport:   care explained   choices provided   questions answered   questions encouraged   reassurance provided   thoughts/feelings acknowledged  Goal: Improved Attention and Thought Clarity  Outcome: Progressing  Intervention: Maximize Cognitive Function  Recent Flowsheet Documentation  Taken 7/18/2025 0400 by Mansi Vasquez RN  Sensory Stimulation Regulation:   care clustered   lighting decreased   quiet environment promoted  Reorientation Measures: clock in view  Taken 7/17/2025 2345 by Mansi Vasquez RN  Sensory Stimulation Regulation:   care clustered   lighting decreased   quiet environment promoted  Reorientation Measures: clock in view  Taken 7/17/2025 2000 by Mansi Vasquez RN  Sensory Stimulation Regulation:   care clustered   lighting decreased   quiet environment promoted  Reorientation Measures: clock in view  Goal: Improved Sleep  Outcome: Progressing  Intervention: Promote Sleep  Recent Flowsheet Documentation  Taken 7/18/2025 0400 by Mansi Vasquez RN  Sleep/Rest Enhancement:   awakenings minimized   room darkened  Taken 7/17/2025 2345 by Mansi Vasquez RN  Sleep/Rest  Enhancement:   awakenings minimized   room darkened  Taken 7/17/2025 2000 by Mansi Vasquez RN  Sleep/Rest Enhancement:   awakenings minimized   room darkened

## 2025-07-18 NOTE — PLAN OF CARE
ICU End of Shift Summary.  For vital signs and complete assessments, please see documentation flowsheets.      Pertinent assessments: afebrile, A &O. Denies pain, however receiving scheduled tylenol. Cardiac: Tele A-Paced. BP soft, remains on levophed. Respiratory: LS clear/diminished. RA 91-95%. : 75/HR UO. Garcia in place. GI: Regular diet, no BM this shift. Skin: see flowsheet. Lines: PIV x 2, drips Levophed. LR.     Major Shift Events: stopped levophed for 1.5 hrs then restarted. Good appetite.   Plan (Upcoming Events): work with PT to improve strength.   Discharge/Transfer Needs: TBD      Bedside Shift Report Completed : Y  Bedside Safety Check Completed: Y    Notified provider about indwelling garcia catheter discussed removal or continued need.    Did provider choose to remove indwelling garcia catheter? No    Provider's garcia indication for keeping indwelling garcia catheter: Acute retention or obstruction    Is the catheter for retention? NO    *If there is a plan to keep garcia catheter in place at discharge daily notification with provider is not necessary, but please add a notation in the treatment team sticky note that the patient will be discharging with the catheter.    Goal Outcome Evaluation:      Plan of Care Reviewed With: patient, spouse    Overall Patient Progress: improvingOverall Patient Progress: improving    Outcome Evaluation: Patient off levophed for 1.5hrs, then back on. denies pain. moving extremities.      Problem: Delirium  Goal: Optimal Coping  Outcome: Progressing  Intervention: Optimize Psychosocial Adjustment to Delirium  Recent Flowsheet Documentation  Taken 7/18/2025 1600 by Shaunna Bautista, RN  Supportive Measures:   active listening utilized   verbalization of feelings encouraged  Taken 7/18/2025 1200 by Shaunna Bautista, RN  Supportive Measures:   active listening utilized   verbalization of feelings encouraged  Taken 7/18/2025 0800 by Shaunna Bautista, RN  Supportive Measures:    active listening utilized   verbalization of feelings encouraged  Goal: Improved Behavioral Control  Outcome: Progressing  Intervention: Prevent and Manage Agitation  Recent Flowsheet Documentation  Taken 7/18/2025 1600 by Shaunna Bautista RN  Environment Familiarity/Consistency: familiar objects from home provided  Taken 7/18/2025 1200 by Shaunna Bautista RN  Environment Familiarity/Consistency: familiar objects from home provided  Taken 7/18/2025 0800 by Shaunna Bautista RN  Environment Familiarity/Consistency: familiar objects from home provided  Intervention: Minimize Safety Risk  Recent Flowsheet Documentation  Taken 7/18/2025 1600 by Shaunna Bautista RN  Enhanced Safety Measures: pain management  Trust Relationship/Rapport:   care explained   choices provided   questions answered   questions encouraged   reassurance provided   thoughts/feelings acknowledged  Taken 7/18/2025 1200 by Shaunna Bautista RN  Enhanced Safety Measures: pain management  Trust Relationship/Rapport:   care explained   choices provided   questions answered   questions encouraged   reassurance provided   thoughts/feelings acknowledged  Taken 7/18/2025 0800 by Shaunna Bautista RN  Enhanced Safety Measures: pain management  Trust Relationship/Rapport:   care explained   choices provided   questions answered   questions encouraged   reassurance provided   thoughts/feelings acknowledged  Goal: Improved Attention and Thought Clarity  Outcome: Progressing  Intervention: Maximize Cognitive Function  Recent Flowsheet Documentation  Taken 7/18/2025 1600 by Shaunna Bautista RN  Sensory Stimulation Regulation:   care clustered   lighting decreased   quiet environment promoted  Reorientation Measures: clock in view  Taken 7/18/2025 1200 by Shaunna Bautista RN  Sensory Stimulation Regulation:   care clustered   lighting decreased   quiet environment promoted  Reorientation Measures: clock in view  Taken 7/18/2025 0800 by Shaunna Bautista RN  Sensory  Stimulation Regulation:   care clustered   lighting decreased   quiet environment promoted  Reorientation Measures: clock in view  Goal: Improved Sleep  Outcome: Progressing     Problem: Adult Inpatient Plan of Care  Goal: Plan of Care Review  Description: The Plan of Care Review/Shift note should be completed every shift.  The Outcome Evaluation is a brief statement about your assessment that the patient is improving, declining, or no change.  This information will be displayed automatically on your shift  note.  Outcome: Progressing  Flowsheets (Taken 7/18/2025 1849)  Outcome Evaluation: Patient off levophed for 1.5hrs, then back on. denies pain. moving extremities.  Plan of Care Reviewed With:   patient   spouse  Overall Patient Progress: improving     Problem: Adult Inpatient Plan of Care  Goal: Absence of Hospital-Acquired Illness or Injury  Intervention: Identify and Manage Fall Risk  Recent Flowsheet Documentation  Taken 7/18/2025 1600 by Shaunna Bautista RN  Safety Promotion/Fall Prevention:   activity supervised   assistive device/personal items within reach   increased rounding and observation   clutter free environment maintained   increase visualization of patient   lighting adjusted   room near nurse's station   safety round/check completed   room organization consistent   treat reversible contributory factors   treat underlying cause  Taken 7/18/2025 1200 by Shaunna Bautista, RN  Safety Promotion/Fall Prevention:   activity supervised   assistive device/personal items within reach   increased rounding and observation   clutter free environment maintained   increase visualization of patient   lighting adjusted   room near nurse's station   safety round/check completed   room organization consistent   treat reversible contributory factors   treat underlying cause  Taken 7/18/2025 0800 by Shaunna Bautista, RN  Safety Promotion/Fall Prevention:   activity supervised   assistive device/personal items within  reach   increased rounding and observation   clutter free environment maintained   increase visualization of patient   lighting adjusted   room near nurse's station   safety round/check completed   room organization consistent   treat reversible contributory factors   treat underlying cause  Intervention: Prevent Skin Injury  Recent Flowsheet Documentation  Taken 7/18/2025 1800 by Shaunna Bautista RN  Body Position:   turned   weight shifting   left  Taken 7/18/2025 1600 by Shaunna Bautista RN  Body Position:   turned   left  Taken 7/18/2025 1400 by Shaunna Bautista RN  Body Position:   turned   weight shifting  Taken 7/18/2025 1200 by Shaunna Bautista RN  Body Position:   turned   supine   tilted   left  Taken 7/18/2025 1000 by Shaunna Bautista RN  Body Position:   turned   left  Taken 7/18/2025 0800 by Shaunna Bautista RN  Body Position:   turned   supine   tilted   left  Intervention: Prevent and Manage VTE (Venous Thromboembolism) Risk  Recent Flowsheet Documentation  Taken 7/18/2025 1600 by Shaunna Bautista RN  VTE Prevention/Management: SCDs on (sequential compression devices)  Taken 7/18/2025 1200 by Shaunna Bautista RN  VTE Prevention/Management: SCDs on (sequential compression devices)  Taken 7/18/2025 0800 by Shaunna Bautista RN  VTE Prevention/Management: SCDs on (sequential compression devices)  Goal: Optimal Comfort and Wellbeing  Intervention: Provide Person-Centered Care  Recent Flowsheet Documentation  Taken 7/18/2025 1600 by Shaunna Bautista RN  Trust Relationship/Rapport:   care explained   choices provided   questions answered   questions encouraged   reassurance provided   thoughts/feelings acknowledged  Taken 7/18/2025 1200 by Shaunna Bautista RN  Trust Relationship/Rapport:   care explained   choices provided   questions answered   questions encouraged   reassurance provided   thoughts/feelings acknowledged  Taken 7/18/2025 0800 by Shaunna Bautista RN  Trust Relationship/Rapport:   care  explained   choices provided   questions answered   questions encouraged   reassurance provided   thoughts/feelings acknowledged     Problem: Comorbidity Management  Goal: Maintenance of Heart Failure Symptom Control  Intervention: Maintain Heart Failure Management  Recent Flowsheet Documentation  Taken 7/18/2025 1600 by Shaunna Bautista RN  Medication Review/Management:   medications reviewed   infusion titrated  Taken 7/18/2025 1200 by Shaunna Bautista RN  Medication Review/Management:   medications reviewed   infusion titrated  Taken 7/18/2025 0800 by Shaunna Bautista RN  Medication Review/Management:   medications reviewed   infusion titrated  Goal: Blood Pressure in Desired Range  Intervention: Maintain Blood Pressure Management  Recent Flowsheet Documentation  Taken 7/18/2025 1600 by Shaunna Bautista RN  Medication Review/Management:   medications reviewed   infusion titrated  Taken 7/18/2025 1200 by Shaunna Bautista RN  Medication Review/Management:   medications reviewed   infusion titrated  Taken 7/18/2025 0800 by Shaunna Bautista RN  Medication Review/Management:   medications reviewed   infusion titrated     Problem: Fall Injury Risk  Goal: Absence of Fall and Fall-Related Injury  Intervention: Identify and Manage Contributors  Recent Flowsheet Documentation  Taken 7/18/2025 1600 by Shaunna Bautista RN  Medication Review/Management:   medications reviewed   infusion titrated  Taken 7/18/2025 1200 by Shaunna Bautista RN  Medication Review/Management:   medications reviewed   infusion titrated  Taken 7/18/2025 0800 by Shaunna Bautista RN  Medication Review/Management:   medications reviewed   infusion titrated  Intervention: Promote Injury-Free Environment  Recent Flowsheet Documentation  Taken 7/18/2025 1600 by Shaunna Bautista RN  Safety Promotion/Fall Prevention:   activity supervised   assistive device/personal items within reach   increased rounding and observation   clutter free environment  maintained   increase visualization of patient   lighting adjusted   room near nurse's station   safety round/check completed   room organization consistent   treat reversible contributory factors   treat underlying cause  Taken 7/18/2025 1200 by Shaunna Bautista RN  Safety Promotion/Fall Prevention:   activity supervised   assistive device/personal items within reach   increased rounding and observation   clutter free environment maintained   increase visualization of patient   lighting adjusted   room near nurse's station   safety round/check completed   room organization consistent   treat reversible contributory factors   treat underlying cause  Taken 7/18/2025 0800 by Shaunna Bautista RN  Safety Promotion/Fall Prevention:   activity supervised   assistive device/personal items within reach   increased rounding and observation   clutter free environment maintained   increase visualization of patient   lighting adjusted   room near nurse's station   safety round/check completed   room organization consistent   treat reversible contributory factors   treat underlying cause     Problem: Pain Acute  Goal: Optimal Pain Control and Function  Intervention: Optimize Psychosocial Wellbeing  Recent Flowsheet Documentation  Taken 7/18/2025 1600 by Shaunna Bautista RN  Supportive Measures:   active listening utilized   verbalization of feelings encouraged  Taken 7/18/2025 1200 by Shaunna Bautista RN  Supportive Measures:   active listening utilized   verbalization of feelings encouraged  Taken 7/18/2025 0800 by Shaunna Bautista RN  Supportive Measures:   active listening utilized   verbalization of feelings encouraged  Intervention: Prevent or Manage Pain  Recent Flowsheet Documentation  Taken 7/18/2025 1600 by Shaunna Bautista RN  Sensory Stimulation Regulation:   care clustered   lighting decreased   quiet environment promoted  Medication Review/Management:   medications reviewed   infusion titrated  Taken 7/18/2025 1200  by Michele, Shaunna, RN  Sensory Stimulation Regulation:   care clustered   lighting decreased   quiet environment promoted  Medication Review/Management:   medications reviewed   infusion titrated  Taken 7/18/2025 0800 by Shaunna Bautista RN  Sensory Stimulation Regulation:   care clustered   lighting decreased   quiet environment promoted  Medication Review/Management:   medications reviewed   infusion titrated     Problem: Hip Fracture Surgical Repair  Goal: Optimal Coping with Change in Health Status  Intervention: Support Psychosocial Response to Surgery and Mobility Changes  Recent Flowsheet Documentation  Taken 7/18/2025 1600 by Shaunna Bautista RN  Supportive Measures:   active listening utilized   verbalization of feelings encouraged  Taken 7/18/2025 1200 by Shaunna Bautista RN  Supportive Measures:   active listening utilized   verbalization of feelings encouraged  Taken 7/18/2025 0800 by Shaunna Bautista RN  Supportive Measures:   active listening utilized   verbalization of feelings encouraged  Goal: Absence of Bleeding  Intervention: Monitor and Manage Bleeding  Recent Flowsheet Documentation  Taken 7/18/2025 1600 by Shaunna Bautista RN  Bleeding Management: dressing monitored  Taken 7/18/2025 1200 by Shaunna Bautista RN  Bleeding Management: dressing monitored  Taken 7/18/2025 0800 by Shaunna Bautista RN  Bleeding Management: dressing monitored  Goal: Cognitive Function Maintained  Intervention: Maintain Cognitive Function  Recent Flowsheet Documentation  Taken 7/18/2025 1600 by Shaunna Bautista RN  Reorientation Measures: clock in view  Environment Familiarity/Consistency: familiar objects from home provided  Taken 7/18/2025 1200 by Shaunna Bautista RN  Reorientation Measures: clock in view  Environment Familiarity/Consistency: familiar objects from home provided  Taken 7/18/2025 0800 by Shaunna Bautista RN  Reorientation Measures: clock in view  Environment Familiarity/Consistency: familiar objects  from home provided  Goal: Absence of Infection Signs and Symptoms  Intervention: Prevent or Manage Infection  Recent Flowsheet Documentation  Taken 7/18/2025 1600 by Shaunna Bautista RN  Infection Management: aseptic technique maintained  Taken 7/18/2025 1200 by Shaunna Bautista RN  Infection Management: aseptic technique maintained  Taken 7/18/2025 0800 by Shaunna Bautista RN  Infection Management: aseptic technique maintained  Goal: Optimal Functional Ability  Intervention: Promote Optimal Functional Status  Recent Flowsheet Documentation  Taken 7/18/2025 1600 by Shaunna Bautista RN  Activity Management: activity encouraged  Taken 7/18/2025 1200 by Shaunna Bautista RN  Activity Management: activity encouraged  Taken 7/18/2025 0800 by Shaunna Bautista RN  Activity Management: activity encouraged  Goal: Anesthesia/Sedation Recovery  Intervention: Optimize Anesthesia Recovery  Recent Flowsheet Documentation  Taken 7/18/2025 1600 by Shaunna Bautista RN  Safety Promotion/Fall Prevention:   activity supervised   assistive device/personal items within reach   increased rounding and observation   clutter free environment maintained   increase visualization of patient   lighting adjusted   room near nurse's station   safety round/check completed   room organization consistent   treat reversible contributory factors   treat underlying cause  Reorientation Measures: clock in view  Taken 7/18/2025 1200 by Shaunna Bautista RN  Safety Promotion/Fall Prevention:   activity supervised   assistive device/personal items within reach   increased rounding and observation   clutter free environment maintained   increase visualization of patient   lighting adjusted   room near nurse's station   safety round/check completed   room organization consistent   treat reversible contributory factors   treat underlying cause  Reorientation Measures: clock in view  Taken 7/18/2025 0800 by Shaunna Bautista RN  Safety Promotion/Fall Prevention:    activity supervised   assistive device/personal items within reach   increased rounding and observation   clutter free environment maintained   increase visualization of patient   lighting adjusted   room near nurse's station   safety round/check completed   room organization consistent   treat reversible contributory factors   treat underlying cause  Reorientation Measures: clock in view  Goal: Effective Oxygenation and Ventilation  Intervention: Optimize Oxygenation and Ventilation  Recent Flowsheet Documentation  Taken 7/18/2025 1600 by Shaunna Bautista RN  Head of Bed (HOB) Positioning: HOB at 30 degrees  Taken 7/18/2025 1200 by Shaunna Bautista RN  Head of Bed (HOB) Positioning: HOB at 30 degrees  Taken 7/18/2025 0800 by Shaunna Bautista RN  Head of Bed (HOB) Positioning: HOB at 30 degrees

## 2025-07-18 NOTE — PHARMACY-ANTICOAGULATION SERVICE
Clinical Pharmacy - Warfarin Dosing Consult     Pharmacy has been consulted to manage this patient s warfarin therapy.  Indication: Atrial Fibrillation  Therapy Goal: INR 2-3  Provider/Team: Dr. Kang BUTLER [facility]: [facility]  Warfarin Prior to Admission: Yes  Warfarin PTA Regimen: 5 mg every MonFri; 2.5 mg ROW  Significant drug interactions: amiodraone (PTA med)    INR   Date Value Ref Range Status   07/17/2025 1.29 (H) 0.85 - 1.15 Final   11/02/2024 1.05 0.85 - 1.15 Final       Recommend warfarin 5 mg today.  Pharmacy will monitor Constantino Brandom daily and order warfarin doses to achieve specified goal.      Please contact pharmacy as soon as possible if the warfarin needs to be held for a procedure or if the warfarin goals change.

## 2025-07-18 NOTE — CONSULTS
SPIRITUAL HEALTH SERVICES - Consult Note  [facility] 7/18/25  Referral Source/Reason for Visit: emotional support after 2nd hip surgery and significant blood loss during surgery    Summary and Recommendations -  Constantino has strong internal and external resources for facing this health event and expressed a steadiness about his journey of healing and general outlook on life. No specific recommendations.    Plan:  called his [employer]. No further plan needed at this time.    Signature: Arelis Mares MDiv, CE-ACPE   and CPE Educator  [facility] available 24/7 for emergent requests/referrals, by entering an ASAP/STAT consult in Epic (this will also notify the on-call  via [employer]).    Assessment    Saw pt [place] REBECCA Thorne per bedside RN referral.    Patient/Family Understanding of Illness and Goals of Care - Constantino expressed understanding of the surgery he went through as well as his low blood pressure and loss of blood. He stated that he might need to stay in the hospital a couple more days due to this, though he had expected not to stay long after surgery.    Distress and Loss - Constantino acknowledged that having broken his hip in November, having surgery again, and then falling and breaking it again (requiring additional surgery) was difficult, but he did not name any intense distress.    Strengths, Coping, and Resources - Constantino named his wife and family as sources of strength, as well as his tiffani. He shared stories about his previous work life, places he's lived, accomplishments etc. He shared a sense of satisfaction and donna in his life.    Meaning, Beliefs, and Spirituality - Constantino is Mormon and names that daily prayer is important to him and part of his routine.     Plan of Care - Called his t[employer]ani community ([employer] [facility] in [place]) because he and his wife (who came in partway through the visit) named that the [employer] was unaware and requested I call to inform  them.

## 2025-07-19 ENCOUNTER — APPOINTMENT (OUTPATIENT)
Dept: PHYSICAL THERAPY | Facility: CLINIC | Age: 85
DRG: 466 | End: 2025-07-19
Attending: STUDENT IN AN ORGANIZED HEALTH CARE EDUCATION/TRAINING PROGRAM
Payer: COMMERCIAL

## 2025-07-19 LAB
ANION GAP SERPL CALCULATED.3IONS-SCNC: 6 MMOL/L (ref 7–15)
BLD PROD TYP BPU: NORMAL
BLOOD COMPONENT TYPE: NORMAL
BUN SERPL-MCNC: 27.9 MG/DL (ref 8–23)
CALCIUM SERPL-MCNC: 7.9 MG/DL (ref 8.8–10.4)
CHLORIDE SERPL-SCNC: 106 MMOL/L (ref 98–107)
CODING SYSTEM: NORMAL
CREAT SERPL-MCNC: 1.26 MG/DL (ref 0.67–1.17)
CROSSMATCH: NORMAL
EGFRCR SERPLBLD CKD-EPI 2021: 56 ML/MIN/1.73M2
ERYTHROCYTE [DISTWIDTH] IN BLOOD BY AUTOMATED COUNT: 14.6 % (ref 10–15)
GLUCOSE BLDC GLUCOMTR-MCNC: 104 MG/DL (ref 70–99)
GLUCOSE BLDC GLUCOMTR-MCNC: 112 MG/DL (ref 70–99)
GLUCOSE BLDC GLUCOMTR-MCNC: 126 MG/DL (ref 70–99)
GLUCOSE BLDC GLUCOMTR-MCNC: 82 MG/DL (ref 70–99)
GLUCOSE BLDC GLUCOMTR-MCNC: 96 MG/DL (ref 70–99)
GLUCOSE SERPL-MCNC: 97 MG/DL (ref 70–99)
HCO3 SERPL-SCNC: 24 MMOL/L (ref 22–29)
HCT VFR BLD AUTO: 22.2 % (ref 40–53)
HGB BLD-MCNC: 7.5 G/DL (ref 13.3–17.7)
INR PPP: 1.53 (ref 0.85–1.15)
ISSUE DATE AND TIME: NORMAL
LACTATE SERPL-SCNC: 1.2 MMOL/L (ref 0.7–2)
LACTATE SERPL-SCNC: 1.3 MMOL/L (ref 0.7–2)
LACTATE SERPL-SCNC: 2.2 MMOL/L (ref 0.7–2)
MAGNESIUM SERPL-MCNC: 1.8 MG/DL (ref 1.7–2.3)
MCH RBC QN AUTO: 32.5 PG (ref 26.5–33)
MCHC RBC AUTO-ENTMCNC: 33.8 G/DL (ref 31.5–36.5)
MCV RBC AUTO: 96 FL (ref 78–100)
PLATELET # BLD AUTO: 123 10E3/UL (ref 150–450)
POTASSIUM SERPL-SCNC: 4.3 MMOL/L (ref 3.4–5.3)
PROTHROMBIN TIME: 18.3 SECONDS (ref 11.8–14.8)
RBC # BLD AUTO: 2.31 10E6/UL (ref 4.4–5.9)
SODIUM SERPL-SCNC: 136 MMOL/L (ref 135–145)
UNIT ABO/RH: NORMAL
UNIT NUMBER: NORMAL
UNIT STATUS: NORMAL
UNIT TYPE ISBT: 5100
WBC # BLD AUTO: 14.3 10E3/UL (ref 4–11)

## 2025-07-19 PROCEDURE — P9016 RBC LEUKOCYTES REDUCED: HCPCS | Performed by: HOSPITALIST

## 2025-07-19 PROCEDURE — 85610 PROTHROMBIN TIME: CPT | Performed by: HOSPITALIST

## 2025-07-19 PROCEDURE — 99233 SBSQ HOSP IP/OBS HIGH 50: CPT | Performed by: HOSPITALIST

## 2025-07-19 PROCEDURE — 36415 COLL VENOUS BLD VENIPUNCTURE: CPT | Performed by: INTERNAL MEDICINE

## 2025-07-19 PROCEDURE — 97161 PT EVAL LOW COMPLEX 20 MIN: CPT | Mod: GP | Performed by: PHYSICAL THERAPIST

## 2025-07-19 PROCEDURE — 82374 ASSAY BLOOD CARBON DIOXIDE: CPT | Performed by: HOSPITALIST

## 2025-07-19 PROCEDURE — 97530 THERAPEUTIC ACTIVITIES: CPT | Mod: GP | Performed by: PHYSICAL THERAPIST

## 2025-07-19 PROCEDURE — 250N000013 HC RX MED GY IP 250 OP 250 PS 637

## 2025-07-19 PROCEDURE — 250N000011 HC RX IP 250 OP 636: Performed by: HOSPITALIST

## 2025-07-19 PROCEDURE — 83605 ASSAY OF LACTIC ACID: CPT | Performed by: INTERNAL MEDICINE

## 2025-07-19 PROCEDURE — 85027 COMPLETE CBC AUTOMATED: CPT | Performed by: HOSPITALIST

## 2025-07-19 PROCEDURE — 250N000013 HC RX MED GY IP 250 OP 250 PS 637: Performed by: STUDENT IN AN ORGANIZED HEALTH CARE EDUCATION/TRAINING PROGRAM

## 2025-07-19 PROCEDURE — 250N000013 HC RX MED GY IP 250 OP 250 PS 637: Performed by: ORTHOPAEDIC SURGERY

## 2025-07-19 PROCEDURE — 83735 ASSAY OF MAGNESIUM: CPT | Performed by: HOSPITALIST

## 2025-07-19 PROCEDURE — 36415 COLL VENOUS BLD VENIPUNCTURE: CPT | Performed by: HOSPITALIST

## 2025-07-19 PROCEDURE — 120N000001 HC R&B MED SURG/OB

## 2025-07-19 RX ORDER — WARFARIN SODIUM 5 MG/1
5 TABLET ORAL
Status: COMPLETED | OUTPATIENT
Start: 2025-07-19 | End: 2025-07-19

## 2025-07-19 RX ADMIN — SENNOSIDES AND DOCUSATE SODIUM 2 TABLET: 50; 8.6 TABLET ORAL at 21:46

## 2025-07-19 RX ADMIN — WARFARIN SODIUM 5 MG: 5 TABLET ORAL at 17:13

## 2025-07-19 RX ADMIN — ACETAMINOPHEN 975 MG: 325 TABLET ORAL at 09:11

## 2025-07-19 RX ADMIN — FAMOTIDINE 20 MG: 20 TABLET, FILM COATED ORAL at 09:10

## 2025-07-19 RX ADMIN — POLYETHYLENE GLYCOL 3350 17 G: 17 POWDER, FOR SOLUTION ORAL at 09:11

## 2025-07-19 RX ADMIN — ACETAMINOPHEN 975 MG: 325 TABLET ORAL at 17:13

## 2025-07-19 RX ADMIN — SENNOSIDES AND DOCUSATE SODIUM 1 TABLET: 50; 8.6 TABLET ORAL at 09:10

## 2025-07-19 RX ADMIN — OXYCODONE HYDROCHLORIDE 2.5 MG: 5 TABLET ORAL at 14:21

## 2025-07-19 RX ADMIN — ATORVASTATIN CALCIUM 80 MG: 40 TABLET, FILM COATED ORAL at 21:45

## 2025-07-19 RX ADMIN — ENOXAPARIN SODIUM 40 MG: 40 INJECTION SUBCUTANEOUS at 14:22

## 2025-07-19 RX ADMIN — ACETAMINOPHEN 975 MG: 325 TABLET ORAL at 00:11

## 2025-07-19 RX ADMIN — AMIODARONE HYDROCHLORIDE 200 MG: 200 TABLET ORAL at 09:11

## 2025-07-19 ASSESSMENT — ACTIVITIES OF DAILY LIVING (ADL)
ADLS_ACUITY_SCORE: 49
DEPENDENT_IADLS:: INDEPENDENT
ADLS_ACUITY_SCORE: 49
ADLS_ACUITY_SCORE: 47
ADLS_ACUITY_SCORE: 49
ADLS_ACUITY_SCORE: 49

## 2025-07-19 NOTE — CONSULTS
Care Management Initial Consult    General Information  Assessment completed with: Patient, Spouse or significant other,    Type of CM/SW Visit: Initial Assessment    Primary Care Provider verified and updated as needed: Yes   Readmission within the last 30 days: no previous admission in last 30 days      Reason for Consult: discharge planning  Advance Care Planning:            Communication Assessment  Patient's communication style: spoken language (English or Bilingual)    Hearing Difficulty or Deaf: yes   Wear Glasses or Blind: no    Cognitive  Cognitive/Neuro/Behavioral: WDL  Level of Consciousness: other (see comments) (sleeping)  Arousal Level: arouses to voice, opens eyes spontaneously  Orientation: oriented x 4  Mood/Behavior: calm, cooperative  Best Language: 0 - No aphasia  Speech: clear    Living Environment:   People in home: spouse  Halie, Wife  Current living Arrangements: independent living facility      Able to return to prior arrangements: yes       Family/Social Support:  Care provided by: self, spouse/significant other  Provides care for: no one  Marital Status:   Support system: Wife, Children          Description of Support System: Involved, Supportive    Support Assessment: Adequate family and caregiver support    Current Resources:   Patient receiving home care services: No        Community Resources: None  Equipment currently used at home: cane, straight, walker, rolling  Supplies currently used at home: None    Employment/Financial:  Employment Status:          Financial Concerns:             Does the patient's insurance plan have a 3 day qualifying hospital stay waiver?  Yes     Which insurance plan 3 day waiver is available? Alternative insurance waiver    Will the waiver be used for post-acute placement? Undetermined at this time    Lifestyle & Psychosocial Needs:  Social Drivers of Health     Food Insecurity: Low Risk  (7/17/2025)    Food Insecurity     Within the past 12 months,  did you worry that your food would run out before you got money to buy more?: No     Within the past 12 months, did the food you bought just not last and you didn t have money to get more?: No   Depression: Not at risk (6/13/2024)    Received from [facility]    PHQ-2     PHQ-2 Score: 0   Housing Stability: Low Risk  (7/17/2025)    Housing Stability     Do you have housing? : Yes     Are you worried about losing your housing?: No   Tobacco Use: Low Risk  (7/9/2025)    Patient History     Smoking Tobacco Use: Never     Smokeless Tobacco Use: Never     Passive Exposure: Not on file   Financial Resource Strain: Low Risk  (7/17/2025)    Financial Resource Strain     Within the past 12 months, have you or your family members you live with been unable to get utilities (heat, electricity) when it was really needed?: No   Alcohol Use: Not on file   Transportation Needs: Low Risk  (7/17/2025)    Transportation Needs     Within the past 12 months, has lack of transportation kept you from medical appointments, getting your medicines, non-medical meetings or appointments, work, or from getting things that you need?: No   Physical Activity: Not on file   Interpersonal Safety: Low Risk  (7/17/2025)    Interpersonal Safety     Do you feel physically and emotionally safe where you currently live?: Yes     Within the past 12 months, have you been hit, slapped, kicked or otherwise physically hurt by someone?: No     Within the past 12 months, have you been humiliated or emotionally abused in other ways by your partner or ex-partner?: No   Stress: Not on file   Social Connections: Not on file   Health Literacy: Not on file       Functional Status:  Prior to admission patient needed assistance:   Dependent ADLs:: Independent  Dependent IADLs:: Independent  Assesssment of Functional Status: Not at baseline with ADL Functioning    Mental Health Status:          Chemical Dependency Status:                Values/Beliefs:  Spiritual,  Cultural Beliefs, Mormon Practices, Values that affect care:                 Discussed  Partnership in Safe Discharge Planning  document with patient/family: No    Additional Information:    SW met with pt/spouse/and daughter at the bedside to introduce SW role. Pt lives in ILF with his spouse and is independent with mobility and cares. Discussed TCU recommendation in which pt/spouse are hesitant. TCU packet provided for review. Pt has been to [facility] in the past and did not have a positive experience. He would prefer to return home and would like [facility] for homecare again. SW sent referral. They are unsure if this is a realistic option at this time and would like to think about it.    Next Steps: follow up regarding decision to return home with homecare vs TCU.    Kavita Alvarez/RACHID Flor, WAYNE  Inpatient Care Coordination  Emergency Room /Float  [phone]    Kavita Alvarez, WAYNE

## 2025-07-19 NOTE — PROGRESS NOTES
Orthopedic Surgery  Constantino Thorne  07/19/2025     Admit Date:  7/17/2025  Assessment:   POD: 2 Days Post-Op   Procedure(s):  Revision right cemented hemiarthroplasty to total hip arthroplasty     Patient resting comfortably in bed. He is receiving one unit of blood this morning during our visit. He notes mild pain of the hip at rest, which increases with any weight bearing. He rates the pain around a 6/10 with weight bearing. He notes he was able to move to his chair this morning during therapy.    Pain currently controlled.  Tolerating oral intake.    Denies nausea or vomiting  Denies chest pain or shortness of breath    Temp:  [97.5  F (36.4  C)-98.7  F (37.1  C)] 97.8  F (36.6  C)  Pulse:  [59-73] 60  Resp:  [11-28] 15  BP: ()/(28-84) 91/53  SpO2:  [89 %-100 %] 100 %    Alert and oriented  Dressing is clean, dry, and intact.   Minimal erythema of the surrounding skin.   Bilateral calves are soft, non-tender.  Right lower extremity is NVI.  Sensation intact bilateral lower extremities  Patient able to resist dorsi and plantar flexion bilaterally  +Dp pulse    Labs:  Recent Labs   Lab Test 07/19/25  0605 07/18/25  1243 07/18/25  0524 07/17/25  1950   WBC 14.3*  --  19.2* 19.7*   HGB 7.5* 8.0* 8.2* 10.3*   *  --  139* 160     Recent Labs   Lab Test 07/19/25  0605 07/17/25  1651 11/02/24  0241   INR 1.53* 1.29* 1.05     No lab results found.      Plan:   Continue Coumadin for DVT prophylaxis as prior to surgery     Mobilize with PT/OT with posterior hip precautions   50% WB RLE.     Continue current pain regiment.   Dressings: Keep intact.  Change if >60% saturated or peeling off.    Follow-up: 2 weeks post-op with Diamond Lovell PA-C/Dr. Cantrell    Disposition:    Pending hemodynamic stabilization and progression in PT.    Diamond Lovell PA-C  [facility]

## 2025-07-19 NOTE — PROGRESS NOTES
[facility]    Hospitalist Progress Note      Assessment & Plan   Constantino Thorne is a 84 year old male w a history of CAD, with prior MI in 2010, history of VFib cardiac arrest at the time of his MI, ischemic cardiomyopathy with LVEF 30-35%, paroxysmal atrial fibrillation, PVCs and CKD who presents with post-operative hypotension after elective revision of right cemented hemiarthroplasty to total hip arthroplasty.      # Hypovolemic shock secondary to acute blood loss and volume loss in the setting of operative procedure. Lactic acidosis: Patient underwent revision of right cemented hemiarthroplasty to total hip arthroplasty on 7/17.  Estimated blood loss was noted to be 1000 cc with complication noted of femoral canal perforation needing femoral stem.  ICU admission was requested because patient has been persistently hypotensive with systolics ranging from 70-90 postoperatively.  He has got a couple of phenylephrine pushes from anesthesiologist and is going to start on norepinephrine drip.  His postoperative hemoglobin was 10.3 and has got 3.5 liters of IV fluid already.  -Patient denies chest pain, shortness of breath or dizziness/lightheadedness.  His EKG shows an atrial paced rhythm.  Lactic acid within normal limits and troponin negative.  He has a mild leukocytosis postoperatively and immediate postoperative hemoglobin 10.2.  Patient on low-dose norepinephrine after being given 3-1/2 L of IV fluid.  -Pt weaned off norepi by afternoon of 7/18.  TTE done showed EF 35-40% without significant volume overload.  No major changes from TTE on 11/7/2024.    -Hgb in mid 7's on 7/19 after receiving IVF from around 10 pre-op.  Given softer BP's and some lightheadedness, would benefit from 1 unit pRBC.  Will transfuse 1 unit.  Repeat hgb AM of 7/20.   -Lactic acid improved.   -Hold beta blockade on 7/19 until BP's improved a bit more.       #Revision of right cemented hemiarthroplasty to total hip  "arthroplasty: This was done due to persistent pain.  Postoperative management per orthopedics.  Restarted on coumadin with ppx lovenox. Therapies consulted.  50% weightbearing.  Incentive spirometer and up to the chair.     #History of ischemic cardiomyopathy/chronic systolic CHF.  History of frequent PVCs.  CAD with prior MI in 2010.  Persistent atrial fibrillation: Last echocardiogram on 7/15/2025 showed the EF of 30% which is unchanged compared to his previous echocardiograms.  Not on daily diuretics at home.  Not on lisinopril and spironolactone due to intolerance.  Not on aspirin given he is on warfarin.  His [id number] score of 5.    -Continue amiodarone.  Resume metoprolol XL 12.5 mg twice daily once blood pressure is improved.  Resume Coumadin.      #Hyperlipidemia: Continued on atorvastatin 80 mg daily.  #Sleep apnea: Continues CPAP.  #CKD2-3: Baseline creatinine seems to be around 1.29 based on his preoperative labs.  Close to baseline.  Monitoring.  #Urinary retention: Patient was not able to urinate in the immediate postoperative period and Conley was inserted.  Voiding trial.    #Obesity: Complicates cares     DVT Prophylaxis: Warfarin with lovenox ppx while IP.    Code Status: Full Code  Medically Ready for Discharge: Anticipated in 2-4 Days.  Can transfer to med surg    Wife and daughter updated at bedside on 7/19.    Stefan Kapadia MD    Interval History   No events.  Feels pretty good but generally weak particularly when getting out of bed. No Sob. NO CP. NO palpitations. No presyncope but \"wobbly\" when getting to chair.  Hgb down a bit today but off norepi.      -Data reviewed today: I reviewed all new labs and imaging results over the last 24 hours. I personally reviewed     Physical Exam   Temp: 97.6  F (36.4  C) Temp src: Temporal BP: 97/66 Pulse: 59   Resp: 13 SpO2: 99 % O2 Device: BiPAP/CPAP    Vitals:    07/17/25 0951 07/17/25 1715 07/18/25 0400   Weight: 94 kg (207 lb 4.8 oz) 96.5 kg (212 lb " 11.9 oz) 97.3 kg (214 lb 8.1 oz)     Vital Signs with Ranges  Temp:  [97.5  F (36.4  C)-98.7  F (37.1  C)] 97.6  F (36.4  C)  Pulse:  [59-73] 59  Resp:  [11-28] 13  BP: ()/(28-84) 97/66  SpO2:  [89 %-99 %] 99 %  I/O last 3 completed shifts:  In: 1067.4 [P.O.:720; I.V.:347.4]  Out: 1275 [Urine:1275]    Constitutional: Nontoxic, NAD.  Obese  HEENT: Normocephalic. MMM, No elevation of JVD noted.   Respiratory: Nl WOB, some diminished breath sounds at the bases but no wheezes.  Cardiovascular: ICD noted in the left chest wall.  Regular, no murmur  GI: Obese, BS+, NT, ND  Skin/Integumen: WWP.  Minimal edema.  Surgical site appears clean and dry.  Not significantly swollen  Neuro: CNII-XII intact. Moves all extremities. No tremor. A&Ox3.    Medications   Current Facility-Administered Medications   Medication Dose Route Frequency Provider Last Rate Last Admin    norepinephrine (LEVOPHED) 4 mg in  mL infusion PREMIX  0.01-0.6 mcg/kg/min Intravenous Continuous Enrike Bolivar MD   Stopped at 07/18/25 1940     Current Facility-Administered Medications   Medication Dose Route Frequency Provider Last Rate Last Admin    acetaminophen (TYLENOL) tablet 975 mg  975 mg Oral Q8H iDamond Lovell PA-C   975 mg at 07/19/25 0011    amiodarone (PACERONE) tablet 200 mg  200 mg Oral QAM Enrike Bolivar MD   200 mg at 07/18/25 0913    atorvastatin (LIPITOR) tablet 80 mg  80 mg Oral At Bedtime Enrike Bolivar MD   80 mg at 07/18/25 2135    enoxaparin ANTICOAGULANT (LOVENOX) injection 40 mg  40 mg Subcutaneous Q24H Stefan Kapadia MD   40 mg at 07/18/25 1408    famotidine (PEPCID) tablet 20 mg  20 mg Oral Daily Diamond Lovell PA-C   20 mg at 07/18/25 0915    Or    famotidine (PEPCID) injection 20 mg  20 mg Intravenous Daily Diamond Lovell PA-C   20 mg at 07/17/25 1818    polyethylene glycol (MIRALAX) Packet 17 g  17 g Oral Daily Diamond Lovell PA-C   17 g at 07/18/25 0913    senna-docusate (SENOKOT-S/PERICOLACE) 8.6-50 MG per tablet 1  tablet  1 tablet Oral BID Diamond Lovell PA-C   1 tablet at 07/18/25 2004    sodium chloride (PF) 0.9% PF flush 3 mL  3 mL Intracatheter Q8H Diamond Lovell PA-C   3 mL at 07/18/25 2136    Warfarin Dose Required Daily - Pharmacist Managed  1 each Oral See Admin Instructions Stefan Kapadia MD           Data   Recent Labs   Lab 07/19/25  0806 07/19/25  0605 07/19/25  0341 07/18/25  1947 07/18/25  1243 07/18/25  0808 07/18/25  0524 07/17/25  2343 07/17/25  1950 07/17/25  1948 07/17/25  1749 07/17/25  1700 07/17/25  1651   WBC  --  14.3*  --   --   --   --  19.2*  --  19.7*  --   --   --  17.1*   HGB  --  7.5*  --   --  8.0*  --  8.2*  --  10.3*  --   --   --  10.2*   MCV  --  96  --   --  96  --  97  --  97  --   --   --  96   PLT  --  123*  --   --   --   --  139*  --  160  --   --   --  148*   INR  --  1.53*  --   --   --   --   --   --   --   --   --   --  1.29*   NA  --  136  --   --   --   --  134*  --  136  --  137  --  138   POTASSIUM  --  4.3  --   --   --   --  4.3  --  4.5  --  4.4  --  4.6   CHLORIDE  --  106  --   --   --   --  103  --  103  --  104  --  105   CO2  --  24  --   --   --   --  21*  --  19*  --  19*  --  21*   BUN  --  27.9*  --   --   --   --  28.7*  --  25.7*  --  25.2*  --  25.8*   CR  --  1.26*  --   --   --   --  1.30*  --  1.24*  --  1.22*  --  1.19*   ANIONGAP  --  6*  --   --   --   --  10  --  14  --  14  --  12   ZACH  --  7.9*  --   --   --   --  8.1*  --  8.4*  --  8.3*  --  8.1*   GLC 82 97 96   < >  --    < > 161*   < > 201*   < > 158*   < > 147*   ALBUMIN  --   --   --   --   --   --   --   --  3.3*  --  3.1*  --   --    PROTTOTAL  --   --   --   --   --   --   --   --  5.7*  --  5.6*  --   --    BILITOTAL  --   --   --   --   --   --   --   --  1.0  --  1.0  --   --    ALKPHOS  --   --   --   --   --   --   --   --  100  --  101  --   --    ALT  --   --   --   --   --   --   --   --  22  --  23  --   --    AST  --   --   --   --   --   --   --   --  29  --  28  --   --     < > =  values in this interval not displayed.       Recent Results (from the past 24 hours)   Echocardiogram Complete   Result Value    LVEF  35-40%    [facility]    [id number]  [id number]  [id number]  [id number]^POLA^CANDY^     [facility]  Echocardiography Laboratory  [address] [place] [address]     Name: DESIREE SKAGGS  MRN: [id number]  : 1940  Study Date: 2025 11:43 AM  Age: 84 yrs  Gender: Male  Patient Location: [facility]  Reason For Study: CHF  Ordering Physician: CANDY ESCALONA  Performed By: Adele Denise     BSA: 2.1 m2  Height: 70 in  Weight: 214 lb  HR: 60  BP: 105/63 mmHg  ______________________________________________________________________________  Procedure  Echocardiogram with two-dimensional, color and spectral Doppler.  ______________________________________________________________________________  Interpretation Summary     Left ventricular systolic function is moderately reduced.The visual ejection  fraction is 35-40%.Mid to distal septal, anterior and apical wall  hypokinesia.The left ventricle is mildly dilated.  The right ventricle is mildly dilated.  The left atrium is severely dilated.  There is mild (1+) mitral regurgitation.  The aortic root is normal size.Ascending aorta dilatation is present.  IVC diameter <2.1 cm collapsing >50% with sniff suggests a normal RA pressure  of 3 mmHg.     Compared to prior study dated 2024 LVEF appears similar  ______________________________________________________________________________  Left Ventricle  The left ventricle is mildly dilated. Proximal septal thickening is noted.  Diastolic Doppler findings (E/E' ratio and/or other parameters) suggest left  ventricular filling pressures are indeterminate. Left ventricular systolic  function is moderately reduced. The visual ejection fraction is 35-40%. Mid to  distal septal, anterior and apical wall hypokinesia.     Right Ventricle  The right ventricle is mildly dilated. The  right ventricular systolic function  is normal. There is a pacemaker lead in the right ventricle.     Atria  The left atrium is severely dilated. Right atrial size is normal. There is no  color Doppler evidence of an atrial shunt.     Mitral Valve  There is mild (1+) mitral regurgitation.     Tricuspid Valve  There is trace tricuspid regurgitation. Right ventricular systolic pressure  could not be approximated due to inadequate tricuspid regurgitation.     Aortic Valve  There is mild trileaflet aortic sclerosis. No aortic regurgitation is present.  No aortic stenosis is present.     Pulmonic Valve  There is trace pulmonic valvular regurgitation. There is no pulmonic valvular  stenosis.     Vessels  The aortic root is normal size. 4.2 cm. Ascending aorta dilatation is present.  4 cm. IVC diameter <2.1 cm collapsing >50% with sniff suggests a normal RA  pressure of 3 mmHg.     Rhythm  Sinus rhythm was noted.     ______________________________________________________________________________  MMode/2D Measurements & Calculations  IVSd: 0.99 cm  LVIDd: 6.0 cm  LVIDs: 3.9 cm  LVPWd: 0.86 cm  IVC diam: 2.0 cm  FS: 34.8 %     LV mass(C)d: 221.3 grams  LV mass(C)dI: 103.0 grams/m2  Ao root diam: 4.2 cm  asc Aorta Diam: 4.0 cm  LVOT diam: 2.4 cm  LVOT area: 4.6 cm2  Ao root diam index Ht(cm/m): 2.4  Ao root diam index BSA (cm/m2): 2.0  Asc Ao diam index BSA (cm/m2): 1.9  Asc Ao diam index Ht(cm/m): 2.2  EF Biplane: 44.0 %  LA Volume (BP): 135.0 ml     LA Volume Index (BP): 62.8 ml/m2  RWT: 0.29  TAPSE: 2.9 cm     Doppler Measurements & Calculations  MV E max montrell: 54.0 cm/sec  MV A max montrell: 84.4 cm/sec  MV E/A: 0.64  MV dec slope: 176.2 cm/sec2  MV dec time: 0.31 sec  LV V1 max P.7 mmHg  LV V1 max: 107.7 cm/sec  LV V1 VTI: 24.0 cm  SV(LVOT): 110.1 ml  SI(LVOT): 51.3 ml/m2  PA V2 max: 129.9 cm/sec  PA max P.8 mmHg  PA acc time: 0.07 sec  E/E' av.7  Lateral E/e': 7.1  Medial E/e': 10.3  RV S Montrell: 21.4 cm/sec      ______________________________________________________________________________  Report approved by: Surya He MD on 07/18/2025 02:57 PM

## 2025-07-19 NOTE — PROGRESS NOTES
07/19/25 0815   Appointment Info   Signing Clinician's Name / Credentials (PT) Riri March, BISHOP   Rehab Comments (PT) post hip prec   Living Environment   People in Home spouse   Current Living Arrangements independent living facility   Self-Care   Equipment Currently Used at Home cane, straight;walker, rolling   Fall history within last six months no   Activity/Exercise/Self-Care Comment Pt typically inde with all cares, continues to drive. Pt does use SEC since 1st hip surgery in November   General Information   Onset of Illness/Injury or Date of Surgery 07/17/25   Referring Physician Diamond Lovell, JESUS   Patient/Family Therapy Goals Statement (PT) Pt is hopeful to DC home   Pertinent History of Current Problem (include personal factors and/or comorbidities that impact the POC) Constantino Thorne is a 84 year old male w a history of CAD, with prior MI in 2010, history of VFib cardiac arrest at the time of his MI, ischemic cardiomyopathy with LVEF 30-35%, paroxysmal atrial fibrillation, PVCs and CKD who presents with post-operative hypotension after elective revision of right cemented hemiarthroplasty to total hip arthroplasty.   Existing Precautions/Restrictions no hip IR;90 degree hip flexion;no hip ADD past midline   Weight-Bearing Status - RLE partial weight-bearing (% in comments)  (50%)   Cognition   Affect/Mental Status (Cognition) WNL   Orientation Status (Cognition) oriented x 3;oriented x 4   Follows Commands (Cognition) WNL   Pain Assessment   Patient Currently in Pain Yes, see Vital Sign flowsheet   Range of Motion (ROM)   Range of Motion ROM deficits secondary to pain;ROM deficits secondary to surgical procedure   Strength (Manual Muscle Testing)   Strength (Manual Muscle Testing) Deficits observed during functional mobility   Bed Mobility   Comment, (Bed Mobility) min A   Transfers   Comment, (Transfers) min A   Gait/Stairs (Locomotion)   Comment, (Gait/Stairs) limited to transfer due to BP    Balance   Balance Comments mildly unsteady   Clinical Impression   Criteria for Skilled Therapeutic Intervention Yes, treatment indicated   PT Diagnosis (PT) decreased functional mobility   Influenced by the following impairments decreased ROM, decreased strength, pain, hip prec   Functional limitations due to impairments decreased bed mob, transfers, ambulation   Clinical Presentation (PT Evaluation Complexity) evolving   Clinical Presentation Rationale slow improvement, low BP   Clinical Decision Making (Complexity) low complexity   Planned Therapy Interventions (PT) balance training;bed mobility training;gait training;home exercise program;patient/family education;strengthening;transfer training;progressive activity/exercise;risk factor education;home program guidelines   Risk & Benefits of therapy have been explained evaluation/treatment results reviewed;care plan/treatment goals reviewed;risks/benefits reviewed;current/potential barriers reviewed;participants voiced agreement with care plan;participants included;patient   PT Total Evaluation Time   PT Eval, Low Complexity Minutes (99519) 5   Physical Therapy Goals   PT Frequency Daily   PT Predicted Duration/Target Date for Goal Attainment 07/22/25   PT Goals Bed Mobility;Transfers;Gait   PT: Bed Mobility Supine to/from sit;Within precautions;Independent   PT: Transfers Sit to/from stand;Bed to/from chair;Assistive device;Within precautions;Modified independent   PT: Gait Rolling walker;Modified independent;100 feet   Interventions   Interventions Quick Adds Therapeutic Activity;Therapeutic Procedure   Therapeutic Activity   Therapeutic Activities: dynamic activities to improve functional performance Minutes (70481) 25   Symptoms Noted During/After Treatment Increased pain   Treatment Detail/Skilled Intervention Pt was educated on hip precuations and wt bearing precautions for mobility. Pt was cued for supine to sit, able to perform with min A. Pt was cued  for scooting forward in bed. Pt was cued for sit to stand, cues for hand placement, min A x 1 from higher surface. Pt was cued for standing in place, able to perform fair wt bearing no buckling. Pt was cued for steps to the recliner chair. Pt was able to perform with min A. Pt was cued for transfer to chair, min A. Low chair. BP low 70/46 with activity. Pt BP rechecked 95/57 after a couple mins of sitting.   PT Discharge Planning   PT Plan Progress to ambulation, W/C follow   PT Discharge Recommendation (DC Rec) Transitional Care Facility   PT Rationale for DC Rec Pt will need to show increased tolerance to activity in order to DC home, currently limited tolerance due to BP, transfer only. Likely with medical improvement will be able to tolerate more activity with stable CV response. Pt currently needing min A for bed mob and transfers. Pt will need to be inde with these skills in order to DC home with home care.   PT Brief overview of current status Limited to transfer A x 1   PT Total Distance Amb During Session (feet) 3   Physical Therapy Time and Intention   Timed Code Treatment Minutes 25   Total Session Time (sum of timed and untimed services) 30

## 2025-07-19 NOTE — PLAN OF CARE
Patient is transferring to room 649. Afebrile, alert and orientated. Pain 4/10 right hip. Gave oxy 2.5 once and scheduled tylenol. Cardiac: Tele: A Paced. BP 99/58. Off levophed for 21hrs. Gave 1 unit PRBC's for hgb. 7.5. Respiratory: LS clear. RA 96%. : Garcia in place.  every 2hrs. GI: no BM this shift. On mirolax and shanae. Regular diet. No drips. PIV. Skin: see flowsheet. All belongings and supplies sent with patient.     Notified provider about indwelling garcia catheter discussed removal or continued need.    Did provider choose to remove indwelling garcia catheter? No    Provider's garcia indication for keeping indwelling garcia catheter: Acute retention or obstruction    Is the catheter for retention? NO    *If there is a plan to keep garcia catheter in place at discharge daily notification with provider is not necessary, but please add a notation in the treatment team sticky note that the patient will be discharging with the catheter.      Goal Outcome Evaluation:      Plan of Care Reviewed With: patient, spouse, family    Overall Patient Progress: improvingOverall Patient Progress: improving    Outcome Evaluation: Patient off levophed entire day. Gave 1 unit PBRC for hgb 7.5. BP 90's over upper 50's map 70's. patient worked with PT. up to chair assist of 1 GB and Walker. moving patient to orthospine.        Problem: Delirium  Goal: Optimal Coping  Outcome: Progressing  Goal: Improved Sleep  Outcome: Progressing     Problem: Adult Inpatient Plan of Care  Goal: Plan of Care Review  Description: The Plan of Care Review/Shift note should be completed every shift.  The Outcome Evaluation is a brief statement about your assessment that the patient is improving, declining, or no change.  This information will be displayed automatically on your shift  note.  Outcome: Progressing  Flowsheets (Taken 7/19/2025 5123)  Outcome Evaluation: Patient off levophed entire day. Gave 1 unit PBRC for hgb 7.5. BP 90's over  upper 50's map 70's. patient worked with PT. up to chair assist of 1 GB and Walker. moving patient to orthospine.  Plan of Care Reviewed With:   patient   spouse   family  Overall Patient Progress: improving  Goal: Absence of Hospital-Acquired Illness or Injury  7/19/2025 1625 by Shaunna Bautista RN  Outcome: Progressing  7/19/2025 1359 by Shaunna Bautista RN  Outcome: Progressing  Intervention: Identify and Manage Fall Risk  Recent Flowsheet Documentation  Taken 7/19/2025 1600 by Shaunna Bautista RN  Safety Promotion/Fall Prevention:   lighting adjusted   increase visualization of patient   increased rounding and observation   clutter free environment maintained  Taken 7/19/2025 1200 by Shaunna Bautista RN  Safety Promotion/Fall Prevention:   lighting adjusted   increase visualization of patient   increased rounding and observation   clutter free environment maintained  Taken 7/19/2025 0800 by Shaunna Bautista RN  Safety Promotion/Fall Prevention:   lighting adjusted   increase visualization of patient   increased rounding and observation   clutter free environment maintained  Intervention: Prevent Skin Injury  Recent Flowsheet Documentation  Taken 7/19/2025 1600 by Shaunna Bautista RN  Body Position:   position changed independently   weight shifting  Taken 7/19/2025 1503 by Shaunna Bautista RN  Body Position:   weight shifting   turned   left  Taken 7/19/2025 1200 by Shaunna Bautista RN  Body Position:   position changed independently   weight shifting  Taken 7/19/2025 0800 by Shaunna Bautista RN  Body Position:   position changed independently   weight shifting  Intervention: Prevent and Manage VTE (Venous Thromboembolism) Risk  Recent Flowsheet Documentation  Taken 7/19/2025 1600 by Shaunna Bautista RN  VTE Prevention/Management: (Lovenox) SCDs off (sequential compression devices)  Taken 7/19/2025 1200 by Shaunna Bautista RN  VTE Prevention/Management: (Lovenox) SCDs off (sequential compression devices)  Taken  7/19/2025 0800 by Shaunna Bautista RN  VTE Prevention/Management: (Lovenox) SCDs off (sequential compression devices)  Goal: Optimal Comfort and Wellbeing  7/19/2025 1625 by Shaunna Bautista RN  Outcome: Progressing  7/19/2025 1359 by Shaunna Bautista RN  Outcome: Progressing  Intervention: Monitor Pain and Promote Comfort  Recent Flowsheet Documentation  Taken 7/19/2025 1200 by Shaunna Bautista RN  Pain Management Interventions: cold applied  Intervention: Provide Person-Centered Care  Recent Flowsheet Documentation  Taken 7/19/2025 1600 by Shaunna Bautista RN  Trust Relationship/Rapport:   care explained   choices provided   questions answered   questions encouraged   reassurance provided   thoughts/feelings acknowledged  Taken 7/19/2025 1200 by Shaunna Bautista RN  Trust Relationship/Rapport:   care explained   choices provided   questions answered   questions encouraged   reassurance provided   thoughts/feelings acknowledged  Taken 7/19/2025 0800 by Shaunna Bautista RN  Trust Relationship/Rapport:   care explained   choices provided   questions answered   questions encouraged   reassurance provided   thoughts/feelings acknowledged     Problem: Delirium  Goal: Improved Behavioral Control  Outcome: Met  Intervention: Minimize Safety Risk  Recent Flowsheet Documentation  Taken 7/19/2025 1600 by Shaunna Bautista RN  Enhanced Safety Measures: pain management  Trust Relationship/Rapport:   care explained   choices provided   questions answered   questions encouraged   reassurance provided   thoughts/feelings acknowledged  Taken 7/19/2025 1200 by Shaunna Bautista RN  Enhanced Safety Measures: pain management  Trust Relationship/Rapport:   care explained   choices provided   questions answered   questions encouraged   reassurance provided   thoughts/feelings acknowledged  Taken 7/19/2025 0800 by Shaunna Bautista RN  Enhanced Safety Measures: pain management  Trust Relationship/Rapport:   care explained   choices  provided   questions answered   questions encouraged   reassurance provided   thoughts/feelings acknowledged  Goal: Improved Attention and Thought Clarity  Outcome: Met  Intervention: Maximize Cognitive Function  Recent Flowsheet Documentation  Taken 7/19/2025 1600 by Shaunna Bautista RN  Sensory Stimulation Regulation:   care clustered   lighting decreased  Reorientation Measures: clock in view  Taken 7/19/2025 1200 by Shaunna Bautista RN  Sensory Stimulation Regulation:   care clustered   lighting decreased  Reorientation Measures: clock in view  Taken 7/19/2025 0800 by Shaunna Bautista RN  Sensory Stimulation Regulation:   care clustered   lighting decreased  Reorientation Measures: clock in view     Problem: Comorbidity Management  Goal: Maintenance of Heart Failure Symptom Control  Intervention: Maintain Heart Failure Management  Recent Flowsheet Documentation  Taken 7/19/2025 1600 by Shaunna Bautista RN  Medication Review/Management:   medications reviewed   high-risk medications identified   infusion titrated  Taken 7/19/2025 1200 by Shaunna Bautista RN  Medication Review/Management:   medications reviewed   high-risk medications identified   infusion titrated  Taken 7/19/2025 0800 by Shaunna Bautista RN  Medication Review/Management:   medications reviewed   high-risk medications identified   infusion titrated  Goal: Blood Pressure in Desired Range  Intervention: Maintain Blood Pressure Management  Recent Flowsheet Documentation  Taken 7/19/2025 1600 by Shaunna Bautista RN  Medication Review/Management:   medications reviewed   high-risk medications identified   infusion titrated  Taken 7/19/2025 1200 by Shaunna Bautista RN  Medication Review/Management:   medications reviewed   high-risk medications identified   infusion titrated  Taken 7/19/2025 0800 by Shaunna Bautista RN  Medication Review/Management:   medications reviewed   high-risk medications identified   infusion titrated     Problem: Fall Injury  Risk  Goal: Absence of Fall and Fall-Related Injury  Intervention: Identify and Manage Contributors  Recent Flowsheet Documentation  Taken 7/19/2025 1600 by Shaunna Bautista RN  Medication Review/Management:   medications reviewed   high-risk medications identified   infusion titrated  Taken 7/19/2025 1200 by Shaunna Bautista RN  Medication Review/Management:   medications reviewed   high-risk medications identified   infusion titrated  Taken 7/19/2025 0800 by Shaunna Bautista RN  Medication Review/Management:   medications reviewed   high-risk medications identified   infusion titrated  Intervention: Promote Injury-Free Environment  Recent Flowsheet Documentation  Taken 7/19/2025 1600 by Shaunna Bautista RN  Safety Promotion/Fall Prevention:   lighting adjusted   increase visualization of patient   increased rounding and observation   clutter free environment maintained  Taken 7/19/2025 1200 by Shaunna Bautista RN  Safety Promotion/Fall Prevention:   lighting adjusted   increase visualization of patient   increased rounding and observation   clutter free environment maintained  Taken 7/19/2025 0800 by Shaunna Bautista RN  Safety Promotion/Fall Prevention:   lighting adjusted   increase visualization of patient   increased rounding and observation   clutter free environment maintained     Problem: Pain Acute  Goal: Optimal Pain Control and Function  Intervention: Develop Pain Management Plan  Recent Flowsheet Documentation  Taken 7/19/2025 1200 by Shaunna Bautista RN  Pain Management Interventions: cold applied  Intervention: Prevent or Manage Pain  Recent Flowsheet Documentation  Taken 7/19/2025 1600 by Shaunna Bautista RN  Sensory Stimulation Regulation:   care clustered   lighting decreased  Medication Review/Management:   medications reviewed   high-risk medications identified   infusion titrated  Taken 7/19/2025 1200 by Shaunna Bautista RN  Sensory Stimulation Regulation:   care clustered   lighting  decreased  Medication Review/Management:   medications reviewed   high-risk medications identified   infusion titrated  Taken 7/19/2025 0800 by Shaunna Bautista RN  Sensory Stimulation Regulation:   care clustered   lighting decreased  Medication Review/Management:   medications reviewed   high-risk medications identified   infusion titrated     Problem: Hip Fracture Surgical Repair  Goal: Absence of Bleeding  Intervention: Monitor and Manage Bleeding  Recent Flowsheet Documentation  Taken 7/19/2025 1600 by Shaunna Bautista RN  Bleeding Management: dressing monitored  Taken 7/19/2025 1200 by Shaunna Bautista RN  Bleeding Management: dressing monitored  Taken 7/19/2025 0800 by Shaunna Bautista RN  Bleeding Management: dressing monitored  Goal: Cognitive Function Maintained  Intervention: Maintain Cognitive Function  Recent Flowsheet Documentation  Taken 7/19/2025 1600 by Shaunna Bautista RN  Reorientation Measures: clock in view  Taken 7/19/2025 1200 by Shaunna Bautista RN  Reorientation Measures: clock in view  Taken 7/19/2025 0800 by Shaunna Bautista RN  Reorientation Measures: clock in view  Goal: Optimal Functional Ability  Intervention: Promote Optimal Functional Status  Recent Flowsheet Documentation  Taken 7/19/2025 1600 by Shaunna Bautista RN  Activity Management: activity encouraged  Taken 7/19/2025 1200 by Shaunna Bautista RN  Activity Management: activity encouraged  Taken 7/19/2025 0800 by Shaunna Bautista RN  Activity Management: activity encouraged  Goal: Anesthesia/Sedation Recovery  Intervention: Optimize Anesthesia Recovery  Recent Flowsheet Documentation  Taken 7/19/2025 1600 by Shaunna Bautista RN  Safety Promotion/Fall Prevention:   lighting adjusted   increase visualization of patient   increased rounding and observation   clutter free environment maintained  Reorientation Measures: clock in view  Taken 7/19/2025 1200 by Shaunna Bautista RN  Safety Promotion/Fall Prevention:   lighting adjusted    increase visualization of patient   increased rounding and observation   clutter free environment maintained  Reorientation Measures: clock in view  Taken 7/19/2025 0800 by Shaunna Bautista RN  Safety Promotion/Fall Prevention:   lighting adjusted   increase visualization of patient   increased rounding and observation   clutter free environment maintained  Reorientation Measures: clock in view  Goal: Optimal Pain Control and Function  Intervention: Prevent or Manage Pain  Recent Flowsheet Documentation  Taken 7/19/2025 1200 by Shaunna Bautista RN  Pain Management Interventions: cold applied  Goal: Effective Oxygenation and Ventilation  Intervention: Optimize Oxygenation and Ventilation  Recent Flowsheet Documentation  Taken 7/19/2025 1600 by Shaunna Bautista RN  Head of Bed (HOB) Positioning: HOB at 20 degrees  Taken 7/19/2025 1503 by Shaunna Bautista RN  Head of Bed (HOB) Positioning: HOB at 20 degrees  Taken 7/19/2025 1200 by Shaunna Bautista RN  Head of Bed (HOB) Positioning: HOB at 20 degrees  Taken 7/19/2025 0800 by Shaunna Bautista RN  Head of Bed (HOB) Positioning: HOB at 20 degrees

## 2025-07-19 NOTE — PLAN OF CARE
Goal Outcome Evaluation:      Plan of Care Reviewed With: patient    Overall Patient Progress: improvingOverall Patient Progress: improving    Outcome Evaluation: Patient remained off Levo gtt entire night overnight. Afebrile. Alert and oriented. C/o pain, scheduled apap given with good outcomes. Slept most of night. Repositioned self throughout independently and with assistance. Good urine output. Right Hip dressing remains CDI.      Problem: Delirium  Goal: Optimal Coping  Outcome: Progressing  Intervention: Optimize Psychosocial Adjustment to Delirium  Recent Flowsheet Documentation  Taken 7/19/2025 0400 by Margy Joaquin RN  Supportive Measures:   active listening utilized   verbalization of feelings encouraged  Taken 7/19/2025 0007 by Margy Joaquin RN  Supportive Measures:   active listening utilized   verbalization of feelings encouraged  Taken 7/18/2025 2007 by Margy Joaquin RN  Supportive Measures:   active listening utilized   verbalization of feelings encouraged  Goal: Improved Behavioral Control  Outcome: Progressing  Intervention: Minimize Safety Risk  Recent Flowsheet Documentation  Taken 7/19/2025 0400 by Margy Joaquin RN  Enhanced Safety Measures: pain management  Trust Relationship/Rapport:   care explained   choices provided   questions answered   questions encouraged   reassurance provided   thoughts/feelings acknowledged  Taken 7/19/2025 0007 by Margy Joaquin RN  Enhanced Safety Measures: pain management  Trust Relationship/Rapport:   care explained   choices provided   questions answered   questions encouraged   reassurance provided   thoughts/feelings acknowledged  Taken 7/18/2025 2007 by Margy Joaquin RN  Enhanced Safety Measures: pain management  Trust Relationship/Rapport:   care explained   choices provided   questions answered   questions encouraged   reassurance provided   thoughts/feelings acknowledged  Goal: Improved Attention and  "Thought Clarity  Outcome: Progressing  Intervention: Maximize Cognitive Function  Recent Flowsheet Documentation  Taken 7/19/2025 0400 by Margy Joaquin, RN  Sensory Stimulation Regulation:   care clustered   lighting decreased  Reorientation Measures: clock in view  Taken 7/19/2025 0007 by Margy Joaquin RN  Sensory Stimulation Regulation:   care clustered   lighting decreased  Reorientation Measures: clock in view  Taken 7/18/2025 2007 by Margy Joaquin, RN  Sensory Stimulation Regulation:   care clustered   lighting decreased  Reorientation Measures: clock in view  Goal: Improved Sleep  Outcome: Progressing     Problem: Adult Inpatient Plan of Care  Goal: Plan of Care Review  Description: The Plan of Care Review/Shift note should be completed every shift.  The Outcome Evaluation is a brief statement about your assessment that the patient is improving, declining, or no change.  This information will be displayed automatically on your shift  note.  Outcome: Progressing  Flowsheets (Taken 7/19/2025 0519)  Outcome Evaluation: Patient remained off Levo gtt entire night overnight. Afebrile. Alert and oriented. C/o pain, scheduled apap given with good outcomes. Slept most of night. Repositioned self throughout independently and with assistance. Good urine output. Right Hip dressing remains CDI.  Plan of Care Reviewed With: patient  Overall Patient Progress: improving  Goal: Patient-Specific Goal (Individualized)  Description: You can add care plan individualizations to a care plan. Examples of Individualization might be:  \"Parent requests to be called daily at 9am for status\", \"I have a hard time hearing out of my right ear\", or \"Do not touch me to wake me up as it startles  me\".  Outcome: Progressing  Goal: Absence of Hospital-Acquired Illness or Injury  Outcome: Progressing  Intervention: Identify and Manage Fall Risk  Recent Flowsheet Documentation  Taken 7/19/2025 0400 by Margy Joaquin, " RN  Safety Promotion/Fall Prevention:   lighting adjusted   increase visualization of patient   increased rounding and observation   clutter free environment maintained  Taken 7/19/2025 0007 by Margy Joaquin RN  Safety Promotion/Fall Prevention:   lighting adjusted   increase visualization of patient   increased rounding and observation   clutter free environment maintained  Taken 7/18/2025 2007 by Margy Joaquin RN  Safety Promotion/Fall Prevention:   lighting adjusted   increase visualization of patient   increased rounding and observation   clutter free environment maintained  Intervention: Prevent Skin Injury  Recent Flowsheet Documentation  Taken 7/19/2025 0400 by Margy Joaquin RN  Body Position:   position changed independently   weight shifting  Taken 7/19/2025 0200 by Margy Joaquin RN  Body Position: position changed independently  Taken 7/19/2025 0007 by Margy Joaquin RN  Body Position:   left   side-lying 30 degrees   position changed independently  Taken 7/18/2025 2200 by Margy Joaquin RN  Body Position:   turned   weight shifting  Taken 7/18/2025 2007 by Margy Joaquin RN  Body Position:   turned   left   side-lying 30 degrees  Intervention: Prevent and Manage VTE (Venous Thromboembolism) Risk  Recent Flowsheet Documentation  Taken 7/19/2025 0400 by Margy Joaquin RN  VTE Prevention/Management: (pt wanted off, on lovenox injections) SCDs off (sequential compression devices)  Taken 7/19/2025 0007 by Margy Joaquin RN  VTE Prevention/Management: SCDs on (sequential compression devices)  Taken 7/18/2025 2007 by Margy Joaquin RN  VTE Prevention/Management: SCDs on (sequential compression devices)  Goal: Optimal Comfort and Wellbeing  Outcome: Progressing  Intervention: Monitor Pain and Promote Comfort  Recent Flowsheet Documentation  Taken 7/19/2025 0011 by Margy Joaquin RN  Pain Management Interventions:  medication (see MAR)  Intervention: Provide Person-Centered Care  Recent Flowsheet Documentation  Taken 7/19/2025 0400 by Margy Joaquin RN  Trust Relationship/Rapport:   care explained   choices provided   questions answered   questions encouraged   reassurance provided   thoughts/feelings acknowledged  Taken 7/19/2025 0007 by Margy Joaquin RN  Trust Relationship/Rapport:   care explained   choices provided   questions answered   questions encouraged   reassurance provided   thoughts/feelings acknowledged  Taken 7/18/2025 2007 by Margy Joaquin RN  Trust Relationship/Rapport:   care explained   choices provided   questions answered   questions encouraged   reassurance provided   thoughts/feelings acknowledged  Goal: Readiness for Transition of Care  Outcome: Progressing     Problem: Comorbidity Management  Goal: Maintenance of Heart Failure Symptom Control  Outcome: Progressing  Intervention: Maintain Heart Failure Management  Recent Flowsheet Documentation  Taken 7/19/2025 0400 by Margy Joaquin RN  Medication Review/Management:   medications reviewed   high-risk medications identified   infusion titrated  Taken 7/19/2025 0007 by Margy Joaquin RN  Medication Review/Management:   medications reviewed   high-risk medications identified   infusion titrated  Taken 7/18/2025 2007 by Margy Joaquin RN  Medication Review/Management:   medications reviewed   high-risk medications identified   infusion titrated  Goal: Blood Pressure in Desired Range  Outcome: Progressing  Intervention: Maintain Blood Pressure Management  Recent Flowsheet Documentation  Taken 7/19/2025 0400 by Margy Joaquin RN  Medication Review/Management:   medications reviewed   high-risk medications identified   infusion titrated  Taken 7/19/2025 0007 by Margy Joaquin, RN  Medication Review/Management:   medications reviewed   high-risk medications identified   infusion titrated  Taken  7/18/2025 2007 by Margy Joaquin RN  Medication Review/Management:   medications reviewed   high-risk medications identified   infusion titrated     Problem: Fall Injury Risk  Goal: Absence of Fall and Fall-Related Injury  Outcome: Progressing  Intervention: Identify and Manage Contributors  Recent Flowsheet Documentation  Taken 7/19/2025 0400 by Margy Joaquin RN  Medication Review/Management:   medications reviewed   high-risk medications identified   infusion titrated  Taken 7/19/2025 0007 by Margy Joaquin RN  Medication Review/Management:   medications reviewed   high-risk medications identified   infusion titrated  Taken 7/18/2025 2007 by Margy Joaquin RN  Medication Review/Management:   medications reviewed   high-risk medications identified   infusion titrated  Intervention: Promote Injury-Free Environment  Recent Flowsheet Documentation  Taken 7/19/2025 0400 by Margy Joaquin RN  Safety Promotion/Fall Prevention:   lighting adjusted   increase visualization of patient   increased rounding and observation   clutter free environment maintained  Taken 7/19/2025 0007 by Margy Joaquin RN  Safety Promotion/Fall Prevention:   lighting adjusted   increase visualization of patient   increased rounding and observation   clutter free environment maintained  Taken 7/18/2025 2007 by Margy Joaquin RN  Safety Promotion/Fall Prevention:   lighting adjusted   increase visualization of patient   increased rounding and observation   clutter free environment maintained     Problem: Pain Acute  Goal: Optimal Pain Control and Function  Outcome: Progressing  Intervention: Optimize Psychosocial Wellbeing  Recent Flowsheet Documentation  Taken 7/19/2025 0400 by Margy Joaquin RN  Supportive Measures:   active listening utilized   verbalization of feelings encouraged  Taken 7/19/2025 0007 by Margy Joaquin RN  Supportive Measures:   active listening  utilized   verbalization of feelings encouraged  Taken 7/18/2025 2007 by Margy Joaquin RN  Supportive Measures:   active listening utilized   verbalization of feelings encouraged  Intervention: Develop Pain Management Plan  Recent Flowsheet Documentation  Taken 7/19/2025 0011 by Margy Joaquin RN  Pain Management Interventions: medication (see MAR)  Intervention: Prevent or Manage Pain  Recent Flowsheet Documentation  Taken 7/19/2025 0400 by Margy Joaquin RN  Sensory Stimulation Regulation:   care clustered   lighting decreased  Medication Review/Management:   medications reviewed   high-risk medications identified   infusion titrated  Taken 7/19/2025 0007 by Margy Joaquin RN  Sensory Stimulation Regulation:   care clustered   lighting decreased  Medication Review/Management:   medications reviewed   high-risk medications identified   infusion titrated  Taken 7/18/2025 2007 by Margy Joaquin RN  Sensory Stimulation Regulation:   care clustered   lighting decreased  Medication Review/Management:   medications reviewed   high-risk medications identified   infusion titrated     Problem: Hip Fracture Surgical Repair  Goal: Optimal Coping with Change in Health Status  Outcome: Progressing  Intervention: Support Psychosocial Response to Surgery and Mobility Changes  Recent Flowsheet Documentation  Taken 7/19/2025 0400 by Margy Joaquin RN  Supportive Measures:   active listening utilized   verbalization of feelings encouraged  Taken 7/19/2025 0007 by Margy Joaquin RN  Supportive Measures:   active listening utilized   verbalization of feelings encouraged  Taken 7/18/2025 2007 by Margy Joaquin RN  Supportive Measures:   active listening utilized   verbalization of feelings encouraged  Goal: Absence of Bleeding  Outcome: Progressing  Intervention: Monitor and Manage Bleeding  Recent Flowsheet Documentation  Taken 7/19/2025 0400 by Margy Joaquin  RN  Bleeding Management: dressing monitored  Taken 7/19/2025 0007 by Margy Joaquin RN  Bleeding Management: dressing monitored  Taken 7/18/2025 2007 by Margy Joaquin RN  Bleeding Management: dressing monitored  Goal: Effective Bowel Elimination  Outcome: Progressing  Intervention: Enhance Bowel Motility and Elimination  Recent Flowsheet Documentation  Taken 7/19/2025 0400 by Margy Joaquin RN  Bowel Motility Enhancement: oral intake encouraged  Taken 7/19/2025 0007 by Margy Joaquin RN  Bowel Motility Enhancement: oral intake encouraged  Taken 7/18/2025 2007 by Margy Joaquin RN  Bowel Motility Enhancement: oral intake encouraged  Goal: Cognitive Function Maintained  Outcome: Progressing  Intervention: Maintain Cognitive Function  Recent Flowsheet Documentation  Taken 7/19/2025 0400 by Margy Joaquin RN  Reorientation Measures: clock in view  Taken 7/19/2025 0007 by Margy Joaquin RN  Reorientation Measures: clock in view  Taken 7/18/2025 2007 by Margy Joaquin RN  Reorientation Measures: clock in view  Goal: Fluid and Electrolyte Balance  Outcome: Progressing  Goal: Absence of Infection Signs and Symptoms  Outcome: Progressing  Intervention: Prevent or Manage Infection  Recent Flowsheet Documentation  Taken 7/19/2025 0400 by Margy Joaquin RN  Infection Management: aseptic technique maintained  Taken 7/19/2025 0007 by Margy Joaquin RN  Infection Management: aseptic technique maintained  Taken 7/18/2025 2007 by Margy Joaquin RN  Infection Management: aseptic technique maintained  Goal: Optimal Functional Ability  Outcome: Progressing  Intervention: Promote Optimal Functional Status  Recent Flowsheet Documentation  Taken 7/19/2025 0400 by Margy Joaquin RN  Activity Management: activity adjusted per tolerance  Taken 7/19/2025 0007 by Margy Joaquin RN  Activity Management: activity adjusted per tolerance  Taken  7/18/2025 2007 by Margy Joaquin RN  Activity Management: activity adjusted per tolerance  Goal: Anesthesia/Sedation Recovery  Outcome: Progressing  Intervention: Optimize Anesthesia Recovery  Recent Flowsheet Documentation  Taken 7/19/2025 0400 by Margy Joaquin RN  Safety Promotion/Fall Prevention:   lighting adjusted   increase visualization of patient   increased rounding and observation   clutter free environment maintained  Reorientation Measures: clock in view  Taken 7/19/2025 0007 by Margy Joaquin RN  Safety Promotion/Fall Prevention:   lighting adjusted   increase visualization of patient   increased rounding and observation   clutter free environment maintained  Reorientation Measures: clock in view  Taken 7/18/2025 2007 by Margy Joaquin RN  Safety Promotion/Fall Prevention:   lighting adjusted   increase visualization of patient   increased rounding and observation   clutter free environment maintained  Reorientation Measures: clock in view  Goal: Optimal Pain Control and Function  Outcome: Progressing  Intervention: Prevent or Manage Pain  Recent Flowsheet Documentation  Taken 7/19/2025 0011 by Margy Joaquin RN  Pain Management Interventions: medication (see MAR)  Goal: Nausea and Vomiting Relief  Outcome: Progressing  Goal: Effective Urinary Elimination  Outcome: Progressing  Intervention: Monitor and Manage Urinary Retention  Recent Flowsheet Documentation  Taken 7/19/2025 0400 by Margy Joaquin RN  Urinary Elimination Promotion: catheter patency maintained  Taken 7/19/2025 0007 by Margy Joaquin RN  Urinary Elimination Promotion: catheter patency maintained  Taken 7/18/2025 2007 by Margy Joaquin, RN  Urinary Elimination Promotion: catheter patency maintained  Goal: Effective Oxygenation and Ventilation  Outcome: Progressing  Intervention: Optimize Oxygenation and Ventilation  Recent Flowsheet Documentation  Taken 7/19/2025 0400 by  Margy Joaquin, RN  Airway/Ventilation Management: airway patency maintained  Head of Bed (HOB) Positioning: HOB at 15 degrees  Taken 7/19/2025 0007 by Margy Joaquin, RN  Airway/Ventilation Management: airway patency maintained  Head of Bed (HOB) Positioning: HOB at 15 degrees  Taken 7/18/2025 2200 by Margy Joaquin, RN  Head of Bed (HOB) Positioning: HOB at 20 degrees  Taken 7/18/2025 2007 by Margy Joaquin, RN  Airway/Ventilation Management: airway patency maintained  Head of Bed (HOB) Positioning: HOB at 20 degrees

## 2025-07-20 ENCOUNTER — APPOINTMENT (OUTPATIENT)
Dept: PHYSICAL THERAPY | Facility: CLINIC | Age: 85
DRG: 466 | End: 2025-07-20
Attending: ORTHOPAEDIC SURGERY
Payer: COMMERCIAL

## 2025-07-20 ENCOUNTER — APPOINTMENT (OUTPATIENT)
Dept: OCCUPATIONAL THERAPY | Facility: CLINIC | Age: 85
DRG: 466 | End: 2025-07-20
Payer: COMMERCIAL

## 2025-07-20 LAB
ANION GAP SERPL CALCULATED.3IONS-SCNC: 9 MMOL/L (ref 7–15)
BUN SERPL-MCNC: 23.6 MG/DL (ref 8–23)
CALCIUM SERPL-MCNC: 8 MG/DL (ref 8.8–10.4)
CHLORIDE SERPL-SCNC: 102 MMOL/L (ref 98–107)
CREAT SERPL-MCNC: 1.07 MG/DL (ref 0.67–1.17)
EGFRCR SERPLBLD CKD-EPI 2021: 68 ML/MIN/1.73M2
ERYTHROCYTE [DISTWIDTH] IN BLOOD BY AUTOMATED COUNT: 14.7 % (ref 10–15)
GLUCOSE BLDC GLUCOMTR-MCNC: 121 MG/DL (ref 70–99)
GLUCOSE SERPL-MCNC: 92 MG/DL (ref 70–99)
HCO3 SERPL-SCNC: 24 MMOL/L (ref 22–29)
HCT VFR BLD AUTO: 24.8 % (ref 40–53)
HGB BLD-MCNC: 8.4 G/DL (ref 13.3–17.7)
HGB BLD-MCNC: 8.7 G/DL (ref 13.3–17.7)
INR PPP: 1.52 (ref 0.85–1.15)
IRON BINDING CAPACITY (ROCHE): 144 UG/DL (ref 240–430)
IRON SATN MFR SERPL: 15 % (ref 15–46)
IRON SERPL-MCNC: 21 UG/DL (ref 61–157)
MAGNESIUM SERPL-MCNC: 1.7 MG/DL (ref 1.7–2.3)
MCH RBC QN AUTO: 32.2 PG (ref 26.5–33)
MCHC RBC AUTO-ENTMCNC: 33.9 G/DL (ref 31.5–36.5)
MCV RBC AUTO: 95 FL (ref 78–100)
MCV RBC AUTO: 95 FL (ref 78–100)
PLATELET # BLD AUTO: 131 10E3/UL (ref 150–450)
POTASSIUM SERPL-SCNC: 4.4 MMOL/L (ref 3.4–5.3)
PROTHROMBIN TIME: 18.3 SECONDS (ref 11.8–14.8)
RBC # BLD AUTO: 2.61 10E6/UL (ref 4.4–5.9)
SODIUM SERPL-SCNC: 135 MMOL/L (ref 135–145)
WBC # BLD AUTO: 11.7 10E3/UL (ref 4–11)

## 2025-07-20 PROCEDURE — 85018 HEMOGLOBIN: CPT | Performed by: INTERNAL MEDICINE

## 2025-07-20 PROCEDURE — 250N000013 HC RX MED GY IP 250 OP 250 PS 637: Performed by: STUDENT IN AN ORGANIZED HEALTH CARE EDUCATION/TRAINING PROGRAM

## 2025-07-20 PROCEDURE — 83550 IRON BINDING TEST: CPT | Performed by: INTERNAL MEDICINE

## 2025-07-20 PROCEDURE — 83735 ASSAY OF MAGNESIUM: CPT | Performed by: HOSPITALIST

## 2025-07-20 PROCEDURE — 250N000013 HC RX MED GY IP 250 OP 250 PS 637: Performed by: INTERNAL MEDICINE

## 2025-07-20 PROCEDURE — 97116 GAIT TRAINING THERAPY: CPT | Mod: GP | Performed by: PHYSICAL THERAPIST

## 2025-07-20 PROCEDURE — 97530 THERAPEUTIC ACTIVITIES: CPT | Mod: GP | Performed by: PHYSICAL THERAPIST

## 2025-07-20 PROCEDURE — 99232 SBSQ HOSP IP/OBS MODERATE 35: CPT | Performed by: INTERNAL MEDICINE

## 2025-07-20 PROCEDURE — 97110 THERAPEUTIC EXERCISES: CPT | Mod: GP | Performed by: PHYSICAL THERAPIST

## 2025-07-20 PROCEDURE — 250N000011 HC RX IP 250 OP 636: Performed by: HOSPITALIST

## 2025-07-20 PROCEDURE — 120N000001 HC R&B MED SURG/OB

## 2025-07-20 PROCEDURE — 99207 PR NO BILLABLE SERVICE THIS VISIT: CPT | Performed by: INTERNAL MEDICINE

## 2025-07-20 PROCEDURE — 85610 PROTHROMBIN TIME: CPT | Performed by: HOSPITALIST

## 2025-07-20 PROCEDURE — 36415 COLL VENOUS BLD VENIPUNCTURE: CPT | Performed by: INTERNAL MEDICINE

## 2025-07-20 PROCEDURE — 82310 ASSAY OF CALCIUM: CPT | Performed by: HOSPITALIST

## 2025-07-20 PROCEDURE — 36415 COLL VENOUS BLD VENIPUNCTURE: CPT | Performed by: HOSPITALIST

## 2025-07-20 PROCEDURE — 85041 AUTOMATED RBC COUNT: CPT | Performed by: HOSPITALIST

## 2025-07-20 PROCEDURE — 97535 SELF CARE MNGMENT TRAINING: CPT | Mod: GO | Performed by: OCCUPATIONAL THERAPIST

## 2025-07-20 PROCEDURE — 250N000013 HC RX MED GY IP 250 OP 250 PS 637

## 2025-07-20 PROCEDURE — 97165 OT EVAL LOW COMPLEX 30 MIN: CPT | Mod: GO | Performed by: OCCUPATIONAL THERAPIST

## 2025-07-20 RX ORDER — NALOXONE HYDROCHLORIDE 0.4 MG/ML
0.2 INJECTION, SOLUTION INTRAMUSCULAR; INTRAVENOUS; SUBCUTANEOUS
Status: DISCONTINUED | OUTPATIENT
Start: 2025-07-20 | End: 2025-07-21 | Stop reason: HOSPADM

## 2025-07-20 RX ORDER — NALOXONE HYDROCHLORIDE 0.4 MG/ML
0.4 INJECTION, SOLUTION INTRAMUSCULAR; INTRAVENOUS; SUBCUTANEOUS
Status: DISCONTINUED | OUTPATIENT
Start: 2025-07-20 | End: 2025-07-21 | Stop reason: HOSPADM

## 2025-07-20 RX ORDER — ACETAMINOPHEN 325 MG/1
975 TABLET ORAL EVERY 8 HOURS
Status: DISCONTINUED | OUTPATIENT
Start: 2025-07-20 | End: 2025-07-21 | Stop reason: HOSPADM

## 2025-07-20 RX ORDER — WARFARIN SODIUM 5 MG/1
5 TABLET ORAL
Status: COMPLETED | OUTPATIENT
Start: 2025-07-20 | End: 2025-07-20

## 2025-07-20 RX ADMIN — ACETAMINOPHEN 975 MG: 325 TABLET ORAL at 00:46

## 2025-07-20 RX ADMIN — ENOXAPARIN SODIUM 40 MG: 40 INJECTION SUBCUTANEOUS at 14:14

## 2025-07-20 RX ADMIN — SENNOSIDES AND DOCUSATE SODIUM 1 TABLET: 50; 8.6 TABLET ORAL at 08:01

## 2025-07-20 RX ADMIN — ACETAMINOPHEN 975 MG: 325 TABLET ORAL at 15:54

## 2025-07-20 RX ADMIN — ATORVASTATIN CALCIUM 80 MG: 40 TABLET, FILM COATED ORAL at 21:29

## 2025-07-20 RX ADMIN — POLYETHYLENE GLYCOL 3350 17 G: 17 POWDER, FOR SOLUTION ORAL at 08:02

## 2025-07-20 RX ADMIN — AMIODARONE HYDROCHLORIDE 200 MG: 200 TABLET ORAL at 08:02

## 2025-07-20 RX ADMIN — FAMOTIDINE 20 MG: 20 TABLET, FILM COATED ORAL at 08:01

## 2025-07-20 RX ADMIN — SENNOSIDES AND DOCUSATE SODIUM 1 TABLET: 50; 8.6 TABLET ORAL at 21:30

## 2025-07-20 RX ADMIN — ACETAMINOPHEN 975 MG: 325 TABLET ORAL at 08:01

## 2025-07-20 RX ADMIN — WARFARIN SODIUM 5 MG: 5 TABLET ORAL at 18:18

## 2025-07-20 ASSESSMENT — ACTIVITIES OF DAILY LIVING (ADL)
ADLS_ACUITY_SCORE: 47
PREVIOUS_RESPONSIBILITIES: MEAL PREP;HOUSEKEEPING;LAUNDRY;SHOPPING;MEDICATION MANAGEMENT;FINANCES;DRIVING
ADLS_ACUITY_SCORE: 47

## 2025-07-20 NOTE — PLAN OF CARE
"  Problem: Delirium  Goal: Optimal Coping  Outcome: Progressing  Intervention: Optimize Psychosocial Adjustment to Delirium  Recent Flowsheet Documentation  Taken 7/20/2025 0045 by Leilani Menon RN  Supportive Measures: active listening utilized  Goal: Improved Sleep  Outcome: Progressing     Problem: Adult Inpatient Plan of Care  Goal: Plan of Care Review  Description: The Plan of Care Review/Shift note should be completed every shift.  The Outcome Evaluation is a brief statement about your assessment that the patient is improving, declining, or no change.  This information will be displayed automatically on your shift  note.  Outcome: Progressing  Flowsheets (Taken 7/20/2025 0617)  Outcome Evaluation: pt able to stay off levophed and transfer out of the ICU last evening. VSS, BPin the 90's, stable. pt up with assist of 1, walker/gb. tylenol scheduled for pain. garcia in place, possible voiding trial today.  Plan of Care Reviewed With: patient  Overall Patient Progress: improving  Goal: Patient-Specific Goal (Individualized)  Description: You can add care plan individualizations to a care plan. Examples of Individualization might be:  \"Parent requests to be called daily at 9am for status\", \"I have a hard time hearing out of my right ear\", or \"Do not touch me to wake me up as it startles  me\".  Outcome: Progressing  Goal: Absence of Hospital-Acquired Illness or Injury  Outcome: Progressing  Intervention: Identify and Manage Fall Risk  Recent Flowsheet Documentation  Taken 7/20/2025 0045 by Leilani Menon RN  Safety Promotion/Fall Prevention:   activity supervised   assistive device/personal items within reach   safety round/check completed   supervised activity  Intervention: Prevent Skin Injury  Recent Flowsheet Documentation  Taken 7/20/2025 0046 by Leilani Menon RN  Body Position: position changed independently  Taken 7/20/2025 0045 by Leilani Menon, RN  Body Position: supine, head " elevated  Goal: Optimal Comfort and Wellbeing  Outcome: Progressing  Intervention: Monitor Pain and Promote Comfort  Recent Flowsheet Documentation  Taken 7/20/2025 0046 by Leilani Menon RN  Pain Management Interventions: medication (see MAR)  Intervention: Provide Person-Centered Care  Recent Flowsheet Documentation  Taken 7/20/2025 0045 by Leilani Menon RN  Trust Relationship/Rapport:   choices provided   care explained   thoughts/feelings acknowledged  Goal: Readiness for Transition of Care  Outcome: Progressing     Problem: Comorbidity Management  Goal: Maintenance of Heart Failure Symptom Control  Outcome: Progressing  Intervention: Maintain Heart Failure Management  Recent Flowsheet Documentation  Taken 7/20/2025 0045 by Leilani Menon RN  Medication Review/Management: medications reviewed  Goal: Blood Pressure in Desired Range  Outcome: Progressing  Intervention: Maintain Blood Pressure Management  Recent Flowsheet Documentation  Taken 7/20/2025 0045 by Leilani Menon RN  Medication Review/Management: medications reviewed     Problem: Fall Injury Risk  Goal: Absence of Fall and Fall-Related Injury  Outcome: Progressing  Intervention: Identify and Manage Contributors  Recent Flowsheet Documentation  Taken 7/20/2025 0045 by Leilani Menon RN  Medication Review/Management: medications reviewed  Intervention: Promote Injury-Free Environment  Recent Flowsheet Documentation  Taken 7/20/2025 0045 by Leilani Menon RN  Safety Promotion/Fall Prevention:   activity supervised   assistive device/personal items within reach   safety round/check completed   supervised activity     Problem: Pain Acute  Goal: Optimal Pain Control and Function  Outcome: Progressing  Intervention: Optimize Psychosocial Wellbeing  Recent Flowsheet Documentation  Taken 7/20/2025 0045 by Leilani Menon RN  Supportive Measures: active listening utilized  Intervention: Develop Pain Management Plan  Recent Flowsheet  Documentation  Taken 7/20/2025 0046 by Leilani Menon RN  Pain Management Interventions: medication (see MAR)  Intervention: Prevent or Manage Pain  Recent Flowsheet Documentation  Taken 7/20/2025 0045 by Leilani Menon RN  Sensory Stimulation Regulation:   lighting decreased   care clustered  Bowel Elimination Promotion:   adequate fluid intake promoted   ambulation promoted  Medication Review/Management: medications reviewed     Problem: Hip Fracture Surgical Repair  Goal: Optimal Coping with Change in Health Status  Outcome: Progressing  Intervention: Support Psychosocial Response to Surgery and Mobility Changes  Recent Flowsheet Documentation  Taken 7/20/2025 0045 by Leilani Menon RN  Supportive Measures: active listening utilized  Goal: Absence of Bleeding  Outcome: Progressing  Intervention: Monitor and Manage Bleeding  Recent Flowsheet Documentation  Taken 7/20/2025 0045 by Leilani Menon RN  Bleeding Management: dressing monitored  Goal: Effective Bowel Elimination  Outcome: Progressing  Intervention: Enhance Bowel Motility and Elimination  Recent Flowsheet Documentation  Taken 7/20/2025 0045 by Leilani Menon RN  Bowel Elimination Management: toileting offered  Bowel Elimination Promotion:   adequate fluid intake promoted   ambulation promoted  Goal: Cognitive Function Maintained  Outcome: Progressing  Intervention: Maintain Cognitive Function  Recent Flowsheet Documentation  Taken 7/20/2025 0045 by Leilani Menon RN  Reorientation Measures: clock in view  Environment Familiarity/Consistency: daily routine followed  Goal: Fluid and Electrolyte Balance  Outcome: Progressing  Goal: Absence of Infection Signs and Symptoms  Outcome: Progressing  Goal: Optimal Functional Ability  Outcome: Progressing  Intervention: Promote Optimal Functional Status  Recent Flowsheet Documentation  Taken 7/20/2025 0046 by Leilani Menon RN  Activity Management: activity adjusted per tolerance  Taken  7/20/2025 0045 by Leilani Menon RN  Activity Management:   activity adjusted per tolerance   ambulated in room   ambulated outside room  Goal: Anesthesia/Sedation Recovery  Outcome: Progressing  Intervention: Optimize Anesthesia Recovery  Recent Flowsheet Documentation  Taken 7/20/2025 0045 by Leilani eMnon RN  Safety Promotion/Fall Prevention:   activity supervised   assistive device/personal items within reach   safety round/check completed   supervised activity  Reorientation Measures: clock in view  Goal: Optimal Pain Control and Function  Outcome: Progressing  Intervention: Prevent or Manage Pain  Recent Flowsheet Documentation  Taken 7/20/2025 0046 by Leilani Menon RN  Pain Management Interventions: medication (see MAR)  Goal: Nausea and Vomiting Relief  Outcome: Progressing  Goal: Effective Urinary Elimination  Outcome: Progressing  Goal: Effective Oxygenation and Ventilation  Outcome: Progressing  Intervention: Optimize Oxygenation and Ventilation  Recent Flowsheet Documentation  Taken 7/20/2025 0046 by Leilani Menon RN  Head of Bed (HOB) Positioning: HOB at 20 degrees  Taken 7/20/2025 0045 by Leilani Menon RN  Airway/Ventilation Management: pulmonary hygiene promoted  Head of Bed (HOB) Positioning: HOB at 30 degrees   Goal Outcome Evaluation:      Plan of Care Reviewed With: patient    Overall Patient Progress: improvingOverall Patient Progress: improving    Outcome Evaluation: pt able to stay off levophed and transfer out of the ICU last evening. VSS, BPin the 90's, stable. pt up with assist of 1, walker/gb. tylenol scheduled for pain. garcia in place, possible voiding trial today.

## 2025-07-20 NOTE — PROGRESS NOTES
Orthopedic Surgery  Constantino Thorne  07/20/2025     Admit Date:  7/17/2025  Assessment:   POD: 3 Days Post-Op   Procedure(s):  Revision right cemented hemiarthroplasty to total hip arthroplasty     Patient in chair working with therapy. He still notes pain along the lateral aspect of the hip and thigh, that is increased with bearing weight on the leg. He notes he has been able to walk during therapy this morning, .  Pain currently controlled.  Tolerating oral intake.    Denies nausea or vomiting  Denies chest pain or shortness of breath    Temp:  [97.4  F (36.3  C)-97.9  F (36.6  C)] 97.9  F (36.6  C)  Pulse:  [60-65] 64  Resp:  [13-20] 16  BP: ()/(50-64) 123/64  SpO2:  [94 %-100 %] 95 %    Alert and oriented  Dressing is clean, dry, and intact.   Minimal erythema of the surrounding skin.   Bilateral calves are soft, non-tender.  Right lower extremity is NVI.  Sensation intact bilateral lower extremities  Patient able to resist dorsi and plantar flexion bilaterally  +Dp pulse    Labs:  Recent Labs   Lab Test 07/20/25  0750 07/19/25  0605 07/18/25  1243 07/18/25  0524   WBC 11.7* 14.3*  --  19.2*   HGB 8.4* 7.5* 8.0* 8.2*   * 123*  --  139*     Recent Labs   Lab Test 07/20/25  0750 07/19/25  0605 07/17/25  1651   INR 1.52* 1.53* 1.29*     No lab results found.      Plan:   Continue Coumadin for DVT prophylaxis as prior to surgery     Mobilize with PT/OT with posterior hip precautions   50% WB RLE.     Continue current pain regiment.   Dressings: Keep intact.  Change if >60% saturated or peeling off.    Follow-up: 2 weeks post-op with Diamond Lovell PA-C/Dr. Cantrell    Disposition:    Pending hemodynamic stabilization and progression in PT.    Diamond Lovell PA-C  [facility]

## 2025-07-20 NOTE — PLAN OF CARE
"Goal Outcome Evaluation:    VSS, soft blood pressures. A&Ox4. CMS intact. R hip incision dressing CDI. Pain controlled with tylenol. Ambulated in room and down the kimbrough with walker/ GB. Conley is intact and cares done. 2 senna tablets given for constipation. Discharge plan tbd.      Plan of Care Reviewed With: patient    Overall Patient Progress: improvingOverall Patient Progress: improving       Problem: Delirium  Goal: Optimal Coping  Outcome: Progressing  Goal: Improved Sleep  Outcome: Progressing     Problem: Adult Inpatient Plan of Care  Goal: Plan of Care Review  Description: The Plan of Care Review/Shift note should be completed every shift.  The Outcome Evaluation is a brief statement about your assessment that the patient is improving, declining, or no change.  This information will be displayed automatically on your shift  note.  Outcome: Progressing  Flowsheets (Taken 7/19/2025 2234)  Plan of Care Reviewed With: patient  Overall Patient Progress: improving  Goal: Patient-Specific Goal (Individualized)  Description: You can add care plan individualizations to a care plan. Examples of Individualization might be:  \"Parent requests to be called daily at 9am for status\", \"I have a hard time hearing out of my right ear\", or \"Do not touch me to wake me up as it startles  me\".  Outcome: Progressing  Goal: Absence of Hospital-Acquired Illness or Injury  Outcome: Progressing  Intervention: Identify and Manage Fall Risk  Recent Flowsheet Documentation  Taken 7/19/2025 1900 by Emely Anna, RN  Safety Promotion/Fall Prevention:   activity supervised   assistive device/personal items within reach   safety round/check completed   supervised activity  Intervention: Prevent Skin Injury  Recent Flowsheet Documentation  Taken 7/19/2025 1900 by Emely Anna, RN  Body Position: supine, head elevated  Goal: Optimal Comfort and Wellbeing  Outcome: Progressing  Goal: Readiness for Transition of Care  Outcome: " Progressing     Problem: Comorbidity Management  Goal: Maintenance of Heart Failure Symptom Control  Outcome: Progressing  Intervention: Maintain Heart Failure Management  Recent Flowsheet Documentation  Taken 7/19/2025 1900 by Emely Anna RN  Medication Review/Management: medications reviewed  Goal: Blood Pressure in Desired Range  Outcome: Progressing  Intervention: Maintain Blood Pressure Management  Recent Flowsheet Documentation  Taken 7/19/2025 1900 by Emely Anna RN  Medication Review/Management: medications reviewed     Problem: Fall Injury Risk  Goal: Absence of Fall and Fall-Related Injury  Outcome: Progressing  Intervention: Identify and Manage Contributors  Recent Flowsheet Documentation  Taken 7/19/2025 1900 by Emely Anna RN  Medication Review/Management: medications reviewed  Intervention: Promote Injury-Free Environment  Recent Flowsheet Documentation  Taken 7/19/2025 1900 by Emely Anna RN  Safety Promotion/Fall Prevention:   activity supervised   assistive device/personal items within reach   safety round/check completed   supervised activity     Problem: Pain Acute  Goal: Optimal Pain Control and Function  Outcome: Progressing  Intervention: Prevent or Manage Pain  Recent Flowsheet Documentation  Taken 7/19/2025 1900 by Emely Anna RN  Medication Review/Management: medications reviewed     Problem: Hip Fracture Surgical Repair  Goal: Optimal Coping with Change in Health Status  Outcome: Progressing  Goal: Absence of Bleeding  Outcome: Progressing  Intervention: Monitor and Manage Bleeding  Recent Flowsheet Documentation  Taken 7/19/2025 1900 by Emely Anna RN  Bleeding Management: dressing monitored  Goal: Effective Bowel Elimination  Outcome: Progressing  Goal: Cognitive Function Maintained  Outcome: Progressing  Goal: Fluid and Electrolyte Balance  Outcome: Progressing  Goal: Absence of Infection Signs and Symptoms  Outcome: Progressing  Goal: Optimal  Functional Ability  Outcome: Progressing  Intervention: Promote Optimal Functional Status  Recent Flowsheet Documentation  Taken 7/19/2025 1900 by Emely Anna, RN  Activity Management:   activity adjusted per tolerance   ambulated in room   ambulated outside room  Goal: Anesthesia/Sedation Recovery  Outcome: Progressing  Intervention: Optimize Anesthesia Recovery  Recent Flowsheet Documentation  Taken 7/19/2025 1900 by Emely Anna, RN  Safety Promotion/Fall Prevention:   activity supervised   assistive device/personal items within reach   safety round/check completed   supervised activity  Goal: Optimal Pain Control and Function  Outcome: Progressing  Goal: Nausea and Vomiting Relief  Outcome: Progressing  Goal: Effective Urinary Elimination  Outcome: Progressing  Goal: Effective Oxygenation and Ventilation  Outcome: Progressing  Intervention: Optimize Oxygenation and Ventilation  Recent Flowsheet Documentation  Taken 7/19/2025 1900 by Emely Anna, RN  Head of Bed (HOB) Positioning: HOB at 30 degrees

## 2025-07-20 NOTE — PROGRESS NOTES
07/20/25 1000   Appointment Info   Signing Clinician's Name / Credentials (OT) Daniela Escalante OTRL   Rehab Comments (OT) RLE 50% WB, posterior hip precautions   Living Environment   People in Home spouse   Current Living Arrangements independent living facility   Home Accessibility no concerns   Living Environment Comments Patient reports he lives in an ILF, elevator access and all needs met on one level. Pt has a walk in shower with grab bars and a seat, raised toilets with grab bars.   Self-Care   Usual Activity Tolerance moderate   Current Activity Tolerance fair   Regular Exercise No   Equipment Currently Used at Home cane, straight;walker, rolling   Fall history within last six months no   Activity/Exercise/Self-Care Comment Pt reports mod I with cane at baseline, walker intermittently in community. Pt reports independence with ADLs and IADLS at baseline   Instrumental Activities of Daily Living (IADL)   Previous Responsibilities meal prep;housekeeping;laundry;shopping;medication management;finances;driving   IADL Comments Pt reports spouse can assist with IADLS   General Information   Onset of Illness/Injury or Date of Surgery 07/17/25   Referring Physician Enrike Bolivar MD   Patient/Family Therapy Goal Statement (OT) Patient would like to return to prior level of function   Additional Occupational Profile Info/Pertinent History of Current Problem 84 year old male w a history of CAD, with prior MI in 2010, history of VFib cardiac arrest at the time of his MI, ischemic cardiomyopathy with LVEF 30-35%, paroxysmal atrial fibrillation, PVCs and CKD who presents with post-operative hypotension after elective revision of right cemented hemiarthroplasty to total hip arthroplasty.      # Hypovolemic shock secondary to acute blood loss and volume loss in the setting of operative procedure. Lactic acidosis   Existing Precautions/Restrictions fall;no hip IR;no hip ADD past midline;90 degree hip flexion;no pivoting or  twisting;weight bearing   Right Lower Extremity (Weight-bearing Status) partial weight-bearing (PWB)   Cognitive Status Examination   Orientation Status orientation to person, place and time   Affect/Mental Status (Cognitive) WFL   Follows Commands WFL   Cognitive Status Comments Patient appears at his cognitive baseline   Visual Perception   Visual Impairment/Limitations corrective lenses full-time   Sensory   Sensory Comments Patient reports intact   Pain Assessment   Patient Currently in Pain Yes, see Vital Sign flowsheet   Posture   Posture forward head position;protracted shoulders   Range of Motion Comprehensive   Comment, General Range of Motion RLE limited due to precautions and pain   Strength Comprehensive (MMT)   Comment, General Manual Muscle Testing (MMT) Assessment RLE 50% WB, decline in tolerance for activity   Bed Mobility   Comment (Bed Mobility) Mod assist for LE   Transfers   Transfer Comments Min assist   Balance   Balance Comments Impaired in standing   Activities of Daily Living   BADL Assessment/Intervention bathing;lower body dressing;grooming;toileting   Bathing Assessment/Intervention   Comment, (Bathing) Max assist with transfer, mod assist LE bathing perclinical reasoning   Lower Body Dressing Assessment/Training   Comment, (Lower Body Dressing) Max assist   Grooming Assessment/Training   Comment, (Grooming) Poor tolerance for activity in standign   Toileting   Comment, (Toileting) Min assist with transfer   Clinical Impression   Criteria for Skilled Therapeutic Interventions Met (OT) Yes, treatment indicated   OT Diagnosis Decline in ADL independence and safety   Influenced by the following impairments RTHA   OT Problem List-Impairments impacting ADL problems related to;activity tolerance impaired;balance;mobility;range of motion (ROM);strength;pain;post-surgical precautions   Assessment of Occupational Performance 5 or more Performance Deficits   Identified Performance Deficits Impaired  dressing bathing toileting and tolerance for ADLS including grooming   Planned Therapy Interventions (OT) ADL retraining;progressive activity/exercise   Clinical Decision Making Complexity (OT) problem focused assessment/low complexity   Risk & Benefits of therapy have been explained care plan/treatment goals reviewed;evaluation/treatment results reviewed;risks/benefits reviewed;current/potential barriers reviewed;participants included;participants voiced agreement with care plan;patient   OT Total Evaluation Time   OT Eval, Low Complexity Minutes (57486) 9   OT Goals   Therapy Frequency (OT) 5 times/week   OT Predicted Duration/Target Date for Goal Attainment 07/29/25   OT Goals Hygiene/Grooming;Lower Body Dressing;Lower Body Bathing;Toilet Transfer/Toileting   OT: Hygiene/Grooming modified independent;using adaptive equipment;within precautions;while standing   OT: Lower Body Dressing Modified independent;using adaptive equipment;within precautions;including set-up/clothing retrieval   OT: Lower Body Bathing Modified independent;using adaptive equipment;with precautions   OT: Toilet Transfer/Toileting Modified independent;toilet transfer;cleaning and garment management;using adaptive equipment;within precautions   Self-Care/Home Management   Self-Care/Home Mgmt/ADL, Compensatory, Meal Prep Minutes (11930) 25   Symptoms Noted During/After Treatment (Meal Preparation/Planning Training) fatigue;increased pain   Treatment Detail/Skilled Intervention Ot; Pt agreeable to therapy. Pt educated on precautions and how to maintain during mobility, pt required min assist and cues to mobilize RLE to EOB. Pt educated on sock aid and reacher EOB with tutorial. Pt donned L sock with sock aid and cues after tutorial. Pt unable to lift RLE to don sock with sock aid, max assist required. Pt completed sit to stand with min fernando and cues for positioning. Pt ambulated to chair with CGA and cues for offloading RLE. Pt completed stand  to sit with min assist to control descent. Pt eudcated on the need for TCU given limited activity tolerance and significant need for assist. Pt agreeable. Pt in chair with LE elevated, alarm on and call light upon OT departure   OT Discharge Planning   OT Plan Toilet transfer in bathroom, g/h standing with chair behind, review LE dressing with AE and precautions   OT Discharge Recommendation (DC Rec) Transitional Care Facility   OT Rationale for DC Rec Patient presents below baseline with ADLs and mobility requiring Ax1 for all mobility and ADLs at this time. Pt would benefit from continued OT in TCU To return to prior level of function   OT Brief overview of current status Min assist sit to stand   OT Total Distance Amb During Session (feet) 4

## 2025-07-20 NOTE — PROGRESS NOTES
X-cover    Notified that patient's systolic blood pressure is currently in the mid 80 range.    Patient is asymptomatic    Chart reviewed.  Patient hospital course notable for postoperative hypotension related to acute blood loss anemia from surgery    Has had intermittent systolic blood pressures in the 90s    Hemoglobin this morning was 8.4 and stable from previous    Per chart review it appears most recent blood transfusion was 7/19    Plan:  -Check stat hemoglobin  -No treatment for now unless patient blood pressure drops further, become symptomatic, or significant drop in hemoglobin from previous  -Bedside nurse updated on plan

## 2025-07-20 NOTE — PLAN OF CARE
"Goal Outcome Evaluation:      Plan of Care Reviewed With: patient    Overall Patient Progress: improvingOverall Patient Progress: improving     Alert and oriented. VSS on room air. Soft Bps. Ax1 gait belt and walker. Hgb 8.4. Had BM today. Pain controlled with scheduled tylenol. CMS intact. Dressing CDI. TCU at discharge.           Problem: Delirium  Goal: Optimal Coping  7/20/2025 1546 by Naye Jang RN  Outcome: Progressing  7/20/2025 1041 by Naye Jang RN  Outcome: Progressing  Goal: Improved Sleep  7/20/2025 1546 by Naye Jang RN  Outcome: Progressing  7/20/2025 1041 by Naye Jang RN  Outcome: Progressing     Problem: Adult Inpatient Plan of Care  Goal: Plan of Care Review  Description: The Plan of Care Review/Shift note should be completed every shift.  The Outcome Evaluation is a brief statement about your assessment that the patient is improving, declining, or no change.  This information will be displayed automatically on your shift  note.  7/20/2025 1546 by Naye Jang RN  Outcome: Progressing  Flowsheets (Taken 7/20/2025 1546)  Plan of Care Reviewed With: patient  Overall Patient Progress: improving  7/20/2025 1041 by Naye Jagn RN  Outcome: Progressing  Goal: Patient-Specific Goal (Individualized)  Description: You can add care plan individualizations to a care plan. Examples of Individualization might be:  \"Parent requests to be called daily at 9am for status\", \"I have a hard time hearing out of my right ear\", or \"Do not touch me to wake me up as it startles  me\".  7/20/2025 1546 by Naye Jang RN  Outcome: Progressing  7/20/2025 1041 by Naye Jang RN  Outcome: Progressing  Goal: Absence of Hospital-Acquired Illness or Injury  7/20/2025 1546 by Naye Jang RN  Outcome: Progressing  7/20/2025 1041 by Naye Jang RN  Outcome: Progressing  Intervention: Identify and Manage Fall Risk  Recent Flowsheet Documentation  Taken 7/20/2025 1000 by " Rankila, Naye L, RN  Safety Promotion/Fall Prevention:   activity supervised   assistive device/personal items within reach   safety round/check completed   supervised activity  Intervention: Prevent Skin Injury  Recent Flowsheet Documentation  Taken 7/20/2025 0809 by Naye Jang RN  Body Position: supine, head elevated  Intervention: Prevent Infection  Recent Flowsheet Documentation  Taken 7/20/2025 1000 by Naye Jang RN  Infection Prevention:   cohorting utilized   hand hygiene promoted   rest/sleep promoted  Goal: Optimal Comfort and Wellbeing  7/20/2025 1546 by Naye Jang RN  Outcome: Progressing  7/20/2025 1041 by Naye Jang RN  Outcome: Progressing  Intervention: Monitor Pain and Promote Comfort  Recent Flowsheet Documentation  Taken 7/20/2025 0759 by Naye Jang RN  Pain Management Interventions: medication (see MAR)  Goal: Readiness for Transition of Care  7/20/2025 1546 by Naye Jang RN  Outcome: Progressing  7/20/2025 1041 by Naye Jang RN  Outcome: Progressing     Problem: Comorbidity Management  Goal: Maintenance of Heart Failure Symptom Control  7/20/2025 1546 by Naye Jang RN  Outcome: Progressing  7/20/2025 1041 by Naye Jang RN  Outcome: Progressing  Intervention: Maintain Heart Failure Management  Recent Flowsheet Documentation  Taken 7/20/2025 1000 by Naye Jang RN  Medication Review/Management: medications reviewed  Goal: Blood Pressure in Desired Range  7/20/2025 1546 by Naye Jang RN  Outcome: Progressing  7/20/2025 1041 by Naye Jang RN  Outcome: Progressing  Intervention: Maintain Blood Pressure Management  Recent Flowsheet Documentation  Taken 7/20/2025 1000 by Naye Jang RN  Medication Review/Management: medications reviewed     Problem: Fall Injury Risk  Goal: Absence of Fall and Fall-Related Injury  7/20/2025 1546 by Naye Jang RN  Outcome: Progressing  7/20/2025 1041 by Naye Jang  RN  Outcome: Progressing  Intervention: Identify and Manage Contributors  Recent Flowsheet Documentation  Taken 7/20/2025 1000 by Naye Jang RN  Medication Review/Management: medications reviewed  Intervention: Promote Injury-Free Environment  Recent Flowsheet Documentation  Taken 7/20/2025 1000 by Naye Jang RN  Safety Promotion/Fall Prevention:   activity supervised   assistive device/personal items within reach   safety round/check completed   supervised activity     Problem: Pain Acute  Goal: Optimal Pain Control and Function  7/20/2025 1546 by Naye Jang RN  Outcome: Progressing  7/20/2025 1041 by Naye Jang RN  Outcome: Progressing  Intervention: Develop Pain Management Plan  Recent Flowsheet Documentation  Taken 7/20/2025 0759 by Naye Jang RN  Pain Management Interventions: medication (see MAR)  Intervention: Prevent or Manage Pain  Recent Flowsheet Documentation  Taken 7/20/2025 1000 by Naye Jang RN  Bowel Elimination Promotion:   adequate fluid intake promoted   ambulation promoted  Medication Review/Management: medications reviewed     Problem: Hip Fracture Surgical Repair  Goal: Optimal Coping with Change in Health Status  7/20/2025 1546 by Naye Jang RN  Outcome: Progressing  7/20/2025 1041 by Naye Jang RN  Outcome: Progressing  Goal: Absence of Bleeding  7/20/2025 1546 by Naye Jang RN  Outcome: Progressing  7/20/2025 1041 by Naye Jang RN  Outcome: Progressing  Intervention: Monitor and Manage Bleeding  Recent Flowsheet Documentation  Taken 7/20/2025 1000 by Naye Jang RN  Bleeding Management: dressing monitored  Goal: Effective Bowel Elimination  7/20/2025 1546 by Naye Jang RN  Outcome: Progressing  7/20/2025 1041 by Naye Jang RN  Outcome: Progressing  Intervention: Enhance Bowel Motility and Elimination  Recent Flowsheet Documentation  Taken 7/20/2025 1000 by Naye Jang RN  Bowel Elimination  Management: toileting offered  Bowel Elimination Promotion:   adequate fluid intake promoted   ambulation promoted  Goal: Cognitive Function Maintained  7/20/2025 1546 by Naye Jang RN  Outcome: Progressing  7/20/2025 1041 by Naye Jang RN  Outcome: Progressing  Goal: Fluid and Electrolyte Balance  7/20/2025 1546 by Naye Jang RN  Outcome: Progressing  7/20/2025 1041 by Naye Jang RN  Outcome: Progressing  Goal: Absence of Infection Signs and Symptoms  7/20/2025 1546 by Naye Jang RN  Outcome: Progressing  7/20/2025 1041 by Naye Jang RN  Outcome: Progressing  Goal: Optimal Functional Ability  7/20/2025 1546 by Naye Jang RN  Outcome: Progressing  7/20/2025 1041 by Naye Jang RN  Outcome: Progressing  Intervention: Promote Optimal Functional Status  Recent Flowsheet Documentation  Taken 7/20/2025 1000 by Naye Jang RN  Activity Management: up in chair  Taken 7/20/2025 0809 by Naye Jang RN  Activity Management:   ambulated to bathroom   back to bed  Goal: Anesthesia/Sedation Recovery  7/20/2025 1546 by Naye Jang RN  Outcome: Progressing  7/20/2025 1041 by Naye Jang RN  Outcome: Progressing  Intervention: Optimize Anesthesia Recovery  Recent Flowsheet Documentation  Taken 7/20/2025 1000 by Naye Jang RN  Safety Promotion/Fall Prevention:   activity supervised   assistive device/personal items within reach   safety round/check completed   supervised activity  Goal: Optimal Pain Control and Function  7/20/2025 1546 by Naye Jang RN  Outcome: Progressing  7/20/2025 1041 by Naye Jang RN  Outcome: Progressing  Intervention: Prevent or Manage Pain  Recent Flowsheet Documentation  Taken 7/20/2025 0759 by Naye Jang RN  Pain Management Interventions: medication (see MAR)  Goal: Nausea and Vomiting Relief  7/20/2025 1546 by Naye Jang RN  Outcome: Progressing  7/20/2025 1041 by Naye Jang  RN  Outcome: Progressing  Goal: Effective Urinary Elimination  7/20/2025 1546 by Naye Jang RN  Outcome: Progressing  7/20/2025 1041 by Naye Jang RN  Outcome: Progressing  Goal: Effective Oxygenation and Ventilation  7/20/2025 1546 by Naye Jang, RN  Outcome: Progressing  7/20/2025 1041 by Naye Jang, RN  Outcome: Progressing  Intervention: Optimize Oxygenation and Ventilation  Recent Flowsheet Documentation  Taken 7/20/2025 0809 by Naye Jang, RN  Head of Bed (HOB) Positioning: HOB at 20-30 degrees

## 2025-07-20 NOTE — PROGRESS NOTES
Care Management Follow Up    Length of Stay (days): 3    Expected Discharge Date: 07/22/2025     Concerns to be Addressed: adjustment to diagnosis/illness     Patient plan of care discussed at interdisciplinary rounds: Yes    Anticipated Discharge Disposition:  TCU     Anticipated Discharge Services:  TCU  Anticipated Discharge DME:  N/A    Patient/family educated on Medicare website which has current facility and service quality ratings:  Yes  Education Provided on the Discharge Plan:  Yes  Patient/Family in Agreement with the Plan:  Yes    Referrals Placed by CM/SW:  [facility] ref[facility] and [facility]  Private pay costs discussed: transportation costs    Discussed  Partnership in Safe Discharge Planning  document with patient/family: No     Handoff Completed: No, handoff not indicated or clinically appropriate    Additional Information:  Social work met with patient and family with questions regarding TCU process. Social work explained referrals would be sent to TCUs of pts choosing. Pt and family requested referrals to [facility] and [facility]. Referrals were sent. Transportation was also discussed. Family is unsure if they will be able to provide transportation due to scheduling conflicts and pts unsteady gait.     Reviewed out of pocket cost for [facility] WC transport, $99.20 base and $6.38 per mile to the destination. Spoke with pt and family, they expressed understanding and are agreeable to this.      Reviewed potential out of pocket cost for [facility] stretcher transport. Current base rate is $1354.65 and $31.61 per mile to the destination. Pt/family expressed understanding and are agreeable to this.      Next Steps: Follow up on TCU referrals and transportation.    CIARA HardyW

## 2025-07-20 NOTE — PROGRESS NOTES
[facility]    Medicine Progress Note - Hospitalist Service    Date of Admission:  7/17/2025    Assessment & Plan   Constantino Thorne is a pleasant 84 year old gentleman w/ history of CAD, prior MI in 2010, history of V-fib cardiac arrest at the time of his MI, ischemic cardiomyopathy with LVEF 30-35%, paroxysmal atrial fibrillation, PVCs and CKD who presented 7/17 with post-operative hypotension after elective revision of right cemented hemiarthroplasty to total hip arthroplasty.   Current problems include:     Hypovolemic shock secondary to acute blood loss and volume loss in the setting of operative procedure  Lactic acidosis  - revision of right cemented hemiarthroplasty to total hip arthroplasty on 7/17.  Estimated blood loss was noted to be 1000 cc with complication noted of femoral canal perforation needing femoral stem.  ICU admission was requested because patient has been persistently hypotensive with systolics ranging from 70-90 postoperatively.  He has got a couple of phenylephrine pushes from anesthesiologist and is going to start on norepinephrine drip.  His postoperative hemoglobin was 10.3 and has got 3.5 liters of IV fluid already.  - Patient denies chest pain, shortness of breath or dizziness/lightheadedness.  His EKG shows an atrial paced rhythm.  Lactic acid within normal limits and troponin negative.  He has a mild leukocytosis postoperatively and immediate postoperative hemoglobin 10.2.  Patient on low-dose norepinephrine after being given 3-1/2 L of IV fluid.  - Pt weaned off norepi by afternoon of 7/18.  TTE done showed EF 35-40% without significant volume overload.  No major changes from TTE on 11/7/2024.    - Hgb in mid 7's on 7/19 after receiving IVF from around 10 pre-op;  - given softer BP's and some lightheadedness, was transfused 1 unit pRBC  - Lactic acid improved.   - Hold beta blockade on 7/19 until BP's improved a bit more.     Post-op urine retention; jose was  placed  - remove garcia today and do trial of void    Normocytic anemia  Recent blood loss  Leukocytosis; improving  Thrombocytopenia  - Hgb improved  - check iron studies   - repeat CBC/platelets 7/21     Revision of right cemented hemiarthroplasty to total hip arthroplasty  Physical deconditioning  - done due to persistent pain.  - Postoperative management per orthopedics.  - restarted on coumadin with ppx lovenox; cntinue Lovenox unti INR therapeutic; RPh following  - therapies working with Constantino; 50% weightbearing.  - incentive spirometer and increase up in chair time     History of ischemic cardiomyopathy  Chronic systolic CHF  History of frequent PVCs  CAD with prior MI in 2010  Persistent atrial fibrillation  Last echocardiogram on 7/15/2025 showed the EF of 30% which is unchanged compared to his previous echocardiograms.  Not on daily diuretics at home.  Not on lisinopril and spironolactone due to intolerance.    - not on aspirin as he is on warfarin.  His [id number] score of 5.    - continue amiodarone.  Resume metoprolol XL 12.5 mg twice daily once blood pressure is improved.  Resume Coumadin.      lipidemia: Continued on atorvastatin 80 mg daily.  Sleep apnea: Continues CPAP.  CKD2-3: Baseline creatinine seems to be around 1.29 based on his preoperative labs.  Close to baseline.  Monitoring.  Urinary retention  - was not able to urinate in the immediate postoperative period and Garcia was inserted;  - remove Garcia today and do voiding trial.    Obesity: Complicates cares     Diet: Advance Diet as Tolerated: Regular Diet Adult    DVT Prophylaxis: Enoxaparin (Lovenox) SQ, Warfarin, Pneumatic Compression Devices, and Ambulate every shift  Garcia Catheter: PRESENT, indication: Acute retention or obstruction  Lines: None     Cardiac Monitoring: ACTIVE order. Indication: ICU  Code Status: Full Code      Clinically Significant Risk Factors               # Hypoalbuminemia: Lowest albumin = 3.1 g/dL at 7/17/2025   "5:49 PM, will monitor as appropriate   # Thrombocytopenia: Lowest platelets = 123 in last 2 days, will monitor for bleeding   # Hypertension: Noted on problem list  # Chronic heart failure with reduced ejection fraction: last echo with EF <40%           # Overweight: Estimated body mass index is 29.69 kg/m  as calculated from the following:    Height as of this encounter: 1.803 m (5' 10.98\").    Weight as of this encounter: 96.5 kg (212 lb 11.9 oz)., PRESENT ON ADMISSION          Disposition Plan     Medically Ready for Discharge: Anticipated Tomorrow  -> therapies recommending TCU's; Constantino and his family are weighing options.  Further follow up by LSW..      Dionisio Wyatt MD  Hospitalist Service  [facility]  Securely message with [employer] (more info)  Text page via [employer] Paging/Directory   ______________________________________________________________________    Interval History   Reviewed care w/ RN, w/ Constantino, his wife and daughter in room.  No f/c/SOB; no chest or abdominal pain.  Was constipated but had a large BM earlier today.  Walked w/ PT this a.m.  No orthostatic Sx, but BP's were in 90's systolic earlier.  Appetite good.    Physical Exam   Vital Signs: Temp: 97.9  F (36.6  C) Temp src: Temporal BP: 96/49 Pulse: 60   Resp: 16 SpO2: 95 % O2 Device: None (Room air)    Weight: 212 lbs 11.9 oz    General Appearance: NAD; fatigued, lying in bed.  HEENT: sclerae clear; MM's moist  Respiratory: good a/e bilaterally; no wheezing or rhonchi  Cardiovascular: RRR; no m/r/g  GI: abd obese; + BS, soft; NT/ND  Skin: no rash or jaundice; bilateral feet/toenails in good condition  Ext: no edema.  PCD's in place  Neurologic: awake, conversant; sits up w/ assist of 1, slowly     Data     Recent Labs   Lab 07/20/25  0750 07/19/25  1603 07/19/25  1202 07/19/25  0806 07/19/25  0605 07/18/25  1947 07/18/25  1243 07/18/25  0808 07/18/25  0524 07/17/25  2343 07/17/25  1950 07/17/25  1948 07/17/25  1749 " 07/17/25  1700 07/17/25  1651   WBC 11.7*  --   --   --  14.3*  --   --   --  19.2*  --  19.7*  --   --   --  17.1*   HGB 8.4*  --   --   --  7.5*  --  8.0*  --  8.2*  --  10.3*  --   --   --  10.2*   MCV 95  --   --   --  96  --  96  --  97  --  97  --   --   --  96   *  --   --   --  123*  --   --   --  139*  --  160  --   --   --  148*   INR 1.52*  --   --   --  1.53*  --   --   --   --   --   --   --   --   --  1.29*     --   --   --  136  --   --   --  134*  --  136  --  137  --  138   POTASSIUM 4.4  --   --   --  4.3  --   --   --  4.3  --  4.5  --  4.4  --  4.6   CHLORIDE 102  --   --   --  106  --   --   --  103  --  103  --  104  --  105   CO2 24  --   --   --  24  --   --   --  21*  --  19*  --  19*  --  21*   BUN 23.6*  --   --   --  27.9*  --   --   --  28.7*  --  25.7*  --  25.2*  --  25.8*   CR 1.07  --   --   --  1.26*  --   --   --  1.30*  --  1.24*  --  1.22*  --  1.19*   ANIONGAP 9  --   --   --  6*  --   --   --  10  --  14  --  14  --  12   ZACH 8.0*  --   --   --  7.9*  --   --   --  8.1*  --  8.4*  --  8.3*  --  8.1*   GLC 92 104* 126*   < > 97   < >  --    < > 161*   < > 201*   < > 158*   < > 147*   ALBUMIN  --   --   --   --   --   --   --   --   --   --  3.3*  --  3.1*  --   --    PROTTOTAL  --   --   --   --   --   --   --   --   --   --  5.7*  --  5.6*  --   --    BILITOTAL  --   --   --   --   --   --   --   --   --   --  1.0  --  1.0  --   --    ALKPHOS  --   --   --   --   --   --   --   --   --   --  100  --  101  --   --    ALT  --   --   --   --   --   --   --   --   --   --  22  --  23  --   --    AST  --   --   --   --   --   --   --   --   --   --  29  --  28  --   --     < > = values in this interval not displayed.

## 2025-07-21 ENCOUNTER — APPOINTMENT (OUTPATIENT)
Dept: PHYSICAL THERAPY | Facility: CLINIC | Age: 85
DRG: 466 | End: 2025-07-21
Attending: ORTHOPAEDIC SURGERY
Payer: COMMERCIAL

## 2025-07-21 ENCOUNTER — LAB REQUISITION (OUTPATIENT)
Dept: LAB | Facility: CLINIC | Age: 85
End: 2025-07-21
Payer: COMMERCIAL

## 2025-07-21 ENCOUNTER — DOCUMENTATION ONLY (OUTPATIENT)
Dept: GERIATRICS | Facility: CLINIC | Age: 85
End: 2025-07-21
Payer: COMMERCIAL

## 2025-07-21 ENCOUNTER — APPOINTMENT (OUTPATIENT)
Dept: OCCUPATIONAL THERAPY | Facility: CLINIC | Age: 85
DRG: 466 | End: 2025-07-21
Attending: ORTHOPAEDIC SURGERY
Payer: COMMERCIAL

## 2025-07-21 VITALS
RESPIRATION RATE: 18 BRPM | SYSTOLIC BLOOD PRESSURE: 114 MMHG | WEIGHT: 212.3 LBS | DIASTOLIC BLOOD PRESSURE: 63 MMHG | TEMPERATURE: 98 F | OXYGEN SATURATION: 94 % | HEART RATE: 66 BPM | BODY MASS INDEX: 29.72 KG/M2 | HEIGHT: 71 IN

## 2025-07-21 DIAGNOSIS — Z11.1 ENCOUNTER FOR SCREENING FOR RESPIRATORY TUBERCULOSIS: ICD-10-CM

## 2025-07-21 LAB
ANION GAP SERPL CALCULATED.3IONS-SCNC: 9 MMOL/L (ref 7–15)
BUN SERPL-MCNC: 22.4 MG/DL (ref 8–23)
CALCIUM SERPL-MCNC: 8.1 MG/DL (ref 8.8–10.4)
CHLORIDE SERPL-SCNC: 103 MMOL/L (ref 98–107)
CORTIS SERPL-MCNC: 14.8 UG/DL
CREAT SERPL-MCNC: 1.02 MG/DL (ref 0.67–1.17)
EGFRCR SERPLBLD CKD-EPI 2021: 72 ML/MIN/1.73M2
ERYTHROCYTE [DISTWIDTH] IN BLOOD BY AUTOMATED COUNT: 14.6 % (ref 10–15)
GLUCOSE SERPL-MCNC: 98 MG/DL (ref 70–99)
HCO3 SERPL-SCNC: 20 MMOL/L (ref 22–29)
HCT VFR BLD AUTO: 25.1 % (ref 40–53)
HGB BLD-MCNC: 8.5 G/DL (ref 13.3–17.7)
INR PPP: 1.5 (ref 0.85–1.15)
MAGNESIUM SERPL-MCNC: 1.8 MG/DL (ref 1.7–2.3)
MCH RBC QN AUTO: 31.7 PG (ref 26.5–33)
MCHC RBC AUTO-ENTMCNC: 33.9 G/DL (ref 31.5–36.5)
MCV RBC AUTO: 94 FL (ref 78–100)
PLATELET # BLD AUTO: 138 10E3/UL (ref 150–450)
POTASSIUM SERPL-SCNC: 4.1 MMOL/L (ref 3.4–5.3)
PROTHROMBIN TIME: 18 SECONDS (ref 11.8–14.8)
RBC # BLD AUTO: 2.68 10E6/UL (ref 4.4–5.9)
SODIUM SERPL-SCNC: 132 MMOL/L (ref 135–145)
WBC # BLD AUTO: 10.6 10E3/UL (ref 4–11)

## 2025-07-21 PROCEDURE — 250N000013 HC RX MED GY IP 250 OP 250 PS 637: Performed by: STUDENT IN AN ORGANIZED HEALTH CARE EDUCATION/TRAINING PROGRAM

## 2025-07-21 PROCEDURE — 97530 THERAPEUTIC ACTIVITIES: CPT | Mod: GP | Performed by: PHYSICAL THERAPIST

## 2025-07-21 PROCEDURE — 99239 HOSP IP/OBS DSCHRG MGMT >30: CPT | Performed by: INTERNAL MEDICINE

## 2025-07-21 PROCEDURE — 250N000011 HC RX IP 250 OP 636: Performed by: HOSPITALIST

## 2025-07-21 PROCEDURE — 82435 ASSAY OF BLOOD CHLORIDE: CPT | Performed by: HOSPITALIST

## 2025-07-21 PROCEDURE — 250N000013 HC RX MED GY IP 250 OP 250 PS 637

## 2025-07-21 PROCEDURE — 82533 TOTAL CORTISOL: CPT | Performed by: INTERNAL MEDICINE

## 2025-07-21 PROCEDURE — 97116 GAIT TRAINING THERAPY: CPT | Mod: GP | Performed by: PHYSICAL THERAPIST

## 2025-07-21 PROCEDURE — 36415 COLL VENOUS BLD VENIPUNCTURE: CPT | Performed by: HOSPITALIST

## 2025-07-21 PROCEDURE — 85610 PROTHROMBIN TIME: CPT | Performed by: HOSPITALIST

## 2025-07-21 PROCEDURE — 83735 ASSAY OF MAGNESIUM: CPT | Performed by: HOSPITALIST

## 2025-07-21 PROCEDURE — 85014 HEMATOCRIT: CPT | Performed by: HOSPITALIST

## 2025-07-21 PROCEDURE — 250N000013 HC RX MED GY IP 250 OP 250 PS 637: Performed by: ORTHOPAEDIC SURGERY

## 2025-07-21 PROCEDURE — 97535 SELF CARE MNGMENT TRAINING: CPT | Mod: GO

## 2025-07-21 RX ORDER — OXYCODONE HYDROCHLORIDE 5 MG/1
5 TABLET ORAL EVERY 4 HOURS PRN
Qty: 30 TABLET | Refills: 0 | Status: SHIPPED | OUTPATIENT
Start: 2025-07-21 | End: 2025-07-29

## 2025-07-21 RX ORDER — POLYETHYLENE GLYCOL 3350 17 G/17G
17 POWDER, FOR SOLUTION ORAL DAILY
Qty: 510 G | Refills: 0 | Status: SHIPPED | OUTPATIENT
Start: 2025-07-21

## 2025-07-21 RX ORDER — METOPROLOL SUCCINATE 25 MG/1
12.5 TABLET, EXTENDED RELEASE ORAL 2 TIMES DAILY
DISCHARGE
Start: 2025-07-21

## 2025-07-21 RX ORDER — HYDROXYZINE HYDROCHLORIDE 10 MG/1
10 TABLET, FILM COATED ORAL EVERY 6 HOURS PRN
Qty: 30 TABLET | Refills: 0 | Status: SHIPPED | OUTPATIENT
Start: 2025-07-21 | End: 2025-07-22

## 2025-07-21 RX ORDER — ACETAMINOPHEN 325 MG/1
975 TABLET ORAL EVERY 8 HOURS
Qty: 60 TABLET | Refills: 0 | Status: SHIPPED | OUTPATIENT
Start: 2025-07-21

## 2025-07-21 RX ORDER — NITROGLYCERIN 0.4 MG/1
TABLET SUBLINGUAL
DISCHARGE
Start: 2025-07-21

## 2025-07-21 RX ORDER — WARFARIN SODIUM 5 MG/1
TABLET ORAL
DISCHARGE
Start: 2025-07-21

## 2025-07-21 RX ORDER — WARFARIN SODIUM 7.5 MG/1
7.5 TABLET ORAL ONCE
Status: COMPLETED | OUTPATIENT
Start: 2025-07-21 | End: 2025-07-21

## 2025-07-21 RX ORDER — AMIODARONE HYDROCHLORIDE 200 MG/1
200 TABLET ORAL EVERY MORNING
DISCHARGE
Start: 2025-07-21

## 2025-07-21 RX ORDER — WARFARIN SODIUM 7.5 MG/1
7.5 TABLET ORAL
Status: DISCONTINUED | OUTPATIENT
Start: 2025-07-21 | End: 2025-07-21

## 2025-07-21 RX ORDER — AMOXICILLIN 250 MG
1 CAPSULE ORAL 2 TIMES DAILY
Qty: 30 TABLET | Refills: 0 | Status: SHIPPED | OUTPATIENT
Start: 2025-07-21

## 2025-07-21 RX ADMIN — POLYETHYLENE GLYCOL 3350 17 G: 17 POWDER, FOR SOLUTION ORAL at 08:55

## 2025-07-21 RX ADMIN — SENNOSIDES AND DOCUSATE SODIUM 1 TABLET: 50; 8.6 TABLET ORAL at 08:54

## 2025-07-21 RX ADMIN — ENOXAPARIN SODIUM 40 MG: 40 INJECTION SUBCUTANEOUS at 12:51

## 2025-07-21 RX ADMIN — ACETAMINOPHEN 975 MG: 325 TABLET ORAL at 07:04

## 2025-07-21 RX ADMIN — FAMOTIDINE 20 MG: 20 TABLET, FILM COATED ORAL at 08:54

## 2025-07-21 RX ADMIN — WARFARIN 7.5 MG: 7.5 TABLET ORAL at 12:46

## 2025-07-21 RX ADMIN — AMIODARONE HYDROCHLORIDE 200 MG: 200 TABLET ORAL at 08:54

## 2025-07-21 ASSESSMENT — ACTIVITIES OF DAILY LIVING (ADL)
ADLS_ACUITY_SCORE: 44
ADLS_ACUITY_SCORE: 47
ADLS_ACUITY_SCORE: 44
ADLS_ACUITY_SCORE: 47
ADLS_ACUITY_SCORE: 44
ADLS_ACUITY_SCORE: 44
ADLS_ACUITY_SCORE: 47
ADLS_ACUITY_SCORE: 44
ADLS_ACUITY_SCORE: 44

## 2025-07-21 NOTE — PLAN OF CARE
Occupational Therapy Discharge Summary    Reason for therapy discharge:    Discharged to transitional care facility.    Progress towards therapy goal(s). See goals on Care Plan in [facility] electronic health record for goal details.  Goals partially met.  Barriers to achieving goals:   discharge from facility.    Therapy recommendation(s):    Continued therapy is recommended.  Rationale/Recommendations:  Continued Occupational Therapy at TCU for progression of independence with ADLs and IADLs.

## 2025-07-21 NOTE — PROGRESS NOTES
Orthopedic Surgery  Constantino Thorne  07/21/2025     Admit Date:  7/17/2025  Assessment:   POD: 4 Days Post-Op   Procedure(s):  Revision right cemented hemiarthroplasty to total hip arthroplasty     Patient resting comfortably in chair. He still notes pain along the lateral aspect of the hip and thigh, that is increased with bearing weight on the leg. He notes he was able to walk down the hallway with therapy this morning, making it further than yesterday.  Pain currently controlled.  Tolerating oral intake.    Denies nausea or vomiting  Denies chest pain or shortness of breath    Temp:  [97.3  F (36.3  C)-98.3  F (36.8  C)] 98  F (36.7  C)  Pulse:  [62-74] 66  Resp:  [16-18] 18  BP: ()/(47-76) 114/63  SpO2:  [93 %-97 %] 94 %    Alert and oriented  Dressing is clean, dry, and intact.   Minimal erythema of the surrounding skin.   Bilateral calves are soft, non-tender.  Right lower extremity is NVI.  Sensation intact bilateral lower extremities  Patient able to resist dorsi and plantar flexion bilaterally  +Dp pulse    Labs:  Recent Labs   Lab Test 07/21/25  0714 07/20/25  1903 07/20/25  0750 07/19/25  0605   WBC 10.6  --  11.7* 14.3*   HGB 8.5* 8.7* 8.4* 7.5*   *  --  131* 123*     Recent Labs   Lab Test 07/21/25  0714 07/20/25  0750 07/19/25  0605   INR 1.50* 1.52* 1.53*     No lab results found.      Plan:   Continue Coumadin for DVT prophylaxis as prior to surgery     Mobilize with PT/OT with posterior hip precautions   50% WB RLE.     Continue current pain regiment.   Dressings: Keep intact.  Change if >60% saturated or peeling off.    Follow-up: 2 weeks post-op with Diamond Lovell PA-C/Dr. Cantrell    Disposition:    Discharge to TCU this afternoon.    Diamond Lovell PA-C  [facility]

## 2025-07-21 NOTE — DISCHARGE SUMMARY
[facility]  Discharge Summary  Name: Constantino Thorne    MRN: [id number]  YOB: 1940    Age: 84 year old  Date of Discharge:  7/21/2025  Date of Admission: 7/17/2025  Primary Care Provider: Thom Barnes  Discharge Physician:  Preston Brumfield MD  Discharging Service:  Hospitalist      Hospital Course/Discharge Diagnoses:  Constantino Thorne is a pleasant 84 year old gentleman w/ history of CAD, prior MI in 2010, history of V-fib cardiac arrest at the time of his MI, ischemic cardiomyopathy with LVEF 30-35%, paroxysmal atrial fibrillation, PVCs and CKD who presented 7/17 with post-operative hypotension after elective revision of right cemented hemiarthroplasty to total hip arthroplasty.  Estimated blood loss was around 1 L with reported complication of femoral canal perforation needing a femoral stem.  He was initially admitted to the ICU due to persistent hypotension and briefly was on vasopressors though hypotension eventually resolved with blood transfusions and fluids.  Course was further complicated by some postop urinary retention though he reports he is voiding well at this time.    At this point he is hemodynamically stable but certainly below his baseline level of functional mobility and will require transitional care unit for ongoing recovery.     Hypovolemic shock secondary to acute blood loss and volume loss in the setting of operative procedure  Lactic acidosis  - revision of right cemented hemiarthroplasty to total hip arthroplasty on 7/17   - Now hemodynamically stable.  Hemoglobin 8.5.     Post-op urine retention; garcia was placed  - Garcia removed and passed voiding trial.     Normocytic anemia  Recent blood loss  Leukocytosis; improving  Thrombocytopenia  - Hgb improved, stable at 8.5.     Revision of right cemented hemiarthroplasty to total hip arthroplasty  Physical deconditioning  - done due to persistent pain.  - Postoperative management per orthopedics.  -  restarted on coumadin with ppx lovenox prior to discharge.  Should continue to have regular INR checks, goal 2-3 for A-fib.  - therapies working with Constantino; 50% weightbearing.     History of ischemic cardiomyopathy  Chronic systolic CHF  History of frequent PVCs  CAD with prior MI in 2010  Persistent atrial fibrillation  Last echocardiogram on 7/15/2025 showed the EF of 30% which is unchanged compared to his previous echocardiograms.  Not on daily diuretics at home.  Not on lisinopril and spironolactone due to intolerance.    - not on aspirin as he is on warfarin.  His [id number] score of 5.    - continue amiodarone.  Resume metoprolol XL as prior to admission.     lipidemia: Continued on atorvastatin 80 mg daily.  Sleep apnea: Continues CPAP.  CKD2-3: Baseline creatinine seems to be around 1.29 based on his preoperative labs.  Close to baseline.  Monitoring.  Urinary retention  - was not able to urinate in the immediate postoperative period and Conley was inserted;  - remove Conley today and do voiding trial.    Obesity: Complicates cares        Discharge Disposition:  Discharged to short-term care facility     Allergies:  Allergies   Allergen Reactions    Lisinopril Unknown and Cough    Spironolactone Unknown and Diarrhea        Discharge Medications:        Review of your medicines        START taking        Dose / Directions   acetaminophen 325 MG tablet  Commonly known as: TYLENOL      Dose: 975 mg  Take 3 tablets (975 mg) by mouth every 8 hours.  Quantity: 60 tablet  Refills: 0     hydrOXYzine HCl 10 MG tablet  Commonly known as: ATARAX      Dose: 10 mg  Take 1 tablet (10 mg) by mouth every 6 hours as needed for other (adjuvant pain).  Quantity: 30 tablet  Refills: 0     oxyCODONE 5 MG tablet  Commonly known as: ROXICODONE      Dose: 5 mg  Take 1 tablet (5 mg) by mouth every 4 hours as needed for severe pain or moderate to severe pain.  Quantity: 30 tablet  Refills: 0     polyethylene glycol 17 GM/Dose  powder  Commonly known as: MIRALAX      Dose: 17 g  Take 17 g by mouth daily.  Quantity: 510 g  Refills: 0     senna-docusate 8.6-50 MG tablet  Commonly known as: SENOKOT-S/PERICOLACE      Dose: 1 tablet  Take 1 tablet by mouth 2 times daily.  Quantity: 30 tablet  Refills: 0            CONTINUE these medicines which may have CHANGED, or have new prescriptions. If we are uncertain of the size of tablets/capsules you have at home, strength may be listed as something that might have changed.        Dose / Directions   warfarin ANTICOAGULANT 5 MG tablet  Commonly known as: COUMADIN/JANTOVEN  This may have changed: See the new instructions.  Used for: Long term current use of anticoagulant therapy      5 mg every Monday and Friday; 2.5 mg all other days.  Please have in INR checked at least weekly while at U to monitor.  Goal INR 2-3 for a-fib.  Refills: 0            CONTINUE these medicines which have NOT CHANGED        Dose / Directions   amiodarone 200 MG tablet  Commonly known as: PACERONE      Dose: 200 mg  Take 200 mg by mouth every morning.  Refills: 0     atorvastatin 80 MG tablet  Commonly known as: LIPITOR      Dose: 80 mg  Take 80 mg by mouth At Bedtime  Refills: 0     metoprolol succinate ER 25 MG 24 hr tablet  Commonly known as: TOPROL XL  Used for: HFrEF (heart failure with reduced ejection fraction) (H)      Dose: 12.5 mg  Take 0.5 tablets (12.5 mg) by mouth 2 times daily.  Quantity: 30 tablet  Refills: 0     nitroGLYcerin 0.4 MG sublingual tablet  Commonly known as: NITROSTAT      Place 1 Tablet under tongue as needed. If no relief after 5 min call 911;continue 1 tab every 5 min max 3 tab  Refills: 0               Where to get your medicines        These medications were sent to [id number] - [place], [place] - [address] [address]  [id number] [place] [address], [place] [address]      Phone: [phone]   acetaminophen 325 MG tablet  hydrOXYzine HCl 10 MG tablet  polyethylene glycol 17 GM/Dose  "powder  senna-docusate 8.6-50 MG tablet       Some of these will need a paper prescription and others can be bought over the counter. Ask your nurse if you have questions.    Bring a paper prescription for each of these medications  oxyCODONE 5 MG tablet         Condition on Discharge:  Discharge condition: Stable       Code status on discharge: Full Code     History of Illness:  See detailed admission note for full details.    Physical Exam:  Vital signs:  Temp: 98  F (36.7  C) Temp src: Temporal BP: 114/63 Pulse: 66   Resp: 18 SpO2: 94 % O2 Device: None (Room air) Oxygen Delivery: 2 LPM Height: 180.3 cm (5' 10.98\") Weight: 96.3 kg (212 lb 4.9 oz)  Estimated body mass index is 29.62 kg/m  as calculated from the following:    Height as of this encounter: 1.803 m (5' 10.98\").    Weight as of this encounter: 96.3 kg (212 lb 4.9 oz).    Wt Readings from Last 1 Encounters:   07/21/25 96.3 kg (212 lb 4.9 oz)     General: Alert, awake, no acute distress.  HEENT: NC/AT, eyes anicteric, external occular movements intact, face symmetric.    Cardiac: RRR, S1, S2.  No murmurs appreciated.  Pulmonary: Normal chest rise, normal work of breathing.  Lungs CTA BL  Abdomen: soft, non-tender, non-distended.  Bowel Sounds Present.  No guarding.  Extremities: no deformities.  Warm, well perfused.  Skin: no rashes or lesions noted.  Warm and Dry.  Neuro: No focal deficits noted.  Speech clear.  Coordination and strength grossly normal.  Psych: Appropriate affect.    Procedures other than Imaging:  Hip hemiarthroplasty     Imaging:  Results for orders placed or performed during the hospital encounter of 07/17/25   XR Hip Port Right 1 View    Narrative    This exam was marked as non-reportable because it will not be read by a   radiologist or a [place] non-radiologist provider.         XR Pelvis w Hip Port Right 1 View    Narrative    EXAM: XR PELVIS AND HIP PORTABLE RIGHT 1 VIEW  LOCATION: [facility]  DATE: " 2025    INDICATION: Status post Hip surgery  COMPARISON: None.      Impression    IMPRESSION: Postoperative changes right total hip arthroplasty. Cerclage wire fixation. Left hip negative for fracture with mild degenerative change. Visualized pelvis negative for fracture. Post procedural air on the right hip joint.   Echocardiogram Complete     Value    LVEF  35-40%    [facility]    [id number]  [id number]  [id number]  [id number]^POLA^CANDY^     [facility]  Echocardiography Laboratory  [address] [place] [address]     Name: DESIREE SKAGGS  MRN: [id number]  : 1940  Study Date: 2025 11:43 AM  Age: 84 yrs  Gender: Male  Patient Location: [facility]  Reason For Study: CHF  Ordering Physician: CANDY ESCALONA  Performed By: Adele Denise     BSA: 2.1 m2  Height: 70 in  Weight: 214 lb  HR: 60  BP: 105/63 mmHg  ______________________________________________________________________________  Procedure  Echocardiogram with two-dimensional, color and spectral Doppler.  ______________________________________________________________________________  Interpretation Summary     Left ventricular systolic function is moderately reduced.The visual ejection  fraction is 35-40%.Mid to distal septal, anterior and apical wall  hypokinesia.The left ventricle is mildly dilated.  The right ventricle is mildly dilated.  The left atrium is severely dilated.  There is mild (1+) mitral regurgitation.  The aortic root is normal size.Ascending aorta dilatation is present.  IVC diameter <2.1 cm collapsing >50% with sniff suggests a normal RA pressure  of 3 mmHg.     Compared to prior study dated 2024 LVEF appears similar  ______________________________________________________________________________  Left Ventricle  The left ventricle is mildly dilated. Proximal septal thickening is noted.  Diastolic Doppler findings (E/E' ratio and/or other parameters) suggest left  ventricular filling pressures are  indeterminate. Left ventricular systolic  function is moderately reduced. The visual ejection fraction is 35-40%. Mid to  distal septal, anterior and apical wall hypokinesia.     Right Ventricle  The right ventricle is mildly dilated. The right ventricular systolic function  is normal. There is a pacemaker lead in the right ventricle.     Atria  The left atrium is severely dilated. Right atrial size is normal. There is no  color Doppler evidence of an atrial shunt.     Mitral Valve  There is mild (1+) mitral regurgitation.     Tricuspid Valve  There is trace tricuspid regurgitation. Right ventricular systolic pressure  could not be approximated due to inadequate tricuspid regurgitation.     Aortic Valve  There is mild trileaflet aortic sclerosis. No aortic regurgitation is present.  No aortic stenosis is present.     Pulmonic Valve  There is trace pulmonic valvular regurgitation. There is no pulmonic valvular  stenosis.     Vessels  The aortic root is normal size. 4.2 cm. Ascending aorta dilatation is present.  4 cm. IVC diameter <2.1 cm collapsing >50% with sniff suggests a normal RA  pressure of 3 mmHg.     Rhythm  Sinus rhythm was noted.     ______________________________________________________________________________  MMode/2D Measurements & Calculations  IVSd: 0.99 cm  LVIDd: 6.0 cm  LVIDs: 3.9 cm  LVPWd: 0.86 cm  IVC diam: 2.0 cm  FS: 34.8 %     LV mass(C)d: 221.3 grams  LV mass(C)dI: 103.0 grams/m2  Ao root diam: 4.2 cm  asc Aorta Diam: 4.0 cm  LVOT diam: 2.4 cm  LVOT area: 4.6 cm2  Ao root diam index Ht(cm/m): 2.4  Ao root diam index BSA (cm/m2): 2.0  Asc Ao diam index BSA (cm/m2): 1.9  Asc Ao diam index Ht(cm/m): 2.2  EF Biplane: 44.0 %  LA Volume (BP): 135.0 ml     LA Volume Index (BP): 62.8 ml/m2  RWT: 0.29  TAPSE: 2.9 cm     Doppler Measurements & Calculations  MV E max sebas: 54.0 cm/sec  MV A max sebas: 84.4 cm/sec  MV E/A: 0.64  MV dec slope: 176.2 cm/sec2  MV dec time: 0.31 sec  LV V1 max P.7  mmHg  LV V1 max: 107.7 cm/sec  LV V1 VTI: 24.0 cm  SV(LVOT): 110.1 ml  SI(LVOT): 51.3 ml/m2  PA V2 max: 129.9 cm/sec  PA max P.8 mmHg  PA acc time: 0.07 sec  E/E' av.7  Lateral E/e': 7.1  Medial E/e': 10.3  RV S Montrell: 21.4 cm/sec     ______________________________________________________________________________  Report approved by: Surya He MD on 2025 02:57 PM              Consultations:  Consultation during this admission received from orthopedics.       Recent Lab Results:  Recent Labs   Lab 25  0714 25  1903 25  0750 25  0605   WBC 10.6  --  11.7* 14.3*   HGB 8.5* 8.7* 8.4* 7.5*   HCT 25.1*  --  24.8* 22.2*   MCV 94 95 95 96   *  --  131* 123*          Lab Results   Component Value Date     2025     2025     2025     2020     2020     2016    Lab Results   Component Value Date    CHLORIDE 103 2025    CHLORIDE 102 2025    CHLORIDE 106 2025    CHLORIDE 108 2020    CHLORIDE 107 2020    CHLORIDE 104 2016    Lab Results   Component Value Date    BUN 22.4 2025    BUN 23.6 2025    BUN 27.9 2025    BUN 27 2020    BUN 30 2020    BUN 31 2016      Lab Results   Component Value Date    POTASSIUM 4.1 2025    POTASSIUM 4.4 2025    POTASSIUM 4.3 2025    POTASSIUM 4.4 2020    POTASSIUM 4.2 2020    POTASSIUM 4.1 2016    Lab Results   Component Value Date    CO2 20 2025    CO2 24 2025    CO2 24 2025    CO2 26 2020    CO2 23 2020    CO2 25 2016    Lab Results   Component Value Date    CR 1.02 2025    CR 1.07 2025    CR 1.26 2025    CR 1.13 2020    CR 1.24 2020    CR 1.24 2016             INR   Date Value Ref Range Status   2025 1.50 (H) 0.85 - 1.15 Final   2020 2.70 (H) 0.86 - 1.14 Final          Pending Results:     Unresulted Labs Ordered in the Past 30 Days of this Admission       Date and Time Order Name Status Description    7/21/2025 12:01 AM Cortisol In process     7/17/2025  4:58 PM Blood Culture Peripheral blood (BC) Arm, Right Preliminary     7/17/2025  4:58 PM Blood Culture Peripheral blood (BC) Hand, Left Preliminary              Discharge Instructions and Follow-Up:   Discharge Procedure Orders   General info for SNF   Order Comments: Length of Stay Estimate: Short Term Care: Estimated # of Days <30  Condition at Discharge: Improving  Level of care:skilled   Rehabilitation Potential: Good  Admission H&P remains valid and up-to-date: Yes  Recent Chemotherapy: N/A  Use Nursing Home Standing Orders: Yes     Mantoux instructions   Order Comments: Give two-step Mantoux (PPD) Per Facility Policy Yes     Follow Up and recommended labs and tests   Order Comments: Please call as soon as possible to make an appointment to be seen by Dr. Rory Cantrell/Diamond Lovell PA-C clinic at 2 weeks postop for a recheck of your surgical site, possible repeat x-rays, and wound care. If you are at a TCU at this time and they have x-ray capabilities, you may complete your wound care and x-rays at your TCU and have them send your images to Dr. Cantrell's office.     Dr. Cantrell's care coordinator is Tressa Camp. Please contact her at [phone] to schedule an appointment.       Dr. Cantrell sees patients at 2 clinic locations:  [facility]  [address] [place] [id number]  [facility]   [address], [address], [place] [address]      Please call the on-call phone number [phone] during evenings, nights and weekends for any urgent needs. Prescription refills must be done during business hours by calling [phone].     Reason for your hospital stay   Order Comments: S/p conversion right total hip arthroplasty     Wound care (specify)   Order Comments: Site:   Right lateral  hip  Instructions:  Leave dressing clean, dry and intact. May change if peeling or >60% saturation   May shower without the dressing covered     Activity - Up ad merari     Order Specific Question Answer Comments   Is discharge order? Yes      Activity - Up with assistive device     Order Specific Question Answer Comments   Is discharge order? Yes      Weight bearing status   Order Comments: 50% WB RLE     Physical Therapy Adult Consult   Order Comments: Evaluate and treat as clinically indicated.    Reason:  s/p conversion right total hip arthroplasty     Occupational Therapy Adult Consult   Order Comments: Evaluate and treat as clinically indicated.    Reason:  s/p conversion right total hip arthroplasty     Fall precautions     Hip precautions   Order Comments: No IR, No flexion >90 degrees, no adduction past midline     Cane DME   Order Comments: DME Documentation: Describe the reason for need to support medical necessity: Impaired gait status post hip surgery. I, the undersigned, certify that the above prescribed supplies are medically necessary for this patient and is both reasonable and necessary in reference to accepted standards of medical practice in the treatment of this patient's condition and is not prescribed as a convenience.     Order Specific Question Answer Comments   Medical Equipment (DME) Supplier: [employer]    PATIENT INSTRUCTIONS: If you did not receive this ordered item today, please contact [employer] for availability ([place] Locations: [phone], [place]: [phone]).    Cane Type: Single Tip    Reminder: Patient can typically get 1 every 5 years      [facility] DME   Order Comments: DME Documentation: Describe the reason for need to support medical necessity: Impaired gait status post hip surgery. I, the undersigned, certify that the above prescribed supplies are medically necessary for this patient and is both reasonable and necessary in reference  to accepted standards of medical practice in the treatment of this patient's condition and is not prescribed as a convenience.     Order Specific Question Answer Comments   Medical Equipment (DME) Supplier: [employer]    PATIENT INSTRUCTIONS: If you did not receive this ordered item today, please contact [employer] [facility] [employer] for availability ([place] Locations: [phone], [place]: [phone]).    Walker Type: Standard (2 Wheel)    Accessories: N/A      Diet   Order Comments: Follow this diet upon discharge: Current Diet:Orders Placed This Encounter      Advance Diet as Tolerated: Regular Diet Adult     Order Specific Question Answer Comments   Is discharge order? Yes        Total time spent in face to face contact with the patient and coordinating discharge was:  60 Minutes.

## 2025-07-21 NOTE — PROGRESS NOTES
Detail Level: Detailed Physical Therapy Discharge Summary    Reason for therapy discharge:    Discharged to transitional care facility.    Progress towards therapy goal(s). [facility] for goal details.  Goals not met.  Barriers to achieving goals:   discharge from facility.    Therapy recommendation(s):    Continued therapy is recommended.  Rationale/Recommendations:  Pt is making progress wtih mobility but still requiring Ax1 with 2WW for safety. Rec TCU to maximize strength, balance, mobility, and pain management prior to returning to ILF..       X Size Of Lesion In Cm (Optional): 0

## 2025-07-21 NOTE — PROGRESS NOTES
[facility] [facility] DISCHARGE NOTE    Patient discharged to transitional care unit at 1:36 PM via wheel chair. Accompanied by spouse and staff. Discharge instructions reviewed with patient and spouse, opportunity offered to ask questions. Prescriptions sent to patients preferred pharmacy. All belongings sent with patient.    Cb Hayden RN

## 2025-07-21 NOTE — PROGRESS NOTES
Care Management Discharge Note    Discharge Date: 07/21/2025       Discharge Disposition:  TCU    Discharge Services:  rehab    Discharge DME:  none    Discharge Transportation: family or friend will provide    Private pay costs discussed: transportation costs    Does the patient's insurance plan have a 3 day qualifying hospital stay waiver?  No    PAS Confirmation Code: [id number]  Patient/family educated on Medicare website which has current facility and service quality ratings:      Education Provided on the Discharge Plan:  yes  Persons Notified of Discharge Plans: patient, family  Patient/Family in Agreement with the Plan:  yes    Handoff Referral Completed: No, handoff not indicated or clinically appropriate    Additional Information:  Patient accepted for TCU placement at [facility]. Patient is agreeable. Updated provider.   Wheelchair transport set up for [phone]. Updated care team and [facility].   Discharge orders faxed. PAS completed.    Navya Ybarra RN  Care Coordinator  [facility]

## 2025-07-21 NOTE — PLAN OF CARE
"Goal Outcome Evaluation:    VSS, on RA. Pt had BP of 86/52 while sitting in chair- pt was asymptomatic. Provider notified, hemoglobin ordered and was 8.7. BP improved to 92/47 after 2 hr. CMS intact. A&Ox4. Ax1 GB/ walker. Conley removed. Pt urinated and had a BM, PVR was 40 mL. Awaiting BM for stool sample. R hip incision CDI. Discharge to TCU when ready.      Plan of Care Reviewed With: patient    Overall Patient Progress: improvingOverall Patient Progress: improving       Problem: Delirium  Goal: Optimal Coping  Outcome: Progressing  Goal: Improved Behavioral Control  Intervention: Minimize Safety Risk  Recent Flowsheet Documentation  Taken 7/20/2025 1655 by Emely Anna, RN  Enhanced Safety Measures: pain management  Goal: Improved Sleep  Outcome: Progressing     Problem: Adult Inpatient Plan of Care  Goal: Plan of Care Review  Description: The Plan of Care Review/Shift note should be completed every shift.  The Outcome Evaluation is a brief statement about your assessment that the patient is improving, declining, or no change.  This information will be displayed automatically on your shift  note.  Outcome: Progressing  Flowsheets (Taken 7/20/2025 2250)  Plan of Care Reviewed With: patient  Overall Patient Progress: improving  Goal: Patient-Specific Goal (Individualized)  Description: You can add care plan individualizations to a care plan. Examples of Individualization might be:  \"Parent requests to be called daily at 9am for status\", \"I have a hard time hearing out of my right ear\", or \"Do not touch me to wake me up as it startles  me\".  Outcome: Progressing  Goal: Absence of Hospital-Acquired Illness or Injury  Outcome: Progressing  Intervention: Identify and Manage Fall Risk  Recent Flowsheet Documentation  Taken 7/20/2025 1655 by Emely Anna, RN  Safety Promotion/Fall Prevention:   activity supervised   assistive device/personal items within reach   clutter free environment maintained   safety " round/check completed  Intervention: Prevent Skin Injury  Recent Flowsheet Documentation  Taken 7/20/2025 1655 by Emely Anna RN  Body Position: supine, head elevated  Intervention: Prevent Infection  Recent Flowsheet Documentation  Taken 7/20/2025 1655 by Emely Anna RN  Infection Prevention:   single patient room provided   rest/sleep promoted  Goal: Optimal Comfort and Wellbeing  Outcome: Progressing  Intervention: Monitor Pain and Promote Comfort  Recent Flowsheet Documentation  Taken 7/20/2025 1655 by Emely Anna RN  Pain Management Interventions: repositioned  Goal: Readiness for Transition of Care  Outcome: Progressing     Problem: Hip Fracture Surgical Repair  Goal: Optimal Coping with Change in Health Status  Outcome: Progressing  Goal: Absence of Bleeding  Outcome: Progressing  Intervention: Monitor and Manage Bleeding  Recent Flowsheet Documentation  Taken 7/20/2025 1655 by Emely Anna RN  Bleeding Management: dressing monitored  Goal: Effective Bowel Elimination  Outcome: Progressing  Intervention: Enhance Bowel Motility and Elimination  Recent Flowsheet Documentation  Taken 7/20/2025 1655 by Emely Anna RN  Bowel Elimination Promotion: ambulation promoted  Goal: Cognitive Function Maintained  Outcome: Progressing  Goal: Fluid and Electrolyte Balance  Outcome: Progressing  Goal: Absence of Infection Signs and Symptoms  Outcome: Progressing  Goal: Optimal Functional Ability  Outcome: Progressing  Intervention: Promote Optimal Functional Status  Recent Flowsheet Documentation  Taken 7/20/2025 1655 by Emely Anna RN  Activity Management: activity adjusted per tolerance  Goal: Anesthesia/Sedation Recovery  Outcome: Progressing  Intervention: Optimize Anesthesia Recovery  Recent Flowsheet Documentation  Taken 7/20/2025 1655 by Emely Anna RN  Safety Promotion/Fall Prevention:   activity supervised   assistive device/personal items within reach   clutter free  environment maintained   safety round/check completed  Administration (IS): proper technique demonstrated  Patient Tolerance (IS): good  Goal: Optimal Pain Control and Function  Outcome: Progressing  Intervention: Prevent or Manage Pain  Recent Flowsheet Documentation  Taken 7/20/2025 1655 by Emely Anna, RN  Pain Management Interventions: repositioned  Goal: Nausea and Vomiting Relief  Outcome: Progressing  Goal: Effective Urinary Elimination  Outcome: Progressing  Goal: Effective Oxygenation and Ventilation  Outcome: Progressing  Intervention: Optimize Oxygenation and Ventilation  Recent Flowsheet Documentation  Taken 7/20/2025 1655 by Emely Anna, RN  Head of Bed (HOB) Positioning: HOB at 30 degrees

## 2025-07-21 NOTE — PHARMACY-ANTICOAGULATION SERVICE
Clinical Pharmacy- Warfarin Discharge Note  This patient is currently on warfarin for the treatment of Atrial fibrillation.  INR Goal= 2-3  Expected length of therapy Per PCP.    Warfarin PTA Regimen: 5 mg every MonFri; 2.5 mg ROW      Anticoagulation Dose History  More data exists         Latest Ref Rng & Units 10/11/2024 11/2/2024 7/17/2025 7/18/2025 7/19/2025 7/20/2025 7/21/2025   Recent Dosing and Labs   warfarin ANTICOAGULANT (COUMADIN/JANTOVEN) 5 MG tablet - - - - 5 mg, $Given 5 mg, $Given 5 mg, $Given -   INR 0.85 - 1.15 1.43  1.05  1.29  - 1.53  1.52  1.50          Agree with provider to discharge the patient on a warfarin regimen of 5 mg every M, F; 2.5 mg all other days.  pt will receive 7.5mg dose x1 before discharge. Pt to resume 5mg daily dose tomorrow morning.    The patient should have an INR checked in 1-2 days.    Madonna Schaefer, PharmD  July 21, 2025

## 2025-07-21 NOTE — PROGRESS NOTES
[facility] GERIATRICS    PRIMARY CARE PROVIDER AND CLINIC:  Thom Barnes MD, [address]  / [place] [address]  Chief Complaint   Patient presents with    Hospital F/U      [place] Medical Record Number:  [id number]  Place of Service where encounter took place:  [facility] ([facility]) [[id number]]    Constantino Thorne  is a 84 year old  (1940), admitted to the above facility from  [facility]. Hospital stay 7/17/25 through 7/21/25..   HPI:    Notes from hospitalization reviewed including H&P orthopedics consult and D/c summary    Constantino Thorne is an exceptionally pleasant 84-year-old male with past medical history of CAD, ischemic cardiomyopathy, HFrEF, paroxysmal atrial fibrillation who was recently hospitalized at [facility] [place].  Admitted for elective revision of right hemiarthroplasty to a total hip arthroplasty.  Operative course complicated by significant ABLA.  This resulted in hypovolemic shock.  He required admission to the ICU and was treated with PRBCs as well as vasopressors.  Hemoglobin ultimately stabilized in the mid eights.  Did struggle with some urinary retention immediately postoperatively but was able to have a successful voiding trial prior to discharge.    Constantino is evaluated in his room.  Finished working with OT.  Overall doing well.  Does have some pain in his right lower extremity.  Trying to control it with Tylenol alone.  Understands oxycodone is available but plans to use it minimally.  Denies constipation.  No dizziness or lightheadedness.  Discussed monitoring hemoglobin closely at TCU.  He continues on anticoagulation with warfarin.  Lives with his wife in his independent apartment      Review of nursing home EMR: -127 HR 67-72    CODE STATUS/ADVANCE DIRECTIVES DISCUSSION:  Prior  CPR/Full code   ALLERGIES:   Allergies   Allergen Reactions    Lisinopril Unknown and Cough    Spironolactone Unknown and Diarrhea      PAST  MEDICAL HISTORY:   Past Medical History:   Diagnosis Date    A-fib (H)     Antiplatelet or antithrombotic long-term use     Arrhythmia     CAD (coronary artery disease)     Hyperlipidemia LDL goal <100     Hypertension     ICD (implantable cardioverter-defibrillator) in place     Old myocardial infarction     SABINE (obstructive sleep apnea)     Pacemaker     Stented coronary artery     Subdural hematoma (H) 10/10/2024    Syncope 10/10/2024      PAST SURGICAL HISTORY:   has a past surgical history that includes Open reduction internal fixation hip bipolar (Right, 11/4/2024) and Arthroplasty hip (Right, 7/17/2025).  FAMILY HISTORY: family history includes Cancer in his sister.  SOCIAL HISTORY:   reports that he has never smoked. He has never used smokeless tobacco. He reports that he does not drink alcohol and does not use drugs.  Patient's living condition: lives with spouse  Senior apartment.  Independent. Can add services if needed. Cane    Post Discharge Medication Reconciliation Status:   MED REC REQUIRED  Post Medication Reconciliation Status: discharge medications reconciled and changed, per note/orders       Current Outpatient Medications   Medication Sig Dispense Refill    acetaminophen (TYLENOL) 325 MG tablet Take 3 tablets (975 mg) by mouth every 8 hours. 60 tablet 0    amiodarone (PACERONE) 200 MG tablet Take 1 tablet (200 mg) by mouth every morning.      atorvastatin (LIPITOR) 80 MG tablet Take 80 mg by mouth At Bedtime       ferrous sulfate (FEROSUL) 325 (65 Fe) MG tablet Take 1 tablet (325 mg) by mouth daily (with breakfast).      metoprolol succinate ER (TOPROL XL) 25 MG 24 hr tablet Take 0.5 tablets (12.5 mg) by mouth 2 times daily.      nitroGLYcerin (NITROSTAT) 0.4 MG sublingual tablet For chest pain place 1 tablet under the tongue every 5 minutes for 3 doses. If symptoms persist 5 minutes after 1st dose call 911.      oxyCODONE (ROXICODONE) 5 MG tablet Take 1 tablet (5 mg) by mouth every 4 hours as  "needed for severe pain or moderate to severe pain. 30 tablet 0    polyethylene glycol (MIRALAX) 17 GM/Dose powder Take 17 g by mouth daily. 510 g 0    senna-docusate (SENOKOT-S/PERICOLACE) 8.6-50 MG tablet Take 1 tablet by mouth 2 times daily. 30 tablet 0    warfarin ANTICOAGULANT (COUMADIN/JANTOVEN) 5 MG tablet 5 mg every Monday and Friday; 2.5 mg all other days.  Please have in INR checked at least weekly while at TCU to monitor.  Goal INR 2-3 for a-fib.       No current facility-administered medications for this visit.       ROS:  10 point ROS of systems including Constitutional, Eyes, Respiratory, Cardiovascular, Gastroenterology, Genitourinary, Integumentary, Musculoskeletal, Psychiatric were all negative except for pertinent positives noted in my HPI.    Vitals:  /69   Pulse 67   Temp 97.3  F (36.3  C)   Resp 18   Ht 1.778 m (5' 10\")   Wt 96.2 kg (212 lb)   SpO2 97%   BMI 30.42 kg/m    Exam:  Physical Exam  Vitals (In facility EMR) reviewed.   HENT:      Head: Normocephalic and atraumatic.   Eyes:      General: No scleral icterus.  Cardiovascular:      Rate and Rhythm: Normal rate and regular rhythm.   Pulmonary:      Effort: Pulmonary effort is normal.   Abdominal:      General: There is no distension.   Musculoskeletal:      Right lower leg: No edema.      Left lower leg: No edema.      Comments: Aquacel dressing in place right lower extremity.  No drainage present.  No bruising   Skin:     General: Skin is warm and dry.      Findings: No rash.   Neurological:      Mental Status: He is alert. Mental status is at baseline.   Psychiatric:         Behavior: Behavior normal.         Lab/Diagnostic data:  Recent labs in [facility] reviewed by me today.  and Most Recent 3 CBC's:  Recent Labs   Lab Test 07/21/25  0714 07/20/25  1903 07/20/25  0750 07/19/25  0605   WBC 10.6  --  11.7* 14.3*   HGB 8.5* 8.7* 8.4* 7.5*   MCV 94 95 95 96   *  --  131* 123*     Most Recent 3 BMP's:  Recent Labs   Lab Test " 07/21/25  0714 07/20/25  2154 07/20/25  0750 07/19/25  0806 07/19/25  0605   *  --  135  --  136   POTASSIUM 4.1  --  4.4  --  4.3   CHLORIDE 103  --  102  --  106   CO2 20*  --  24  --  24   BUN 22.4  --  23.6*  --  27.9*   CR 1.02  --  1.07  --  1.26*   ANIONGAP 9  --  9  --  6*   ZACH 8.1*  --  8.0*  --  7.9*   GLC 98 121* 92   < > 97    < > = values in this interval not displayed.     Most Recent 2 LFT's:  Recent Labs   Lab Test 07/17/25  1950 07/17/25  1749   AST 29 28   ALT 22 23   ALKPHOS 100 101   BILITOTAL 1.0 1.0     Most Recent ESR & CRP:No lab results found.  Most Recent Anemia Panel:  Recent Labs   Lab Test 07/21/25  0714 07/20/25  1903 07/20/25  0750   WBC 10.6  --  11.7*   HGB 8.5*   < > 8.4*   HCT 25.1*  --  24.8*   MCV 94   < > 95   *  --  131*   IRON  --   --  21*   IRONSAT  --   --  15   FEB  --   --  144*    < > = values in this interval not displayed.       ASSESSMENT/PLAN:  S/p revision of right total hip arthroplasty  Pain control with scheduled Tylenol as well as as needed oxycodone.  Patient plans to limit oxycodone.  Discontinue hydroxyzine  50% weightbearing right lower extremity.  Hip precautions  PT/OT  Follow-up with orthopedics in 2 weeks    ABLA  Hypovolemic shock, resolved: Admitted to the ICU.  Status post multiple blood transfusion and treatment with vasopressor.  Hemoglobin stable at 8.5at discharge  Start ferrous sulfate 325 mg daily  CBC 7/28      Urinary retention, resolved: Successful voiding trial prior to hospital discharge  Monitor for retention    Chronic HFrEF (30%)  Ischemic cardiomyopathy  CAD: Not on diuretics at baseline  Continue metoprolol and atorvastatin  Monitor volume status.  3 times weekly weights      Atrial fibrillation  Chronic anticoagulation [warfarin 5 mg Monday and Friday.  2.5 mg all other days]  INR today 1.8.  Has been receiving 5-7.5 mg the past several days  Warfarin 5 mg 7/22.  2.5 mg 7/23  INR 7/24      Electronically signed by:  Elyssa  Tahira Alcaraz PA-C

## 2025-07-21 NOTE — PLAN OF CARE
Goal Outcome Evaluation:      Plan of Care Reviewed With: patient    Overall Patient Progress: improvingOverall Patient Progress: improving    Outcome Evaluation: Pain control,VSS monitoring,tcu when ready      Patient a/ox4, on RA,uses home cpap, bp improving, patient asymptomatic, minimal pain,declined scheduled tylenol, up 1xassist with walker/gaitbelt, Dressing CDI,garcia removed, voiding without difficulty with urinal, discharge tcu when ready.  Problem: Delirium  Goal: Optimal Coping  7/21/2025 0345 by Ira Monte RN  Outcome: Progressing  7/21/2025 0336 by Ira Monte RN  Outcome: Progressing  Intervention: Optimize Psychosocial Adjustment to Delirium  Recent Flowsheet Documentation  Taken 7/21/2025 0300 by Ira Monte RN  Supportive Measures: active listening utilized  Goal: Improved Sleep  7/21/2025 0345 by Ira Monte RN  Outcome: Progressing  7/21/2025 0336 by Ira Monte RN  Outcome: Progressing  Intervention: Promote Sleep  Recent Flowsheet Documentation  Taken 7/21/2025 0300 by Ira Monte RN  Sleep/Rest Enhancement:   awakenings minimized   room darkened   regular sleep/rest pattern promoted     Problem: Adult Inpatient Plan of Care  Goal: Plan of Care Review  Description: The Plan of Care Review/Shift note should be completed every shift.  The Outcome Evaluation is a brief statement about your assessment that the patient is improving, declining, or no change.  This information will be displayed automatically on your shift  note.  7/21/2025 0345 by Ira Monte RN  Outcome: Progressing  Flowsheets (Taken 7/21/2025 0345)  Outcome Evaluation: Pain control,VSS monitoring,tcu when ready  Plan of Care Reviewed With: patient  Overall Patient Progress: improving  7/21/2025 0336 by Ira Monte RN  Outcome: Progressing  Flowsheets (Taken 7/21/2025 0335)  Plan of Care Reviewed With: patient  Overall Patient Progress: improving  Goal: Patient-Specific Goal  "(Individualized)  Description: You can add care plan individualizations to a care plan. Examples of Individualization might be:  \"Parent requests to be called daily at 9am for status\", \"I have a hard time hearing out of my right ear\", or \"Do not touch me to wake me up as it startles  me\".  7/21/2025 0345 by Ira Monte RN  Outcome: Progressing  7/21/2025 0336 by Ira Monte RN  Outcome: Progressing  Goal: Absence of Hospital-Acquired Illness or Injury  7/21/2025 0345 by Ira Monte RN  Outcome: Progressing  7/21/2025 0336 by Ira Monte RN  Outcome: Progressing  Intervention: Identify and Manage Fall Risk  Recent Flowsheet Documentation  Taken 7/21/2025 0300 by Ira Monte RN  Safety Promotion/Fall Prevention:   activity supervised   clutter free environment maintained   safety round/check completed  Intervention: Prevent Skin Injury  Recent Flowsheet Documentation  Taken 7/21/2025 0300 by Ira Monte RN  Body Position:   side-lying   left  Skin Protection: incontinence pads utilized  Intervention: Prevent and Manage VTE (Venous Thromboembolism) Risk  Recent Flowsheet Documentation  Taken 7/21/2025 0300 by Ira Monte RN  VTE Prevention/Management: (requested to have scds off to sleep) other (see comments)  Intervention: Prevent Infection  Recent Flowsheet Documentation  Taken 7/21/2025 0300 by Ira Monte RN  Infection Prevention:   rest/sleep promoted   hand hygiene promoted   single patient room provided  Goal: Optimal Comfort and Wellbeing  7/21/2025 0345 by Ira Monte RN  Outcome: Progressing  7/21/2025 0336 by Ira Monte RN  Outcome: Progressing  Intervention: Monitor Pain and Promote Comfort  Recent Flowsheet Documentation  Taken 7/21/2025 0300 by Ira Monte RN  Pain Management Interventions: (patient declined tylenol) care clustered  Goal: Readiness for Transition of Care  7/21/2025 0345 by Ira Monte RN  Outcome: Progressing  7/21/2025 0336 by " Lavell, Ira I, RN  Outcome: Progressing     Problem: Comorbidity Management  Goal: Maintenance of Heart Failure Symptom Control  7/21/2025 0345 by Ira Monte RN  Outcome: Progressing  7/21/2025 0336 by Ira Monte RN  Outcome: Progressing  Intervention: Maintain Heart Failure Management  Recent Flowsheet Documentation  Taken 7/21/2025 0300 by Ira Monte RN  Medication Review/Management: medications reviewed  Goal: Blood Pressure in Desired Range  7/21/2025 0345 by Ira Monte RN  Outcome: Progressing  7/21/2025 0336 by Ira Monte RN  Outcome: Progressing  Intervention: Maintain Blood Pressure Management  Recent Flowsheet Documentation  Taken 7/21/2025 0300 by Ira Monte RN  Medication Review/Management: medications reviewed     Problem: Fall Injury Risk  Goal: Absence of Fall and Fall-Related Injury  7/21/2025 0345 by Ira Monte RN  Outcome: Progressing  7/21/2025 0336 by Ira Monte RN  Outcome: Progressing  Intervention: Identify and Manage Contributors  Recent Flowsheet Documentation  Taken 7/21/2025 0300 by Ira Monte RN  Medication Review/Management: medications reviewed  Intervention: Promote Injury-Free Environment  Recent Flowsheet Documentation  Taken 7/21/2025 0300 by Ira Monte RN  Safety Promotion/Fall Prevention:   activity supervised   clutter free environment maintained   safety round/check completed     Problem: Pain Acute  Goal: Optimal Pain Control and Function  7/21/2025 0345 by Ira Monte RN  Outcome: Progressing  7/21/2025 0336 by Ira Monte RN  Outcome: Progressing  Intervention: Optimize Psychosocial Wellbeing  Recent Flowsheet Documentation  Taken 7/21/2025 0300 by Ira Monte RN  Supportive Measures: active listening utilized  Intervention: Develop Pain Management Plan  Recent Flowsheet Documentation  Taken 7/21/2025 0300 by Ira Monte RN  Pain Management Interventions: (patient declined tylenol) care  clustered  Intervention: Prevent or Manage Pain  Recent Flowsheet Documentation  Taken 7/21/2025 0300 by Ira Monte RN  Sleep/Rest Enhancement:   awakenings minimized   room darkened   regular sleep/rest pattern promoted  Medication Review/Management: medications reviewed     Problem: Hip Fracture Surgical Repair  Goal: Optimal Coping with Change in Health Status  7/21/2025 0345 by Ira Monte RN  Outcome: Progressing  7/21/2025 0336 by Ira Monte RN  Outcome: Progressing  Intervention: Support Psychosocial Response to Surgery and Mobility Changes  Recent Flowsheet Documentation  Taken 7/21/2025 0300 by Ira Monte RN  Supportive Measures: active listening utilized  Goal: Absence of Bleeding  7/21/2025 0345 by Ira Monte RN  Outcome: Progressing  7/21/2025 0336 by Ira Monte RN  Outcome: Progressing  Intervention: Monitor and Manage Bleeding  Recent Flowsheet Documentation  Taken 7/21/2025 0300 by Ira Monte RN  Bleeding Management: dressing monitored  Goal: Effective Bowel Elimination  7/21/2025 0345 by Ira Monte RN  Outcome: Progressing  7/21/2025 0336 by Ira Monte RN  Outcome: Progressing  Intervention: Enhance Bowel Motility and Elimination  Recent Flowsheet Documentation  Taken 7/21/2025 0300 by Ira Monte RN  Bowel Motility Enhancement: fluid intake encouraged  Goal: Cognitive Function Maintained  7/21/2025 0345 by Ira Monte RN  Outcome: Progressing  7/21/2025 0336 by Ira Monte RN  Outcome: Progressing  Intervention: Maintain Cognitive Function  Recent Flowsheet Documentation  Taken 7/21/2025 0300 by Ira Monte RN  Sleep/Rest Enhancement:   awakenings minimized   room darkened   regular sleep/rest pattern promoted  Goal: Fluid and Electrolyte Balance  7/21/2025 0345 by Ira Monte RN  Outcome: Progressing  7/21/2025 0336 by Ira Monte RN  Outcome: Progressing  Goal: Absence of Infection Signs and Symptoms  7/21/2025 0345  by Lavell, Ira I, RN  Outcome: Progressing  7/21/2025 0336 by Ira Monte RN  Outcome: Progressing  Goal: Optimal Functional Ability  7/21/2025 0345 by Ira Monte RN  Outcome: Progressing  7/21/2025 0336 by Ira Monte RN  Outcome: Progressing  Intervention: Promote Optimal Functional Status  Recent Flowsheet Documentation  Taken 7/21/2025 0300 by Ira Monte RN  Activity Management: activity encouraged  Goal: Anesthesia/Sedation Recovery  7/21/2025 0345 by Ira Monte RN  Outcome: Progressing  7/21/2025 0336 by Ira Monte RN  Outcome: Progressing  Intervention: Optimize Anesthesia Recovery  Recent Flowsheet Documentation  Taken 7/21/2025 0300 by Ira Monte RN  Safety Promotion/Fall Prevention:   activity supervised   clutter free environment maintained   safety round/check completed  Goal: Optimal Pain Control and Function  7/21/2025 0345 by Ira Monte RN  Outcome: Progressing  7/21/2025 0336 by Ira Monte RN  Outcome: Progressing  Intervention: Prevent or Manage Pain  Recent Flowsheet Documentation  Taken 7/21/2025 0300 by Ira Monte RN  Pain Management Interventions: (patient declined tylenol) care clustered  Goal: Nausea and Vomiting Relief  7/21/2025 0345 by Ira Monte RN  Outcome: Progressing  7/21/2025 0336 by Ira Monte RN  Outcome: Progressing  Goal: Effective Urinary Elimination  7/21/2025 0345 by Ira Monte RN  Outcome: Progressing  7/21/2025 0336 by Ira Monte RN  Outcome: Progressing  Goal: Effective Oxygenation and Ventilation  7/21/2025 0345 by Ira Monte RN  Outcome: Progressing  7/21/2025 0336 by Ira Monte RN  Outcome: Progressing  Intervention: Optimize Oxygenation and Ventilation  Recent Flowsheet Documentation  Taken 7/21/2025 0300 by Ira Monte RN  Head of Bed (HOB) Positioning: HOB at 30 degrees

## 2025-07-22 ENCOUNTER — TRANSITIONAL CARE UNIT VISIT (OUTPATIENT)
Dept: GERIATRICS | Facility: CLINIC | Age: 85
End: 2025-07-22
Payer: COMMERCIAL

## 2025-07-22 VITALS
HEIGHT: 70 IN | SYSTOLIC BLOOD PRESSURE: 119 MMHG | WEIGHT: 212 LBS | RESPIRATION RATE: 18 BRPM | OXYGEN SATURATION: 97 % | BODY MASS INDEX: 30.35 KG/M2 | DIASTOLIC BLOOD PRESSURE: 69 MMHG | TEMPERATURE: 97.3 F | HEART RATE: 67 BPM

## 2025-07-22 DIAGNOSIS — Z79.01 LONG TERM CURRENT USE OF ANTICOAGULANT THERAPY: ICD-10-CM

## 2025-07-22 DIAGNOSIS — I48.19 PERSISTENT ATRIAL FIBRILLATION (H): ICD-10-CM

## 2025-07-22 DIAGNOSIS — D62 ABLA (ACUTE BLOOD LOSS ANEMIA): ICD-10-CM

## 2025-07-22 DIAGNOSIS — Z96.649 S/P HIP HEMIARTHROPLASTY: Primary | ICD-10-CM

## 2025-07-22 DIAGNOSIS — I50.20 HFREF (HEART FAILURE WITH REDUCED EJECTION FRACTION) (H): ICD-10-CM

## 2025-07-22 DIAGNOSIS — I10 ESSENTIAL HYPERTENSION: ICD-10-CM

## 2025-07-22 DIAGNOSIS — D69.6 THROMBOCYTOPENIA: ICD-10-CM

## 2025-07-22 LAB
BACTERIA SPEC CULT: NO GROWTH
BACTERIA SPEC CULT: NO GROWTH

## 2025-07-22 PROCEDURE — 99309 SBSQ NF CARE MODERATE MDM 30: CPT | Performed by: PHYSICIAN ASSISTANT

## 2025-07-22 RX ORDER — FERROUS SULFATE 325(65) MG
325 TABLET ORAL
Status: SHIPPED
Start: 2025-07-22

## 2025-07-22 NOTE — LETTER
7/22/2025      Constantino Thorne  [address]  [place] [address]        [facility] GERIATRICS    PRIMARY CARE PROVIDER AND CLINIC:  Thom Barnes MD, [address]  / [place] [address]  Chief Complaint   Patient presents with     Hospital F/U      [facility] Record Number:  [id number]  Place of Service where encounter took place:  [facility] ([facility]) [[id number]]    Constantino Thorne  is a 84 year old  (1940), admitted to the above facility from  [facility]. Hospital stay 7/17/25 through 7/21/25..   HPI:    Notes from hospitalization reviewed including H&P orthopedics consult and D/c summary    Constantino Thorne is an exceptionally pleasant 84-year-old male with past medical history of CAD, ischemic cardiomyopathy, HFrEF, paroxysmal atrial fibrillation who was recently hospitalized at [facility].  Admitted for elective revision of right hemiarthroplasty to a total hip arthroplasty.  Operative course complicated by significant ABLA.  This resulted in hypovolemic shock.  He required admission to the ICU and was treated with PRBCs as well as vasopressors.  Hemoglobin ultimately stabilized in the mid eights.  Did struggle with some urinary retention immediately postoperatively but was able to have a successful voiding trial prior to discharge.    Constantino is evaluated in his room.  Finished working with OT.  Overall doing well.  Does have some pain in his right lower extremity.  Trying to control it with Tylenol alone.  Understands oxycodone is available but plans to use it minimally.  Denies constipation.  No dizziness or lightheadedness.  Discussed monitoring hemoglobin closely at U.  He continues on anticoagulation with warfarin.  Lives with his wife in his independent apartment      Review of nursing home EMR: -127 HR 67-72    CODE STATUS/ADVANCE DIRECTIVES DISCUSSION:  Prior  CPR/Full code   ALLERGIES:   Allergies   Allergen Reactions      Lisinopril Unknown and Cough     Spironolactone Unknown and Diarrhea      PAST MEDICAL HISTORY:   Past Medical History:   Diagnosis Date     A-fib (H)      Antiplatelet or antithrombotic long-term use      Arrhythmia      CAD (coronary artery disease)      Hyperlipidemia LDL goal <100      Hypertension      ICD (implantable cardioverter-defibrillator) in place      Old myocardial infarction      SABINE (obstructive sleep apnea)      Pacemaker      Stented coronary artery      Subdural hematoma (H) 10/10/2024     Syncope 10/10/2024      PAST SURGICAL HISTORY:   has a past surgical history that includes Open reduction internal fixation hip bipolar (Right, 11/4/2024) and Arthroplasty hip (Right, 7/17/2025).  FAMILY HISTORY: family history includes Cancer in his sister.  SOCIAL HISTORY:   reports that he has never smoked. He has never used smokeless tobacco. He reports that he does not drink alcohol and does not use drugs.  Patient's living condition: lives with spouse  Senior apartment.  Independent. Can add services if needed. Cane    Post Discharge Medication Reconciliation Status:   MED REC REQUIRED  Post Medication Reconciliation Status: discharge medications reconciled and changed, per note/orders       Current Outpatient Medications   Medication Sig Dispense Refill     acetaminophen (TYLENOL) 325 MG tablet Take 3 tablets (975 mg) by mouth every 8 hours. 60 tablet 0     amiodarone (PACERONE) 200 MG tablet Take 1 tablet (200 mg) by mouth every morning.       atorvastatin (LIPITOR) 80 MG tablet Take 80 mg by mouth At Bedtime        ferrous sulfate (FEROSUL) 325 (65 Fe) MG tablet Take 1 tablet (325 mg) by mouth daily (with breakfast).       metoprolol succinate ER (TOPROL XL) 25 MG 24 hr tablet Take 0.5 tablets (12.5 mg) by mouth 2 times daily.       nitroGLYcerin (NITROSTAT) 0.4 MG sublingual tablet For chest pain place 1 tablet under the tongue every 5 minutes for 3 doses. If symptoms persist 5 minutes after 1st dose  "call 911.       oxyCODONE (ROXICODONE) 5 MG tablet Take 1 tablet (5 mg) by mouth every 4 hours as needed for severe pain or moderate to severe pain. 30 tablet 0     polyethylene glycol (MIRALAX) 17 GM/Dose powder Take 17 g by mouth daily. 510 g 0     senna-docusate (SENOKOT-S/PERICOLACE) 8.6-50 MG tablet Take 1 tablet by mouth 2 times daily. 30 tablet 0     warfarin ANTICOAGULANT (COUMADIN/JANTOVEN) 5 MG tablet 5 mg every Monday and Friday; 2.5 mg all other days.  Please have in INR checked at least weekly while at TCU to monitor.  Goal INR 2-3 for a-fib.       No current facility-administered medications for this visit.       ROS:  10 point ROS of systems including Constitutional, Eyes, Respiratory, Cardiovascular, Gastroenterology, Genitourinary, Integumentary, Musculoskeletal, Psychiatric were all negative except for pertinent positives noted in my HPI.    Vitals:  /69   Pulse 67   Temp 97.3  F (36.3  C)   Resp 18   Ht 1.778 m (5' 10\")   Wt 96.2 kg (212 lb)   SpO2 97%   BMI 30.42 kg/m    Exam:  Physical Exam  Vitals (In facility EMR) reviewed.   HENT:      Head: Normocephalic and atraumatic.   Eyes:      General: No scleral icterus.  Cardiovascular:      Rate and Rhythm: Normal rate and regular rhythm.   Pulmonary:      Effort: Pulmonary effort is normal.   Abdominal:      General: There is no distension.   Musculoskeletal:      Right lower leg: No edema.      Left lower leg: No edema.      Comments: Aquacel dressing in place right lower extremity.  No drainage present.  No bruising   Skin:     General: Skin is warm and dry.      Findings: No rash.   Neurological:      Mental Status: He is alert. Mental status is at baseline.   Psychiatric:         Behavior: Behavior normal.         Lab/Diagnostic data:  Recent labs in [facility] reviewed by me today.  and Most Recent 3 CBC's:  Recent Labs   Lab Test 07/21/25  0714 07/20/25  1903 07/20/25  0750 07/19/25  0605   WBC 10.6  --  11.7* 14.3*   HGB 8.5* 8.7* " 8.4* 7.5*   MCV 94 95 95 96   *  --  131* 123*     Most Recent 3 BMP's:  Recent Labs   Lab Test 07/21/25  0714 07/20/25  2154 07/20/25  0750 07/19/25  0806 07/19/25  0605   *  --  135  --  136   POTASSIUM 4.1  --  4.4  --  4.3   CHLORIDE 103  --  102  --  106   CO2 20*  --  24  --  24   BUN 22.4  --  23.6*  --  27.9*   CR 1.02  --  1.07  --  1.26*   ANIONGAP 9  --  9  --  6*   ZACH 8.1*  --  8.0*  --  7.9*   GLC 98 121* 92   < > 97    < > = values in this interval not displayed.     Most Recent 2 LFT's:  Recent Labs   Lab Test 07/17/25  1950 07/17/25  1749   AST 29 28   ALT 22 23   ALKPHOS 100 101   BILITOTAL 1.0 1.0     Most Recent ESR & CRP:No lab results found.  Most Recent Anemia Panel:  Recent Labs   Lab Test 07/21/25  0714 07/20/25  1903 07/20/25  0750   WBC 10.6  --  11.7*   HGB 8.5*   < > 8.4*   HCT 25.1*  --  24.8*   MCV 94   < > 95   *  --  131*   IRON  --   --  21*   IRONSAT  --   --  15   FEB  --   --  144*    < > = values in this interval not displayed.       ASSESSMENT/PLAN:  S/p revision of right total hip arthroplasty  Pain control with scheduled Tylenol as well as as needed oxycodone.  Patient plans to limit oxycodone.  Discontinue hydroxyzine  50% weightbearing right lower extremity.  Hip precautions  PT/OT  Follow-up with orthopedics in 2 weeks    ABLA  Hypovolemic shock, resolved: Admitted to the ICU.  Status post multiple blood transfusion and treatment with vasopressor.  Hemoglobin stable at 8.5at discharge  Start ferrous sulfate 325 mg daily  CBC 7/28      Urinary retention, resolved: Successful voiding trial prior to hospital discharge  Monitor for retention    Chronic HFrEF (30%)  Ischemic cardiomyopathy  CAD: Not on diuretics at baseline  Continue metoprolol and atorvastatin  Monitor volume status.  3 times weekly weights      Atrial fibrillation  Chronic anticoagulation [warfarin 5 mg Monday and Friday.  2.5 mg all other days]  INR today 1.8.  Has been receiving 5-7.5 mg  the past several days  Warfarin 5 mg 7/22.  2.5 mg 7/23  INR 7/24      Electronically signed by:  Elyssa Alcaraz PA-C                 Sincerely,        Elyssa Alcaraz PA-C    Electronically signed

## 2025-07-23 ENCOUNTER — LAB REQUISITION (OUTPATIENT)
Dept: LAB | Facility: CLINIC | Age: 85
End: 2025-07-23
Payer: COMMERCIAL

## 2025-07-23 DIAGNOSIS — Z47.1 AFTERCARE FOLLOWING JOINT REPLACEMENT SURGERY: ICD-10-CM

## 2025-07-23 LAB
GAMMA INTERFERON BACKGROUND BLD IA-ACNC: 0.06 IU/ML
M TB IFN-G BLD-IMP: NEGATIVE
M TB IFN-G CD4+ BCKGRND COR BLD-ACNC: 9.94 IU/ML
MITOGEN IGNF BCKGRD COR BLD-ACNC: -0.02 IU/ML
MITOGEN IGNF BCKGRD COR BLD-ACNC: -0.02 IU/ML
QUANTIFERON MITOGEN: 10 IU/ML
QUANTIFERON NIL TUBE: 0.06 IU/ML
QUANTIFERON TB1 TUBE: 0.04 IU/ML
QUANTIFERON TB2 TUBE: 0.04

## 2025-07-24 ENCOUNTER — TELEPHONE (OUTPATIENT)
Dept: GERIATRICS | Facility: CLINIC | Age: 85
End: 2025-07-24
Payer: COMMERCIAL

## 2025-07-24 NOTE — TELEPHONE ENCOUNTER
"Provider: Elyssa Alcaraz PA-C  Facility: [facility] Type:  TCU    Caller: Veda  Call Back Number: [phone]  Reason for call: INR  Diagnosis/Goal: A. Fib  Heparin/Lovenox:  No  Currently on ABX?: No  Other interacting medication:  Amiodarone  Missed or refused doses: No    Date INR Previous Dose/Orders   7/19 1.53 5 mg   7/20 1.52 5 mg   7/21 1.50 7.5 mg   7/22 1.8 5 mg on 7/22, 2.5 mg on 7/23 7/24 2.4      Telephone encounter sent to: STEPHEN Kwong CNP    Requesting orders for Warfarin dosing and date of next INR draw  Please respond to \"Geriatrics Nurse Pool\".    Jennifer Duke RN   "

## 2025-07-28 ENCOUNTER — TRANSITIONAL CARE UNIT VISIT (OUTPATIENT)
Dept: GERIATRICS | Facility: CLINIC | Age: 85
End: 2025-07-28
Payer: COMMERCIAL

## 2025-07-28 VITALS
HEIGHT: 71 IN | HEART RATE: 61 BPM | TEMPERATURE: 97.6 F | DIASTOLIC BLOOD PRESSURE: 54 MMHG | OXYGEN SATURATION: 97 % | WEIGHT: 216.9 LBS | SYSTOLIC BLOOD PRESSURE: 96 MMHG | BODY MASS INDEX: 30.36 KG/M2 | RESPIRATION RATE: 18 BRPM

## 2025-07-28 DIAGNOSIS — I10 ESSENTIAL HYPERTENSION: ICD-10-CM

## 2025-07-28 DIAGNOSIS — Z96.649 S/P HIP HEMIARTHROPLASTY: Primary | ICD-10-CM

## 2025-07-28 DIAGNOSIS — I48.19 PERSISTENT ATRIAL FIBRILLATION (H): ICD-10-CM

## 2025-07-28 DIAGNOSIS — D62 ABLA (ACUTE BLOOD LOSS ANEMIA): ICD-10-CM

## 2025-07-28 LAB
ANION GAP SERPL CALCULATED.3IONS-SCNC: 10 MMOL/L (ref 7–15)
BUN SERPL-MCNC: 19.5 MG/DL (ref 8–23)
CALCIUM SERPL-MCNC: 8.9 MG/DL (ref 8.8–10.4)
CHLORIDE SERPL-SCNC: 104 MMOL/L (ref 98–107)
CREAT SERPL-MCNC: 1.18 MG/DL (ref 0.67–1.17)
EGFRCR SERPLBLD CKD-EPI 2021: 60 ML/MIN/1.73M2
ERYTHROCYTE [DISTWIDTH] IN BLOOD BY AUTOMATED COUNT: 15.2 % (ref 10–15)
GLUCOSE SERPL-MCNC: 83 MG/DL (ref 70–99)
HCO3 SERPL-SCNC: 24 MMOL/L (ref 22–29)
HCT VFR BLD AUTO: 29 % (ref 40–53)
HGB BLD-MCNC: 9 G/DL (ref 13.3–17.7)
MCH RBC QN AUTO: 30.9 PG (ref 26.5–33)
MCHC RBC AUTO-ENTMCNC: 31 G/DL (ref 31.5–36.5)
MCV RBC AUTO: 100 FL (ref 78–100)
PLATELET # BLD AUTO: 309 10E3/UL (ref 150–450)
POTASSIUM SERPL-SCNC: 4.5 MMOL/L (ref 3.4–5.3)
RBC # BLD AUTO: 2.91 10E6/UL (ref 4.4–5.9)
SODIUM SERPL-SCNC: 138 MMOL/L (ref 135–145)
WBC # BLD AUTO: 9.7 10E3/UL (ref 4–11)

## 2025-07-28 PROCEDURE — 85027 COMPLETE CBC AUTOMATED: CPT | Mod: ORL | Performed by: PHYSICIAN ASSISTANT

## 2025-07-28 PROCEDURE — 36415 COLL VENOUS BLD VENIPUNCTURE: CPT | Mod: ORL | Performed by: PHYSICIAN ASSISTANT

## 2025-07-28 PROCEDURE — 80048 BASIC METABOLIC PNL TOTAL CA: CPT | Mod: ORL | Performed by: PHYSICIAN ASSISTANT

## 2025-07-28 PROCEDURE — P9604 ONE-WAY ALLOW PRORATED TRIP: HCPCS | Mod: ORL | Performed by: PHYSICIAN ASSISTANT

## 2025-07-28 PROCEDURE — 99309 SBSQ NF CARE MODERATE MDM 30: CPT | Performed by: PHYSICIAN ASSISTANT

## 2025-07-28 NOTE — PROGRESS NOTES
"  Chief Complaint   Patient presents with    Nursing Home Acute       HPI:  Constantino Thorne is a 84 year old  (1940), who is being seen today for an episodic care visit at: [facility] ([facility]) [[id number]].     Brief summary: Constantino Thorne is an exceptionally pleasant 84-year-old male with past medical history of CAD, ischemic cardiomyopathy, HFrEF, paroxysmal atrial fibrillation who was recently hospitalized at [facility].  Admitted for elective revision of right hemiarthroplasty to a total hip arthroplasty.  Operative course complicated by significant ABLA.  This resulted in hypovolemic shock.  He required admission to the ICU and was treated with PRBCs as well as vasopressors.  Hemoglobin ultimately stabilized in the mid eights.  Did struggle with some urinary retention immediately postoperatively but was able to have a successful voiding trial prior to discharge.    Today's concern is: Constantino is evaluated in his room.  Continues to do well.  Pain well-controlled Tylenol alone.  Does not want to take oxycodone so we will discontinue it.  No constipation.  ICPs have been on the softer side but denies dizziness or lightheadedness        Review of nursing home EMR:SBP 93-1 14, Wt 216, INR 2.2      Allergies, and PMH/PSH reviewed in [facility] today.    REVIEW OF SYSTEMS:  4 point ROS including Respiratory, CV, GI and , other than that noted in the HPI,  is negative    Objective:   BP 96/54   Pulse 61   Temp 97.6  F (36.4  C)   Resp 18   Ht 1.803 m (5' 11\")   Wt 98.4 kg (216 lb 14.4 oz)   SpO2 97%   BMI 30.25 kg/m      Physical Exam  Vitals (In facility EMR) reviewed.   HENT:      Head: Normocephalic and atraumatic.   Eyes:      General: No scleral icterus.  Cardiovascular:      Rate and Rhythm: Normal rate and regular rhythm.   Pulmonary:      Effort: Pulmonary effort is normal.   Abdominal:      General: There is no distension.   Musculoskeletal:      Right lower leg: No edema.      Left " lower leg: No edema.      Comments: Aquacel dressing in place to right hip.  No drainage.   Skin:     General: Skin is warm and dry.      Findings: No rash.   Neurological:      Mental Status: He is alert. Mental status is at baseline.   Psychiatric:         Behavior: Behavior normal.          MED REC REQUIRED  Post Medication Reconciliation Status: discharge medications reconciled and changed, per note/orders      Recent labs in [facility] reviewed by me today.  and Most Recent 3 CBC's:  Recent Labs   Lab Test 07/28/25  0636 07/21/25  0714 07/20/25  1903 07/20/25  0750   WBC 9.7 10.6  --  11.7*   HGB 9.0* 8.5* 8.7* 8.4*    94 95 95    138*  --  131*     Most Recent 3 BMP's:  Recent Labs   Lab Test 07/28/25  0636 07/21/25  0714 07/20/25  2154 07/20/25  0750    132*  --  135   POTASSIUM 4.5 4.1  --  4.4   CHLORIDE 104 103  --  102   CO2 24 20*  --  24   BUN 19.5 22.4  --  23.6*   CR 1.18* 1.02  --  1.07   ANIONGAP 10 9  --  9   ZACH 8.9 8.1*  --  8.0*   GLC 83 98 121* 92       Assessment/Plan:  S/p revision of right total hip arthroplasty  Continue pain control with scheduled Tylenol  Discontinue oxycodone for lack of utilization  50% weightbearing right lower extremity.  Hip precautions  PT/OT  Follow-up with orthopedics 8/1    ABLA  Hypovolemic shock, resolved: Admitted to the ICU.  Status post multiple blood transfusion and treatment with vasopressor.    Continue ferrous sulfate  CBC today with improved hemoglobin to 9.0        Urinary retention, resolved: Successful voiding trial prior to hospital discharge  Monitor for retention    Chronic HFrEF (30%)  Ischemic cardiomyopathy  CAD: Not on diuretics at baseline  Continue metoprolol and atorvastatin  Monitor volume status.  3 times weekly weights.  Appears euvolemic      Atrial fibrillation  Chronic anticoagulation [warfarin 5 mg Monday and Friday.  2.5 mg all other days]  INR today 2.2  Resume home regimen of warfarin 5 mg Monday and Friday and 2.5  mg all other days  Repeat INR 8/4      Orders:  As above      Electronically signed by: Elyssa Alcaraz PA-C

## 2025-07-28 NOTE — LETTER
" 7/28/2025      Constantino Thorne  [address]  [facility] [address]          Chief Complaint   Patient presents with     Nursing Home Acute       HPI:  Constantino Thorne is a 84 year old  (1940), who is being seen today for an episodic care visit at: [facility] ([facility]) [[id number]].     Brief summary: Constantino Thorne is an exceptionally pleasant 84-year-old male with past medical history of CAD, ischemic cardiomyopathy, HFrEF, paroxysmal atrial fibrillation who was recently hospitalized at [facility].  Admitted for elective revision of right hemiarthroplasty to a total hip arthroplasty.  Operative course complicated by significant ABLA.  This resulted in hypovolemic shock.  He required admission to the ICU and was treated with PRBCs as well as vasopressors.  Hemoglobin ultimately stabilized in the mid eights.  Did struggle with some urinary retention immediately postoperatively but was able to have a successful voiding trial prior to discharge.    Today's concern is: Constantino is evaluated in his room.  Continues to do well.  Pain well-controlled Tylenol alone.  Does not want to take oxycodone so we will discontinue it.  No constipation.  ICPs have been on the softer side but denies dizziness or lightheadedness        Review of nursing home EMR:SBP 93-1 14, Wt 216, INR 2.2      Allergies, and PMH/PSH reviewed in [facility] today.    REVIEW OF SYSTEMS:  4 point ROS including Respiratory, CV, GI and , other than that noted in the HPI,  is negative    Objective:   BP 96/54   Pulse 61   Temp 97.6  F (36.4  C)   Resp 18   Ht 1.803 m (5' 11\")   Wt 98.4 kg (216 lb 14.4 oz)   SpO2 97%   BMI 30.25 kg/m      Physical Exam  Vitals (In facility EMR) reviewed.   HENT:      Head: Normocephalic and atraumatic.   Eyes:      General: No scleral icterus.  Cardiovascular:      Rate and Rhythm: Normal rate and regular rhythm.   Pulmonary:      Effort: Pulmonary effort is normal.   Abdominal:      General: There " is no distension.   Musculoskeletal:      Right lower leg: No edema.      Left lower leg: No edema.      Comments: Aquacel dressing in place to right hip.  No drainage.   Skin:     General: Skin is warm and dry.      Findings: No rash.   Neurological:      Mental Status: He is alert. Mental status is at baseline.   Psychiatric:         Behavior: Behavior normal.          MED REC REQUIRED  Post Medication Reconciliation Status: discharge medications reconciled and changed, per note/orders      Recent labs in [facility] reviewed by me today.  and Most Recent 3 CBC's:  Recent Labs   Lab Test 07/28/25  0636 07/21/25  0714 07/20/25  1903 07/20/25  0750   WBC 9.7 10.6  --  11.7*   HGB 9.0* 8.5* 8.7* 8.4*    94 95 95    138*  --  131*     Most Recent 3 BMP's:  Recent Labs   Lab Test 07/28/25  0636 07/21/25  0714 07/20/25  2154 07/20/25  0750    132*  --  135   POTASSIUM 4.5 4.1  --  4.4   CHLORIDE 104 103  --  102   CO2 24 20*  --  24   BUN 19.5 22.4  --  23.6*   CR 1.18* 1.02  --  1.07   ANIONGAP 10 9  --  9   ZACH 8.9 8.1*  --  8.0*   GLC 83 98 121* 92       Assessment/Plan:  S/p revision of right total hip arthroplasty  Continue pain control with scheduled Tylenol  Discontinue oxycodone for lack of utilization  50% weightbearing right lower extremity.  Hip precautions  PT/OT  Follow-up with orthopedics 8/1    ABLA  Hypovolemic shock, resolved: Admitted to the ICU.  Status post multiple blood transfusion and treatment with vasopressor.    Continue ferrous sulfate  CBC today with improved hemoglobin to 9.0        Urinary retention, resolved: Successful voiding trial prior to hospital discharge  Monitor for retention    Chronic HFrEF (30%)  Ischemic cardiomyopathy  CAD: Not on diuretics at baseline  Continue metoprolol and atorvastatin  Monitor volume status.  3 times weekly weights.  Appears euvolemic      Atrial fibrillation  Chronic anticoagulation [warfarin 5 mg Monday and Friday.  2.5 mg all other  days]  INR today 2.2  Resume home regimen of warfarin 5 mg Monday and Friday and 2.5 mg all other days  Repeat INR 8/4      Orders:  As above      Electronically signed by: Elyssa Alcaraz PA-C       Sincerely,        Elyssa Alcaraz PA-C    Electronically signed

## 2025-07-30 ENCOUNTER — DISCHARGE SUMMARY NURSING HOME (OUTPATIENT)
Dept: GERIATRICS | Facility: CLINIC | Age: 85
End: 2025-07-30
Payer: COMMERCIAL

## 2025-07-30 VITALS
DIASTOLIC BLOOD PRESSURE: 87 MMHG | SYSTOLIC BLOOD PRESSURE: 147 MMHG | BODY MASS INDEX: 29.99 KG/M2 | WEIGHT: 214.2 LBS | TEMPERATURE: 97 F | OXYGEN SATURATION: 94 % | HEIGHT: 71 IN | HEART RATE: 78 BPM | RESPIRATION RATE: 14 BRPM

## 2025-07-30 DIAGNOSIS — I48.19 PERSISTENT ATRIAL FIBRILLATION (H): ICD-10-CM

## 2025-07-30 DIAGNOSIS — D62 ABLA (ACUTE BLOOD LOSS ANEMIA): ICD-10-CM

## 2025-07-30 DIAGNOSIS — Z96.649 S/P HIP HEMIARTHROPLASTY: Primary | ICD-10-CM

## 2025-07-30 DIAGNOSIS — I50.20 HFREF (HEART FAILURE WITH REDUCED EJECTION FRACTION) (H): ICD-10-CM

## 2025-07-30 DIAGNOSIS — I10 ESSENTIAL HYPERTENSION: ICD-10-CM

## 2025-07-30 PROCEDURE — 99316 NF DSCHRG MGMT 30 MIN+: CPT | Performed by: PHYSICIAN ASSISTANT

## 2025-07-30 NOTE — PROGRESS NOTES
[facility] GERIATRICS DISCHARGE SUMMARY  PATIENT'S NAME: Constantino Thorne  YOB: 1940  MEDICAL RECORD NUMBER:  [id number]  Place of Service where encounter took place:  [facility] [[id number]]    PRIMARY CARE PROVIDER AND CLINIC RESPONSIBLE AFTER TRANSFER:   Thom Barnes MD, [address]  / [place] [address]    [facility] Provider     Transferring providers: Elyssa Alcaraz PA-C, Dr. Tressa Garcia MD  Recent Hospitalization/ED:  [facility] Hospital stay 7/17/25 to 7/21/25.  Date of SNF Admission: July 21, 2025  Date of SNF (anticipated) Discharge: August 01, 2025  Discharged to: previous independent home  Cognitive Scores: SLUMS: 29/30  Physical Function: Ambulating 275 ft with FWW and SBA  DME: No new DME needed    CODE STATUS/ADVANCE DIRECTIVES DISCUSSION:  Full  ALLERGIES: Lisinopril and Spironolactone    NURSING FACILITY COURSE   Medication Changes/Rationale:   Oxycodone d/c due to lack of use    Summary of nursing facility stay:   Constantino Thorne is an exceptionally pleasant 84-year-old male with past medical history of CAD, ischemic cardiomyopathy, HFrEF, paroxysmal atrial fibrillation who was recently hospitalized at [facility].  Admitted for elective revision of right hemiarthroplasty to a total hip arthroplasty.  Operative course complicated by significant ABLA.  This resulted in hypovolemic shock.  He required admission to the ICU and was treated with PRBCs as well as vasopressors.  Hemoglobin ultimately stabilized in the mid eights.  Did struggle with some urinary retention immediately postoperatively but was able to have a successful voiding trial prior to discharge.    Admitted to TCU. Hgb remained stable. Progressed with therapy. Now discharging home    Constantino is evaluated for follow-up.  Continues to do well.  Pain is well-controlled with Tylenol alone.  Has orthopedic follow-up on Friday that he plans to attend after his  discharge from TCU    S/p revision of right total hip arthroplasty  Continue pain control with scheduled Tylenol.  Oxycodone discontinued at TCU due to lack of use.  50% weightbearing right lower extremity.  Hip precautions  PT/OT  Follow-up with orthopedics 8/1    ABLA  Hypovolemic shock, resolved: Admitted to the ICU.  Status post multiple blood transfusion and treatment with vasopressor.    Continue ferrous sulfate  CBC 7/28 with improved hemoglobin to 9.0        Urinary retention, resolved: Successful voiding trial prior to hospital discharge  Monitor for retention    Chronic HFrEF (30%)  Ischemic cardiomyopathy  CAD: Not on diuretics at baseline  Continue metoprolol and atorvastatin  Monitor volume status.  3 times weekly weights.  Appears euvolemic      Atrial fibrillation  Chronic anticoagulation [warfarin 5 mg Monday and Friday.  2.5 mg all other days]  Resume home regimen of warfarin 5 mg Monday and Friday and 2.5 mg all other days  Repeat INR 8/4 with home care.      Discharge Medications:  MED REC REQUIRED  Post Medication Reconciliation Status: discharge medications reconciled and changed, per note/orders       Current Outpatient Medications   Medication Sig Dispense Refill    acetaminophen (TYLENOL) 325 MG tablet Take 3 tablets (975 mg) by mouth every 8 hours. 60 tablet 0    amiodarone (PACERONE) 200 MG tablet Take 1 tablet (200 mg) by mouth every morning.      atorvastatin (LIPITOR) 80 MG tablet Take 80 mg by mouth At Bedtime       ferrous sulfate (FEROSUL) 325 (65 Fe) MG tablet Take 1 tablet (325 mg) by mouth daily (with breakfast).      metoprolol succinate ER (TOPROL XL) 25 MG 24 hr tablet Take 0.5 tablets (12.5 mg) by mouth 2 times daily.      nitroGLYcerin (NITROSTAT) 0.4 MG sublingual tablet For chest pain place 1 tablet under the tongue every 5 minutes for 3 doses. If symptoms persist 5 minutes after 1st dose call 911.      polyethylene glycol (MIRALAX) 17 GM/Dose powder Take 17 g by mouth  "daily. 510 g 0    senna-docusate (SENOKOT-S/PERICOLACE) 8.6-50 MG tablet Take 1 tablet by mouth 2 times daily. 30 tablet 0    warfarin ANTICOAGULANT (COUMADIN/JANTOVEN) 5 MG tablet 5 mg every Monday and Friday; 2.5 mg all other days.  Please have in INR checked at least weekly while at TCU to monitor.  Goal INR 2-3 for a-fib.        Controlled medications:   not applicable/none     Past Medical History:   Past Medical History:   Diagnosis Date    A-fib (H)     Antiplatelet or antithrombotic long-term use     Arrhythmia     CAD (coronary artery disease)     Hyperlipidemia LDL goal <100     Hypertension     ICD (implantable cardioverter-defibrillator) in place     Old myocardial infarction     SABINE (obstructive sleep apnea)     Pacemaker     Stented coronary artery     Subdural hematoma (H) 10/10/2024    Syncope 10/10/2024     Physical Exam:   Vitals: BP (!) 147/87   Pulse 78   Temp 97  F (36.1  C)   Resp 14   Ht 1.803 m (5' 11\")   Wt 97.2 kg (214 lb 3.2 oz)   SpO2 94%   BMI 29.87 kg/m    BMI: Body mass index is 29.87 kg/m .  Physical Exam  Vitals (In facility EMR) reviewed.   HENT:      Head: Normocephalic and atraumatic.   Eyes:      General: No scleral icterus.  Cardiovascular:      Rate and Rhythm: Normal rate and regular rhythm.   Pulmonary:      Effort: Pulmonary effort is normal.   Abdominal:      General: There is no distension.   Musculoskeletal:      Right lower leg: No edema.   Skin:     General: Skin is warm and dry.      Findings: No rash.   Neurological:      Mental Status: He is alert. Mental status is at baseline.   Psychiatric:         Behavior: Behavior normal.         SNF labs: Recent labs in [facility] reviewed by me today.  and Most Recent 3 CBC's:  Recent Labs   Lab Test 07/28/25  0636 07/21/25  0714 07/20/25  1903 07/20/25  0750   WBC 9.7 10.6  --  11.7*   HGB 9.0* 8.5* 8.7* 8.4*    94 95 95    138*  --  131*     Most Recent 3 BMP's:  Recent Labs   Lab Test 07/28/25  0636 " 07/21/25  0714 07/20/25  2154 07/20/25  0750    132*  --  135   POTASSIUM 4.5 4.1  --  4.4   CHLORIDE 104 103  --  102   CO2 24 20*  --  24   BUN 19.5 22.4  --  23.6*   CR 1.18* 1.02  --  1.07   ANIONGAP 10 9  --  9   ZACH 8.9 8.1*  --  8.0*   GLC 83 98 121* 92     Most Recent 2 LFT's:  Recent Labs   Lab Test 07/17/25  1950 07/17/25  1749   AST 29 28   ALT 22 23   ALKPHOS 100 101   BILITOTAL 1.0 1.0     Most Recent 3 Hemoglobins:  Recent Labs   Lab Test 07/28/25  0636 07/21/25  0714 07/20/25  1903   HGB 9.0* 8.5* 8.7*       DISCHARGE PLAN:  Follow up labs: No labs orders/due  Medical Follow Up:      Follow up with primary care provider in 1 weeks   Follow-up with orthopedics/1 as scheduled.  Current [place] scheduled appointments:  Appointments in Next Year    No future appts.    Discharge Services: Home Care:  Occupational Therapy, Physical Therapy, Registered Nurse, and Home Health Aide  Discharge Instructions Verbalized to Patient at Discharge:   Weight bearing restrictions:  Partial weight bearing (30 - 50%). 50%    TOTAL DISCHARGE TIME:   Greater than 30 minutes  Electronically signed by:  Elyssa Alcaraz PA-C     Documentation of Face to Face and Certification for Home Health Services    I certify that services are/were furnished while this patient was under the care of a physician and that a physician or an allowed non-physician practitioner (NPP), had a face-to-face encounter that meets the physician face-to-face encounter requirements. The encounter was in whole, or in part, related to the primary reason for home health. The patient is confined to his/her home and needs intermittent skilled nursing, physical therapy, speech-language pathology, or the continued need for occupational therapy. A plan of care has been established by a physician and is periodically reviewed by a physician.  Date of Face-to-Face Encounter: 7/31/2025.    I certify that, based on my findings, the following services are  medically necessary home health services: Nursing, Occupational Therapy, and Physical Therapy.    My clinical findings support the need for the above skilled services because: Requires assistance of another person or specialized equipment to access medical services because patient: Requires supervision of another for safe transfer...    Patient to re-establish plan of care with their PCP within 7-10 days after leaving the facility to reestablish care.  Medicare certified PECOS provider: Elyssa Alcaraz PA-C  Date: July 31, 2025

## 2025-07-30 NOTE — LETTER
7/30/2025      Constantino Thorne  [address]  [place] [address]        [facility] GERIATRICS DISCHARGE SUMMARY  PATIENT'S NAME: Constantino Thorne  YOB: 1940  MEDICAL RECORD NUMBER:  [id number]  Place of Service where encounter took place:  [facility] [[id number]]    PRIMARY CARE PROVIDER AND CLINIC RESPONSIBLE AFTER TRANSFER:   Thom Barnes MD, [address] [facility] / [place] [address]    [facility] Provider     Transferring providers: Elyssa Alcaraz PA-C, Dr. Tressa Garcia MD  Recent Hospitalization/ED:  [facility] Hospital stay 7/17/25 to 7/21/25.  Date of SNF Admission: July 21, 2025  Date of SNF (anticipated) Discharge: August 01, 2025  Discharged to: previous independent home  Cognitive Scores: SLUMS: 29/30  Physical Function: Ambulating 275 ft with FWW and SBA  DME: No new DME needed    CODE STATUS/ADVANCE DIRECTIVES DISCUSSION:  Full  ALLERGIES: Lisinopril and Spironolactone    NURSING FACILITY COURSE   Medication Changes/Rationale:   Oxycodone d/c due to lack of use    Summary of nursing facility stay:   Constantino Thorne is an exceptionally pleasant 84-year-old male with past medical history of CAD, ischemic cardiomyopathy, HFrEF, paroxysmal atrial fibrillation who was recently hospitalized at [facility].  Admitted for elective revision of right hemiarthroplasty to a total hip arthroplasty.  Operative course complicated by significant ABLA.  This resulted in hypovolemic shock.  He required admission to the ICU and was treated with PRBCs as well as vasopressors.  Hemoglobin ultimately stabilized in the mid eights.  Did struggle with some urinary retention immediately postoperatively but was able to have a successful voiding trial prior to discharge.    Admitted to TCU. Hgb remained stable. Progressed with therapy. Now discharging home    Constantino is evaluated for follow-up.  Continues to do well.  Pain is well-controlled with Tylenol  alone.  Has orthopedic follow-up on Friday that he plans to attend after his discharge from TCU    S/p revision of right total hip arthroplasty  Continue pain control with scheduled Tylenol.  Oxycodone discontinued at TCU due to lack of use.  50% weightbearing right lower extremity.  Hip precautions  PT/OT  Follow-up with orthopedics 8/1    ABLA  Hypovolemic shock, resolved: Admitted to the ICU.  Status post multiple blood transfusion and treatment with vasopressor.    Continue ferrous sulfate  CBC 7/28 with improved hemoglobin to 9.0        Urinary retention, resolved: Successful voiding trial prior to hospital discharge  Monitor for retention    Chronic HFrEF (30%)  Ischemic cardiomyopathy  CAD: Not on diuretics at baseline  Continue metoprolol and atorvastatin  Monitor volume status.  3 times weekly weights.  Appears euvolemic      Atrial fibrillation  Chronic anticoagulation [warfarin 5 mg Monday and Friday.  2.5 mg all other days]  Resume home regimen of warfarin 5 mg Monday and Friday and 2.5 mg all other days  Repeat INR 8/4 with home care.      Discharge Medications:  MED REC REQUIRED  Post Medication Reconciliation Status: discharge medications reconciled and changed, per note/orders       Current Outpatient Medications   Medication Sig Dispense Refill     acetaminophen (TYLENOL) 325 MG tablet Take 3 tablets (975 mg) by mouth every 8 hours. 60 tablet 0     amiodarone (PACERONE) 200 MG tablet Take 1 tablet (200 mg) by mouth every morning.       atorvastatin (LIPITOR) 80 MG tablet Take 80 mg by mouth At Bedtime        ferrous sulfate (FEROSUL) 325 (65 Fe) MG tablet Take 1 tablet (325 mg) by mouth daily (with breakfast).       metoprolol succinate ER (TOPROL XL) 25 MG 24 hr tablet Take 0.5 tablets (12.5 mg) by mouth 2 times daily.       nitroGLYcerin (NITROSTAT) 0.4 MG sublingual tablet For chest pain place 1 tablet under the tongue every 5 minutes for 3 doses. If symptoms persist 5 minutes after 1st dose call  "911.       polyethylene glycol (MIRALAX) 17 GM/Dose powder Take 17 g by mouth daily. 510 g 0     senna-docusate (SENOKOT-S/PERICOLACE) 8.6-50 MG tablet Take 1 tablet by mouth 2 times daily. 30 tablet 0     warfarin ANTICOAGULANT (COUMADIN/JANTOVEN) 5 MG tablet 5 mg every Monday and Friday; 2.5 mg all other days.  Please have in INR checked at least weekly while at TCU to monitor.  Goal INR 2-3 for a-fib.        Controlled medications:   not applicable/none     Past Medical History:   Past Medical History:   Diagnosis Date     A-fib (H)      Antiplatelet or antithrombotic long-term use      Arrhythmia      CAD (coronary artery disease)      Hyperlipidemia LDL goal <100      Hypertension      ICD (implantable cardioverter-defibrillator) in place      Old myocardial infarction      SABINE (obstructive sleep apnea)      Pacemaker      Stented coronary artery      Subdural hematoma (H) 10/10/2024     Syncope 10/10/2024     Physical Exam:   Vitals: BP (!) 147/87   Pulse 78   Temp 97  F (36.1  C)   Resp 14   Ht 1.803 m (5' 11\")   Wt 97.2 kg (214 lb 3.2 oz)   SpO2 94%   BMI 29.87 kg/m    BMI: Body mass index is 29.87 kg/m .  Physical Exam  Vitals (In facility EMR) reviewed.   HENT:      Head: Normocephalic and atraumatic.   Eyes:      General: No scleral icterus.  Cardiovascular:      Rate and Rhythm: Normal rate and regular rhythm.   Pulmonary:      Effort: Pulmonary effort is normal.   Abdominal:      General: There is no distension.   Musculoskeletal:      Right lower leg: No edema.   Skin:     General: Skin is warm and dry.      Findings: No rash.   Neurological:      Mental Status: He is alert. Mental status is at baseline.   Psychiatric:         Behavior: Behavior normal.         SNF labs: Recent labs in [facility] reviewed by me today.  and Most Recent 3 CBC's:  Recent Labs   Lab Test 07/28/25  0636 07/21/25  0714 07/20/25  1903 07/20/25  0750   WBC 9.7 10.6  --  11.7*   HGB 9.0* 8.5* 8.7* 8.4*    94 95 95   PLT " 309 138*  --  131*     Most Recent 3 BMP's:  Recent Labs   Lab Test 07/28/25  0636 07/21/25  0714 07/20/25  2154 07/20/25  0750    132*  --  135   POTASSIUM 4.5 4.1  --  4.4   CHLORIDE 104 103  --  102   CO2 24 20*  --  24   BUN 19.5 22.4  --  23.6*   CR 1.18* 1.02  --  1.07   ANIONGAP 10 9  --  9   ZACH 8.9 8.1*  --  8.0*   GLC 83 98 121* 92     Most Recent 2 LFT's:  Recent Labs   Lab Test 07/17/25  1950 07/17/25  1749   AST 29 28   ALT 22 23   ALKPHOS 100 101   BILITOTAL 1.0 1.0     Most Recent 3 Hemoglobins:  Recent Labs   Lab Test 07/28/25  0636 07/21/25  0714 07/20/25  1903   HGB 9.0* 8.5* 8.7*       DISCHARGE PLAN:  Follow up labs: No labs orders/due  Medical Follow Up:      Follow up with primary care provider in 1 weeks   Follow-up with orthopedics/1 as scheduled.  Current [place] scheduled appointments:  Appointments in Next Year    No future appts.    Discharge Services: Home Care:  Occupational Therapy, Physical Therapy, Registered Nurse, and Home Health Aide  Discharge Instructions Verbalized to Patient at Discharge:   Weight bearing restrictions:  Partial weight bearing (30 - 50%). 50%    TOTAL DISCHARGE TIME:   Greater than 30 minutes  Electronically signed by:  Elyssa Alcaraz PA-C     Documentation of Face to Face and Certification for Home Health Services    I certify that services are/were furnished while this patient was under the care of a physician and that a physician or an allowed non-physician practitioner (NPP), had a face-to-face encounter that meets the physician face-to-face encounter requirements. The encounter was in whole, or in part, related to the primary reason for home health. The patient is confined to his/her home and needs intermittent skilled nursing, physical therapy, speech-language pathology, or the continued need for occupational therapy. A plan of care has been established by a physician and is periodically reviewed by a physician.  Date of Face-to-Face  Encounter: 7/31/2025.    I certify that, based on my findings, the following services are medically necessary home health services: Nursing, Occupational Therapy, and Physical Therapy.    My clinical findings support the need for the above skilled services because: Requires assistance of another person or specialized equipment to access medical services because patient: Requires supervision of another for safe transfer...    Patient to re-establish plan of care with their PCP within 7-10 days after leaving the facility to reestablish care.  Medicare certified PECOS provider: Elyssa Alcaraz PA-C  Date: July 31, 2025                 Sincerely,        Elyssa Alcaraz PA-C    Electronically signed

## (undated) DEVICE — BLADE SAW SAGITTAL STRK 18X90X1.27MM HD SYS 6 6118-127-090

## (undated) DEVICE — SU ETHIBOND 5 V-37 4X30" MB66G

## (undated) DEVICE — PACK TOTAL HIP RIDGES LATEX PO15HIFSG

## (undated) DEVICE — BLADE SAW SAGITTAL STRK 25X90X1.27MM HD SYS 6 6125-127-090

## (undated) DEVICE — SU VICRYL+ 1 MO-4 18" DYED VCP702D

## (undated) DEVICE — PREP CHLORAPREP 26ML TINTED HI-LITE ORANGE 930815

## (undated) DEVICE — SOL NACL 0.9% IRRIG 3000ML BAG 2B7477

## (undated) DEVICE — SUCTION TIP YANKAUER W/O VENT K86

## (undated) DEVICE — SU STRATAFIX PDS PLUS 1 CT-1 12" SXPP1A443

## (undated) DEVICE — GLOVE BIOGEL PI MICRO INDICATOR UNDERGLOVE SZ 8.0 48980

## (undated) DEVICE — CATH TRAY FOLEY COUDE 16FR STATLOCK LATEX 304416A

## (undated) DEVICE — LINEN FULL SHEET 5511

## (undated) DEVICE — SET HANDPIECE INTERPULSE W/COAXIAL FAN SPRAY TIP 0210118000

## (undated) DEVICE — LINEN DRAPE 54X72" 5467

## (undated) DEVICE — Device

## (undated) DEVICE — GLOVE PROTEXIS BLUE W/NEU-THERA 8.5  2D73EB85

## (undated) DEVICE — DRAPE CONVERTORS U-DRAPE 60X72" 8476

## (undated) DEVICE — SU MONOCRYL 3-0 PS-2 27" Y427H

## (undated) DEVICE — BONE CLEANING TIP INTERPULSE FEMORAL CANAL 0210-007-000

## (undated) DEVICE — IMM PILLOW ABDUCT HIP MED

## (undated) DEVICE — BRUSH FEMORAL CANAL 210-4 0210004000

## (undated) DEVICE — SUTURE VICRYL+ 2-0 36IN CT-1 UND VCP945H

## (undated) DEVICE — TUBING SUCTION 12"X1/4" N612

## (undated) DEVICE — LINEN HALF SHEET 5512

## (undated) DEVICE — LINEN ORTHO ACL PACK 5447

## (undated) DEVICE — GLOVE BIOGEL PI MICRO INDICATOR UNDERGLOVE SZ 6.5 48965

## (undated) DEVICE — GLOVE BIOGEL PI SZ 6.0 40860

## (undated) DEVICE — GLOVE BIOGEL PI SZ 6.5 40865

## (undated) DEVICE — SPONGE BONE WICK FEMORAL W/SUCTION ATTACHMENT 0206-714-000

## (undated) DEVICE — DRAPE IOBAN INCISE 23X17" 6650EZ

## (undated) DEVICE — GLOVE PROTEXIS POWDER FREE 8.5 ORTHO LIME 2D73ET85

## (undated) DEVICE — SU VICRYL+ 2-0 CT1 27IN CR UND VCPP42D

## (undated) DEVICE — HOOD SURG T7PLUS PEEL AWAY FACE SHIELD STRL LF 0416-801-100

## (undated) DEVICE — SUCTION MANIFOLD NEPTUNE 2 SYS 4 PORT 0702-020-000

## (undated) DEVICE — ESU PENCIL SMOKE EVAC W/ROCKER SWITCH 0703-047-000

## (undated) DEVICE — SOLUTION IV IRRIGATION 0.9% NACL 3L R8206

## (undated) DEVICE — SU STRATAFIX PDS PLUS 2 CT-1 SPIRAL L9 IN SXPP2B412

## (undated) DEVICE — GLOVE BIOGEL PI SZ 8.0 40880

## (undated) DEVICE — BAG CLEAR TRASH 1.3M 39X33" P4040C

## (undated) DEVICE — CLEANSER WOUND IRRISEPT 0.05% CHG IRRISEPT-403

## (undated) DEVICE — DRAPE STERI TOWEL LG 1010

## (undated) DEVICE — GLOVE PROTEXIS BLUE W/NEU-THERA 6.5  2D73EB65

## (undated) DEVICE — SU ETHIBOND 2 V-37 4X30" MX69G

## (undated) DEVICE — DRSG AQUACEL AG HYDROFIBER  3.5X10" 422605

## (undated) RX ORDER — KETOROLAC TROMETHAMINE 30 MG/ML
INJECTION, SOLUTION INTRAMUSCULAR; INTRAVENOUS
Status: DISPENSED
Start: 2025-07-17

## (undated) RX ORDER — FENTANYL CITRATE-0.9 % NACL/PF 10 MCG/ML
PLASTIC BAG, INJECTION (ML) INTRAVENOUS
Status: DISPENSED
Start: 2025-07-17

## (undated) RX ORDER — FENTANYL CITRATE-0.9 % NACL/PF 10 MCG/ML
PLASTIC BAG, INJECTION (ML) INTRAVENOUS
Status: DISPENSED
Start: 2024-11-04

## (undated) RX ORDER — DEXAMETHASONE SODIUM PHOSPHATE 4 MG/ML
INJECTION, SOLUTION INTRA-ARTICULAR; INTRALESIONAL; INTRAMUSCULAR; INTRAVENOUS; SOFT TISSUE
Status: DISPENSED
Start: 2024-11-04

## (undated) RX ORDER — CEFAZOLIN SODIUM/WATER 2 G/20 ML
SYRINGE (ML) INTRAVENOUS
Status: DISPENSED
Start: 2024-11-04

## (undated) RX ORDER — PROPOFOL 10 MG/ML
INJECTION, EMULSION INTRAVENOUS
Status: DISPENSED
Start: 2024-11-04

## (undated) RX ORDER — METHADONE HYDROCHLORIDE 10 MG/ML
INJECTION, SOLUTION INTRAMUSCULAR; INTRAVENOUS; SUBCUTANEOUS
Status: DISPENSED
Start: 2025-07-17

## (undated) RX ORDER — TRANEXAMIC ACID 10 MG/ML
INJECTION, SOLUTION INTRAVENOUS
Status: DISPENSED
Start: 2024-11-04

## (undated) RX ORDER — VANCOMYCIN HYDROCHLORIDE 1 G/20ML
INJECTION, POWDER, LYOPHILIZED, FOR SOLUTION INTRAVENOUS
Status: DISPENSED
Start: 2024-11-04

## (undated) RX ORDER — TOBRAMYCIN 1.2 G/30ML
INJECTION, POWDER, LYOPHILIZED, FOR SOLUTION INTRAVENOUS
Status: DISPENSED
Start: 2024-11-04

## (undated) RX ORDER — VANCOMYCIN HYDROCHLORIDE 1 G/20ML
INJECTION, POWDER, LYOPHILIZED, FOR SOLUTION INTRAVENOUS
Status: DISPENSED
Start: 2025-07-17

## (undated) RX ORDER — TRANEXAMIC ACID 650 MG/1
TABLET ORAL
Status: DISPENSED
Start: 2025-07-17

## (undated) RX ORDER — LIDOCAINE HYDROCHLORIDE 10 MG/ML
INJECTION, SOLUTION EPIDURAL; INFILTRATION; INTRACAUDAL; PERINEURAL
Status: DISPENSED
Start: 2024-11-04

## (undated) RX ORDER — ONDANSETRON 2 MG/ML
INJECTION INTRAMUSCULAR; INTRAVENOUS
Status: DISPENSED
Start: 2024-11-04

## (undated) RX ORDER — CEFAZOLIN SODIUM/WATER 2 G/20 ML
SYRINGE (ML) INTRAVENOUS
Status: DISPENSED
Start: 2025-07-17

## (undated) RX ORDER — EPHEDRINE SULFATE 50 MG/ML
INJECTION, SOLUTION INTRAVENOUS
Status: DISPENSED
Start: 2025-07-17

## (undated) RX ORDER — FENTANYL CITRATE 50 UG/ML
INJECTION, SOLUTION INTRAMUSCULAR; INTRAVENOUS
Status: DISPENSED
Start: 2024-11-04

## (undated) RX ORDER — ACETAMINOPHEN 325 MG/1
TABLET ORAL
Status: DISPENSED
Start: 2025-07-17